# Patient Record
Sex: FEMALE | Race: WHITE | NOT HISPANIC OR LATINO | Employment: OTHER | URBAN - METROPOLITAN AREA
[De-identification: names, ages, dates, MRNs, and addresses within clinical notes are randomized per-mention and may not be internally consistent; named-entity substitution may affect disease eponyms.]

---

## 2017-01-11 ENCOUNTER — LAB (OUTPATIENT)
Dept: LAB | Facility: HOSPITAL | Age: 72
End: 2017-01-11

## 2017-01-11 DIAGNOSIS — N64.4 MASTODYNIA: Primary | ICD-10-CM

## 2017-01-11 LAB
ESTRADIOL SERPL HS-MCNC: 25 PG/ML
PROGEST SERPL-MCNC: 0.44 NG/ML

## 2017-01-11 PROCEDURE — 36415 COLL VENOUS BLD VENIPUNCTURE: CPT

## 2017-01-11 PROCEDURE — 84144 ASSAY OF PROGESTERONE: CPT | Performed by: OBSTETRICS & GYNECOLOGY

## 2017-01-11 PROCEDURE — 82670 ASSAY OF TOTAL ESTRADIOL: CPT | Performed by: OBSTETRICS & GYNECOLOGY

## 2017-01-12 ENCOUNTER — TRANSCRIBE ORDERS (OUTPATIENT)
Dept: ADMINISTRATIVE | Facility: HOSPITAL | Age: 72
End: 2017-01-12

## 2017-01-12 DIAGNOSIS — N63.0 MASS OF BREAST: Primary | ICD-10-CM

## 2017-01-24 ENCOUNTER — HOSPITAL ENCOUNTER (OUTPATIENT)
Dept: ULTRASOUND IMAGING | Facility: HOSPITAL | Age: 72
Discharge: HOME | End: 2017-01-24

## 2017-01-24 ENCOUNTER — HOSPITAL ENCOUNTER (OUTPATIENT)
Dept: MAMMOGRAPHY | Facility: HOSPITAL | Age: 72
Discharge: HOME | End: 2017-01-24
Attending: OBSTETRICS & GYNECOLOGY | Admitting: OBSTETRICS & GYNECOLOGY

## 2017-01-24 DIAGNOSIS — N63.0 MASS OF BREAST: ICD-10-CM

## 2017-01-24 PROCEDURE — G0279 TOMOSYNTHESIS, MAMMO: HCPCS | Performed by: RADIOLOGY

## 2017-01-24 PROCEDURE — G0204 DX MAMMO INCL CAD BI: HCPCS

## 2017-01-24 PROCEDURE — G0204 DX MAMMO INCL CAD BI: HCPCS | Performed by: RADIOLOGY

## 2017-01-24 PROCEDURE — 76642 ULTRASOUND BREAST LIMITED: CPT

## 2017-01-24 PROCEDURE — 76642 ULTRASOUND BREAST LIMITED: CPT | Performed by: RADIOLOGY

## 2017-01-24 PROCEDURE — G0279 TOMOSYNTHESIS, MAMMO: HCPCS

## 2017-04-04 ENCOUNTER — OFFICE VISIT (OUTPATIENT)
Dept: ENDOCRINOLOGY | Facility: CLINIC | Age: 72
End: 2017-04-04

## 2017-04-04 VITALS
DIASTOLIC BLOOD PRESSURE: 74 MMHG | HEIGHT: 63 IN | HEART RATE: 78 BPM | SYSTOLIC BLOOD PRESSURE: 148 MMHG | BODY MASS INDEX: 21.79 KG/M2 | OXYGEN SATURATION: 98 % | WEIGHT: 123 LBS

## 2017-04-04 DIAGNOSIS — E78.01 ESSENTIAL FAMILIAL HYPERCHOLESTEROLEMIA: ICD-10-CM

## 2017-04-04 DIAGNOSIS — K29.90 GASTRODUODENITIS WITHOUT BLEEDING: ICD-10-CM

## 2017-04-04 DIAGNOSIS — I10 BENIGN ESSENTIAL HYPERTENSION: Primary | ICD-10-CM

## 2017-04-04 DIAGNOSIS — G62.89 OTHER POLYNEUROPATHY: ICD-10-CM

## 2017-04-04 DIAGNOSIS — K21.00 GASTROESOPHAGEAL REFLUX DISEASE WITH ESOPHAGITIS: ICD-10-CM

## 2017-04-04 DIAGNOSIS — E55.9 VITAMIN D DEFICIENCY: ICD-10-CM

## 2017-04-04 DIAGNOSIS — R10.13 EPIGASTRIC PAIN: ICD-10-CM

## 2017-04-04 DIAGNOSIS — R61 NIGHT SWEATS: ICD-10-CM

## 2017-04-04 DIAGNOSIS — K22.0 ACHALASIA: ICD-10-CM

## 2017-04-04 PROBLEM — R53.83 FATIGUE: Status: ACTIVE | Noted: 2017-04-04

## 2017-04-04 PROBLEM — E03.9 HYPOTHYROIDISM: Status: ACTIVE | Noted: 2017-04-04

## 2017-04-04 PROBLEM — Z98.890 H/O COLONOSCOPY: Status: ACTIVE | Noted: 2017-04-04

## 2017-04-04 LAB
ALBUMIN SERPL-MCNC: 4.8 G/DL (ref 3.2–4.8)
ALBUMIN/GLOB SERPL: 1.7 G/DL (ref 1.5–2.5)
ALP SERPL-CCNC: 68 U/L (ref 25–100)
ALT SERPL W P-5'-P-CCNC: 10 U/L (ref 7–40)
ANION GAP SERPL CALCULATED.3IONS-SCNC: 11 MMOL/L (ref 3–11)
ARTICHOKE IGE QN: 109 MG/DL (ref 0–130)
AST SERPL-CCNC: 20 U/L (ref 0–33)
BASOPHILS # BLD AUTO: 0.02 10*3/MM3 (ref 0–0.2)
BASOPHILS NFR BLD AUTO: 0.2 % (ref 0–1)
BILIRUB SERPL-MCNC: 0.6 MG/DL (ref 0.3–1.2)
BUN BLD-MCNC: 10 MG/DL (ref 9–23)
BUN/CREAT SERPL: 12.5 (ref 7–25)
CALCIUM SPEC-SCNC: 10.5 MG/DL (ref 8.7–10.4)
CHLORIDE SERPL-SCNC: 103 MMOL/L (ref 99–109)
CHOLEST SERPL-MCNC: 206 MG/DL (ref 0–200)
CO2 SERPL-SCNC: 26 MMOL/L (ref 20–31)
CREAT BLD-MCNC: 0.8 MG/DL (ref 0.6–1.3)
DEPRECATED RDW RBC AUTO: 42.6 FL (ref 37–54)
EOSINOPHIL # BLD AUTO: 0.05 10*3/MM3 (ref 0.1–0.3)
EOSINOPHIL NFR BLD AUTO: 0.6 % (ref 0–3)
ERYTHROCYTE [DISTWIDTH] IN BLOOD BY AUTOMATED COUNT: 12.3 % (ref 11.3–14.5)
ERYTHROCYTE [SEDIMENTATION RATE] IN BLOOD: 12 MM/HR (ref 0–30)
FERRITIN SERPL-MCNC: 21 NG/ML (ref 10–291)
GFR SERPL CREATININE-BSD FRML MDRD: 70 ML/MIN/1.73
GLOBULIN UR ELPH-MCNC: 2.9 GM/DL
GLUCOSE BLD-MCNC: 151 MG/DL (ref 70–100)
HCT VFR BLD AUTO: 44.9 % (ref 34.5–44)
HDLC SERPL-MCNC: 85 MG/DL (ref 40–60)
HGB BLD-MCNC: 14.6 G/DL (ref 11.5–15.5)
IMM GRANULOCYTES # BLD: 0.01 10*3/MM3 (ref 0–0.03)
IMM GRANULOCYTES NFR BLD: 0.1 % (ref 0–0.6)
LIPASE SERPL-CCNC: 50 U/L (ref 6–51)
LYMPHOCYTES # BLD AUTO: 1.61 10*3/MM3 (ref 0.6–4.8)
LYMPHOCYTES NFR BLD AUTO: 18.4 % (ref 24–44)
MCH RBC QN AUTO: 30.7 PG (ref 27–31)
MCHC RBC AUTO-ENTMCNC: 32.5 G/DL (ref 32–36)
MCV RBC AUTO: 94.3 FL (ref 80–99)
MONOCYTES # BLD AUTO: 0.5 10*3/MM3 (ref 0–1)
MONOCYTES NFR BLD AUTO: 5.7 % (ref 0–12)
NEUTROPHILS # BLD AUTO: 6.54 10*3/MM3 (ref 1.5–8.3)
NEUTROPHILS NFR BLD AUTO: 75 % (ref 41–71)
PLATELET # BLD AUTO: 213 10*3/MM3 (ref 150–450)
PMV BLD AUTO: 10.3 FL (ref 6–12)
POTASSIUM BLD-SCNC: 4.1 MMOL/L (ref 3.5–5.5)
PROT SERPL-MCNC: 7.7 G/DL (ref 5.7–8.2)
RBC # BLD AUTO: 4.76 10*6/MM3 (ref 3.89–5.14)
SODIUM BLD-SCNC: 140 MMOL/L (ref 132–146)
TRIGL SERPL-MCNC: 109 MG/DL (ref 0–150)
TSH SERPL DL<=0.05 MIU/L-ACNC: 0.88 MIU/ML (ref 0.35–5.35)
WBC NRBC COR # BLD: 8.73 10*3/MM3 (ref 3.5–10.8)

## 2017-04-04 PROCEDURE — 80061 LIPID PANEL: CPT | Performed by: INTERNAL MEDICINE

## 2017-04-04 PROCEDURE — 82728 ASSAY OF FERRITIN: CPT | Performed by: INTERNAL MEDICINE

## 2017-04-04 PROCEDURE — 99214 OFFICE O/P EST MOD 30 MIN: CPT | Performed by: INTERNAL MEDICINE

## 2017-04-04 PROCEDURE — 80053 COMPREHEN METABOLIC PANEL: CPT | Performed by: INTERNAL MEDICINE

## 2017-04-04 PROCEDURE — 83690 ASSAY OF LIPASE: CPT | Performed by: INTERNAL MEDICINE

## 2017-04-04 PROCEDURE — 85025 COMPLETE CBC W/AUTO DIFF WBC: CPT | Performed by: INTERNAL MEDICINE

## 2017-04-04 PROCEDURE — 85652 RBC SED RATE AUTOMATED: CPT | Performed by: INTERNAL MEDICINE

## 2017-04-04 PROCEDURE — 84443 ASSAY THYROID STIM HORMONE: CPT | Performed by: INTERNAL MEDICINE

## 2017-04-04 NOTE — ASSESSMENT & PLAN NOTE
With h/o gerd despite ppi, night sweats  Epigastric abdominal pain   Check egd  Ct with oral contrast   F/u after these are complete

## 2017-04-04 NOTE — PROGRESS NOTES
Brittny Nicolas 71 y.o.  CC:Follow-up; Hypothyroidism; Hypertension; and Hyperlipidemia    [place]: Follow-up; Hypothyroidism; Hypertension; and Hyperlipidemia    bp is good   Cc is problems with insomnia and night sweats  No etiology determined  Does have GERD at times   Is getting up to empty bladder 3-4 times a night   She is reducing food and liquids after 7-8 pm  Had mamm Dr Whelan- was negative  Pain is epigastric - had abnormal barium study 2015 and was seeing GI Dr Anaya but she cannot recall if EGD at the time   Pain in pit of stomach  No abd scan or recent egd  Is taking ppi regularly but symptoms still severe at times    No Known Allergies    Current Outpatient Prescriptions:   •  amLODIPine (NORVASC) 2.5 MG tablet, Take 1 tablet by mouth Daily., Disp: 30 tablet, Rfl: 5  •  aspirin 81 MG tablet, Take  by mouth., Disp: , Rfl:   •  Calcium-Magnesium (CALCIUM MAGNESIUM 750) 300-300 MG tablet, Take 2 tablets by mouth Daily., Disp: , Rfl:   •  Cholecalciferol (VITAMIN D) 2000 UNITS capsule, Take 1 capsule by mouth., Disp: , Rfl:   •  levothyroxine (SYNTHROID, LEVOTHROID) 125 MCG tablet, Take 1 tablet by mouth Daily., Disp: 30 tablet, Rfl: 5  Patient Active Problem List    Diagnosis   • Achalasia [K22.0]   • Chest pain [R07.9]   • Benign essential hypertension [I10]   • Carotid artery stenosis [I65.29]   • Cataract [H26.9]   • Cough [R05]   • Acute cystitis [N30.00]   • Screening for malignant neoplasm [Z12.9]   • GERD (gastroesophageal reflux disease) [K21.9]   • Essential familial hypercholesterolemia [E78.01]   • Gastrointestinal bleeding [K92.2]   • Inguinal hernia [K40.90]   • Insomnia [G47.00]   • Meniere's disease [H81.09]   • Night sweats [R61]   • Osteoporosis [M81.0]   • Peripheral neuropathy [G62.9]   • Retinal artery occlusion [H34.9]   • Rheumatoid arthritis [M06.9]   • Stroke syndrome [I63.9]   • Suprapubic pain [R10.2]   • Transient ischemic attack [G45.9]   • Positive TB test [R76.11]   • Vitamin D  deficiency [E55.9]   • Weight loss [R63.4]     Review of Systems   Constitutional: Positive for fatigue. Negative for activity change, appetite change, chills, diaphoresis, fever and unexpected weight change.   HENT: Negative for congestion, dental problem, drooling, ear discharge, ear pain, facial swelling, hearing loss, mouth sores, nosebleeds, postnasal drip, rhinorrhea, sinus pressure, sneezing, sore throat, tinnitus, trouble swallowing and voice change.    Eyes: Negative for photophobia, pain, discharge, redness, itching and visual disturbance.   Respiratory: Negative for apnea, cough, choking, chest tightness, shortness of breath, wheezing and stridor.    Cardiovascular: Negative for chest pain, palpitations and leg swelling.   Gastrointestinal: Positive for abdominal pain. Negative for abdominal distention, anal bleeding, blood in stool, constipation, diarrhea, nausea, rectal pain and vomiting.        Gerd with gastritis    Endocrine: Negative for cold intolerance, heat intolerance, polydipsia, polyphagia and polyuria.        Night sweats    Genitourinary: Negative for decreased urine volume, difficulty urinating, dysuria, enuresis, flank pain, frequency, genital sores, hematuria and urgency.   Musculoskeletal: Positive for arthralgias. Negative for back pain, gait problem, joint swelling, myalgias, neck pain and neck stiffness.   Skin: Negative for color change, pallor, rash and wound.   Allergic/Immunologic: Negative for environmental allergies, food allergies and immunocompromised state.   Neurological: Negative for dizziness, tremors, seizures, syncope, facial asymmetry, speech difficulty, weakness, light-headedness, numbness and headaches.   Hematological: Negative for adenopathy. Does not bruise/bleed easily.   Psychiatric/Behavioral: Negative for agitation, behavioral problems, confusion, decreased concentration, dysphoric mood, hallucinations, self-injury, sleep disturbance and suicidal ideas. The  "patient is not nervous/anxious and is not hyperactive.      Social History     Social History   • Marital status:      Spouse name: N/A   • Number of children: N/A   • Years of education: N/A     Occupational History   • Not on file.     Social History Main Topics   • Smoking status: Former Smoker   • Smokeless tobacco: Not on file   • Alcohol use Not on file   • Drug use: Not on file   • Sexual activity: Not on file     Other Topics Concern   • Not on file     Social History Narrative     Family History   Problem Relation Age of Onset   • Cystic fibrosis Other    • Cervical cancer Mother    • Cancer Paternal Grandmother      /74  Pulse 98  Ht 63\" (160 cm)  Wt 123 lb (55.8 kg)  SpO2 (!) 78%  BMI 21.79 kg/m2  Physical Exam   Constitutional: She is oriented to person, place, and time. She appears well-developed and well-nourished.   HENT:   Head: Normocephalic and atraumatic.   Nose: Nose normal.   Mouth/Throat: Oropharynx is clear and moist.   Eyes: Conjunctivae, EOM and lids are normal. Pupils are equal, round, and reactive to light.   Neck: Trachea normal and normal range of motion. Neck supple. Carotid bruit is not present. No tracheal deviation present. No thyroid mass and no thyromegaly present.   Cardiovascular: Normal rate, regular rhythm, normal heart sounds and intact distal pulses.  Exam reveals no gallop and no friction rub.    No murmur heard.  Pulmonary/Chest: Effort normal and breath sounds normal. No respiratory distress. She has no wheezes.   Abdominal: Soft. Bowel sounds are normal. She exhibits no distension. There is tenderness (epigastric without mass or adenopathy ).   Musculoskeletal: Normal range of motion. She exhibits no edema or deformity.   Lymphadenopathy:     She has no cervical adenopathy.   Neurological: She is alert and oriented to person, place, and time. She has normal reflexes. She displays normal reflexes. No cranial nerve deficit.   Skin: Skin is warm and dry. " No rash noted. No cyanosis or erythema. Nails show no clubbing.   Psychiatric: She has a normal mood and affect. Her speech is normal and behavior is normal. Judgment and thought content normal. Cognition and memory are normal.   Nursing note and vitals reviewed.    Results for orders placed or performed in visit on 01/11/17   Estradiol   Result Value Ref Range    Estradiol 25.0 pg/mL   Progesterone   Result Value Ref Range    Progesterone 0.44 ng/mL     Problem List Items Addressed This Visit        Cardiovascular and Mediastinum    Benign essential hypertension - Primary     bp is high/ normal - she is testing at home and levels are normal / not over 145/85  Continue to test at home   Call or email if over goal at any time  Continue current medications          Relevant Orders    Comprehensive Metabolic Panel    CBC & Differential    CBC Auto Differential    Essential familial hypercholesterolemia     Check flp          Relevant Orders    TSH    Lipid Panel       Digestive    Achalasia     With h/o gerd despite ppi, night sweats  Epigastric abdominal pain   Check egd  Ct with oral contrast   F/u after these are complete         Relevant Orders    Ferritin    Sedimentation Rate    GERD (gastroesophageal reflux disease)     Abnormal study 2/15- repeat with egd          Relevant Orders    Ambulatory referral for Screening EGD    CT Abdomen Pelvis Without Contrast    Lipase    Vitamin D deficiency     Continue supplement and repeat yearly             Nervous and Auditory    Peripheral neuropathy     Without foot lesion   Recommended B12 supplement             Musculoskeletal and Integument    Night sweats     With epigastric abdominal pain - check ct abdomen            Relevant Orders    CT Abdomen Pelvis Without Contrast    Lipase      Other Visit Diagnoses     Epigastric pain        Relevant Orders    Lipase    Gastroduodenitis without bleeding         Relevant Orders    Ferritin        Return in about 6 months  (around 10/4/2017) for Recheck 30 min .    Casandra Sommer MA

## 2017-04-04 NOTE — ASSESSMENT & PLAN NOTE
bp is high/ normal - she is testing at home and levels are normal / not over 145/85  Continue to test at home   Call or email if over goal at any time  Continue current medications

## 2017-04-10 ENCOUNTER — HOSPITAL ENCOUNTER (OUTPATIENT)
Dept: CT IMAGING | Facility: HOSPITAL | Age: 72
Discharge: HOME | End: 2017-04-10
Attending: INTERNAL MEDICINE | Admitting: INTERNAL MEDICINE

## 2017-04-10 PROCEDURE — 74176 CT ABD & PELVIS W/O CONTRAST: CPT

## 2017-04-10 RX ADMIN — BARIUM SULFATE 450 ML: 21 SUSPENSION ORAL at 13:15

## 2017-04-11 RX ORDER — LEVOTHYROXINE SODIUM 0.12 MG/1
TABLET ORAL
Qty: 30 TABLET | Refills: 4 | Status: SHIPPED | OUTPATIENT
Start: 2017-04-11 | End: 2017-09-13 | Stop reason: SDUPTHER

## 2017-05-01 PROBLEM — K22.10 EROSIVE ESOPHAGITIS: Status: ACTIVE | Noted: 2017-05-01

## 2017-07-31 RX ORDER — AMLODIPINE BESYLATE 2.5 MG/1
2.5 TABLET ORAL DAILY
Qty: 30 TABLET | Refills: 5 | Status: SHIPPED | OUTPATIENT
Start: 2017-07-31 | End: 2017-08-01 | Stop reason: SDUPTHER

## 2017-07-31 NOTE — TELEPHONE ENCOUNTER
PATIENT NEEDS THIS REFILLED ASAP. SHE STATES HER PHARMACY SENT THE REQUEST OVER ON THE 20TH. I DO NOT SEE A REFILL REQUEST AT ALL IN HER CHART. LAST DATED WAS April.

## 2017-08-01 RX ORDER — AMLODIPINE BESYLATE 2.5 MG/1
2.5 TABLET ORAL DAILY
Qty: 30 TABLET | Refills: 5 | Status: SHIPPED | OUTPATIENT
Start: 2017-08-01 | End: 2018-02-09 | Stop reason: SDUPTHER

## 2017-09-14 RX ORDER — LEVOTHYROXINE SODIUM 0.12 MG/1
TABLET ORAL
Qty: 30 TABLET | Refills: 0 | Status: SHIPPED | OUTPATIENT
Start: 2017-09-14 | End: 2017-10-15 | Stop reason: SDUPTHER

## 2017-10-06 ENCOUNTER — OFFICE VISIT (OUTPATIENT)
Dept: ENDOCRINOLOGY | Facility: CLINIC | Age: 72
End: 2017-10-06

## 2017-10-06 VITALS
WEIGHT: 120 LBS | HEART RATE: 99 BPM | SYSTOLIC BLOOD PRESSURE: 138 MMHG | HEIGHT: 64 IN | DIASTOLIC BLOOD PRESSURE: 80 MMHG | BODY MASS INDEX: 20.49 KG/M2 | OXYGEN SATURATION: 98 %

## 2017-10-06 DIAGNOSIS — I10 BENIGN ESSENTIAL HYPERTENSION: Primary | ICD-10-CM

## 2017-10-06 DIAGNOSIS — K21.00 GASTROESOPHAGEAL REFLUX DISEASE WITH ESOPHAGITIS: ICD-10-CM

## 2017-10-06 DIAGNOSIS — M81.0 AGE-RELATED OSTEOPOROSIS WITHOUT CURRENT PATHOLOGICAL FRACTURE: ICD-10-CM

## 2017-10-06 DIAGNOSIS — E03.9 ACQUIRED HYPOTHYROIDISM: ICD-10-CM

## 2017-10-06 DIAGNOSIS — E55.9 VITAMIN D DEFICIENCY: ICD-10-CM

## 2017-10-06 DIAGNOSIS — M81.6 LOCALIZED OSTEOPOROSIS WITHOUT CURRENT PATHOLOGICAL FRACTURE: ICD-10-CM

## 2017-10-06 DIAGNOSIS — E78.01 ESSENTIAL FAMILIAL HYPERCHOLESTEROLEMIA: ICD-10-CM

## 2017-10-06 LAB
25(OH)D3 SERPL-MCNC: 31.9 NG/ML
ALBUMIN SERPL-MCNC: 4 G/DL (ref 3.2–4.8)
ALBUMIN/GLOB SERPL: 1.4 G/DL (ref 1.5–2.5)
ALP SERPL-CCNC: 94 U/L (ref 25–100)
ALT SERPL W P-5'-P-CCNC: 11 U/L (ref 7–40)
ANION GAP SERPL CALCULATED.3IONS-SCNC: 3 MMOL/L (ref 3–11)
ARTICHOKE IGE QN: 84 MG/DL (ref 0–130)
AST SERPL-CCNC: 17 U/L (ref 0–33)
BASOPHILS # BLD AUTO: 0.03 10*3/MM3 (ref 0–0.2)
BASOPHILS NFR BLD AUTO: 0.5 % (ref 0–1)
BILIRUB SERPL-MCNC: 0.6 MG/DL (ref 0.3–1.2)
BUN BLD-MCNC: 11 MG/DL (ref 9–23)
BUN/CREAT SERPL: 13.8 (ref 7–25)
CALCIUM SPEC-SCNC: 9.5 MG/DL (ref 8.7–10.4)
CHLORIDE SERPL-SCNC: 104 MMOL/L (ref 99–109)
CHOLEST SERPL-MCNC: 173 MG/DL (ref 0–200)
CO2 SERPL-SCNC: 33 MMOL/L (ref 20–31)
CREAT BLD-MCNC: 0.8 MG/DL (ref 0.6–1.3)
DEPRECATED RDW RBC AUTO: 43.3 FL (ref 37–54)
EOSINOPHIL # BLD AUTO: 0.12 10*3/MM3 (ref 0–0.3)
EOSINOPHIL NFR BLD AUTO: 2 % (ref 0–3)
ERYTHROCYTE [DISTWIDTH] IN BLOOD BY AUTOMATED COUNT: 12.5 % (ref 11.3–14.5)
GFR SERPL CREATININE-BSD FRML MDRD: 70 ML/MIN/1.73
GLOBULIN UR ELPH-MCNC: 2.8 GM/DL
GLUCOSE BLD-MCNC: 77 MG/DL (ref 70–100)
HCT VFR BLD AUTO: 39.9 % (ref 34.5–44)
HDLC SERPL-MCNC: 69 MG/DL (ref 40–60)
HGB BLD-MCNC: 12.7 G/DL (ref 11.5–15.5)
IMM GRANULOCYTES # BLD: 0.01 10*3/MM3 (ref 0–0.03)
IMM GRANULOCYTES NFR BLD: 0.2 % (ref 0–0.6)
LYMPHOCYTES # BLD AUTO: 1.23 10*3/MM3 (ref 0.6–4.8)
LYMPHOCYTES NFR BLD AUTO: 20 % (ref 24–44)
MCH RBC QN AUTO: 30.1 PG (ref 27–31)
MCHC RBC AUTO-ENTMCNC: 31.8 G/DL (ref 32–36)
MCV RBC AUTO: 94.5 FL (ref 80–99)
MONOCYTES # BLD AUTO: 0.47 10*3/MM3 (ref 0–1)
MONOCYTES NFR BLD AUTO: 7.7 % (ref 0–12)
NEUTROPHILS # BLD AUTO: 4.28 10*3/MM3 (ref 1.5–8.3)
NEUTROPHILS NFR BLD AUTO: 69.6 % (ref 41–71)
PLATELET # BLD AUTO: 229 10*3/MM3 (ref 150–450)
PMV BLD AUTO: 10.3 FL (ref 6–12)
POTASSIUM BLD-SCNC: 4 MMOL/L (ref 3.5–5.5)
PROT SERPL-MCNC: 6.8 G/DL (ref 5.7–8.2)
RBC # BLD AUTO: 4.22 10*6/MM3 (ref 3.89–5.14)
SODIUM BLD-SCNC: 140 MMOL/L (ref 132–146)
TRIGL SERPL-MCNC: 137 MG/DL (ref 0–150)
TSH SERPL DL<=0.05 MIU/L-ACNC: 1.02 MIU/ML (ref 0.35–5.35)
WBC NRBC COR # BLD: 6.14 10*3/MM3 (ref 3.5–10.8)

## 2017-10-06 PROCEDURE — 82306 VITAMIN D 25 HYDROXY: CPT | Performed by: INTERNAL MEDICINE

## 2017-10-06 PROCEDURE — G0008 ADMIN INFLUENZA VIRUS VAC: HCPCS | Performed by: INTERNAL MEDICINE

## 2017-10-06 PROCEDURE — 80061 LIPID PANEL: CPT | Performed by: INTERNAL MEDICINE

## 2017-10-06 PROCEDURE — 85025 COMPLETE CBC W/AUTO DIFF WBC: CPT | Performed by: INTERNAL MEDICINE

## 2017-10-06 PROCEDURE — 99214 OFFICE O/P EST MOD 30 MIN: CPT | Performed by: INTERNAL MEDICINE

## 2017-10-06 PROCEDURE — 80053 COMPREHEN METABOLIC PANEL: CPT | Performed by: INTERNAL MEDICINE

## 2017-10-06 PROCEDURE — 84443 ASSAY THYROID STIM HORMONE: CPT | Performed by: INTERNAL MEDICINE

## 2017-10-06 PROCEDURE — 90662 IIV NO PRSV INCREASED AG IM: CPT | Performed by: INTERNAL MEDICINE

## 2017-10-06 RX ORDER — PANTOPRAZOLE SODIUM 40 MG/1
TABLET, DELAYED RELEASE ORAL
COMMUNITY
Start: 2017-08-26 | End: 2018-10-22 | Stop reason: ALTCHOICE

## 2017-10-06 NOTE — ASSESSMENT & PLAN NOTE
On double dose protonix - still gerd  Worse at night - is supplementing with tums  Samples dexilant provided

## 2017-10-06 NOTE — PROGRESS NOTES
Brittny Nicolas 72 y.o.  CC:Follow-up; Hypothyroidism; Hypertension; Hyperlipidemia; Osteoporosis; and Heartburn (GERD)    [place]: Follow-up; Hypothyroidism; Hypertension; Hyperlipidemia; Osteoporosis; and Heartburn (GERD)    Doing well overall   bp is good   Energy is good overall  Is following a low fat diet  Is on protonix for gerd- working well   Known osteoporosis due for updated bone density  Does take vitamin D daily  Cc severe GERD - GI doubled protonix and she is still taking tums and other medications       No Known Allergies    Current Outpatient Prescriptions:   •  amLODIPine (NORVASC) 2.5 MG tablet, Take 1 tablet by mouth Daily., Disp: 30 tablet, Rfl: 5  •  aspirin 81 MG tablet, Take  by mouth., Disp: , Rfl:   •  Calcium-Magnesium (CALCIUM MAGNESIUM 750) 300-300 MG tablet, Take 2 tablets by mouth Daily., Disp: , Rfl:   •  Cholecalciferol (VITAMIN D) 2000 UNITS capsule, Take 1 capsule by mouth., Disp: , Rfl:   •  levothyroxine (SYNTHROID, LEVOTHROID) 125 MCG tablet, TAKE ONE TABLET BY MOUTH EVERY DAY, Disp: 30 tablet, Rfl: 0  •  pantoprazole (PROTONIX) 40 MG EC tablet, Take one tablet BID, Disp: , Rfl:   Patient Active Problem List    Diagnosis   • Erosive esophagitis [K22.10]     Overview Note:     EGD 5/17 - reflux precautions     • H/O colonoscopy [Z98.890]     Overview Note:     Description: 11/5/13 - Dr Cassandra Anaya     • Fatigue [R53.83]   • Hypothyroidism [E03.9]     Overview Note:     Impression: 03/08/2016 - check tfts  Impression: 09/08/2015 - update tfts  Impression: 03/09/2015 - check tfts  Impression: 12/23/2014 - check tsh  Impression: 12/05/2014 - check tsh  Impression: 09/11/2014 - update tft  Impression: 03/11/2014 - update tft;      • Achalasia [K22.0]     Overview Note:     Impression: 03/09/2015 - egd pending  Impression: 12/23/2014 - get CAT scan copy; Description: 3/15- esophagitis, schatzkis ring and hiatal hernia  s/p egd with dilation 3/11/15, EGD mild esophagitis     • Acute  upper respiratory infection [J06.9]     Overview Note:     Impression: 12/05/2014 - doxycycline and proair, check cxr;      • Atypical chest pain [R07.89]     Overview Note:     Description: atypical, assoc with low tsh  Saw Dr Ortega- stress test 4/13  Description: assoc with low TSH 6/12     • Benign essential hypertension [I10]     Overview Note:     Impression: 03/08/2016 - bp is good  Impression: 09/08/2015 - bp is good   continue to monitor  Impression: 03/09/2015 - bp is good  Impression: 12/23/2014 - bp is good  Impression: 12/05/2014 - bp is higher today- increase lisinopril 10 mg bid and check bp every day, report in 1 week  Impression: 09/11/2014 - bp is high- not sure if she took med last night  recheck better, does monitor at home and they are good overall  continue to monitor and we can titrate medication prn  Impression: 03/11/2014 - bp high here, repeat 158/82, home bp good.  continue to monitor and call if over 145/85;      • Carotid artery stenosis [I65.29]     Overview Note:     Impression: 01/08/2015 - s/p op cea- doing well  bp is settling down now- ok to continue current rx; Description: with occlusion of retinal artery- s/p carotide endarterectomy 1/15     • Cataract [H26.9]     Overview Note:     Description: 3/15     • Cough [R05]     Overview Note:     Impression: 03/09/2015 - raspy cough- waking her up at night  non productive- occurring since December  gradually gotten worse  discussed poss due to ace inhibitor- will change to arb.  Impression: 03/09/2015 - raspy cough- waking her up at night  non productive- occurring since December  gradually gotten worse;      • Acute cystitis [N30.00]   • Screening for malignant neoplasm [Z12.9]   • Gastroesophageal reflux disease [K21.9]     Overview Note:     Impression: 09/11/2014 - no help with protonix, prilosec, nexium and zantac  has taken and tolerated dexilant in past- samples provided and renewed; Description: esophagitis- Dr Anaya - 2014,  recc chronic ppi rx, EGD 3/15 - recc chronic ppi, diet , elevation HOB     • Essential familial hypercholesterolemia [E78.01]     Overview Note:     Impression: 03/08/2016 - check flp  Impression: 09/08/2015 - check flp  Impression: 03/09/2015 - check flp- target ldl less than 80  did not do chol last ov  Impression: 03/09/2015 - check flp- target ldl less than 80  Impression: 12/23/2014 - update flp  Impression: 03/11/2014 - update flp;      • Gastrointestinal bleeding [K92.2]     Overview Note:     Description: due to diverticulosis     • Inguinal hernia [K40.90]     Overview Note:     Impression: 03/08/2016 - s/p repair- healing well    left lower quadrant incision closed and no redness or drainage; Description: 3/16 - recurrent- AJ referred by Dr Whelan     • Insomnia [G47.00]     Overview Note:     Impression: 11/09/2015 - see above;      • Leg cramps [R25.2]     Overview Note:     Impression: 09/08/2015 - check ck, mag  trial co q 10- really lower calf and foot cramps;      • Meniere's disease [H81.09]   • Night sweats [R61]     Overview Note:     Impression: 11/09/2015 - with weight loss and low tsh   we reduced supplement last ov   check tfts today;      • Osteoporosis [M81.0]     Overview Note:     Impression: 12/23/2014 - check vitamin D levels  Impression: 03/11/2014 - check vitamin D levels; Description: dexa a long time ago     • Peripheral neuropathy [G62.9]     Overview Note:     Description: radiculopathy- ncv 2/13     • Retinal artery occlusion [H34.9]     Overview Note:     Impression: 01/08/2015 - with problems with peripheral vision  drove today- did well today;      • Rheumatoid arthritis [M06.9]     Overview Note:     Impression: 12/05/2014 - stable symptoms; Description: kaity 9/11     • Stroke syndrome [I63.9]     Overview Note:     Impression: 12/05/2014 - check flp; Description: retinal stroke 11/30/14- loss of vision right eye - Dr Denson      • Suprapubic pain [R10.2]     Overview Note:      Impression: 11/09/2015 - update ua;      • Transient ischemic attack [G45.9]     Overview Note:     Impression: 03/09/2015 - stable currently; Description: 3/15 ischemic neuropathy- eye     • Positive reaction to tuberculin skin test [R76.11]   • Vitamin D deficiency [E55.9]     Overview Note:     Impression: 03/08/2016 - update vitamin D levels  Impression: 09/08/2015 - update vitamin D levels;      • Weight loss [R63.4]     Overview Note:     Impression: 11/09/2015 - see above- also check labs;        Review of Systems   Constitutional: Negative for activity change, appetite change, chills, diaphoresis, fatigue, fever and unexpected weight change.   HENT: Negative for congestion, dental problem, drooling, ear discharge, ear pain, facial swelling, hearing loss, mouth sores, nosebleeds, postnasal drip, rhinorrhea, sinus pressure, sneezing, sore throat, tinnitus, trouble swallowing and voice change.    Eyes: Negative for photophobia, pain, discharge, redness, itching and visual disturbance.   Respiratory: Negative for apnea, cough, choking, chest tightness, shortness of breath, wheezing and stridor.    Cardiovascular: Negative for chest pain, palpitations and leg swelling.   Gastrointestinal: Negative for abdominal distention, abdominal pain, anal bleeding, blood in stool, constipation, diarrhea, nausea, rectal pain and vomiting.   Endocrine: Negative for cold intolerance, heat intolerance, polydipsia, polyphagia and polyuria.   Genitourinary: Negative for decreased urine volume, difficulty urinating, dysuria, enuresis, flank pain, frequency, genital sores, hematuria and urgency.   Musculoskeletal: Negative for arthralgias, back pain, gait problem, joint swelling, myalgias, neck pain and neck stiffness.   Skin: Negative for color change, pallor, rash and wound.   Allergic/Immunologic: Negative for environmental allergies, food allergies and immunocompromised state.   Neurological: Negative for dizziness, tremors,  "seizures, syncope, facial asymmetry, speech difficulty, weakness, light-headedness, numbness and headaches.   Hematological: Negative for adenopathy. Does not bruise/bleed easily.   Psychiatric/Behavioral: Negative for agitation, behavioral problems, confusion, decreased concentration, dysphoric mood, hallucinations, self-injury, sleep disturbance and suicidal ideas. The patient is not nervous/anxious and is not hyperactive.      Social History     Social History   • Marital status:      Spouse name: N/A   • Number of children: N/A   • Years of education: N/A     Occupational History   • Not on file.     Social History Main Topics   • Smoking status: Former Smoker   • Smokeless tobacco: Not on file   • Alcohol use Not on file   • Drug use: Not on file   • Sexual activity: Not on file     Other Topics Concern   • Not on file     Social History Narrative     Family History   Problem Relation Age of Onset   • Cystic fibrosis Other    • Cervical cancer Mother    • Cancer Paternal Grandmother      /80  Pulse 99  Ht 63.5\" (161.3 cm)  Wt 120 lb (54.4 kg)  SpO2 98%  BMI 20.92 kg/m2  Physical Exam   Constitutional: She is oriented to person, place, and time. She appears well-developed and well-nourished.   HENT:   Head: Normocephalic and atraumatic.   Nose: Nose normal.   Mouth/Throat: Oropharynx is clear and moist.   Eyes: Conjunctivae, EOM and lids are normal. Pupils are equal, round, and reactive to light.   Neck: Trachea normal and normal range of motion. Neck supple. Carotid bruit is not present. No tracheal deviation present. No thyroid mass and no thyromegaly present.   Cardiovascular: Normal rate, regular rhythm, normal heart sounds and intact distal pulses.  Exam reveals no gallop and no friction rub.    No murmur heard.  Pulmonary/Chest: Effort normal and breath sounds normal. No respiratory distress. She has no wheezes.   Musculoskeletal: Normal range of motion. She exhibits no edema or " deformity.   Lymphadenopathy:     She has no cervical adenopathy.   Neurological: She is alert and oriented to person, place, and time. She has normal reflexes. She displays normal reflexes. No cranial nerve deficit.   Skin: Skin is warm and dry. No rash noted. No cyanosis or erythema. Nails show no clubbing.   Psychiatric: She has a normal mood and affect. Her speech is normal and behavior is normal. Judgment and thought content normal. Cognition and memory are normal.   Nursing note and vitals reviewed.    Results for orders placed or performed in visit on 04/04/17   Comprehensive Metabolic Panel   Result Value Ref Range    Glucose 151 (H) 70 - 100 mg/dL    BUN 10 9 - 23 mg/dL    Creatinine 0.80 0.60 - 1.30 mg/dL    Sodium 140 132 - 146 mmol/L    Potassium 4.1 3.5 - 5.5 mmol/L    Chloride 103 99 - 109 mmol/L    CO2 26.0 20.0 - 31.0 mmol/L    Calcium 10.5 (H) 8.7 - 10.4 mg/dL    Total Protein 7.7 5.7 - 8.2 g/dL    Albumin 4.80 3.20 - 4.80 g/dL    ALT (SGPT) 10 7 - 40 U/L    AST (SGOT) 20 0 - 33 U/L    Alkaline Phosphatase 68 25 - 100 U/L    Total Bilirubin 0.6 0.3 - 1.2 mg/dL    eGFR Non African Amer 70 >60 mL/min/1.73    Globulin 2.9 gm/dL    A/G Ratio 1.7 1.5 - 2.5 g/dL    BUN/Creatinine Ratio 12.5 7.0 - 25.0    Anion Gap 11.0 3.0 - 11.0 mmol/L   TSH   Result Value Ref Range    TSH 0.876 0.350 - 5.350 mIU/mL   Lipid Panel   Result Value Ref Range    Total Cholesterol 206 (H) 0 - 200 mg/dL    Triglycerides 109 0 - 150 mg/dL    HDL Cholesterol 85 (H) 40 - 60 mg/dL    LDL Cholesterol  109 0 - 130 mg/dL   Ferritin   Result Value Ref Range    Ferritin 21.00 10.00 - 291.00 ng/mL   Sedimentation Rate   Result Value Ref Range    Sed Rate 12 0 - 30 mm/hr   Lipase   Result Value Ref Range    Lipase 50 6 - 51 U/L   CBC Auto Differential   Result Value Ref Range    WBC 8.73 3.50 - 10.80 10*3/mm3    RBC 4.76 3.89 - 5.14 10*6/mm3    Hemoglobin 14.6 11.5 - 15.5 g/dL    Hematocrit 44.9 (H) 34.5 - 44.0 %    MCV 94.3 80.0 - 99.0  fL    MCH 30.7 27.0 - 31.0 pg    MCHC 32.5 32.0 - 36.0 g/dL    RDW 12.3 11.3 - 14.5 %    RDW-SD 42.6 37.0 - 54.0 fl    MPV 10.3 6.0 - 12.0 fL    Platelets 213 150 - 450 10*3/mm3    Neutrophil % 75.0 (H) 41.0 - 71.0 %    Lymphocyte % 18.4 (L) 24.0 - 44.0 %    Monocyte % 5.7 0.0 - 12.0 %    Eosinophil % 0.6 0.0 - 3.0 %    Basophil % 0.2 0.0 - 1.0 %    Immature Grans % 0.1 0.0 - 0.6 %    Neutrophils, Absolute 6.54 1.50 - 8.30 10*3/mm3    Lymphocytes, Absolute 1.61 0.60 - 4.80 10*3/mm3    Monocytes, Absolute 0.50 0.00 - 1.00 10*3/mm3    Eosinophils, Absolute 0.05 (L) 0.10 - 0.30 10*3/mm3    Basophils, Absolute 0.02 0.00 - 0.20 10*3/mm3    Immature Grans, Absolute 0.01 0.00 - 0.03 10*3/mm3     Problem List Items Addressed This Visit        Cardiovascular and Mediastinum    Benign essential hypertension - Primary     bp is normal today          Relevant Orders    Comprehensive Metabolic Panel    CBC & Differential    CBC Auto Differential    Essential familial hypercholesterolemia     Check flp   On low fat diet          Relevant Orders    Lipid Panel       Digestive    Gastroesophageal reflux disease     On double dose protonix - still gerd  Worse at night - is supplementing with tums  Samples dexilant provided          Relevant Medications    pantoprazole (PROTONIX) 40 MG EC tablet    Vitamin D deficiency     Update vitamin D levels          Relevant Orders    Vitamin D 25 Hydroxy       Endocrine    Hypothyroidism     Check tfts          Relevant Orders    TSH       Musculoskeletal and Integument    Osteoporosis     Update bone density          Relevant Orders    DEXA Bone Density Axial        Return in about 6 months (around 4/6/2018) for Recheck 30 min .    Casandra Sommer MA  Signed Dalila Liz MD

## 2017-10-15 RX ORDER — LEVOTHYROXINE SODIUM 0.12 MG/1
125 TABLET ORAL DAILY
Qty: 30 TABLET | Refills: 3 | Status: SHIPPED | OUTPATIENT
Start: 2017-10-15 | End: 2018-02-14 | Stop reason: SDUPTHER

## 2017-10-25 ENCOUNTER — HOSPITAL ENCOUNTER (OUTPATIENT)
Dept: BONE DENSITY | Facility: HOSPITAL | Age: 72
Discharge: HOME | End: 2017-10-25
Attending: INTERNAL MEDICINE | Admitting: INTERNAL MEDICINE

## 2017-10-25 PROCEDURE — 77080 DXA BONE DENSITY AXIAL: CPT

## 2017-10-29 ENCOUNTER — TELEPHONE (OUTPATIENT)
Dept: INTERNAL MEDICINE | Facility: CLINIC | Age: 72
End: 2017-10-29

## 2017-10-29 RX ORDER — ZOSTER VACCINE LIVE 19400 [PFU]/.65ML
INJECTION, POWDER, LYOPHILIZED, FOR SUSPENSION SUBCUTANEOUS
COMMUNITY
Start: 2017-10-07 | End: 2018-10-09

## 2017-10-29 RX ORDER — IBANDRONATE SODIUM 150 MG/1
150 TABLET, FILM COATED ORAL
Qty: 1 TABLET | Refills: 11 | Status: SHIPPED | OUTPATIENT
Start: 2017-10-29 | End: 2017-11-02 | Stop reason: SDUPTHER

## 2017-11-02 ENCOUNTER — TELEPHONE (OUTPATIENT)
Dept: INTERNAL MEDICINE | Facility: CLINIC | Age: 72
End: 2017-11-02

## 2017-11-02 RX ORDER — IBANDRONATE SODIUM 150 MG/1
150 TABLET, FILM COATED ORAL
Qty: 1 TABLET | Refills: 11 | Status: SHIPPED | OUTPATIENT
Start: 2017-11-02 | End: 2017-12-11 | Stop reason: SDUPTHER

## 2017-11-02 NOTE — TELEPHONE ENCOUNTER
rx printed and mailed.  Thanks, [facility]      DR KIM,    THE RX FOR BONIVA WAS NOT ENCLOSED WITH RECENT LETTER FOR [facility].

## 2017-11-14 ENCOUNTER — TELEPHONE (OUTPATIENT)
Dept: INTERNAL MEDICINE | Facility: CLINIC | Age: 72
End: 2017-11-14

## 2017-11-14 NOTE — TELEPHONE ENCOUNTER
Received fax from [facility] requesting a PA for Boniva tablets, submitted PA via [email or web address], awaiting response.     PA for Boniva was denied stating that pt must try and fail Fosamax first, denial letter given to prescriber for further review.

## 2017-12-11 ENCOUNTER — TELEPHONE (OUTPATIENT)
Dept: ENDOCRINOLOGY | Facility: CLINIC | Age: 72
End: 2017-12-11

## 2017-12-11 RX ORDER — IBANDRONATE SODIUM 150 MG/1
150 TABLET, FILM COATED ORAL
Qty: 1 TABLET | Refills: 11
Start: 2017-12-11 | End: 2018-10-09

## 2017-12-11 NOTE — TELEPHONE ENCOUNTER
Called pt wo let her know that PA was approved and could have RX fill for Boniva. L:eft message on machine

## 2017-12-11 NOTE — TELEPHONE ENCOUNTER
PT WAS RETURNING A CALL. DOESN'T KNOW WHO CALLED OR WHY. READ THE NOTES LEFT BY [facility] IN MYCHART BUT THEY DID NOT MAKE SENSE TO PT

## 2018-02-09 RX ORDER — AMLODIPINE BESYLATE 2.5 MG/1
2.5 TABLET ORAL DAILY
Qty: 30 TABLET | Refills: 3 | Status: SHIPPED | OUTPATIENT
Start: 2018-02-09 | End: 2018-06-19 | Stop reason: SDUPTHER

## 2018-02-14 RX ORDER — LEVOTHYROXINE SODIUM 0.12 MG/1
TABLET ORAL
Qty: 30 TABLET | Refills: 1 | Status: SHIPPED | OUTPATIENT
Start: 2018-02-14 | End: 2018-04-11 | Stop reason: SDUPTHER

## 2018-04-09 ENCOUNTER — OFFICE VISIT (OUTPATIENT)
Dept: ENDOCRINOLOGY | Facility: CLINIC | Age: 73
End: 2018-04-09

## 2018-04-09 VITALS
HEART RATE: 81 BPM | DIASTOLIC BLOOD PRESSURE: 78 MMHG | SYSTOLIC BLOOD PRESSURE: 142 MMHG | BODY MASS INDEX: 20.32 KG/M2 | WEIGHT: 119 LBS | OXYGEN SATURATION: 98 % | HEIGHT: 64 IN

## 2018-04-09 DIAGNOSIS — E55.9 VITAMIN D DEFICIENCY: ICD-10-CM

## 2018-04-09 DIAGNOSIS — E78.01 ESSENTIAL FAMILIAL HYPERCHOLESTEROLEMIA: ICD-10-CM

## 2018-04-09 DIAGNOSIS — E03.9 ACQUIRED HYPOTHYROIDISM: Primary | ICD-10-CM

## 2018-04-09 DIAGNOSIS — I10 BENIGN ESSENTIAL HYPERTENSION: ICD-10-CM

## 2018-04-09 DIAGNOSIS — G62.89 OTHER POLYNEUROPATHY: ICD-10-CM

## 2018-04-09 LAB
25(OH)D3 SERPL-MCNC: 19.4 NG/ML
ALBUMIN SERPL-MCNC: 4.4 G/DL (ref 3.2–4.8)
ALBUMIN/GLOB SERPL: 1.8 G/DL (ref 1.5–2.5)
ALP SERPL-CCNC: 76 U/L (ref 25–100)
ALT SERPL W P-5'-P-CCNC: 15 U/L (ref 7–40)
ANION GAP SERPL CALCULATED.3IONS-SCNC: 5 MMOL/L (ref 3–11)
ARTICHOKE IGE QN: 93 MG/DL (ref 0–130)
AST SERPL-CCNC: 20 U/L (ref 0–33)
BILIRUB SERPL-MCNC: 0.4 MG/DL (ref 0.3–1.2)
BUN BLD-MCNC: 15 MG/DL (ref 9–23)
BUN/CREAT SERPL: 21.4 (ref 7–25)
CALCIUM SPEC-SCNC: 9.5 MG/DL (ref 8.7–10.4)
CHLORIDE SERPL-SCNC: 105 MMOL/L (ref 99–109)
CHOLEST SERPL-MCNC: 194 MG/DL (ref 0–200)
CO2 SERPL-SCNC: 29 MMOL/L (ref 20–31)
CREAT BLD-MCNC: 0.7 MG/DL (ref 0.6–1.3)
GFR SERPL CREATININE-BSD FRML MDRD: 82 ML/MIN/1.73
GLOBULIN UR ELPH-MCNC: 2.5 GM/DL
GLUCOSE BLD-MCNC: 70 MG/DL (ref 70–100)
HDLC SERPL-MCNC: 86 MG/DL (ref 40–60)
POTASSIUM BLD-SCNC: 4.5 MMOL/L (ref 3.5–5.5)
PROT SERPL-MCNC: 6.9 G/DL (ref 5.7–8.2)
SODIUM BLD-SCNC: 139 MMOL/L (ref 132–146)
TRIGL SERPL-MCNC: 68 MG/DL (ref 0–150)
TSH SERPL DL<=0.05 MIU/L-ACNC: 1.22 MIU/ML (ref 0.35–5.35)

## 2018-04-09 PROCEDURE — 82306 VITAMIN D 25 HYDROXY: CPT | Performed by: INTERNAL MEDICINE

## 2018-04-09 PROCEDURE — 80061 LIPID PANEL: CPT | Performed by: INTERNAL MEDICINE

## 2018-04-09 PROCEDURE — 84443 ASSAY THYROID STIM HORMONE: CPT | Performed by: INTERNAL MEDICINE

## 2018-04-09 PROCEDURE — 80053 COMPREHEN METABOLIC PANEL: CPT | Performed by: INTERNAL MEDICINE

## 2018-04-09 PROCEDURE — 99214 OFFICE O/P EST MOD 30 MIN: CPT | Performed by: INTERNAL MEDICINE

## 2018-04-09 RX ORDER — AMITRIPTYLINE HYDROCHLORIDE 25 MG/1
TABLET, FILM COATED ORAL
COMMUNITY
Start: 2018-03-12 | End: 2019-11-04

## 2018-04-09 NOTE — PROGRESS NOTES
Brittny Nicolas 72 y.o.  CC:Follow-up; Hypertension; Hypothyroidism; Hyperlipidemia; Osteoporosis; and Heartburn      [place]: Follow-up; Hypertension; Hypothyroidism; Hyperlipidemia; Osteoporosis; and Heartburn    bp is good   Is on low fat diet   Energy is good   Tolerating boniva well   Lost job without notice    No Known Allergies    Current Outpatient Prescriptions:   •  amitriptyline (ELAVIL) 25 MG tablet, Take one tablet Q HS, Disp: , Rfl:   •  amLODIPine (NORVASC) 2.5 MG tablet, Take 1 tablet by mouth Daily., Disp: 30 tablet, Rfl: 3  •  aspirin 81 MG tablet, Take  by mouth., Disp: , Rfl:   •  Calcium-Magnesium (CALCIUM MAGNESIUM 750) 300-300 MG tablet, Take 2 tablets by mouth Daily., Disp: , Rfl:   •  Cholecalciferol (VITAMIN D) 2000 UNITS capsule, Take 1 capsule by mouth., Disp: , Rfl:   •  ibandronate (BONIVA) 150 MG tablet, Take 1 tablet by mouth Every 30 (Thirty) Days., Disp: 1 tablet, Rfl: 11  •  levothyroxine (SYNTHROID, LEVOTHROID) 125 MCG tablet, TAKE ONE TABLET BY MOUTH EVERY DAY, Disp: 30 tablet, Rfl: 1  •  pantoprazole (PROTONIX) 40 MG EC tablet, Take one tablet BID, Disp: , Rfl:   •  ZOSTAVAX 42319 UNT/0.65ML reconstituted suspension, , Disp: , Rfl:   Patient Active Problem List    Diagnosis   • Erosive esophagitis [K22.10]     Overview Note:     EGD 5/17 - reflux precautions     • H/O colonoscopy [Z98.890]     Overview Note:     Description: 11/5/13 - Dr Cassandra Anaya     • Fatigue [R53.83]   • Hypothyroidism [E03.9]     Overview Note:     Impression: 03/08/2016 - check tfts  Impression: 09/08/2015 - update tfts  Impression: 03/09/2015 - check tfts  Impression: 12/23/2014 - check tsh  Impression: 12/05/2014 - check tsh  Impression: 09/11/2014 - update tft  Impression: 03/11/2014 - update tft;      • Achalasia [K22.0]     Overview Note:     Impression: 03/09/2015 - egd pending  Impression: 12/23/2014 - get CAT scan copy; Description: 3/15- esophagitis, schatzkis ring and hiatal hernia  s/p egd with  dilation 3/11/15, EGD mild esophagitis     • Acute upper respiratory infection [J06.9]     Overview Note:     Impression: 12/05/2014 - doxycycline and proair, check cxr;      • Atypical chest pain [R07.89]     Overview Note:     Description: atypical, assoc with low tsh  Saw Dr Ortega- stress test 4/13  Description: assoc with low TSH 6/12     • Benign essential hypertension [I10]     Overview Note:     Impression: 03/08/2016 - bp is good  Impression: 09/08/2015 - bp is good   continue to monitor  Impression: 03/09/2015 - bp is good  Impression: 12/23/2014 - bp is good  Impression: 12/05/2014 - bp is higher today- increase lisinopril 10 mg bid and check bp every day, report in 1 week  Impression: 09/11/2014 - bp is high- not sure if she took med last night  recheck better, does monitor at home and they are good overall  continue to monitor and we can titrate medication prn  Impression: 03/11/2014 - bp high here, repeat 158/82, home bp good.  continue to monitor and call if over 145/85;      • Carotid artery stenosis [I65.29]     Overview Note:     Impression: 01/08/2015 - s/p op cea- doing well  bp is settling down now- ok to continue current rx; Description: with occlusion of retinal artery- s/p carotide endarterectomy 1/15     • Cataract [H26.9]     Overview Note:     Description: 3/15     • Cough [R05]     Overview Note:     Impression: 03/09/2015 - raspy cough- waking her up at night  non productive- occurring since December  gradually gotten worse  discussed poss due to ace inhibitor- will change to arb.  Impression: 03/09/2015 - raspy cough- waking her up at night  non productive- occurring since December  gradually gotten worse;      • Acute cystitis [N30.00]   • Screening for malignant neoplasm [Z12.9]   • Gastroesophageal reflux disease [K21.9]     Overview Note:     Impression: 09/11/2014 - no help with protonix, prilosec, nexium and zantac  has taken and tolerated dexilant in past- samples provided and  renewed; Description: esophagitis- Dr Anaya - 2014, recc chronic ppi rx, EGD 3/15 - recc chronic ppi, diet , elevation HOB     • Essential familial hypercholesterolemia [E78.01]     Overview Note:     Impression: 03/08/2016 - check flp  Impression: 09/08/2015 - check flp  Impression: 03/09/2015 - check flp- target ldl less than 80  did not do chol last ov  Impression: 03/09/2015 - check flp- target ldl less than 80  Impression: 12/23/2014 - update flp  Impression: 03/11/2014 - update flp;      • Gastrointestinal bleeding [K92.2]     Overview Note:     Description: due to diverticulosis     • Inguinal hernia [K40.90]     Overview Note:     Impression: 03/08/2016 - s/p repair- healing well    left lower quadrant incision closed and no redness or drainage; Description: 3/16 - recurrent- AJ referred by Dr Whelan     • Insomnia [G47.00]     Overview Note:     Impression: 11/09/2015 - see above;      • Leg cramps [R25.2]     Overview Note:     Impression: 09/08/2015 - check ck, mag  trial co q 10- really lower calf and foot cramps;      • Meniere's disease [H81.09]   • Night sweats [R61]     Overview Note:     Impression: 11/09/2015 - with weight loss and low tsh   we reduced supplement last ov   check tfts today;      • Osteoporosis [M81.0]     Overview Note:     Impression: 12/23/2014 - check vitamin D levels  Impression: 03/11/2014 - check vitamin D levels; Description: dexa a long time ago     • Peripheral neuropathy [G62.9]     Overview Note:     Description: radiculopathy- ncv 2/13     • Retinal artery occlusion [H34.9]     Overview Note:     Impression: 01/08/2015 - with problems with peripheral vision  drove today- did well today;      • Rheumatoid arthritis [M06.9]     Overview Note:     Impression: 12/05/2014 - stable symptoms; Description: kaity 9/11     • Stroke syndrome [I63.9]     Overview Note:     Impression: 12/05/2014 - check flp; Description: retinal stroke 11/30/14- loss of vision right eye - Dr Denson       • Suprapubic pain [R10.2]     Overview Note:     Impression: 11/09/2015 - update ua;      • Transient ischemic attack [G45.9]     Overview Note:     Impression: 03/09/2015 - stable currently; Description: 3/15 ischemic neuropathy- eye     • Positive reaction to tuberculin skin test [R76.11]   • Vitamin D deficiency [E55.9]     Overview Note:     Impression: 03/08/2016 - update vitamin D levels  Impression: 09/08/2015 - update vitamin D levels;      • Weight loss [R63.4]     Overview Note:     Impression: 11/09/2015 - see above- also check labs;        Review of Systems   Constitutional: Negative for activity change, appetite change, chills, diaphoresis, fatigue, fever and unexpected weight change.   HENT: Negative for congestion, dental problem, drooling, ear discharge, ear pain, facial swelling, hearing loss, mouth sores, nosebleeds, postnasal drip, rhinorrhea, sinus pressure, sneezing, sore throat, tinnitus, trouble swallowing and voice change.    Eyes: Negative for photophobia, pain, discharge, redness, itching and visual disturbance.   Respiratory: Negative for apnea, cough, choking, chest tightness, shortness of breath, wheezing and stridor.    Cardiovascular: Negative for chest pain, palpitations and leg swelling.   Gastrointestinal: Negative for abdominal distention, abdominal pain, anal bleeding, blood in stool, constipation, diarrhea, nausea, rectal pain and vomiting.   Endocrine: Negative for cold intolerance, heat intolerance, polydipsia, polyphagia and polyuria.   Genitourinary: Negative for decreased urine volume, difficulty urinating, dysuria, enuresis, flank pain, frequency, genital sores, hematuria and urgency.   Musculoskeletal: Positive for arthralgias. Negative for back pain, gait problem, joint swelling, myalgias, neck pain and neck stiffness.        Bunion, callus and thickened left gt toenail   Skin: Negative for color change, pallor, rash and wound.   Allergic/Immunologic: Negative for  "environmental allergies, food allergies and immunocompromised state.   Neurological: Negative for dizziness, tremors, seizures, syncope, facial asymmetry, speech difficulty, weakness, light-headedness, numbness and headaches.   Hematological: Negative for adenopathy. Does not bruise/bleed easily.   Psychiatric/Behavioral: Negative for agitation, behavioral problems, confusion, decreased concentration, dysphoric mood, hallucinations, self-injury, sleep disturbance and suicidal ideas. The patient is not nervous/anxious and is not hyperactive.      Social History     Social History   • Marital status:      Spouse name: N/A   • Number of children: N/A   • Years of education: N/A     Occupational History   • Not on file.     Social History Main Topics   • Smoking status: Former Smoker   • Smokeless tobacco: Never Used   • Alcohol use Yes      Comment: rarely   • Drug use: No   • Sexual activity: Defer     Other Topics Concern   • Not on file     Social History Narrative   • No narrative on file     Family History   Problem Relation Age of Onset   • Cystic fibrosis Other    • Cervical cancer Mother    • Cancer Paternal Grandmother      /78   Pulse 81   Ht 161.3 cm (63.5\")   Wt 54 kg (119 lb)   SpO2 98%   BMI 20.75 kg/m²   Physical Exam   Constitutional: She is oriented to person, place, and time. She appears well-developed and well-nourished.   HENT:   Head: Normocephalic and atraumatic.   Nose: Nose normal.   Mouth/Throat: Oropharynx is clear and moist.   Eyes: Conjunctivae, EOM and lids are normal. Pupils are equal, round, and reactive to light.   Neck: Trachea normal and normal range of motion. Neck supple. Carotid bruit is not present. No tracheal deviation present. No thyroid mass and no thyromegaly present.   Cardiovascular: Normal rate, regular rhythm, normal heart sounds and intact distal pulses.  Exam reveals no gallop and no friction rub.    No murmur heard.  Pulmonary/Chest: Effort normal and " breath sounds normal. No respiratory distress. She has no wheezes.   Musculoskeletal: Normal range of motion. She exhibits no edema or deformity.   Lymphadenopathy:     She has no cervical adenopathy.   Neurological: She is alert and oriented to person, place, and time. She has normal reflexes. She displays normal reflexes. No cranial nerve deficit.   Skin: Skin is warm and dry. No rash noted. No cyanosis or erythema. Nails show no clubbing.   Thick nail left gt toe  Bilateral mid foot callus     Psychiatric: She has a normal mood and affect. Her speech is normal and behavior is normal. Judgment and thought content normal. Cognition and memory are normal.   Nursing note and vitals reviewed.    Results for orders placed or performed in visit on 10/06/17   Comprehensive Metabolic Panel   Result Value Ref Range    Glucose 77 70 - 100 mg/dL    BUN 11 9 - 23 mg/dL    Creatinine 0.80 0.60 - 1.30 mg/dL    Sodium 140 132 - 146 mmol/L    Potassium 4.0 3.5 - 5.5 mmol/L    Chloride 104 99 - 109 mmol/L    CO2 33.0 (H) 20.0 - 31.0 mmol/L    Calcium 9.5 8.7 - 10.4 mg/dL    Total Protein 6.8 5.7 - 8.2 g/dL    Albumin 4.00 3.20 - 4.80 g/dL    ALT (SGPT) 11 7 - 40 U/L    AST (SGOT) 17 0 - 33 U/L    Alkaline Phosphatase 94 25 - 100 U/L    Total Bilirubin 0.6 0.3 - 1.2 mg/dL    eGFR Non African Amer 70 >60 mL/min/1.73    Globulin 2.8 gm/dL    A/G Ratio 1.4 (L) 1.5 - 2.5 g/dL    BUN/Creatinine Ratio 13.8 7.0 - 25.0    Anion Gap 3.0 3.0 - 11.0 mmol/L   TSH   Result Value Ref Range    TSH 1.024 0.350 - 5.350 mIU/mL   Lipid Panel   Result Value Ref Range    Total Cholesterol 173 0 - 200 mg/dL    Triglycerides 137 0 - 150 mg/dL    HDL Cholesterol 69 (H) 40 - 60 mg/dL    LDL Cholesterol  84 0 - 130 mg/dL   Vitamin D 25 Hydroxy   Result Value Ref Range    25 Hydroxy, Vitamin D 31.9 ng/ml   CBC Auto Differential   Result Value Ref Range    WBC 6.14 3.50 - 10.80 10*3/mm3    RBC 4.22 3.89 - 5.14 10*6/mm3    Hemoglobin 12.7 11.5 - 15.5 g/dL     Hematocrit 39.9 34.5 - 44.0 %    MCV 94.5 80.0 - 99.0 fL    MCH 30.1 27.0 - 31.0 pg    MCHC 31.8 (L) 32.0 - 36.0 g/dL    RDW 12.5 11.3 - 14.5 %    RDW-SD 43.3 37.0 - 54.0 fl    MPV 10.3 6.0 - 12.0 fL    Platelets 229 150 - 450 10*3/mm3    Neutrophil % 69.6 41.0 - 71.0 %    Lymphocyte % 20.0 (L) 24.0 - 44.0 %    Monocyte % 7.7 0.0 - 12.0 %    Eosinophil % 2.0 0.0 - 3.0 %    Basophil % 0.5 0.0 - 1.0 %    Immature Grans % 0.2 0.0 - 0.6 %    Neutrophils, Absolute 4.28 1.50 - 8.30 10*3/mm3    Lymphocytes, Absolute 1.23 0.60 - 4.80 10*3/mm3    Monocytes, Absolute 0.47 0.00 - 1.00 10*3/mm3    Eosinophils, Absolute 0.12 0.00 - 0.30 10*3/mm3    Basophils, Absolute 0.03 0.00 - 0.20 10*3/mm3    Immature Grans, Absolute 0.01 0.00 - 0.03 10*3/mm3     Problem List Items Addressed This Visit        Cardiovascular and Mediastinum    Essential familial hypercholesterolemia     Check flp          Relevant Orders    Lipid Panel    Benign essential hypertension     bp is high- recommended she test at home and let us  Know if over 130/80            Digestive    Vitamin D deficiency     On supplement- update levels          Relevant Orders    Vitamin D 25 Hydroxy       Endocrine    Hypothyroidism - Primary     Check tfts          Relevant Orders    Comprehensive Metabolic Panel    TSH       Nervous and Auditory    Peripheral neuropathy     Doing well - no c/o   Discussed seeing podiatry   She will consider once she finds another job            Other Visit Diagnoses    None.       Return in about 6 months (around 10/9/2018) for Recheck 30 min .    Casandra Sommer MA  Signed Dalila Liz MD

## 2018-04-11 RX ORDER — LEVOTHYROXINE SODIUM 0.12 MG/1
TABLET ORAL
Qty: 30 TABLET | Refills: 1 | Status: SHIPPED | OUTPATIENT
Start: 2018-04-11 | End: 2018-06-19 | Stop reason: SDUPTHER

## 2018-06-19 RX ORDER — AMLODIPINE BESYLATE 2.5 MG/1
TABLET ORAL
Qty: 30 TABLET | Refills: 3 | Status: SHIPPED | OUTPATIENT
Start: 2018-06-19 | End: 2018-11-07 | Stop reason: SDUPTHER

## 2018-06-19 RX ORDER — LEVOTHYROXINE SODIUM 0.12 MG/1
TABLET ORAL
Qty: 30 TABLET | Refills: 1 | Status: SHIPPED | OUTPATIENT
Start: 2018-06-19 | End: 2018-09-06 | Stop reason: SDUPTHER

## 2018-09-06 RX ORDER — LEVOTHYROXINE SODIUM 0.12 MG/1
TABLET ORAL
Qty: 30 TABLET | Refills: 1 | Status: SHIPPED | OUTPATIENT
Start: 2018-09-06 | End: 2018-11-07 | Stop reason: SDUPTHER

## 2018-10-09 ENCOUNTER — OFFICE VISIT (OUTPATIENT)
Dept: ENDOCRINOLOGY | Facility: CLINIC | Age: 73
End: 2018-10-09

## 2018-10-09 VITALS
BODY MASS INDEX: 20.14 KG/M2 | WEIGHT: 118 LBS | DIASTOLIC BLOOD PRESSURE: 70 MMHG | HEIGHT: 64 IN | OXYGEN SATURATION: 100 % | HEART RATE: 70 BPM | SYSTOLIC BLOOD PRESSURE: 132 MMHG

## 2018-10-09 DIAGNOSIS — I10 BENIGN ESSENTIAL HYPERTENSION: Primary | ICD-10-CM

## 2018-10-09 DIAGNOSIS — E78.01 ESSENTIAL FAMILIAL HYPERCHOLESTEROLEMIA: ICD-10-CM

## 2018-10-09 DIAGNOSIS — E03.9 ACQUIRED HYPOTHYROIDISM: ICD-10-CM

## 2018-10-09 DIAGNOSIS — M81.6 LOCALIZED OSTEOPOROSIS WITHOUT CURRENT PATHOLOGICAL FRACTURE: ICD-10-CM

## 2018-10-09 DIAGNOSIS — E55.9 VITAMIN D DEFICIENCY: ICD-10-CM

## 2018-10-09 LAB
25(OH)D3 SERPL-MCNC: 34.1 NG/ML
ALBUMIN SERPL-MCNC: 4.56 G/DL (ref 3.2–4.8)
ALBUMIN/GLOB SERPL: 1.9 G/DL (ref 1.5–2.5)
ALP SERPL-CCNC: 72 U/L (ref 25–100)
ALT SERPL W P-5'-P-CCNC: 19 U/L (ref 7–40)
ANION GAP SERPL CALCULATED.3IONS-SCNC: 9 MMOL/L (ref 3–11)
ARTICHOKE IGE QN: 103 MG/DL (ref 0–130)
AST SERPL-CCNC: 20 U/L (ref 0–33)
BASOPHILS # BLD AUTO: 0.02 10*3/MM3 (ref 0–0.2)
BASOPHILS NFR BLD AUTO: 0.2 % (ref 0–1)
BILIRUB SERPL-MCNC: 0.7 MG/DL (ref 0.3–1.2)
BUN BLD-MCNC: 17 MG/DL (ref 9–23)
BUN/CREAT SERPL: 21.5 (ref 7–25)
CALCIUM SPEC-SCNC: 9.9 MG/DL (ref 8.7–10.4)
CHLORIDE SERPL-SCNC: 104 MMOL/L (ref 99–109)
CHOLEST SERPL-MCNC: 192 MG/DL (ref 0–200)
CO2 SERPL-SCNC: 28 MMOL/L (ref 20–31)
CREAT BLD-MCNC: 0.79 MG/DL (ref 0.6–1.3)
DEPRECATED RDW RBC AUTO: 42 FL (ref 37–54)
EOSINOPHIL # BLD AUTO: 0.08 10*3/MM3 (ref 0–0.3)
EOSINOPHIL NFR BLD AUTO: 0.8 % (ref 0–3)
ERYTHROCYTE [DISTWIDTH] IN BLOOD BY AUTOMATED COUNT: 12.3 % (ref 11.3–14.5)
GFR SERPL CREATININE-BSD FRML MDRD: 71 ML/MIN/1.73
GLOBULIN UR ELPH-MCNC: 2.3 GM/DL
GLUCOSE BLD-MCNC: 80 MG/DL (ref 70–100)
HCT VFR BLD AUTO: 41 % (ref 34.5–44)
HDLC SERPL-MCNC: 83 MG/DL (ref 40–60)
HGB BLD-MCNC: 12.9 G/DL (ref 11.5–15.5)
IMM GRANULOCYTES # BLD: 0.02 10*3/MM3 (ref 0–0.03)
IMM GRANULOCYTES NFR BLD: 0.2 % (ref 0–0.6)
LYMPHOCYTES # BLD AUTO: 1.68 10*3/MM3 (ref 0.6–4.8)
LYMPHOCYTES NFR BLD AUTO: 17.2 % (ref 24–44)
MCH RBC QN AUTO: 29.3 PG (ref 27–31)
MCHC RBC AUTO-ENTMCNC: 31.5 G/DL (ref 32–36)
MCV RBC AUTO: 93 FL (ref 80–99)
MONOCYTES # BLD AUTO: 0.67 10*3/MM3 (ref 0–1)
MONOCYTES NFR BLD AUTO: 6.9 % (ref 0–12)
NEUTROPHILS # BLD AUTO: 7.32 10*3/MM3 (ref 1.5–8.3)
NEUTROPHILS NFR BLD AUTO: 74.9 % (ref 41–71)
PLATELET # BLD AUTO: 248 10*3/MM3 (ref 150–450)
PMV BLD AUTO: 10.7 FL (ref 6–12)
POTASSIUM BLD-SCNC: 3.8 MMOL/L (ref 3.5–5.5)
PROT SERPL-MCNC: 6.9 G/DL (ref 5.7–8.2)
RBC # BLD AUTO: 4.41 10*6/MM3 (ref 3.89–5.14)
SODIUM BLD-SCNC: 141 MMOL/L (ref 132–146)
T4 FREE SERPL-MCNC: 1.19 NG/DL (ref 0.89–1.76)
TRIGL SERPL-MCNC: 107 MG/DL (ref 0–150)
TSH SERPL DL<=0.05 MIU/L-ACNC: 0.77 MIU/ML (ref 0.35–5.35)
WBC NRBC COR # BLD: 9.77 10*3/MM3 (ref 3.5–10.8)

## 2018-10-09 PROCEDURE — G0008 ADMIN INFLUENZA VIRUS VAC: HCPCS | Performed by: INTERNAL MEDICINE

## 2018-10-09 PROCEDURE — 99214 OFFICE O/P EST MOD 30 MIN: CPT | Performed by: INTERNAL MEDICINE

## 2018-10-09 PROCEDURE — 85025 COMPLETE CBC W/AUTO DIFF WBC: CPT | Performed by: INTERNAL MEDICINE

## 2018-10-09 PROCEDURE — 84439 ASSAY OF FREE THYROXINE: CPT | Performed by: INTERNAL MEDICINE

## 2018-10-09 PROCEDURE — 80061 LIPID PANEL: CPT | Performed by: INTERNAL MEDICINE

## 2018-10-09 PROCEDURE — 84443 ASSAY THYROID STIM HORMONE: CPT | Performed by: INTERNAL MEDICINE

## 2018-10-09 PROCEDURE — 82306 VITAMIN D 25 HYDROXY: CPT | Performed by: INTERNAL MEDICINE

## 2018-10-09 PROCEDURE — 90662 IIV NO PRSV INCREASED AG IM: CPT | Performed by: INTERNAL MEDICINE

## 2018-10-09 PROCEDURE — 80053 COMPREHEN METABOLIC PANEL: CPT | Performed by: INTERNAL MEDICINE

## 2018-10-09 NOTE — PROGRESS NOTES
Brittny Fidnecio 73 y.o.  CC:Acquired hypothyroidism (f/u ); Hyperlipidemia (f/u); Hypertension (f/u); Osteoporosis (f/u); and Heartburn (f/u )      [place]: Acquired hypothyroidism (f/u ); Hyperlipidemia (f/u); Hypertension (f/u); Osteoporosis (f/u); and Heartburn (f/u )  doing well overall  Energy is good  Is on low fat diet   bp is good with recheck   Is on boniva and calcium/D   A lot of gerd  Intolerant boniva  Discussed options with her - reclast vs prolia  She is concerned about duration of therapy and risk of ONJ and atypical fracture  We discussed small risk assoc with this  She would prefer therapy with prolia     No Known Allergies    Current Outpatient Prescriptions:   •  amitriptyline (ELAVIL) 25 MG tablet, Take one tablet Q HS, Disp: , Rfl:   •  amLODIPine (NORVASC) 2.5 MG tablet, TAKE ONE TABLET BY MOUTH EVERY DAY, Disp: 30 tablet, Rfl: 3  •  aspirin 81 MG tablet, Take  by mouth., Disp: , Rfl:   •  Calcium-Magnesium (CALCIUM MAGNESIUM 750) 300-300 MG tablet, Take 2 tablets by mouth Daily., Disp: , Rfl:   •  Cholecalciferol (VITAMIN D) 2000 UNITS capsule, Take 1 capsule by mouth., Disp: , Rfl:   •  ibandronate (BONIVA) 150 MG tablet, Take 1 tablet by mouth Every 30 (Thirty) Days., Disp: 1 tablet, Rfl: 11  •  levothyroxine (SYNTHROID, LEVOTHROID) 125 MCG tablet, TAKE ONE TABLET BY MOUTH EVERY DAY, Disp: 30 tablet, Rfl: 1  •  pantoprazole (PROTONIX) 40 MG EC tablet, Take one tablet BID, Disp: , Rfl:   Patient Active Problem List    Diagnosis   • Erosive esophagitis [K22.10]   • H/O colonoscopy [Z98.890]   • Fatigue [R53.83]   • Hypothyroidism [E03.9]   • Achalasia [K22.0]   • Acute upper respiratory infection [J06.9]   • Atypical chest pain [R07.89]   • Benign essential hypertension [I10]   • Carotid artery stenosis [I65.29]   • Cataract [H26.9]   • Cough [R05]   • Acute cystitis [N30.00]   • Screening for malignant neoplasm [Z12.9]   • Gastroesophageal reflux disease [K21.9]   • Essential familial  hypercholesterolemia [E78.01]   • Gastrointestinal bleeding [K92.2]   • Inguinal hernia [K40.90]   • Insomnia [G47.00]   • Leg cramps [R25.2]   • Meniere's disease [H81.09]   • Night sweats [R61]   • Osteoporosis [M81.0]   • Peripheral neuropathy [G62.9]   • Retinal artery occlusion [H34.9]   • Rheumatoid arthritis (CMS/HCC) [M06.9]   • Stroke syndrome [[id number]]   • Suprapubic pain [R10.2]   • Transient ischemic attack [G45.9]   • Positive reaction to tuberculin skin test [R76.11]   • Vitamin D deficiency [E55.9]   • Weight loss [R63.4]     Review of Systems   Constitutional: Negative for activity change, appetite change, chills, diaphoresis, fatigue, fever and unexpected weight change.   HENT: Negative for congestion, dental problem, drooling, ear discharge, ear pain, facial swelling, hearing loss, mouth sores, nosebleeds, postnasal drip, rhinorrhea, sinus pressure, sneezing, sore throat, tinnitus, trouble swallowing and voice change.    Eyes: Negative for photophobia, pain, discharge, redness, itching and visual disturbance.   Respiratory: Negative for apnea, cough, choking, chest tightness, shortness of breath, wheezing and stridor.    Cardiovascular: Negative for chest pain, palpitations and leg swelling.   Gastrointestinal: Negative for abdominal distention, abdominal pain, anal bleeding, blood in stool, constipation, diarrhea, nausea, rectal pain and vomiting.   Endocrine: Negative for cold intolerance, heat intolerance, polydipsia, polyphagia and polyuria.   Genitourinary: Negative for decreased urine volume, difficulty urinating, dysuria, enuresis, flank pain, frequency, genital sores, hematuria and urgency.   Musculoskeletal: Negative for arthralgias, back pain, gait problem, joint swelling, myalgias, neck pain and neck stiffness.   Skin: Negative for color change, pallor, rash and wound.   Allergic/Immunologic: Negative for environmental allergies, food allergies and immunocompromised state.  "  Neurological: Negative for dizziness, tremors, seizures, syncope, facial asymmetry, speech difficulty, weakness, light-headedness, numbness and headaches.   Hematological: Negative for adenopathy. Does not bruise/bleed easily.   Psychiatric/Behavioral: Negative for agitation, behavioral problems, confusion, decreased concentration, dysphoric mood, hallucinations, self-injury, sleep disturbance and suicidal ideas. The patient is not nervous/anxious and is not hyperactive.      Social History     Social History   • Marital status:      Spouse name: N/A   • Number of children: N/A   • Years of education: N/A     Occupational History   • Not on file.     Social History Main Topics   • Smoking status: Former Smoker   • Smokeless tobacco: Never Used   • Alcohol use Yes      Comment: rarely   • Drug use: No   • Sexual activity: Defer     Other Topics Concern   • Not on file     Social History Narrative   • No narrative on file     Family History   Problem Relation Age of Onset   • Cystic fibrosis Other    • Cervical cancer Mother    • Cancer Paternal Grandmother      /90   Pulse 70   Ht 161.3 cm (63.5\")   Wt 53.5 kg (118 lb)   SpO2 100%   BMI 20.57 kg/m²   Physical Exam   Constitutional: She is oriented to person, place, and time. She appears well-developed and well-nourished.   HENT:   Head: Normocephalic and atraumatic.   Nose: Nose normal.   Mouth/Throat: Oropharynx is clear and moist.   Eyes: Pupils are equal, round, and reactive to light. Conjunctivae, EOM and lids are normal.   Neck: Trachea normal and normal range of motion. Neck supple. Carotid bruit is not present. No tracheal deviation present. No thyroid mass and no thyromegaly present.   Cardiovascular: Normal rate, regular rhythm, normal heart sounds and intact distal pulses.  Exam reveals no gallop and no friction rub.    No murmur heard.  Pulmonary/Chest: Effort normal and breath sounds normal. No respiratory distress. She has no " wheezes.   Musculoskeletal: Normal range of motion. She exhibits no edema or deformity.   Dorsal kyphosis    Lymphadenopathy:     She has no cervical adenopathy.   Neurological: She is alert and oriented to person, place, and time. She has normal reflexes. She displays normal reflexes. No cranial nerve deficit.   Skin: Skin is warm and dry. No rash noted. No cyanosis or erythema. Nails show no clubbing.   Psychiatric: She has a normal mood and affect. Her speech is normal and behavior is normal. Judgment and thought content normal. Cognition and memory are normal.   Nursing note and vitals reviewed.    Results for orders placed or performed in visit on 04/09/18   Comprehensive Metabolic Panel   Result Value Ref Range    Glucose 70 70 - 100 mg/dL    BUN 15 9 - 23 mg/dL    Creatinine 0.70 0.60 - 1.30 mg/dL    Sodium 139 132 - 146 mmol/L    Potassium 4.5 3.5 - 5.5 mmol/L    Chloride 105 99 - 109 mmol/L    CO2 29.0 20.0 - 31.0 mmol/L    Calcium 9.5 8.7 - 10.4 mg/dL    Total Protein 6.9 5.7 - 8.2 g/dL    Albumin 4.40 3.20 - 4.80 g/dL    ALT (SGPT) 15 7 - 40 U/L    AST (SGOT) 20 0 - 33 U/L    Alkaline Phosphatase 76 25 - 100 U/L    Total Bilirubin 0.4 0.3 - 1.2 mg/dL    eGFR Non African Amer 82 >60 mL/min/1.73    Globulin 2.5 gm/dL    A/G Ratio 1.8 1.5 - 2.5 g/dL    BUN/Creatinine Ratio 21.4 7.0 - 25.0    Anion Gap 5.0 3.0 - 11.0 mmol/L   TSH   Result Value Ref Range    TSH 1.216 0.350 - 5.350 mIU/mL   Lipid Panel   Result Value Ref Range    Total Cholesterol 194 0 - 200 mg/dL    Triglycerides 68 0 - 150 mg/dL    HDL Cholesterol 86 (H) 40 - 60 mg/dL    LDL Cholesterol  93 0 - 130 mg/dL   Vitamin D 25 Hydroxy   Result Value Ref Range    25 Hydroxy, Vitamin D 19.4 ng/ml     Problem List Items Addressed This Visit        Cardiovascular and Mediastinum    Essential familial hypercholesterolemia     Check flp          Relevant Orders    Lipid Panel    Benign essential hypertension - Primary     High bp - normal with recheck    No bp elevation at home          Relevant Orders    Comprehensive Metabolic Panel    CBC Auto Differential       Digestive    Vitamin D deficiency     Update vitamin D levels          Relevant Orders    Vitamin D 25 Hydroxy       Endocrine    Hypothyroidism     Update tfts          Relevant Orders    TSH    T4, Free       Musculoskeletal and Integument    Osteoporosis     Check vitamin D level   Continue calcium and vitamin D supplement  rx sent for prolia and we will se if covered- intol boniva/oral bisphosphonates due to GI upset              Return in about 6 months (around 4/9/2019) for Recheck 30 min .    Kelsie Jones MA  Signed Dalila Liz MD

## 2018-10-09 NOTE — ASSESSMENT & PLAN NOTE
Check vitamin D level   Continue calcium and vitamin D supplement  rx sent for prolia and we will se if covered- intol boniva/oral bisphosphonates due to GI upset

## 2018-10-12 ENCOUNTER — TELEPHONE (OUTPATIENT)
Dept: INTERNAL MEDICINE | Facility: CLINIC | Age: 73
End: 2018-10-12

## 2018-10-12 NOTE — TELEPHONE ENCOUNTER
PT STATED THAT THE LAST TIME SHE SAW , SHE PRESCRIBED PROLIA; SHE PICKED IT UP THIS MORNING AT PHARMACY AND BEFORE SHE TAKES IT, SHE WANTS TO CONFIRM THAT MEDICATION IS FOR ESOPHAGEAL REFLUX; PT THINKS THAT  AND HERSELF MISUNDERSTOOD EACH OTHER AND SHE WANTS TO CONFIRM THAT THIS IS THE RIGHT MEDICATION; SHE HAS ESOPHAGEAL REFLUX AND IT KEEPS HER UP AT NIGHT; IF THIS IS NOT THE MEDICATION THAT SHE SHOULD BE TAKEN, WHAT MEDICATION DOES  RECOMMEND; PLEASE ADVISE PT [phone]

## 2018-10-15 NOTE — TELEPHONE ENCOUNTER
I lmom for pt to call back to discuss  Dr Liz advised pt she should stop boniva and start prolia due to the boniva causing the GERD to be so much worse so the prolia is for the osteoporosis and not GERD

## 2018-10-17 NOTE — TELEPHONE ENCOUNTER
PATIENT RETURNED CALL. PLEASE CALL BACK TO DISCUSS. SHE STATES THE GERD IS STILL CONTINUING TO KEEP HER UP THROUGHOUT THE NIGHT.     PATIENT SAYS IT IS OKAY TO LEAVE DETAILED MESSAGE IF SHE IS UNABLE TO ANSWER. THANKS.

## 2018-10-18 ENCOUNTER — TELEPHONE (OUTPATIENT)
Dept: INTERNAL MEDICINE | Facility: CLINIC | Age: 73
End: 2018-10-18

## 2018-10-18 NOTE — TELEPHONE ENCOUNTER
I lmom to advise the patient if she is still having a lot of symptoms with the GERD even after stopping the boniva she will need to be seen.  Advised her Dr Liz is out of the office for the next 10 days and she should call and schedule with an extender here in the office and to call back if any questions

## 2018-10-19 NOTE — TELEPHONE ENCOUNTER
I lmom to advise the patient to call back with any issues or problems she might be having otherwise I will assume she got the message that I left for her earlier in the week

## 2018-10-22 RX ORDER — DEXLANSOPRAZOLE 60 MG/1
60 CAPSULE, DELAYED RELEASE ORAL DAILY
Qty: 30 CAPSULE | Refills: 1 | Status: SHIPPED | OUTPATIENT
Start: 2018-10-22 | End: 2018-12-19 | Stop reason: SDUPTHER

## 2018-10-22 NOTE — TELEPHONE ENCOUNTER
Pt states the GERD symptoms are so bad it is keeping her awake at night.  She stopped the pantoprazole about 3 days ago because it was not helping at all  She was advised we can retry the dexilant and if it is not covered on insurance advised her to try OTC omeprazole or nexium  Pt voiced understanding  rx sent for dexilant

## 2018-11-07 RX ORDER — LEVOTHYROXINE SODIUM 0.12 MG/1
TABLET ORAL
Qty: 30 TABLET | Refills: 1 | Status: SHIPPED | OUTPATIENT
Start: 2018-11-07 | End: 2019-01-04 | Stop reason: SDUPTHER

## 2018-11-07 RX ORDER — AMLODIPINE BESYLATE 2.5 MG/1
TABLET ORAL
Qty: 30 TABLET | Refills: 3 | Status: SHIPPED | OUTPATIENT
Start: 2018-11-07 | End: 2019-01-04 | Stop reason: SDUPTHER

## 2018-11-12 ENCOUNTER — TELEPHONE (OUTPATIENT)
Dept: INTERNAL MEDICINE | Facility: CLINIC | Age: 73
End: 2018-11-12

## 2018-11-12 NOTE — TELEPHONE ENCOUNTER
Patient states she had a terrible night last night with severe acid reflux symptoms.  She states she is having pain from the mid abdomen into her back and has a bad headache and her arms and hands are tingling and some numbness.  She is taking Dexilant 60mg daily with no relief  She was offered OV here tonight however she declined stating she just got home from work and does not want to come back out tonight  Ov was then scheduled tomorrow with Brigitte Ward at 9:45  Pt voiced understanding

## 2018-11-12 NOTE — TELEPHONE ENCOUNTER
Patient called in regards to her gastroesophageal reflux messing up again and she was wanting to know if she could get in to see Dr Liz for this matter or been referred out to a gastro doctor ( other than dr waller office) pt would like to get a call back in regards to this matter at [phone]

## 2018-11-26 ENCOUNTER — TELEPHONE (OUTPATIENT)
Dept: INTERNAL MEDICINE | Facility: CLINIC | Age: 73
End: 2018-11-26

## 2018-11-26 DIAGNOSIS — K21.9 GASTROESOPHAGEAL REFLUX DISEASE, ESOPHAGITIS PRESENCE NOT SPECIFIED: Primary | ICD-10-CM

## 2018-11-26 NOTE — TELEPHONE ENCOUNTER
PATIENT CALLED REGARDING GERD SYMPTOMS, DR KIM 1ST AVAIL IS IN [place]ARCH.  SHE WANTS TO SEE A SPECIALISTS REGARDNG SYMPTOMS BUT DOESN'T WANT TO WAIT OR BE WAITLISTED HERE.  SHES REQUESTING A CALL -[phone]

## 2018-11-26 NOTE — TELEPHONE ENCOUNTER
Referral to GI specialist created.  I believe she just did not want to go back to Dr Goldstein because he advised her to lose weight.  Thanks, [facility]

## 2018-11-26 NOTE — TELEPHONE ENCOUNTER
I lmom to advise the pt the GI referral was created and to call back in one week if she has not be contacted or call sooner if questions or concerns

## 2018-12-19 RX ORDER — DEXLANSOPRAZOLE 60 MG/1
CAPSULE, DELAYED RELEASE ORAL
Qty: 30 CAPSULE | Refills: 1 | Status: SHIPPED | OUTPATIENT
Start: 2018-12-19 | End: 2019-02-25 | Stop reason: SDUPTHER

## 2019-01-04 RX ORDER — AMLODIPINE BESYLATE 2.5 MG/1
2.5 TABLET ORAL DAILY
Qty: 30 TABLET | Refills: 2 | Status: SHIPPED | OUTPATIENT
Start: 2019-01-04 | End: 2019-07-11 | Stop reason: SDUPTHER

## 2019-01-04 RX ORDER — LEVOTHYROXINE SODIUM 0.12 MG/1
125 TABLET ORAL DAILY
Qty: 30 TABLET | Refills: 2 | Status: SHIPPED | OUTPATIENT
Start: 2019-01-04 | End: 2019-11-04 | Stop reason: SDUPTHER

## 2019-01-15 ENCOUNTER — TRANSCRIBE ORDERS (OUTPATIENT)
Dept: ADMINISTRATIVE | Facility: HOSPITAL | Age: 74
End: 2019-01-15

## 2019-02-21 ENCOUNTER — OFFICE VISIT (OUTPATIENT)
Dept: ENDOCRINOLOGY | Facility: CLINIC | Age: 74
End: 2019-02-21

## 2019-02-21 ENCOUNTER — HOSPITAL ENCOUNTER (OUTPATIENT)
Dept: CT IMAGING | Facility: HOSPITAL | Age: 74
Discharge: HOME | End: 2019-02-21
Admitting: INTERNAL MEDICINE

## 2019-02-21 VITALS
HEART RATE: 78 BPM | OXYGEN SATURATION: 98 % | DIASTOLIC BLOOD PRESSURE: 80 MMHG | BODY MASS INDEX: 21.58 KG/M2 | SYSTOLIC BLOOD PRESSURE: 148 MMHG | WEIGHT: 121.8 LBS | HEIGHT: 63 IN

## 2019-02-21 DIAGNOSIS — E03.9 ACQUIRED HYPOTHYROIDISM: ICD-10-CM

## 2019-02-21 DIAGNOSIS — R10.32 LEFT LOWER QUADRANT PAIN: ICD-10-CM

## 2019-02-21 DIAGNOSIS — I10 BENIGN ESSENTIAL HYPERTENSION: Primary | ICD-10-CM

## 2019-02-21 DIAGNOSIS — M25.512 ACUTE PAIN OF LEFT SHOULDER: ICD-10-CM

## 2019-02-21 DIAGNOSIS — E55.9 VITAMIN D DEFICIENCY: ICD-10-CM

## 2019-02-21 DIAGNOSIS — E78.01 ESSENTIAL FAMILIAL HYPERCHOLESTEROLEMIA: ICD-10-CM

## 2019-02-21 LAB
ALBUMIN SERPL-MCNC: 4.56 G/DL (ref 3.2–4.8)
ALBUMIN/GLOB SERPL: 2 G/DL (ref 1.5–2.5)
ALP SERPL-CCNC: 83 U/L (ref 25–100)
ALT SERPL W P-5'-P-CCNC: 13 U/L (ref 7–40)
ANION GAP SERPL CALCULATED.3IONS-SCNC: 9 MMOL/L (ref 3–11)
ARTICHOKE IGE QN: 94 MG/DL (ref 0–130)
AST SERPL-CCNC: 20 U/L (ref 0–33)
BASOPHILS # BLD AUTO: 0.03 10*3/MM3 (ref 0–0.2)
BASOPHILS NFR BLD AUTO: 0.4 % (ref 0–1)
BILIRUB SERPL-MCNC: 0.6 MG/DL (ref 0.3–1.2)
BUN BLD-MCNC: 12 MG/DL (ref 9–23)
BUN/CREAT SERPL: 17.4 (ref 7–25)
CALCIUM SPEC-SCNC: 9.7 MG/DL (ref 8.7–10.4)
CHLORIDE SERPL-SCNC: 103 MMOL/L (ref 99–109)
CHOLEST SERPL-MCNC: 187 MG/DL (ref 0–200)
CO2 SERPL-SCNC: 27 MMOL/L (ref 20–31)
CREAT BLD-MCNC: 0.69 MG/DL (ref 0.6–1.3)
CRP SERPL-MCNC: 0.03 MG/DL (ref 0–1)
DEPRECATED RDW RBC AUTO: 43.2 FL (ref 37–54)
EOSINOPHIL # BLD AUTO: 0.05 10*3/MM3 (ref 0–0.3)
EOSINOPHIL NFR BLD AUTO: 0.6 % (ref 0–3)
ERYTHROCYTE [DISTWIDTH] IN BLOOD BY AUTOMATED COUNT: 13.1 % (ref 11.3–14.5)
ERYTHROCYTE [SEDIMENTATION RATE] IN BLOOD: 25 MM/HR (ref 0–30)
GFR SERPL CREATININE-BSD FRML MDRD: 83 ML/MIN/1.73
GLOBULIN UR ELPH-MCNC: 2.2 GM/DL
GLUCOSE BLD-MCNC: 83 MG/DL (ref 70–100)
HCT VFR BLD AUTO: 39.4 % (ref 34.5–44)
HDLC SERPL-MCNC: 74 MG/DL (ref 40–60)
HGB BLD-MCNC: 12.4 G/DL (ref 11.5–15.5)
IMM GRANULOCYTES # BLD AUTO: 0.02 10*3/MM3 (ref 0–0.05)
IMM GRANULOCYTES NFR BLD AUTO: 0.2 % (ref 0–0.6)
LYMPHOCYTES # BLD AUTO: 1.44 10*3/MM3 (ref 0.6–4.8)
LYMPHOCYTES NFR BLD AUTO: 17.8 % (ref 24–44)
MCH RBC QN AUTO: 28.4 PG (ref 27–31)
MCHC RBC AUTO-ENTMCNC: 31.5 G/DL (ref 32–36)
MCV RBC AUTO: 90.2 FL (ref 80–99)
MONOCYTES # BLD AUTO: 0.5 10*3/MM3 (ref 0–1)
MONOCYTES NFR BLD AUTO: 6.2 % (ref 0–12)
NEUTROPHILS # BLD AUTO: 6.04 10*3/MM3 (ref 1.5–8.3)
NEUTROPHILS NFR BLD AUTO: 74.8 % (ref 41–71)
PLATELET # BLD AUTO: 250 10*3/MM3 (ref 150–450)
PMV BLD AUTO: 10.3 FL (ref 6–12)
POTASSIUM BLD-SCNC: 3.7 MMOL/L (ref 3.5–5.5)
PROT SERPL-MCNC: 6.8 G/DL (ref 5.7–8.2)
RBC # BLD AUTO: 4.37 10*6/MM3 (ref 3.89–5.14)
SODIUM BLD-SCNC: 139 MMOL/L (ref 132–146)
TRIGL SERPL-MCNC: 86 MG/DL (ref 0–150)
TSH SERPL DL<=0.05 MIU/L-ACNC: 0.4 MIU/ML (ref 0.35–5.35)
WBC NRBC COR # BLD: 8.08 10*3/MM3 (ref 3.5–10.8)

## 2019-02-21 PROCEDURE — 84443 ASSAY THYROID STIM HORMONE: CPT | Performed by: INTERNAL MEDICINE

## 2019-02-21 PROCEDURE — 0 DIATRIZOATE MEGLUMINE & SODIUM PER 1 ML: Performed by: INTERNAL MEDICINE

## 2019-02-21 PROCEDURE — 86431 RHEUMATOID FACTOR QUANT: CPT | Performed by: INTERNAL MEDICINE

## 2019-02-21 PROCEDURE — 85025 COMPLETE CBC W/AUTO DIFF WBC: CPT | Performed by: INTERNAL MEDICINE

## 2019-02-21 PROCEDURE — 85652 RBC SED RATE AUTOMATED: CPT | Performed by: INTERNAL MEDICINE

## 2019-02-21 PROCEDURE — 80061 LIPID PANEL: CPT | Performed by: INTERNAL MEDICINE

## 2019-02-21 PROCEDURE — 80053 COMPREHEN METABOLIC PANEL: CPT | Performed by: INTERNAL MEDICINE

## 2019-02-21 PROCEDURE — 86430 RHEUMATOID FACTOR TEST QUAL: CPT | Performed by: INTERNAL MEDICINE

## 2019-02-21 PROCEDURE — 86140 C-REACTIVE PROTEIN: CPT | Performed by: INTERNAL MEDICINE

## 2019-02-21 PROCEDURE — 99214 OFFICE O/P EST MOD 30 MIN: CPT | Performed by: INTERNAL MEDICINE

## 2019-02-21 PROCEDURE — 74176 CT ABD & PELVIS W/O CONTRAST: CPT

## 2019-02-21 RX ADMIN — DIATRIZOATE MEGLUMINE AND DIATRIZOATE SODIUM 15 ML: 660; 100 LIQUID ORAL; RECTAL at 13:05

## 2019-02-21 NOTE — ASSESSMENT & PLAN NOTE
Check cbc - constant severe pain- some tenderness with palpation without rebound  Check CT abdomen today   Check inflammatory markers

## 2019-02-21 NOTE — PROGRESS NOTES
Brittny Nicolas 73 y.o.  [facility]:Follow-up; Hypothyroidism; Hyperlipidemia; Hypertension; Osteoporosis; and Heartburn      [place]: Follow-up; Hypothyroidism; Hyperlipidemia; Hypertension; Osteoporosis; and Heartburn    Having problems with left shoulder and arm - went to ER - xray negative (told her to use anti-inflammatory)  Discussed with her - still sore raising shoulder  Weak in shoulder   Also some left lower quadrant abdominal pain - 2-3 weeks - sharp and some constipation   Has acholic stools  Pain in abdomen constant - no exacerbating or alleviating factors  9/10 severity   Is on dexilant  Arm locked in place - precipitated er visit  Can raise to 90 degrees but no further   Has not tried any otc remedies besides nsaid    Allergies   Allergen Reactions   • Boniva [Ibandronic Acid] GI Intolerance     Refractory gerd and abdominal pain        Current Outpatient Medications:   •  amitriptyline (ELAVIL) 25 MG tablet, Take one tablet Q HS, Disp: , Rfl:   •  amLODIPine (NORVASC) 2.5 MG tablet, Take 1 tablet by mouth Daily., Disp: 30 tablet, Rfl: 2  •  aspirin 81 MG tablet, Take  by mouth., Disp: , Rfl:   •  Calcium-Magnesium (CALCIUM MAGNESIUM 750) 300-300 MG tablet, Take 2 tablets by mouth Daily., Disp: , Rfl:   •  Cholecalciferol (VITAMIN D) 2000 UNITS capsule, Take 1 capsule by mouth., Disp: , Rfl:   •  DEXILANT 60 MG capsule, TAKE ONE CAPSULE BY MOUTH EVERY DAY, Disp: 30 capsule, Rfl: 1  •  levothyroxine (SYNTHROID, LEVOTHROID) 125 MCG tablet, Take 1 tablet by mouth Daily., Disp: 30 tablet, Rfl: 2  Patient Active Problem List    Diagnosis   • Erosive esophagitis [K22.10]   • H/O colonoscopy [Z98.890]   • Fatigue [R53.83]   • Hypothyroidism [E03.9]   • Achalasia [K22.0]   • Acute upper respiratory infection [J06.9]   • Atypical chest pain [R07.89]   • Benign essential hypertension [I10]   • Carotid artery stenosis [I65.29]   • Cataract [H26.9]   • Cough [R05]   • Acute cystitis [N30.00]   • Screening for malignant  neoplasm [Z12.9]   • Gastroesophageal reflux disease [K21.9]   • Essential familial hypercholesterolemia [E78.01]   • Gastrointestinal bleeding [K92.2]   • Inguinal hernia [K40.90]   • Insomnia [G47.00]   • Leg cramps [R25.2]   • Meniere's disease [H81.09]   • Night sweats [R61]   • Osteoporosis [M81.0]   • Peripheral neuropathy [G62.9]   • Retinal artery occlusion [H34.9]   • Rheumatoid arthritis (CMS/HCC) [M06.9]   • Stroke syndrome [BOF7060]   • Suprapubic pain [R10.2]   • Transient ischemic attack [G45.9]   • Positive reaction to tuberculin skin test [R76.11]   • Vitamin D deficiency [E55.9]   • Weight loss [R63.4]     Review of Systems   Constitutional: Negative for activity change, appetite change, chills, diaphoresis, fatigue, fever and unexpected weight change.   HENT: Negative for congestion, dental problem, drooling, ear discharge, ear pain, facial swelling, hearing loss, mouth sores, nosebleeds, postnasal drip, rhinorrhea, sinus pressure, sneezing, sore throat, tinnitus, trouble swallowing and voice change.    Eyes: Negative for photophobia, pain, discharge, redness, itching and visual disturbance.   Respiratory: Negative for apnea, cough, choking, chest tightness, shortness of breath, wheezing and stridor.    Cardiovascular: Negative for chest pain, palpitations and leg swelling.   Gastrointestinal: Positive for abdominal pain. Negative for abdominal distention, anal bleeding, blood in stool, constipation, diarrhea, nausea, rectal pain and vomiting.        Acholic stools   Endocrine: Negative for cold intolerance, heat intolerance, polydipsia, polyphagia and polyuria.   Genitourinary: Negative for decreased urine volume, difficulty urinating, dysuria, enuresis, flank pain, frequency, genital sores, hematuria and urgency.   Musculoskeletal: Positive for arthralgias. Negative for back pain, gait problem, joint swelling, myalgias, neck pain and neck stiffness.        Left shoulder pain    Skin: Negative for  "color change, pallor, rash and wound.   Allergic/Immunologic: Negative for environmental allergies, food allergies and immunocompromised state.   Neurological: Negative for dizziness, tremors, seizures, syncope, facial asymmetry, speech difficulty, weakness, light-headedness, numbness and headaches.   Hematological: Negative for adenopathy. Does not bruise/bleed easily.   Psychiatric/Behavioral: Negative for agitation, behavioral problems, confusion, decreased concentration, dysphoric mood, hallucinations, self-injury, sleep disturbance and suicidal ideas. The patient is not nervous/anxious and is not hyperactive.      Social History     Socioeconomic History   • Marital status:      Spouse name: Not on file   • Number of children: Not on file   • Years of education: Not on file   • Highest education level: Not on file   Social Needs   • Financial resource strain: Not on file   • Food insecurity - worry: Not on file   • Food insecurity - inability: Not on file   • Transportation needs - medical: Not on file   • Transportation needs - non-medical: Not on file   Occupational History   • Not on file   Tobacco Use   • Smoking status: Former Smoker   • Smokeless tobacco: Never Used   Substance and Sexual Activity   • Alcohol use: Yes     Comment: rarely   • Drug use: No   • Sexual activity: Defer   Other Topics Concern   • Not on file   Social History Narrative   • Not on file     Family History   Problem Relation Age of Onset   • Cystic fibrosis Other    • Cervical cancer Mother    • Cancer Paternal Grandmother      /80   Pulse 78   Ht 160 cm (63\")   Wt 55.2 kg (121 lb 12.8 oz)   SpO2 98%   Breastfeeding? No   BMI 21.58 kg/m²   Physical Exam   Constitutional: She is oriented to person, place, and time. She appears well-developed and well-nourished.   HENT:   Head: Normocephalic and atraumatic.   Nose: Nose normal.   Mouth/Throat: Oropharynx is clear and moist.   Eyes: Conjunctivae, EOM and lids are " normal. Pupils are equal, round, and reactive to light.   Neck: Trachea normal and normal range of motion. Neck supple. Carotid bruit is not present. No tracheal deviation present. No thyroid mass and no thyromegaly present.   Cardiovascular: Normal rate, regular rhythm, normal heart sounds and intact distal pulses. Exam reveals no gallop and no friction rub.   No murmur heard.  Pulmonary/Chest: Effort normal and breath sounds normal. No respiratory distress. She has no wheezes.   Abdominal: Soft. Bowel sounds are normal. There is tenderness (left lower quadrant ).   Musculoskeletal: Normal range of motion. She exhibits no edema or deformity.   Left shoulder pain with decreased rom   Better passive rom    Lymphadenopathy:     She has no cervical adenopathy.   Neurological: She is alert and oriented to person, place, and time. She has normal reflexes. She displays normal reflexes. No cranial nerve deficit.   Skin: Skin is warm and dry. No rash noted. No cyanosis or erythema. Nails show no clubbing.   Psychiatric: She has a normal mood and affect. Her speech is normal and behavior is normal. Judgment and thought content normal. Cognition and memory are normal.   Nursing note and vitals reviewed.    Results for orders placed or performed in visit on 10/09/18   Comprehensive Metabolic Panel   Result Value Ref Range    Glucose 80 70 - 100 mg/dL    BUN 17 9 - 23 mg/dL    Creatinine 0.79 0.60 - 1.30 mg/dL    Sodium 141 132 - 146 mmol/L    Potassium 3.8 3.5 - 5.5 mmol/L    Chloride 104 99 - 109 mmol/L    CO2 28.0 20.0 - 31.0 mmol/L    Calcium 9.9 8.7 - 10.4 mg/dL    Total Protein 6.9 5.7 - 8.2 g/dL    Albumin 4.56 3.20 - 4.80 g/dL    ALT (SGPT) 19 7 - 40 U/L    AST (SGOT) 20 0 - 33 U/L    Alkaline Phosphatase 72 25 - 100 U/L    Total Bilirubin 0.7 0.3 - 1.2 mg/dL    eGFR Non African Amer 71 >60 mL/min/1.73    Globulin 2.3 gm/dL    A/G Ratio 1.9 1.5 - 2.5 g/dL    BUN/Creatinine Ratio 21.5 7.0 - 25.0    Anion Gap 9.0 3.0 -  11.0 mmol/L   CBC Auto Differential   Result Value Ref Range    WBC 9.77 3.50 - 10.80 10*3/mm3    RBC 4.41 3.89 - 5.14 10*6/mm3    Hemoglobin 12.9 11.5 - 15.5 g/dL    Hematocrit 41.0 34.5 - 44.0 %    MCV 93.0 80.0 - 99.0 fL    MCH 29.3 27.0 - 31.0 pg    MCHC 31.5 (L) 32.0 - 36.0 g/dL    RDW 12.3 11.3 - 14.5 %    RDW-SD 42.0 37.0 - 54.0 fl    MPV 10.7 6.0 - 12.0 fL    Platelets 248 150 - 450 10*3/mm3    Neutrophil % 74.9 (H) 41.0 - 71.0 %    Lymphocyte % 17.2 (L) 24.0 - 44.0 %    Monocyte % 6.9 0.0 - 12.0 %    Eosinophil % 0.8 0.0 - 3.0 %    Basophil % 0.2 0.0 - 1.0 %    Immature Grans % 0.2 0.0 - 0.6 %    Neutrophils, Absolute 7.32 1.50 - 8.30 10*3/mm3    Lymphocytes, Absolute 1.68 0.60 - 4.80 10*3/mm3    Monocytes, Absolute 0.67 0.00 - 1.00 10*3/mm3    Eosinophils, Absolute 0.08 0.00 - 0.30 10*3/mm3    Basophils, Absolute 0.02 0.00 - 0.20 10*3/mm3    Immature Grans, Absolute 0.02 0.00 - 0.03 10*3/mm3   Lipid Panel   Result Value Ref Range    Total Cholesterol 192 0 - 200 mg/dL    Triglycerides 107 0 - 150 mg/dL    HDL Cholesterol 83 (H) 40 - 60 mg/dL    LDL Cholesterol  103 0 - 130 mg/dL   TSH   Result Value Ref Range    TSH 0.772 0.350 - 5.350 mIU/mL   T4, Free   Result Value Ref Range    Free T4 1.19 0.89 - 1.76 ng/dL   Vitamin D 25 Hydroxy   Result Value Ref Range    25 Hydroxy, Vitamin D 34.1 ng/ml     Problem List Items Addressed This Visit        Cardiovascular and Mediastinum    Essential familial hypercholesterolemia     On low fat diet - doing well          Relevant Orders    Lipid Panel    TSH    Benign essential hypertension - Primary     bp high- recheck 142/78  Doing well overall  Continue to monitor          Relevant Orders    Comprehensive Metabolic Panel    CBC Auto Differential       Digestive    Vitamin D deficiency     Continue supplement             Endocrine    Hypothyroidism     Check tsh          Relevant Orders    TSH       Nervous and Auditory    Left lower quadrant pain     Check cbc -  constant severe pain- some tenderness with palpation without rebound  Check CT abdomen today   Check inflammatory markers          Relevant Orders    Sedimentation Rate    Rheumatoid Factor    C-reactive Protein    CT Abdomen Pelvis Without Contrast    Acute pain of left shoulder     With decreased active rom, to 90 degrees  Does have good passive ROM- check crp and rf, order for therapy provided  If no benefit then consider ortho referral           Return in about 6 months (around 8/21/2019) for Recheck 30 min .      Casandra Sommer MA  Signed Dalila Liz MD

## 2019-02-21 NOTE — ASSESSMENT & PLAN NOTE
With decreased active rom, to 90 degrees  Does have good passive ROM- check crp and rf, order for therapy provided  If no benefit then consider ortho referral

## 2019-02-22 LAB
RHEUMATOID FACT SERPL-ACNC: POSITIVE [IU]/ML
RHEUMATOID FACT TITR SER: NORMAL IU/ML

## 2019-02-26 RX ORDER — DEXLANSOPRAZOLE 60 MG/1
CAPSULE, DELAYED RELEASE ORAL
Qty: 30 CAPSULE | Refills: 1 | Status: SHIPPED | OUTPATIENT
Start: 2019-02-26 | End: 2019-11-04 | Stop reason: SDUPTHER

## 2019-03-12 ENCOUNTER — TELEPHONE (OUTPATIENT)
Dept: INTERNAL MEDICINE | Facility: CLINIC | Age: 74
End: 2019-03-12

## 2019-03-12 NOTE — TELEPHONE ENCOUNTER
MS ARMENDARIZ CALLED THIS MORNING REGARDING AN APPOINTMENT WITH PHYSICAL THERAPY THAT SHE NEEDS TO R/S. SHE STATES THAT THIS OFFICE SCHEDULED THE APPOINTMENT FOR HER BUT I DO NOT SEE THAT IN HER CHART. SHE STATES THAT SHE HAS A SHEET OF PAPER WITH DR KIM NAME ON IT AND DOESN'T KNOW WHERE THE PHYSICAL THERAPY APPOINTMENT IS TO BE.     CALL BACK [phone]

## 2019-03-12 NOTE — TELEPHONE ENCOUNTER
Patient was advised she needs to call the PT location of her choice to schedule and to take the written order with her to appointment  Pt voiced understanding

## 2019-04-02 ENCOUNTER — HOSPITAL ENCOUNTER (OUTPATIENT)
Dept: GENERAL RADIOLOGY | Facility: HOSPITAL | Age: 74
Discharge: HOME | End: 2019-04-02
Admitting: NURSE PRACTITIONER

## 2019-04-02 ENCOUNTER — TRANSCRIBE ORDERS (OUTPATIENT)
Dept: ADMINISTRATIVE | Facility: HOSPITAL | Age: 74
End: 2019-04-02

## 2019-04-02 DIAGNOSIS — M25.512 LEFT SHOULDER PAIN, UNSPECIFIED CHRONICITY: Primary | ICD-10-CM

## 2019-04-02 PROCEDURE — 73030 X-RAY EXAM OF SHOULDER: CPT

## 2019-04-15 ENCOUNTER — OFFICE VISIT (OUTPATIENT)
Dept: ORTHOPEDIC SURGERY | Facility: CLINIC | Age: 74
End: 2019-04-15

## 2019-04-15 VITALS — BODY MASS INDEX: 21.41 KG/M2 | HEART RATE: 77 BPM | HEIGHT: 63 IN | OXYGEN SATURATION: 98 % | WEIGHT: 120.81 LBS

## 2019-04-15 DIAGNOSIS — IMO0002 DISORDER OF ROTATOR CUFF SYNDROME OF LEFT SHOULDER AND ALLIED DISORDER: ICD-10-CM

## 2019-04-15 DIAGNOSIS — M25.512 LEFT SHOULDER PAIN, UNSPECIFIED CHRONICITY: Primary | ICD-10-CM

## 2019-04-15 PROCEDURE — 20610 DRAIN/INJ JOINT/BURSA W/O US: CPT | Performed by: PHYSICIAN ASSISTANT

## 2019-04-15 PROCEDURE — 99204 OFFICE O/P NEW MOD 45 MIN: CPT | Performed by: PHYSICIAN ASSISTANT

## 2019-04-15 RX ORDER — LEVOTHYROXINE SODIUM 175 UG/1
175 TABLET ORAL DAILY
Refills: 0 | COMMUNITY
Start: 2019-04-08 | End: 2019-11-04 | Stop reason: DRUGHIGH

## 2019-04-15 RX ORDER — NAPROXEN 500 MG/1
500 TABLET ORAL 2 TIMES DAILY
Refills: 2 | COMMUNITY
Start: 2019-04-02 | End: 2020-02-20

## 2019-04-15 RX ORDER — ATORVASTATIN CALCIUM 20 MG/1
20 TABLET, FILM COATED ORAL
Refills: 0 | COMMUNITY
Start: 2019-04-05 | End: 2019-11-04 | Stop reason: SDUPTHER

## 2019-04-15 RX ADMIN — LIDOCAINE HYDROCHLORIDE 4 ML: 10 INJECTION, SOLUTION INFILTRATION; PERINEURAL at 13:39

## 2019-04-15 RX ADMIN — TRIAMCINOLONE ACETONIDE 40 MG: 40 INJECTION, SUSPENSION INTRA-ARTICULAR; INTRAMUSCULAR at 13:39

## 2019-04-15 NOTE — PROGRESS NOTES
[facility] Note    Subjective     Chief Complaint   Patient presents with   • Left Shoulder - Pain        HPI      Brittny Nicolas is a 73 y.o. female.  Right-hand-dominant.  Patient presents orthopedic clinic for evaluation of her left shoulder.  She reports that on 24 January 2019 she woke up and was unable to move her left shoulder.  She denies any history of injury or trauma.  Since then she has been able to perform some movement but notes significant pain.  Patient states that pain is predominantly localized to the anterior and lateral aspect of the shoulder.  There is some mild tenderness noted over the ACJ.  At this time she endorses a pain scale of 9-10/10.  Severity the pain moderate to severe.  Quality pain aching, throbbing.  Associated symptoms swelling, stiffness and giving way.  Patient has difficulty with dressing, reaching, lifting and cannot perform any overhead activities secondary to pain in the shoulder.  It also affects her sleep.  She is currently using ibuprofen for pain control.  No reported radiculopathy or paresthesias.  Patient denies any fever, chills, night sweats or other constitutional symptoms.      Past Medical History:   Diagnosis Date   • Graves disease       Past Surgical History:   Procedure Laterality Date   • AUGMENTATION MAMMAPLASTY      1978   • BREAST AUGMENTATION     • COLONOSCOPY     • D&C CERVICAL BIOPSY      Dilation and Curettage of Cervical Stump   • HERNIA REPAIR     • THROMBOENDARTERECTOMY      carotid   • UPPER GASTROINTESTINAL ENDOSCOPY        Family History   Problem Relation Age of Onset   • Cystic fibrosis Other    • Cervical cancer Mother    • Cancer Paternal Grandmother      Social History     Socioeconomic History   • Marital status:      Spouse name: Not on file   • Number of children: Not on file   • Years of education: Not on file   • Highest education level: Not on file   Tobacco Use   • Smoking status: Former Smoker   •  Smokeless tobacco: Never Used   Substance and Sexual Activity   • Alcohol use: Yes     Comment: rarely   • Drug use: No   • Sexual activity: Defer      Current Outpatient Medications on File Prior to Visit   Medication Sig Dispense Refill   • amitriptyline (ELAVIL) 25 MG tablet Take one tablet Q HS     • amLODIPine (NORVASC) 2.5 MG tablet Take 1 tablet by mouth Daily. 30 tablet 2   • aspirin 81 MG tablet Take  by mouth.     • atorvastatin (LIPITOR) 20 MG tablet Take 20 mg by mouth every night at bedtime.  0   • Calcium-Magnesium (CALCIUM MAGNESIUM 750) 300-300 MG tablet Take 2 tablets by mouth Daily.     • Cholecalciferol (VITAMIN D) 2000 UNITS capsule Take 1 capsule by mouth.     • DEXILANT 60 MG capsule TAKE ONE CAPSULE BY MOUTH EVERY DAY 30 capsule 1   • levothyroxine (SYNTHROID, LEVOTHROID) 125 MCG tablet Take 1 tablet by mouth Daily. 30 tablet 2   • levothyroxine (SYNTHROID, LEVOTHROID) 175 MCG tablet Take 175 mcg by mouth Daily.  0   • naproxen (NAPROSYN) 500 MG tablet Take 500 mg by mouth 2 (Two) Times a Day.  2     No current facility-administered medications on file prior to visit.       Allergies   Allergen Reactions   • Boniva [Ibandronic Acid] GI Intolerance     Refractory gerd and abdominal pain         The following portions of the patient's history were reviewed and updated as appropriate: allergies, current medications, past family history, past medical history, past social history, past surgical history and problem list.    Review of Systems   Constitutional: Positive for activity change, appetite change, diaphoresis and fatigue.   HENT: Negative.    Eyes: Negative.    Respiratory: Negative.    Cardiovascular: Negative.    Gastrointestinal: Negative.    Endocrine: Negative.    Genitourinary: Negative.    Musculoskeletal: Positive for arthralgias (shoulder pain), back pain and joint swelling.   Skin: Negative.    Allergic/Immunologic: Negative.    Neurological: Negative.    Hematological: Negative.   "  Psychiatric/Behavioral: Negative.         Objective      Physical Exam  Pulse 77   Ht 160 cm (62.99\")   Wt 54.8 kg (120 lb 13 oz)   SpO2 98%   BMI 21.41 kg/m²     Body mass index is 21.41 kg/m².        GENERAL APPEARANCE: awake, alert & oriented x 3, in no acute distress and well developed, well nourished  PSYCH: normal mood and affect  LUNGS:  breathing nonlabored, no wheezing  EYES: sclera anicteric, pupils equal  CARDIOVASCULAR: palpable pulses dorsalis pedis, palpable posterior tibial bilaterally. Capillary refill less than 2 seconds  INTEGUMENTARY: skin intact, no clubbing, cyanosis  NEUROLOGIC:  Normal Sensation and reflexes             Ortho Exam  Musculoskeletal   Upper Extremity   Right Shoulder     Inspection and Palpation:     Tenderness -anterior, lateral shoulder as well as mild discomfort noted with palpation of the ACJ.    Crepitus - none    Sensation is normal    Examination reveals no ecchymosis.        Strength and Tone:    Supraspinatus - 4-/5    External Rotators- 4-/5    Infraspinatus - 4-/5    Subscapularis - 4/5    Deltoid - 5/5     Range of Motion   Left shoulder:    Internal Rotation: ROM - T12    External Rotation: AROM - 80 degrees    Elevation through flexion: AROM - 180 degrees    Right Shoulder:    Internal Rotation: ROM - side of the body    External Rotation: AROM - 45 degrees    Elevation through flexion: AROM - 110 degrees AROM, 140 degrees PROM     Instability   Right shoulder    Sulcus sign negative    Apprehension test negative    Jesica relocation test negative    Jerk test negative     Impingement   Right shoulder    Brennan-Kishan impingement test positive    Neer impingement test positive     Functional Testing   Right shoulder    AC crossover adduction test mild discomfort    Abdominal compression test mild discomfort    Lift-off sign negative      Imaging/Studies  Imaging Results (last 7 days)     ** No results found for the last 168 hours. **      Dr. De Jesus and I " reviewed plain film imaging performed on 4/2/2019.  No acute bony abnormality, fracture or dislocation.  There is evidence of subluxation of the humeral head inferior when compared to the glenoid.  See chart for official report.      Assessment/Plan        ICD-10-CM ICD-9-CM   1. Left shoulder pain, unspecified chronicity M25.512 719.41   2. Disorder of rotator cuff syndrome of left shoulder and allied disorder M75.102 726.19       Orders Placed This Encounter   Procedures   • Large Joint Arthrocentesis: L subacromial bursa   • Ambulatory Referral to Physical Therapy Evaluate and treat, Ortho        -Left shoulder pain with rotator cuff syndrome.  -Patient was offered and accepted a subacromial corticosteroid injection.  Injection was given today.  -Patient was referred to PT.  -Patient may continue with her over-the-counter anti-inflammatory as needed to help assist with inflammation and pain control.  -Discussed the importance of gentle range of motion to the shoulder to avoid stiffness.  -Follow back up in 4 weeks for repeat evaluation, sooner if issues arise or symptoms worsen/change.  Depending on how she responds to PT and corticosteroid injection, patient may require more advanced imaging to assess  cuff pathology.  Patient and daughter verbalized understanding and agreement.  -Questions and concerns answered.      After discussing the risks, benefits, indications of injection, the patient gave consent to proceed.  Her left shoulder subacromial space was confirmed as the correct site to be injected with a timeout.  It was then prepped using Hibiclens and injected with a mixture of 4 cc of 1% plain lidocaine and 1 cc of Kenalog (40 mg per mL) without any resistance through the posterior lateral approach, patient in seated position.  Area was cleaned, hemostasis was achieved and a Band-Aid was applied over the injection site.  The patient tolerated procedure well.  I instructed the patient on signs and symptoms  of infection.  They should report to the emergency department or return to clinic if any of these develop, for further evaluation and treatment.  Recommended modifying activity for the next 48 hours to include rest, ice, elevation and oral pain medication as needed.     Patient was examined by Dr. De Jesus and he agrees with the above assessment and plan.    Medical Decision Making  Management Options : over-the-counter medicine, prescription/IM medicine and physical/occupational therapy  Data/Risk: radiology tests       Landy Zayas PA-C  04/16/19  10:22 AM         EMR Dragon/Transcription disclaimer:  Much of this encounter note is an electronic transcription of spoken language to printed text. Electronic transcription of spoken language may permit erroneous, or at times, nonsensical words or phrases to be inadvertently transcribed. Although I have reviewed the note for such errors, some may still exist.

## 2019-04-15 NOTE — PROGRESS NOTES
Procedure   Large Joint Arthrocentesis: L subacromial bursa  Date/Time: 4/15/2019 1:39 PM  Consent given by: patient  Site marked: site marked  Timeout: Immediately prior to procedure a time out was called to verify the correct patient, procedure, equipment, support staff and site/side marked as required   Supporting Documentation  Indications: pain   Procedure Details  Location: shoulder - L subacromial bursa  Preparation: Patient was prepped and draped in the usual sterile fashion  Needle size: 25 G  Approach: posterior  Medications administered: 40 mg triamcinolone acetonide 40 MG/ML; 4 mL lidocaine 1 %  Patient tolerance: patient tolerated the procedure well with no immediate complications

## 2019-04-16 RX ORDER — LIDOCAINE HYDROCHLORIDE 10 MG/ML
4 INJECTION, SOLUTION INFILTRATION; PERINEURAL
Status: COMPLETED | OUTPATIENT
Start: 2019-04-15 | End: 2019-04-15

## 2019-04-16 RX ORDER — TRIAMCINOLONE ACETONIDE 40 MG/ML
40 INJECTION, SUSPENSION INTRA-ARTICULAR; INTRAMUSCULAR
Status: COMPLETED | OUTPATIENT
Start: 2019-04-15 | End: 2019-04-15

## 2019-04-17 ENCOUNTER — TELEPHONE (OUTPATIENT)
Dept: ORTHOPEDIC SURGERY | Facility: CLINIC | Age: 74
End: 2019-04-17

## 2019-04-17 NOTE — TELEPHONE ENCOUNTER
Tried to call patient and the phone was picked up and hung up.    Patient called back and I let her know that I was sending a message to Landy and will give her a call back when I hear from her.  Dalia

## 2019-04-17 NOTE — TELEPHONE ENCOUNTER
Patient calling because she said the CRD gave her to recommendations for physical therapist in [place] and she lost the paper where she wrote them down. I did not see them in her office note. Please advise. her number is [phone]

## 2019-04-18 NOTE — TELEPHONE ENCOUNTER
All I gave the patient was the printed referral to PT.  I don't know the name of the PT in [place]. If patient can get us the name and number, then we can fax referral to them.

## 2019-05-13 ENCOUNTER — TELEPHONE (OUTPATIENT)
Dept: ORTHOPEDIC SURGERY | Facility: CLINIC | Age: 74
End: 2019-05-13

## 2019-05-13 NOTE — TELEPHONE ENCOUNTER
CALLED PATIENT TO REMIND HER OF MISSED APPOINTMENT TODAY. LEFT VOICEMAIL FOR HER TO CALL BACK AND RESCHEDULE. SENT NO SHOW LETTER.

## 2019-07-12 RX ORDER — AMLODIPINE BESYLATE 2.5 MG/1
TABLET ORAL
Qty: 30 TABLET | Refills: 2 | Status: SHIPPED | OUTPATIENT
Start: 2019-07-12 | End: 2019-10-25 | Stop reason: SDUPTHER

## 2019-10-25 RX ORDER — AMLODIPINE BESYLATE 2.5 MG/1
2.5 TABLET ORAL DAILY
Qty: 30 TABLET | Refills: 1 | Status: SHIPPED | OUTPATIENT
Start: 2019-10-25 | End: 2019-11-04 | Stop reason: SDUPTHER

## 2019-11-04 ENCOUNTER — OFFICE VISIT (OUTPATIENT)
Dept: ENDOCRINOLOGY | Facility: CLINIC | Age: 74
End: 2019-11-04

## 2019-11-04 VITALS
SYSTOLIC BLOOD PRESSURE: 168 MMHG | OXYGEN SATURATION: 97 % | HEART RATE: 72 BPM | BODY MASS INDEX: 20.77 KG/M2 | DIASTOLIC BLOOD PRESSURE: 90 MMHG | WEIGHT: 117.2 LBS | HEIGHT: 63 IN

## 2019-11-04 DIAGNOSIS — E55.9 VITAMIN D DEFICIENCY: ICD-10-CM

## 2019-11-04 DIAGNOSIS — E03.9 ACQUIRED HYPOTHYROIDISM: ICD-10-CM

## 2019-11-04 DIAGNOSIS — I10 BENIGN ESSENTIAL HYPERTENSION: Primary | ICD-10-CM

## 2019-11-04 DIAGNOSIS — E78.01 ESSENTIAL FAMILIAL HYPERCHOLESTEROLEMIA: ICD-10-CM

## 2019-11-04 PROCEDURE — 82607 VITAMIN B-12: CPT | Performed by: INTERNAL MEDICINE

## 2019-11-04 PROCEDURE — 80053 COMPREHEN METABOLIC PANEL: CPT | Performed by: INTERNAL MEDICINE

## 2019-11-04 PROCEDURE — 99214 OFFICE O/P EST MOD 30 MIN: CPT | Performed by: INTERNAL MEDICINE

## 2019-11-04 PROCEDURE — 85025 COMPLETE CBC W/AUTO DIFF WBC: CPT | Performed by: INTERNAL MEDICINE

## 2019-11-04 PROCEDURE — 84443 ASSAY THYROID STIM HORMONE: CPT | Performed by: INTERNAL MEDICINE

## 2019-11-04 PROCEDURE — 84439 ASSAY OF FREE THYROXINE: CPT | Performed by: INTERNAL MEDICINE

## 2019-11-04 RX ORDER — AMLODIPINE BESYLATE 5 MG/1
5 TABLET ORAL DAILY
Qty: 30 TABLET | Refills: 11 | Status: SHIPPED | OUTPATIENT
Start: 2019-11-04 | End: 2020-11-11

## 2019-11-04 RX ORDER — ATORVASTATIN CALCIUM 20 MG/1
20 TABLET, FILM COATED ORAL
Qty: 30 TABLET | Refills: 5 | Status: SHIPPED | OUTPATIENT
Start: 2019-11-04 | End: 2020-08-05

## 2019-11-04 RX ORDER — AMLODIPINE BESYLATE 2.5 MG/1
2.5 TABLET ORAL DAILY
Qty: 30 TABLET | Refills: 5 | Status: SHIPPED | OUTPATIENT
Start: 2019-11-04 | End: 2019-11-04 | Stop reason: SDUPTHER

## 2019-11-04 RX ORDER — DEXLANSOPRAZOLE 60 MG/1
1 CAPSULE, DELAYED RELEASE ORAL DAILY
Qty: 30 CAPSULE | Refills: 5 | Status: SHIPPED | OUTPATIENT
Start: 2019-11-04 | End: 2020-05-14

## 2019-11-04 RX ORDER — LEVOTHYROXINE SODIUM 0.12 MG/1
125 TABLET ORAL DAILY
Qty: 30 TABLET | Refills: 2 | Status: SHIPPED | OUTPATIENT
Start: 2019-11-04 | End: 2019-12-23 | Stop reason: SDUPTHER

## 2019-11-04 NOTE — PROGRESS NOTES
Brittny Fidencio 74 y.o.  CC:Headache (with elevated blood pressure and confusion, went to [facility] on Sat and then was sent to  ED )      [place]q: Headache (with elevated blood pressure and confusion, went to [facility] on Sat and then was sent to  ED )    Has not been taking bp medication  She does not know why   She states she has been taking levothyroxine 125 mcg daily and has not missed any doses of this   bp is high today   She thinks she may be out of norvasc  She is with her daughter today who state she may also be having more problems with confusion   She is still working every day     Allergies   Allergen Reactions   • Boniva [Ibandronic Acid] GI Intolerance     Refractory gerd and abdominal pain        Current Outpatient Medications:   •  amitriptyline (ELAVIL) 25 MG tablet, Take one tablet Q HS, Disp: , Rfl:   •  amLODIPine (NORVASC) 2.5 MG tablet, Take 1 tablet by mouth Daily. Needs to call for appointment, Disp: 30 tablet, Rfl: 1  •  aspirin 81 MG tablet, Take  by mouth., Disp: , Rfl:   •  atorvastatin (LIPITOR) 20 MG tablet, Take 20 mg by mouth every night at bedtime., Disp: , Rfl: 0  •  Calcium-Magnesium (CALCIUM MAGNESIUM 750) 300-300 MG tablet, Take 2 tablets by mouth Daily., Disp: , Rfl:   •  Cholecalciferol (VITAMIN D) 2000 UNITS capsule, Take 1 capsule by mouth., Disp: , Rfl:   •  DEXILANT 60 MG capsule, TAKE ONE CAPSULE BY MOUTH EVERY DAY, Disp: 30 capsule, Rfl: 1  •  levothyroxine (SYNTHROID, LEVOTHROID) 125 MCG tablet, Take 1 tablet by mouth Daily., Disp: 30 tablet, Rfl: 2  •  levothyroxine (SYNTHROID, LEVOTHROID) 175 MCG tablet, Take 175 mcg by mouth Daily., Disp: , Rfl: 0  •  naproxen (NAPROSYN) 500 MG tablet, Take 500 mg by mouth 2 (Two) Times a Day., Disp: , Rfl: 2  Patient Active Problem List    Diagnosis   • Left lower quadrant pain [R10.32]   • Acute pain of left shoulder [M25.512]   • Erosive esophagitis [K22.10]   • H/O colonoscopy [Z98.890]   • Fatigue [R53.83]   •  Hypothyroidism [E03.9]   • Achalasia [K22.0]   • Acute upper respiratory infection [J06.9]   • Atypical chest pain [R07.89]   • Benign essential hypertension [I10]   • Carotid artery stenosis [I65.29]   • Cataract [H26.9]   • Cough [R05]   • Acute cystitis [N30.00]   • Screening for malignant neoplasm [Z12.9]   • Gastroesophageal reflux disease [K21.9]   • Essential familial hypercholesterolemia [E78.01]   • Gastrointestinal bleeding [K92.2]   • Inguinal hernia [K40.90]   • Insomnia [G47.00]   • Leg cramps [R25.2]   • Meniere's disease [H81.09]   • Night sweats [R61]   • Osteoporosis [M81.0]   • Peripheral neuropathy [G62.9]   • Retinal artery occlusion [H34.9]   • Rheumatoid arthritis (CMS/HCC) [M06.9]   • Stroke syndrome [[id number]]   • Suprapubic pain [R10.2]   • Transient ischemic attack [G45.9]   • Positive reaction to tuberculin skin test [R76.11]   • Vitamin D deficiency [E55.9]   • Weight loss [R63.4]     Review of Systems   Constitutional: Negative for activity change, appetite change, chills, diaphoresis, fatigue, fever and unexpected weight change.   HENT: Negative for congestion, dental problem, drooling, ear discharge, ear pain, facial swelling, hearing loss, mouth sores, nosebleeds, postnasal drip, rhinorrhea, sinus pressure, sneezing, sore throat, tinnitus, trouble swallowing and voice change.    Eyes: Negative for photophobia, pain, discharge, redness, itching and visual disturbance.   Respiratory: Negative for apnea, cough, choking, chest tightness, shortness of breath, wheezing and stridor.    Cardiovascular: Negative for chest pain, palpitations and leg swelling.   Gastrointestinal: Negative for abdominal distention, abdominal pain, anal bleeding, blood in stool, constipation, diarrhea, nausea, rectal pain and vomiting.   Endocrine: Negative for cold intolerance, heat intolerance, polydipsia, polyphagia and polyuria.   Genitourinary: Negative for decreased urine volume, difficulty urinating,  "dysuria, enuresis, flank pain, frequency, genital sores, hematuria and urgency.   Musculoskeletal: Negative for arthralgias, back pain, gait problem, joint swelling, myalgias, neck pain and neck stiffness.   Skin: Negative for color change, pallor, rash and wound.   Allergic/Immunologic: Negative for environmental allergies, food allergies and immunocompromised state.   Neurological: Negative for dizziness, tremors, seizures, syncope, facial asymmetry, speech difficulty, weakness, light-headedness, numbness and headaches.   Hematological: Negative for adenopathy. Does not bruise/bleed easily.   Psychiatric/Behavioral: Negative for agitation, behavioral problems, confusion, decreased concentration, dysphoric mood, hallucinations, self-injury, sleep disturbance and suicidal ideas. The patient is not nervous/anxious and is not hyperactive.      Social History     Socioeconomic History   • Marital status:      Spouse name: Not on file   • Number of children: Not on file   • Years of education: Not on file   • Highest education level: Not on file   Tobacco Use   • Smoking status: Former Smoker   • Smokeless tobacco: Never Used   Substance and Sexual Activity   • Alcohol use: Yes     Comment: rarely   • Drug use: No   • Sexual activity: Defer     Family History   Problem Relation Age of Onset   • Cystic fibrosis Other    • Cervical cancer Mother    • Cancer Paternal Grandmother      /90   Pulse 72   Ht 158.8 cm (62.5\")   Wt 53.2 kg (117 lb 3.2 oz)   SpO2 97%   BMI 21.09 kg/m²   Physical Exam   Constitutional: She is oriented to person, place, and time. She appears well-developed and well-nourished.   HENT:   Head: Normocephalic and atraumatic.   Nose: Nose normal.   Mouth/Throat: Oropharynx is clear and moist.   Eyes: Conjunctivae, EOM and lids are normal. Pupils are equal, round, and reactive to light.   Neck: Trachea normal and normal range of motion. Neck supple. Carotid bruit is not present. No " tracheal deviation present. No thyroid mass and no thyromegaly present.   Cardiovascular: Normal rate, regular rhythm, normal heart sounds and intact distal pulses. Exam reveals no gallop and no friction rub.   No murmur heard.  Pulmonary/Chest: Effort normal and breath sounds normal. No respiratory distress. She has no wheezes.   Musculoskeletal: Normal range of motion. She exhibits no edema or deformity.   Bilateral bunion   Overgrown nail left gt toe    Lymphadenopathy:     She has no cervical adenopathy.   Neurological: She is alert and oriented to person, place, and time. She has normal reflexes. She displays normal reflexes. No cranial nerve deficit.   Skin: Skin is warm and dry. No rash noted. No cyanosis or erythema. Nails show no clubbing.   Psychiatric: She has a normal mood and affect. Her speech is normal and behavior is normal. Judgment and thought content normal. Cognition and memory are normal.   Nursing note and vitals reviewed.    Results for orders placed or performed in visit on 02/21/19   Comprehensive Metabolic Panel   Result Value Ref Range    Glucose 83 70 - 100 mg/dL    BUN 12 9 - 23 mg/dL    Creatinine 0.69 0.60 - 1.30 mg/dL    Sodium 139 132 - 146 mmol/L    Potassium 3.7 3.5 - 5.5 mmol/L    Chloride 103 99 - 109 mmol/L    CO2 27.0 20.0 - 31.0 mmol/L    Calcium 9.7 8.7 - 10.4 mg/dL    Total Protein 6.8 5.7 - 8.2 g/dL    Albumin 4.56 3.20 - 4.80 g/dL    ALT (SGPT) 13 7 - 40 U/L    AST (SGOT) 20 0 - 33 U/L    Alkaline Phosphatase 83 25 - 100 U/L    Total Bilirubin 0.6 0.3 - 1.2 mg/dL    eGFR Non African Amer 83 >60 mL/min/1.73    Globulin 2.2 gm/dL    A/G Ratio 2.0 1.5 - 2.5 g/dL    BUN/Creatinine Ratio 17.4 7.0 - 25.0    Anion Gap 9.0 3.0 - 11.0 mmol/L   CBC Auto Differential   Result Value Ref Range    WBC 8.08 3.50 - 10.80 10*3/mm3    RBC 4.37 3.89 - 5.14 10*6/mm3    Hemoglobin 12.4 11.5 - 15.5 g/dL    Hematocrit 39.4 34.5 - 44.0 %    MCV 90.2 80.0 - 99.0 fL    MCH 28.4 27.0 - 31.0 pg     MCHC 31.5 (L) 32.0 - 36.0 g/dL    RDW 13.1 11.3 - 14.5 %    RDW-SD 43.2 37.0 - 54.0 fl    MPV 10.3 6.0 - 12.0 fL    Platelets 250 150 - 450 10*3/mm3    Neutrophil % 74.8 (H) 41.0 - 71.0 %    Lymphocyte % 17.8 (L) 24.0 - 44.0 %    Monocyte % 6.2 0.0 - 12.0 %    Eosinophil % 0.6 0.0 - 3.0 %    Basophil % 0.4 0.0 - 1.0 %    Immature Grans % 0.2 0.0 - 0.6 %    Neutrophils, Absolute 6.04 1.50 - 8.30 10*3/mm3    Lymphocytes, Absolute 1.44 0.60 - 4.80 10*3/mm3    Monocytes, Absolute 0.50 0.00 - 1.00 10*3/mm3    Eosinophils, Absolute 0.05 0.00 - 0.30 10*3/mm3    Basophils, Absolute 0.03 0.00 - 0.20 10*3/mm3    Immature Grans, Absolute 0.02 0.00 - 0.05 10*3/mm3   Sedimentation Rate   Result Value Ref Range    Sed Rate 25 0 - 30 mm/hr   Rheumatoid Factor   Result Value Ref Range    Rheumatoid Factor Qualitative Positive (A) Negative   C-reactive Protein   Result Value Ref Range    C-Reactive Protein 0.03 0.00 - 1.00 mg/dL   Lipid Panel   Result Value Ref Range    Total Cholesterol 187 0 - 200 mg/dL    Triglycerides 86 0 - 150 mg/dL    HDL Cholesterol 74 (H) 40 - 60 mg/dL    LDL Cholesterol  94 0 - 130 mg/dL   TSH   Result Value Ref Range    TSH 0.399 0.350 - 5.350 mIU/mL   Rheumatoid Factor (IU / ML) Titer   Result Value Ref Range    Rheumatoid Factor Semi-Quant 160 IU/mL IU/mL     Problem List Items Addressed This Visit        Cardiovascular and Mediastinum    Essential familial hypercholesterolemia     Is on lipitor 20 mg daily   Continue medication and monitoring          Relevant Medications    atorvastatin (LIPITOR) 20 MG tablet    Benign essential hypertension - Primary     bp is high- increase amlodipine to 5 mg daily and take every day   Daughter will help be sure she is getting her medication          Relevant Medications    amLODIPine (NORVASC) 5 MG tablet    Other Relevant Orders    Comprehensive Metabolic Panel    CBC Auto Differential       Digestive    Vitamin D deficiency     Is on supplement   Check b12 levels           Relevant Orders    Vitamin B12       Endocrine    Hypothyroidism     Check tfts   Continue monitoring and medication          Relevant Medications    levothyroxine (SYNTHROID, LEVOTHROID) 125 MCG tablet    Other Relevant Orders    T4, Free    TSH        Return in about 6 weeks (around 12/16/2019) for Recheck 30 min .    Casandra Sommer MA  Signed Dalila Liz MD

## 2019-11-04 NOTE — ASSESSMENT & PLAN NOTE
bp is high- increase amlodipine to 5 mg daily and take every day   Daughter will help be sure she is getting her medication

## 2019-11-05 LAB
ALBUMIN SERPL-MCNC: 4.3 G/DL (ref 3.5–5.2)
ALBUMIN/GLOB SERPL: 1.3 G/DL
ALP SERPL-CCNC: 69 U/L (ref 39–117)
ALT SERPL W P-5'-P-CCNC: 12 U/L (ref 1–33)
ANION GAP SERPL CALCULATED.3IONS-SCNC: 14.2 MMOL/L (ref 5–15)
AST SERPL-CCNC: 21 U/L (ref 1–32)
BASOPHILS # BLD AUTO: 0.05 10*3/MM3 (ref 0–0.2)
BASOPHILS NFR BLD AUTO: 0.7 % (ref 0–1.5)
BILIRUB SERPL-MCNC: 0.4 MG/DL (ref 0.2–1.2)
BUN BLD-MCNC: 14 MG/DL (ref 8–23)
BUN/CREAT SERPL: 17.3 (ref 7–25)
CALCIUM SPEC-SCNC: 9.8 MG/DL (ref 8.6–10.5)
CHLORIDE SERPL-SCNC: 99 MMOL/L (ref 98–107)
CO2 SERPL-SCNC: 24.8 MMOL/L (ref 22–29)
CREAT BLD-MCNC: 0.81 MG/DL (ref 0.57–1)
DEPRECATED RDW RBC AUTO: 47 FL (ref 37–54)
EOSINOPHIL # BLD AUTO: 0.07 10*3/MM3 (ref 0–0.4)
EOSINOPHIL NFR BLD AUTO: 1 % (ref 0.3–6.2)
ERYTHROCYTE [DISTWIDTH] IN BLOOD BY AUTOMATED COUNT: 14.9 % (ref 12.3–15.4)
GFR SERPL CREATININE-BSD FRML MDRD: 69 ML/MIN/1.73
GLOBULIN UR ELPH-MCNC: 3.4 GM/DL
GLUCOSE BLD-MCNC: 115 MG/DL (ref 65–99)
HCT VFR BLD AUTO: 38.1 % (ref 34–46.6)
HGB BLD-MCNC: 12.5 G/DL (ref 12–15.9)
IMM GRANULOCYTES # BLD AUTO: 0.03 10*3/MM3 (ref 0–0.05)
IMM GRANULOCYTES NFR BLD AUTO: 0.4 % (ref 0–0.5)
LYMPHOCYTES # BLD AUTO: 1.72 10*3/MM3 (ref 0.7–3.1)
LYMPHOCYTES NFR BLD AUTO: 24.4 % (ref 19.6–45.3)
MCH RBC QN AUTO: 28.7 PG (ref 26.6–33)
MCHC RBC AUTO-ENTMCNC: 32.8 G/DL (ref 31.5–35.7)
MCV RBC AUTO: 87.6 FL (ref 79–97)
MONOCYTES # BLD AUTO: 0.43 10*3/MM3 (ref 0.1–0.9)
MONOCYTES NFR BLD AUTO: 6.1 % (ref 5–12)
NEUTROPHILS # BLD AUTO: 4.76 10*3/MM3 (ref 1.7–7)
NEUTROPHILS NFR BLD AUTO: 67.4 % (ref 42.7–76)
NRBC BLD AUTO-RTO: 0 /100 WBC (ref 0–0.2)
PLATELET # BLD AUTO: 255 10*3/MM3 (ref 140–450)
PMV BLD AUTO: 9.3 FL (ref 6–12)
POTASSIUM BLD-SCNC: 4.1 MMOL/L (ref 3.5–5.2)
PROT SERPL-MCNC: 7.7 G/DL (ref 6–8.5)
RBC # BLD AUTO: 4.35 10*6/MM3 (ref 3.77–5.28)
SODIUM BLD-SCNC: 138 MMOL/L (ref 136–145)
T4 FREE SERPL-MCNC: 0.25 NG/DL (ref 0.93–1.7)
TSH SERPL DL<=0.05 MIU/L-ACNC: 110 UIU/ML (ref 0.27–4.2)
VIT B12 BLD-MCNC: 298 PG/ML (ref 211–946)
WBC NRBC COR # BLD: 7.06 10*3/MM3 (ref 3.4–10.8)

## 2019-11-06 ENCOUNTER — TELEPHONE (OUTPATIENT)
Dept: ENDOCRINOLOGY | Facility: CLINIC | Age: 74
End: 2019-11-06

## 2019-11-06 NOTE — TELEPHONE ENCOUNTER
PT called saying that she was told to call today for lab results. It is noted that Casadnra spoke with her last night regarding her results and I informed her that a latter had been mailed out to her regarding the results, I also read the letter to her over the phone. She made she had the medication and voiced understanding.

## 2019-12-16 ENCOUNTER — OFFICE VISIT (OUTPATIENT)
Dept: ENDOCRINOLOGY | Facility: CLINIC | Age: 74
End: 2019-12-16

## 2019-12-16 VITALS
HEIGHT: 64 IN | HEART RATE: 75 BPM | SYSTOLIC BLOOD PRESSURE: 124 MMHG | OXYGEN SATURATION: 100 % | WEIGHT: 112 LBS | BODY MASS INDEX: 19.12 KG/M2 | DIASTOLIC BLOOD PRESSURE: 76 MMHG

## 2019-12-16 DIAGNOSIS — I10 BENIGN ESSENTIAL HYPERTENSION: ICD-10-CM

## 2019-12-16 DIAGNOSIS — E03.9 ACQUIRED HYPOTHYROIDISM: Primary | ICD-10-CM

## 2019-12-16 LAB
ALBUMIN SERPL-MCNC: 4.6 G/DL (ref 3.5–5.2)
ALBUMIN/GLOB SERPL: 1.2 G/DL
ALP SERPL-CCNC: 62 U/L (ref 39–117)
ALT SERPL W P-5'-P-CCNC: 8 U/L (ref 1–33)
ANION GAP SERPL CALCULATED.3IONS-SCNC: 15 MMOL/L (ref 5–15)
AST SERPL-CCNC: 16 U/L (ref 1–32)
BILIRUB SERPL-MCNC: 0.5 MG/DL (ref 0.2–1.2)
BUN BLD-MCNC: 13 MG/DL (ref 8–23)
BUN/CREAT SERPL: 17.8 (ref 7–25)
CALCIUM SPEC-SCNC: 10.1 MG/DL (ref 8.6–10.5)
CHLORIDE SERPL-SCNC: 99 MMOL/L (ref 98–107)
CO2 SERPL-SCNC: 26 MMOL/L (ref 22–29)
CREAT BLD-MCNC: 0.73 MG/DL (ref 0.57–1)
GFR SERPL CREATININE-BSD FRML MDRD: 78 ML/MIN/1.73
GLOBULIN UR ELPH-MCNC: 3.7 GM/DL
GLUCOSE BLD-MCNC: 85 MG/DL (ref 65–99)
POTASSIUM BLD-SCNC: 4.6 MMOL/L (ref 3.5–5.2)
PROT SERPL-MCNC: 8.3 G/DL (ref 6–8.5)
SODIUM BLD-SCNC: 140 MMOL/L (ref 136–145)
T4 FREE SERPL-MCNC: 1.91 NG/DL (ref 0.93–1.7)
TSH SERPL DL<=0.05 MIU/L-ACNC: 0.25 UIU/ML (ref 0.27–4.2)

## 2019-12-16 PROCEDURE — 84443 ASSAY THYROID STIM HORMONE: CPT | Performed by: INTERNAL MEDICINE

## 2019-12-16 PROCEDURE — 84439 ASSAY OF FREE THYROXINE: CPT | Performed by: INTERNAL MEDICINE

## 2019-12-16 PROCEDURE — 80053 COMPREHEN METABOLIC PANEL: CPT | Performed by: INTERNAL MEDICINE

## 2019-12-16 PROCEDURE — 99213 OFFICE O/P EST LOW 20 MIN: CPT | Performed by: INTERNAL MEDICINE

## 2019-12-16 NOTE — PROGRESS NOTES
Brittny Fidencio 74 y.o.  CC:6 week FU      Nikolski: 6 week FU    Is getting ready to move to [place]   Will be living with grandson   bp is good  Is on vitamin D supplement  Is on statin   High TSH- now on synthroid 125 mcg daily     Allergies   Allergen Reactions   • Boniva [Ibandronic Acid] GI Intolerance     Refractory gerd and abdominal pain        Current Outpatient Medications:   •  amLODIPine (NORVASC) 5 MG tablet, Take 1 tablet by mouth Daily. Needs to call for appointment, Disp: 30 tablet, Rfl: 11  •  aspirin 81 MG tablet, Take  by mouth., Disp: , Rfl:   •  atorvastatin (LIPITOR) 20 MG tablet, Take 1 tablet by mouth every night at bedtime., Disp: 30 tablet, Rfl: 5  •  Calcium-Magnesium (CALCIUM MAGNESIUM 750) 300-300 MG tablet, Take 2 tablets by mouth Daily., Disp: , Rfl:   •  Cholecalciferol (VITAMIN D) 2000 UNITS capsule, Take 1 capsule by mouth., Disp: , Rfl:   •  dexlansoprazole (DEXILANT) 60 MG capsule, Take 1 capsule by mouth Daily., Disp: 30 capsule, Rfl: 5  •  levothyroxine (SYNTHROID, LEVOTHROID) 125 MCG tablet, Take 1 tablet by mouth Daily., Disp: 30 tablet, Rfl: 2  •  naproxen (NAPROSYN) 500 MG tablet, Take 500 mg by mouth 2 (Two) Times a Day., Disp: , Rfl: 2  Patient Active Problem List    Diagnosis   • Left lower quadrant pain [R10.32]   • Acute pain of left shoulder [M25.512]   • Erosive esophagitis [K22.10]   • H/O colonoscopy [Z98.890]   • Fatigue [R53.83]   • Hypothyroidism [E03.9]   • Achalasia [K22.0]   • Acute upper respiratory infection [J06.9]   • Atypical chest pain [R07.89]   • Benign essential hypertension [I10]   • Carotid artery stenosis [I65.29]   • Cataract [H26.9]   • Cough [R05]   • Acute cystitis [N30.00]   • Screening for malignant neoplasm [Z12.9]   • Gastroesophageal reflux disease [K21.9]   • Essential familial hypercholesterolemia [E78.01]   • Gastrointestinal bleeding [K92.2]   • Inguinal hernia [K40.90]   • Insomnia [G47.00]   • Leg cramps [R25.2]   • Meniere's disease  [H81.09]   • Night sweats [R61]   • Osteoporosis [M81.0]   • Peripheral neuropathy [G62.9]   • Retinal artery occlusion [H34.9]   • Rheumatoid arthritis (CMS/HCC) [M06.9]   • Stroke syndrome [[id number]]   • Suprapubic pain [R10.2]   • Transient ischemic attack [G45.9]   • Positive reaction to tuberculin skin test [R76.11]   • Vitamin D deficiency [E55.9]   • Weight loss [R63.4]     Review of Systems   Constitutional: Negative for activity change, appetite change, chills, diaphoresis, fatigue, fever and unexpected weight change.   HENT: Negative for congestion, dental problem, drooling, ear discharge, ear pain, facial swelling, hearing loss, mouth sores, nosebleeds, postnasal drip, rhinorrhea, sinus pressure, sneezing, sore throat, tinnitus, trouble swallowing and voice change.    Eyes: Negative for photophobia, pain, discharge, redness, itching and visual disturbance.   Respiratory: Negative for apnea, cough, choking, chest tightness, shortness of breath, wheezing and stridor.    Cardiovascular: Negative for chest pain, palpitations and leg swelling.   Gastrointestinal: Negative for abdominal distention, abdominal pain, anal bleeding, blood in stool, constipation, diarrhea, nausea, rectal pain and vomiting.   Endocrine: Negative for cold intolerance, heat intolerance, polydipsia, polyphagia and polyuria.   Genitourinary: Negative for decreased urine volume, difficulty urinating, dysuria, enuresis, flank pain, frequency, genital sores, hematuria and urgency.   Musculoskeletal: Negative for arthralgias, back pain, gait problem, joint swelling, myalgias, neck pain and neck stiffness.   Skin: Negative for color change, pallor, rash and wound.   Allergic/Immunologic: Negative for environmental allergies, food allergies and immunocompromised state.   Neurological: Negative for dizziness, tremors, seizures, syncope, facial asymmetry, speech difficulty, weakness, light-headedness, numbness and headaches.   Hematological:  "Negative for adenopathy. Does not bruise/bleed easily.   Psychiatric/Behavioral: Negative for agitation, behavioral problems, confusion, decreased concentration, dysphoric mood, hallucinations, self-injury, sleep disturbance and suicidal ideas. The patient is not nervous/anxious and is not hyperactive.      Social History     Socioeconomic History   • Marital status:      Spouse name: Not on file   • Number of children: Not on file   • Years of education: Not on file   • Highest education level: Not on file   Tobacco Use   • Smoking status: Former Smoker   • Smokeless tobacco: Never Used   Substance and Sexual Activity   • Alcohol use: Yes     Comment: rarely   • Drug use: No   • Sexual activity: Defer     Family History   Problem Relation Age of Onset   • Cystic fibrosis Other    • Cervical cancer Mother    • Cancer Paternal Grandmother      /76   Pulse 75   Ht 162.6 cm (64\")   Wt 50.8 kg (112 lb)   SpO2 100%   BMI 19.22 kg/m²   Physical Exam   Constitutional: She is oriented to person, place, and time. She appears well-developed and well-nourished.   HENT:   Head: Normocephalic and atraumatic.   Nose: Nose normal.   Mouth/Throat: Oropharynx is clear and moist.   Eyes: Pupils are equal, round, and reactive to light. Conjunctivae, EOM and lids are normal.   Neck: Trachea normal and normal range of motion. Neck supple. Carotid bruit is not present. No tracheal deviation present. No thyroid mass and no thyromegaly present.   Cardiovascular: Normal rate, regular rhythm, normal heart sounds and intact distal pulses. Exam reveals no gallop and no friction rub.   No murmur heard.  Pulmonary/Chest: Effort normal and breath sounds normal. No respiratory distress. She has no wheezes.   Musculoskeletal: Normal range of motion. She exhibits no edema or deformity.   Lymphadenopathy:     She has no cervical adenopathy.   Neurological: She is alert and oriented to person, place, and time. She has normal " reflexes. She displays normal reflexes. No cranial nerve deficit.   Skin: Skin is warm and dry. No rash noted. No cyanosis or erythema. Nails show no clubbing.   Psychiatric: She has a normal mood and affect. Her speech is normal and behavior is normal. Judgment and thought content normal. Cognition and memory are normal.   Nursing note and vitals reviewed.    Results for orders placed or performed in visit on 11/04/19   Comprehensive Metabolic Panel   Result Value Ref Range    Glucose 115 (H) 65 - 99 mg/dL    BUN 14 8 - 23 mg/dL    Creatinine 0.81 0.57 - 1.00 mg/dL    Sodium 138 136 - 145 mmol/L    Potassium 4.1 3.5 - 5.2 mmol/L    Chloride 99 98 - 107 mmol/L    CO2 24.8 22.0 - 29.0 mmol/L    Calcium 9.8 8.6 - 10.5 mg/dL    Total Protein 7.7 6.0 - 8.5 g/dL    Albumin 4.30 3.50 - 5.20 g/dL    ALT (SGPT) 12 1 - 33 U/L    AST (SGOT) 21 1 - 32 U/L    Alkaline Phosphatase 69 39 - 117 U/L    Total Bilirubin 0.4 0.2 - 1.2 mg/dL    eGFR Non African Amer 69 >60 mL/min/1.73    Globulin 3.4 gm/dL    A/G Ratio 1.3 g/dL    BUN/Creatinine Ratio 17.3 7.0 - 25.0    Anion Gap 14.2 5.0 - 15.0 mmol/L   CBC Auto Differential   Result Value Ref Range    WBC 7.06 3.40 - 10.80 10*3/mm3    RBC 4.35 3.77 - 5.28 10*6/mm3    Hemoglobin 12.5 12.0 - 15.9 g/dL    Hematocrit 38.1 34.0 - 46.6 %    MCV 87.6 79.0 - 97.0 fL    MCH 28.7 26.6 - 33.0 pg    MCHC 32.8 31.5 - 35.7 g/dL    RDW 14.9 12.3 - 15.4 %    RDW-SD 47.0 37.0 - 54.0 fl    MPV 9.3 6.0 - 12.0 fL    Platelets 255 140 - 450 10*3/mm3    Neutrophil % 67.4 42.7 - 76.0 %    Lymphocyte % 24.4 19.6 - 45.3 %    Monocyte % 6.1 5.0 - 12.0 %    Eosinophil % 1.0 0.3 - 6.2 %    Basophil % 0.7 0.0 - 1.5 %    Immature Grans % 0.4 0.0 - 0.5 %    Neutrophils, Absolute 4.76 1.70 - 7.00 10*3/mm3    Lymphocytes, Absolute 1.72 0.70 - 3.10 10*3/mm3    Monocytes, Absolute 0.43 0.10 - 0.90 10*3/mm3    Eosinophils, Absolute 0.07 0.00 - 0.40 10*3/mm3    Basophils, Absolute 0.05 0.00 - 0.20 10*3/mm3    Immature  Grans, Absolute 0.03 0.00 - 0.05 10*3/mm3    nRBC 0.0 0.0 - 0.2 /100 WBC   T4, Free   Result Value Ref Range    Free T4 0.25 (L) 0.93 - 1.70 ng/dL   TSH   Result Value Ref Range    .000 (H) 0.270 - 4.200 uIU/mL   Vitamin B12   Result Value Ref Range    Vitamin B-12 298 211 - 946 pg/mL     Problem List Items Addressed This Visit        Cardiovascular and Mediastinum    Benign essential hypertension     bp is good  Continue monitoring and medication             Endocrine    Hypothyroidism - Primary     Last tsh 110   Now taking medication   Check tfts          Relevant Orders    T4, Free    TSH    Comprehensive Metabolic Panel         Return in about 4 months (around 4/16/2020) for Recheck 30 min .    Casandra Sommer MA  Signed Dalila Liz MD

## 2019-12-23 ENCOUNTER — TELEPHONE (OUTPATIENT)
Dept: ENDOCRINOLOGY | Facility: CLINIC | Age: 74
End: 2019-12-23

## 2019-12-23 RX ORDER — LEVOTHYROXINE SODIUM 112 UG/1
112 TABLET ORAL DAILY
Qty: 90 TABLET | Refills: 1 | Status: SHIPPED | OUTPATIENT
Start: 2019-12-23 | End: 2020-06-29 | Stop reason: SDUPTHER

## 2020-01-16 ENCOUNTER — OFFICE VISIT (OUTPATIENT)
Dept: ENDOCRINOLOGY | Facility: CLINIC | Age: 75
End: 2020-01-16

## 2020-01-16 VITALS
WEIGHT: 111 LBS | DIASTOLIC BLOOD PRESSURE: 80 MMHG | HEIGHT: 63 IN | TEMPERATURE: 97.6 F | BODY MASS INDEX: 19.67 KG/M2 | SYSTOLIC BLOOD PRESSURE: 148 MMHG | HEART RATE: 64 BPM | OXYGEN SATURATION: 99 %

## 2020-01-16 DIAGNOSIS — J02.9 PHARYNGITIS, UNSPECIFIED ETIOLOGY: Primary | ICD-10-CM

## 2020-01-16 PROCEDURE — 99212 OFFICE O/P EST SF 10 MIN: CPT | Performed by: INTERNAL MEDICINE

## 2020-01-16 RX ORDER — AMOXICILLIN 500 MG/1
500 CAPSULE ORAL 3 TIMES DAILY
Qty: 21 CAPSULE | Refills: 0 | Status: SHIPPED | OUTPATIENT
Start: 2020-01-16 | End: 2020-02-20

## 2020-01-16 NOTE — PROGRESS NOTES
Brittny Nicolas 74 y.o.  CC:Sore Throat (woke up feeling bad this morning) and Headache      [place]: Sore Throat (woke up feeling bad this morning) and Headache    Started 2 days ago   bp is higher   Has not taken anything over the counter  Lost voice today   No runny nose, fever or eye c/o   Just sore throat and headache, tender ant neck lymph nodes     Allergies   Allergen Reactions   • Boniva [Ibandronic Acid] GI Intolerance     Refractory gerd and abdominal pain        Current Outpatient Medications:   •  amLODIPine (NORVASC) 5 MG tablet, Take 1 tablet by mouth Daily. Needs to call for appointment, Disp: 30 tablet, Rfl: 11  •  aspirin 81 MG tablet, Take  by mouth., Disp: , Rfl:   •  atorvastatin (LIPITOR) 20 MG tablet, Take 1 tablet by mouth every night at bedtime., Disp: 30 tablet, Rfl: 5  •  Calcium-Magnesium (CALCIUM MAGNESIUM 750) 300-300 MG tablet, Take 2 tablets by mouth Daily., Disp: , Rfl:   •  Cholecalciferol (VITAMIN D) 2000 UNITS capsule, Take 1 capsule by mouth., Disp: , Rfl:   •  dexlansoprazole (DEXILANT) 60 MG capsule, Take 1 capsule by mouth Daily., Disp: 30 capsule, Rfl: 5  •  levothyroxine (SYNTHROID, LEVOTHROID) 112 MCG tablet, Take 1 tablet by mouth Daily., Disp: 90 tablet, Rfl: 1  •  naproxen (NAPROSYN) 500 MG tablet, Take 500 mg by mouth 2 (Two) Times a Day., Disp: , Rfl: 2  Patient Active Problem List    Diagnosis   • Left lower quadrant pain [R10.32]   • Acute pain of left shoulder [M25.512]   • Erosive esophagitis [K22.10]   • H/O colonoscopy [Z98.890]   • Fatigue [R53.83]   • Hypothyroidism [E03.9]   • Achalasia [K22.0]   • Acute upper respiratory infection [J06.9]   • Atypical chest pain [R07.89]   • Benign essential hypertension [I10]   • Carotid artery stenosis [I65.29]   • Cataract [H26.9]   • Cough [R05]   • Acute cystitis [N30.00]   • Screening for malignant neoplasm [Z12.9]   • Gastroesophageal reflux disease [K21.9]   • Essential familial hypercholesterolemia [E78.01]   •  Gastrointestinal bleeding [K92.2]   • Inguinal hernia [K40.90]   • Insomnia [G47.00]   • Leg cramps [R25.2]   • Meniere's disease [H81.09]   • Night sweats [R61]   • Osteoporosis [M81.0]   • Peripheral neuropathy [G62.9]   • Retinal artery occlusion [H34.9]   • Rheumatoid arthritis (CMS/HCC) [M06.9]   • Stroke syndrome [OJS9953]   • Suprapubic pain [R10.2]   • Transient ischemic attack [G45.9]   • Positive reaction to tuberculin skin test [R76.11]   • Vitamin D deficiency [E55.9]   • Weight loss [R63.4]     Review of Systems   Constitutional: Negative for activity change, appetite change, chills, diaphoresis, fatigue, fever and unexpected weight change.   HENT: Positive for sore throat and voice change. Negative for congestion, dental problem, drooling, ear discharge, ear pain, facial swelling, hearing loss, mouth sores, nosebleeds, postnasal drip, rhinorrhea, sinus pressure, sneezing, tinnitus and trouble swallowing.    Eyes: Negative for photophobia, pain, discharge, redness, itching and visual disturbance.   Respiratory: Negative for apnea, cough, choking, chest tightness, shortness of breath, wheezing and stridor.    Cardiovascular: Negative for chest pain, palpitations and leg swelling.   Gastrointestinal: Negative for abdominal distention, abdominal pain, anal bleeding, blood in stool, constipation, diarrhea, nausea, rectal pain and vomiting.   Endocrine: Negative for cold intolerance, heat intolerance, polydipsia, polyphagia and polyuria.   Genitourinary: Negative for decreased urine volume, difficulty urinating, dysuria, enuresis, flank pain, frequency, genital sores, hematuria and urgency.   Musculoskeletal: Negative for arthralgias, back pain, gait problem, joint swelling, myalgias, neck pain and neck stiffness.   Skin: Negative for color change, pallor, rash and wound.   Allergic/Immunologic: Negative for environmental allergies, food allergies and immunocompromised state.   Neurological: Positive for  "headaches. Negative for dizziness, tremors, seizures, syncope, facial asymmetry, speech difficulty, weakness, light-headedness and numbness.   Hematological: Negative for adenopathy. Does not bruise/bleed easily.   Psychiatric/Behavioral: Negative for agitation, behavioral problems, confusion, decreased concentration, dysphoric mood, hallucinations, self-injury, sleep disturbance and suicidal ideas. The patient is not nervous/anxious and is not hyperactive.      Social History     Socioeconomic History   • Marital status:      Spouse name: Not on file   • Number of children: Not on file   • Years of education: Not on file   • Highest education level: Not on file   Tobacco Use   • Smoking status: Former Smoker   • Smokeless tobacco: Never Used   Substance and Sexual Activity   • Alcohol use: Yes     Comment: rarely   • Drug use: No   • Sexual activity: Defer     Family History   Problem Relation Age of Onset   • Cystic fibrosis Other    • Cervical cancer Mother    • Cancer Paternal Grandmother      /80   Pulse 64   Temp 97.6 °F (36.4 °C)   Ht 158.8 cm (62.5\")   Wt 50.3 kg (111 lb)   SpO2 99%   BMI 19.98 kg/m²   Physical Exam   Constitutional: She is oriented to person, place, and time. She appears well-developed and well-nourished.   HENT:   Head: Normocephalic and atraumatic.   Nose: Nose normal.   Mouth/Throat: Oropharyngeal exudate (with red post pharynx, tonsillar exudate) present.   Eyes: Pupils are equal, round, and reactive to light. Conjunctivae, EOM and lids are normal.   Neck: Trachea normal and normal range of motion. Neck supple. Carotid bruit is not present. No tracheal deviation present. No thyroid mass and no thyromegaly present.   Tender adenopathy neck    Cardiovascular: Normal rate, regular rhythm, normal heart sounds and intact distal pulses. Exam reveals no gallop and no friction rub.   No murmur heard.  Pulmonary/Chest: Effort normal and breath sounds normal. No respiratory " distress. She has no wheezes.   Musculoskeletal: Normal range of motion. She exhibits no edema or deformity.   Lymphadenopathy:     She has no cervical adenopathy.   Neurological: She is alert and oriented to person, place, and time. She has normal reflexes. She displays normal reflexes. No cranial nerve deficit.   Skin: Skin is warm and dry. No rash noted. No cyanosis or erythema. Nails show no clubbing.   Psychiatric: She has a normal mood and affect. Her speech is normal and behavior is normal. Judgment and thought content normal. Cognition and memory are normal.   Nursing note and vitals reviewed.    Results for orders placed or performed in visit on 12/16/19   T4, Free   Result Value Ref Range    Free T4 1.91 (H) 0.93 - 1.70 ng/dL   TSH   Result Value Ref Range    TSH 0.246 (L) 0.270 - 4.200 uIU/mL   Comprehensive Metabolic Panel   Result Value Ref Range    Glucose 85 65 - 99 mg/dL    BUN 13 8 - 23 mg/dL    Creatinine 0.73 0.57 - 1.00 mg/dL    Sodium 140 136 - 145 mmol/L    Potassium 4.6 3.5 - 5.2 mmol/L    Chloride 99 98 - 107 mmol/L    CO2 26.0 22.0 - 29.0 mmol/L    Calcium 10.1 8.6 - 10.5 mg/dL    Total Protein 8.3 6.0 - 8.5 g/dL    Albumin 4.60 3.50 - 5.20 g/dL    ALT (SGPT) 8 1 - 33 U/L    AST (SGOT) 16 1 - 32 U/L    Alkaline Phosphatase 62 39 - 117 U/L    Total Bilirubin 0.5 0.2 - 1.2 mg/dL    eGFR Non African Amer 78 >60 mL/min/1.73    Globulin 3.7 gm/dL    A/G Ratio 1.2 g/dL    BUN/Creatinine Ratio 17.8 7.0 - 25.0    Anion Gap 15.0 5.0 - 15.0 mmol/L     Problem List Items Addressed This Visit        Respiratory    Pharyngitis - Primary     Presumed strep - headache, tonsillar exudate and tender ant cervical adenopathy   rx amoxil sent to pharmacy   Call 24-48 hrs if no better              Return if symptoms worsen or fail to improve, for Recheck.    Casandra Sommer MA  Signed Dalila Liz MD

## 2020-01-17 PROBLEM — J02.9 PHARYNGITIS: Status: ACTIVE | Noted: 2020-01-17

## 2020-01-17 RX ORDER — LEVOTHYROXINE SODIUM 0.12 MG/1
TABLET ORAL
COMMUNITY
Start: 2019-12-30 | End: 2020-01-23

## 2020-01-17 NOTE — ASSESSMENT & PLAN NOTE
Presumed strep - headache, tonsillar exudate and tender ant cervical adenopathy   rx amoxil sent to pharmacy   Call 24-48 hrs if no better

## 2020-01-23 RX ORDER — LEVOTHYROXINE SODIUM 0.12 MG/1
TABLET ORAL
Qty: 90 TABLET | Refills: 1 | Status: SHIPPED | OUTPATIENT
Start: 2020-01-23 | End: 2020-02-20 | Stop reason: DRUGHIGH

## 2020-02-20 ENCOUNTER — OFFICE VISIT (OUTPATIENT)
Dept: ENDOCRINOLOGY | Facility: CLINIC | Age: 75
End: 2020-02-20

## 2020-02-20 VITALS
DIASTOLIC BLOOD PRESSURE: 74 MMHG | OXYGEN SATURATION: 98 % | WEIGHT: 104 LBS | BODY MASS INDEX: 18.43 KG/M2 | HEART RATE: 64 BPM | SYSTOLIC BLOOD PRESSURE: 128 MMHG | HEIGHT: 63 IN

## 2020-02-20 DIAGNOSIS — R41.3 MEMORY LOSS: ICD-10-CM

## 2020-02-20 DIAGNOSIS — I10 BENIGN ESSENTIAL HYPERTENSION: Primary | ICD-10-CM

## 2020-02-20 DIAGNOSIS — E55.9 VITAMIN D DEFICIENCY: ICD-10-CM

## 2020-02-20 DIAGNOSIS — E03.9 ACQUIRED HYPOTHYROIDISM: ICD-10-CM

## 2020-02-20 DIAGNOSIS — E78.01 ESSENTIAL FAMILIAL HYPERCHOLESTEROLEMIA: ICD-10-CM

## 2020-02-20 LAB
ALBUMIN SERPL-MCNC: 3.9 G/DL (ref 3.5–5.2)
ALBUMIN/GLOB SERPL: 1.3 G/DL
ALP SERPL-CCNC: 82 U/L (ref 39–117)
ALT SERPL W P-5'-P-CCNC: 16 U/L (ref 1–33)
ANION GAP SERPL CALCULATED.3IONS-SCNC: 10.4 MMOL/L (ref 5–15)
AST SERPL-CCNC: 21 U/L (ref 1–32)
BASOPHILS # BLD AUTO: 0.03 10*3/MM3 (ref 0–0.2)
BASOPHILS NFR BLD AUTO: 0.4 % (ref 0–1.5)
BILIRUB SERPL-MCNC: 0.5 MG/DL (ref 0.2–1.2)
BUN BLD-MCNC: 14 MG/DL (ref 8–23)
BUN/CREAT SERPL: 17.9 (ref 7–25)
CALCIUM SPEC-SCNC: 9.2 MG/DL (ref 8.6–10.5)
CHLORIDE SERPL-SCNC: 99 MMOL/L (ref 98–107)
CHOLEST SERPL-MCNC: 137 MG/DL (ref 0–200)
CO2 SERPL-SCNC: 23.6 MMOL/L (ref 22–29)
CREAT BLD-MCNC: 0.78 MG/DL (ref 0.57–1)
DEPRECATED RDW RBC AUTO: 37.2 FL (ref 37–54)
EOSINOPHIL # BLD AUTO: 0.03 10*3/MM3 (ref 0–0.4)
EOSINOPHIL NFR BLD AUTO: 0.4 % (ref 0.3–6.2)
ERYTHROCYTE [DISTWIDTH] IN BLOOD BY AUTOMATED COUNT: 11.9 % (ref 12.3–15.4)
GFR SERPL CREATININE-BSD FRML MDRD: 72 ML/MIN/1.73
GLOBULIN UR ELPH-MCNC: 3.1 GM/DL
GLUCOSE BLD-MCNC: 83 MG/DL (ref 65–99)
HCT VFR BLD AUTO: 35.2 % (ref 34–46.6)
HDLC SERPL-MCNC: 61 MG/DL (ref 40–60)
HGB BLD-MCNC: 11.7 G/DL (ref 12–15.9)
IMM GRANULOCYTES # BLD AUTO: 0.02 10*3/MM3 (ref 0–0.05)
IMM GRANULOCYTES NFR BLD AUTO: 0.3 % (ref 0–0.5)
LDLC SERPL CALC-MCNC: 63 MG/DL (ref 0–100)
LDLC/HDLC SERPL: 1.03 {RATIO}
LYMPHOCYTES # BLD AUTO: 0.85 10*3/MM3 (ref 0.7–3.1)
LYMPHOCYTES NFR BLD AUTO: 10.9 % (ref 19.6–45.3)
MCH RBC QN AUTO: 28.7 PG (ref 26.6–33)
MCHC RBC AUTO-ENTMCNC: 33.2 G/DL (ref 31.5–35.7)
MCV RBC AUTO: 86.3 FL (ref 79–97)
MONOCYTES # BLD AUTO: 0.86 10*3/MM3 (ref 0.1–0.9)
MONOCYTES NFR BLD AUTO: 11 % (ref 5–12)
NEUTROPHILS # BLD AUTO: 6.04 10*3/MM3 (ref 1.7–7)
NEUTROPHILS NFR BLD AUTO: 77 % (ref 42.7–76)
NRBC BLD AUTO-RTO: 0 /100 WBC (ref 0–0.2)
PLATELET # BLD AUTO: 208 10*3/MM3 (ref 140–450)
PMV BLD AUTO: 10.9 FL (ref 6–12)
POTASSIUM BLD-SCNC: 4.2 MMOL/L (ref 3.5–5.2)
PROT SERPL-MCNC: 7 G/DL (ref 6–8.5)
RBC # BLD AUTO: 4.08 10*6/MM3 (ref 3.77–5.28)
SODIUM BLD-SCNC: 133 MMOL/L (ref 136–145)
T4 FREE SERPL-MCNC: 1.48 NG/DL (ref 0.93–1.7)
TRIGL SERPL-MCNC: 65 MG/DL (ref 0–150)
TSH SERPL DL<=0.05 MIU/L-ACNC: 3.11 UIU/ML (ref 0.27–4.2)
VLDLC SERPL-MCNC: 13 MG/DL (ref 5–40)
WBC NRBC COR # BLD: 7.83 10*3/MM3 (ref 3.4–10.8)

## 2020-02-20 PROCEDURE — 84439 ASSAY OF FREE THYROXINE: CPT | Performed by: INTERNAL MEDICINE

## 2020-02-20 PROCEDURE — 99214 OFFICE O/P EST MOD 30 MIN: CPT | Performed by: INTERNAL MEDICINE

## 2020-02-20 PROCEDURE — 80061 LIPID PANEL: CPT | Performed by: INTERNAL MEDICINE

## 2020-02-20 PROCEDURE — 80053 COMPREHEN METABOLIC PANEL: CPT | Performed by: INTERNAL MEDICINE

## 2020-02-20 PROCEDURE — 85025 COMPLETE CBC W/AUTO DIFF WBC: CPT | Performed by: INTERNAL MEDICINE

## 2020-02-20 PROCEDURE — 84443 ASSAY THYROID STIM HORMONE: CPT | Performed by: INTERNAL MEDICINE

## 2020-02-20 NOTE — PROGRESS NOTES
"Brittny Nicolas 74 y.o.  CC:Memory Loss (showed up for [facility] 3 days early, does not remember many things and is not taking medications regularly per her daughter)      [place]: Memory Loss (showed up for [facility] 3 days early, does not remember many things and is not taking medications regularly per her daughter)    Initial tsh over 100  Repeat good but no return of memory   Daughter is staying at her house, people are checking her medications  bp is good   She is not eating- \"too hard to cook for one person\" - has had weight loss  Discussed supplements like ensure or boost    Allergies   Allergen Reactions   • Boniva [Ibandronic Acid] GI Intolerance     Refractory gerd and abdominal pain        Current Outpatient Medications:   •  amLODIPine (NORVASC) 5 MG tablet, Take 1 tablet by mouth Daily. Needs to call for appointment, Disp: 30 tablet, Rfl: 11  •  amoxicillin (AMOXIL) 500 MG capsule, Take 1 capsule by mouth 3 (Three) Times a Day., Disp: 21 capsule, Rfl: 0  •  aspirin 81 MG tablet, Take  by mouth., Disp: , Rfl:   •  atorvastatin (LIPITOR) 20 MG tablet, Take 1 tablet by mouth every night at bedtime., Disp: 30 tablet, Rfl: 5  •  Calcium-Magnesium (CALCIUM MAGNESIUM 750) 300-300 MG tablet, Take 2 tablets by mouth Daily., Disp: , Rfl:   •  Cholecalciferol (VITAMIN D) 2000 UNITS capsule, Take 1 capsule by mouth., Disp: , Rfl:   •  dexlansoprazole (DEXILANT) 60 MG capsule, Take 1 capsule by mouth Daily., Disp: 30 capsule, Rfl: 5  •  levothyroxine (SYNTHROID, LEVOTHROID) 112 MCG tablet, Take 1 tablet by mouth Daily., Disp: 90 tablet, Rfl: 1  •  levothyroxine (SYNTHROID, LEVOTHROID) 125 MCG tablet, TAKE 1 TABLET BY MOUTH EVERY DAY, Disp: 90 tablet, Rfl: 1  •  naproxen (NAPROSYN) 500 MG tablet, Take 500 mg by mouth 2 (Two) Times a Day., Disp: , Rfl: 2  Patient Active Problem List    Diagnosis   • Pharyngitis [J02.9]   • Left lower quadrant pain [R10.32]   • Acute pain of left shoulder [M25.512]   • Erosive esophagitis " [K22.10]   • H/O colonoscopy [Z98.890]   • Fatigue [R53.83]   • Hypothyroidism [E03.9]   • Achalasia [K22.0]   • Acute upper respiratory infection [J06.9]   • Atypical chest pain [R07.89]   • Benign essential hypertension [I10]   • Carotid artery stenosis [I65.29]   • Cataract [H26.9]   • Cough [R05]   • Acute cystitis [N30.00]   • Screening for malignant neoplasm [Z12.9]   • Gastroesophageal reflux disease [K21.9]   • Essential familial hypercholesterolemia [E78.01]   • Gastrointestinal bleeding [K92.2]   • Inguinal hernia [K40.90]   • Insomnia [G47.00]   • Leg cramps [R25.2]   • Meniere's disease [H81.09]   • Night sweats [R61]   • Osteoporosis [M81.0]   • Peripheral neuropathy [G62.9]   • Retinal artery occlusion [H34.9]   • Rheumatoid arthritis (CMS/HCC) [M06.9]   • Stroke syndrome [[id number]]   • Suprapubic pain [R10.2]   • Transient ischemic attack [G45.9]   • Positive reaction to tuberculin skin test [R76.11]   • Vitamin D deficiency [E55.9]   • Weight loss [R63.4]     Review of Systems   Constitutional: Positive for fatigue. Negative for activity change, appetite change, chills, diaphoresis, fever and unexpected weight change.   HENT: Negative for congestion, dental problem, drooling, ear discharge, ear pain, facial swelling, hearing loss, mouth sores, nosebleeds, postnasal drip, rhinorrhea, sinus pressure, sneezing, sore throat, tinnitus, trouble swallowing and voice change.    Eyes: Negative for photophobia, pain, discharge, redness, itching and visual disturbance.   Respiratory: Negative for apnea, cough, choking, chest tightness, shortness of breath, wheezing and stridor.    Cardiovascular: Negative for chest pain, palpitations and leg swelling.   Gastrointestinal: Negative for abdominal distention, abdominal pain, anal bleeding, blood in stool, constipation, diarrhea, nausea, rectal pain and vomiting.   Endocrine: Negative for cold intolerance, heat intolerance, polydipsia, polyphagia and polyuria.  "  Genitourinary: Negative for decreased urine volume, difficulty urinating, dysuria, enuresis, flank pain, frequency, genital sores, hematuria and urgency.   Musculoskeletal: Negative for arthralgias, back pain, gait problem, joint swelling, myalgias, neck pain and neck stiffness.   Skin: Negative for color change, pallor, rash and wound.   Allergic/Immunologic: Negative for environmental allergies, food allergies and immunocompromised state.   Neurological: Negative for dizziness, tremors, seizures, syncope, facial asymmetry, speech difficulty, weakness, light-headedness, numbness and headaches.   Hematological: Negative for adenopathy. Does not bruise/bleed easily.   Psychiatric/Behavioral: Positive for behavioral problems and confusion. Negative for agitation, decreased concentration, dysphoric mood, hallucinations, self-injury, sleep disturbance and suicidal ideas. The patient is not nervous/anxious and is not hyperactive.      Social History     Socioeconomic History   • Marital status:      Spouse name: Not on file   • Number of children: Not on file   • Years of education: Not on file   • Highest education level: Not on file   Tobacco Use   • Smoking status: Former Smoker   • Smokeless tobacco: Never Used   Substance and Sexual Activity   • Alcohol use: Yes     Comment: rarely   • Drug use: No   • Sexual activity: Defer     Family History   Problem Relation Age of Onset   • Cystic fibrosis Other    • Cervical cancer Mother    • Cancer Paternal Grandmother      /74   Pulse 64   Ht 158.8 cm (62.5\")   Wt 47.2 kg (104 lb)   SpO2 98%   BMI 18.72 kg/m²   Physical Exam   Constitutional: She is oriented to person, place, and time. She appears well-developed and well-nourished.   HENT:   Head: Normocephalic and atraumatic.   Nose: Nose normal.   Mouth/Throat: Oropharynx is clear and moist.   Eyes: Pupils are equal, round, and reactive to light. Conjunctivae, EOM and lids are normal.   Neck: Trachea " normal and normal range of motion. Neck supple. Carotid bruit is not present. No tracheal deviation present. No thyroid mass and no thyromegaly present.   Cardiovascular: Normal rate, regular rhythm, normal heart sounds and intact distal pulses. Exam reveals no gallop and no friction rub.   No murmur heard.  Pulmonary/Chest: Effort normal and breath sounds normal. No respiratory distress. She has no wheezes.   Musculoskeletal: Normal range of motion. She exhibits no edema or deformity.   Lymphadenopathy:     She has no cervical adenopathy.   Neurological: She is alert and oriented to person, place, and time. She has normal reflexes. She displays normal reflexes. No cranial nerve deficit.   Skin: Skin is warm and dry. No rash noted. No cyanosis or erythema. Nails show no clubbing.   Psychiatric: Her speech is normal. Cognition and memory are normal.   Nursing note and vitals reviewed.    Results for orders placed or performed in visit on 12/16/19   T4, Free   Result Value Ref Range    Free T4 1.91 (H) 0.93 - 1.70 ng/dL   TSH   Result Value Ref Range    TSH 0.246 (L) 0.270 - 4.200 uIU/mL   Comprehensive Metabolic Panel   Result Value Ref Range    Glucose 85 65 - 99 mg/dL    BUN 13 8 - 23 mg/dL    Creatinine 0.73 0.57 - 1.00 mg/dL    Sodium 140 136 - 145 mmol/L    Potassium 4.6 3.5 - 5.2 mmol/L    Chloride 99 98 - 107 mmol/L    CO2 26.0 22.0 - 29.0 mmol/L    Calcium 10.1 8.6 - 10.5 mg/dL    Total Protein 8.3 6.0 - 8.5 g/dL    Albumin 4.60 3.50 - 5.20 g/dL    ALT (SGPT) 8 1 - 33 U/L    AST (SGOT) 16 1 - 32 U/L    Alkaline Phosphatase 62 39 - 117 U/L    Total Bilirubin 0.5 0.2 - 1.2 mg/dL    eGFR Non African Amer 78 >60 mL/min/1.73    Globulin 3.7 gm/dL    A/G Ratio 1.2 g/dL    BUN/Creatinine Ratio 17.8 7.0 - 25.0    Anion Gap 15.0 5.0 - 15.0 mmol/L     Problem List Items Addressed This Visit        Cardiovascular and Mediastinum    Essential familial hypercholesterolemia     Update lipids- likely low due to weight loss,  decreased appetite         Relevant Orders    Lipid Panel (Completed)    Benign essential hypertension - Primary     bp is good   Continue monitoring and medication          Relevant Orders    Comprehensive Metabolic Panel (Completed)    CBC Auto Differential (Completed)       Digestive    Vitamin D deficiency     Continue vitamin d supplement             Endocrine    Hypothyroidism     Update tfts- states she is taking her medication as prescribed          Relevant Orders    TSH (Completed)    T4, Free (Completed)       Other    Memory loss     After being off thyroid supplement, tsh 100   Update tfts on medication          Relevant Orders    Ambulatory Referral to Neurology        Return in about 4 months (around 6/20/2020) for Recheck 30 min .    Casandra Sommer MA  Signed Dalila Liz MD

## 2020-03-04 ENCOUNTER — OFFICE VISIT (OUTPATIENT)
Dept: NEUROLOGY | Facility: CLINIC | Age: 75
End: 2020-03-04

## 2020-03-04 VITALS — OXYGEN SATURATION: 97 % | WEIGHT: 109 LBS | HEART RATE: 66 BPM | HEIGHT: 63 IN | BODY MASS INDEX: 19.31 KG/M2

## 2020-03-04 DIAGNOSIS — R41.3 MEMORY LOSS: Primary | ICD-10-CM

## 2020-03-04 PROCEDURE — 99205 OFFICE O/P NEW HI 60 MIN: CPT | Performed by: PSYCHIATRY & NEUROLOGY

## 2020-03-04 RX ORDER — DONEPEZIL HYDROCHLORIDE 5 MG/1
5 TABLET, FILM COATED ORAL DAILY
Qty: 30 TABLET | Refills: 11 | Status: SHIPPED | OUTPATIENT
Start: 2020-03-04 | End: 2020-06-03 | Stop reason: SDUPTHER

## 2020-03-04 RX ORDER — CETIRIZINE HYDROCHLORIDE 10 MG/1
10 TABLET ORAL DAILY
COMMUNITY
End: 2021-06-15

## 2020-03-04 NOTE — PROGRESS NOTES
"Subjective     CC: memory impairment    History of Present Illness   Brittny Nicolas is a 74 y.o. female who comes to clinic today for evaluation of memory impairment. Her family has noted symptoms since at least early 2019 marked initially by forgetfulness. This has gradually worsened  over time. Additional associated symptoms have included impairments in language  and executive function. There are no identified exacerbating or alleviating factors. She has moved in with her daughter in [place] as a result of her cognitive impairment.    Prior evaluation has included unremarkable screening bloodwork.    I have reviewed and confirmed the past family, social and medical history as accurate on 3/4/20.     Review of Systems   Constitutional: Negative.    Respiratory: Negative.    Cardiovascular: Negative.    Gastrointestinal: Negative.    Genitourinary: Negative.    All other systems reviewed and are negative.      Objective   General appearance today is normal.   Peripheral pulses were present and symmetric.   The ophthalmoscopic exam today is unremarkable. The discs and posterior elements are unremarkable.    Pulse 66   Ht 158.8 cm (62.5\")   Wt 49.4 kg (109 lb)   SpO2 97%   BMI 19.62 kg/m²     Physical Exam   Neurological: She has normal strength. She has a normal Finger-Nose-Finger Test. Gait normal.   Reflex Scores:       Tricep reflexes are 1+ on the right side and 1+ on the left side.       Bicep reflexes are 1+ on the right side and 1+ on the left side.       Brachioradialis reflexes are 1+ on the right side and 1+ on the left side.       Patellar reflexes are 1+ on the right side and 1+ on the left side.       Achilles reflexes are 1+ on the right side and 1+ on the left side.  Psychiatric: Her speech is normal.        Neurologic Exam     Mental Status   Oriented to person.   Disoriented to place.   Disoriented to time.   Registration: recalls 3 of 3 objects. Recall of objects at 5 minutes: 0/3. Follows 3 " step commands.   Attention: normal. Concentration: normal.   Speech: speech is normal   Level of consciousness: alert  Knowledge: poor.   Able to name object. Able to read. Able to repeat. Able to write. Normal comprehension.     Cranial Nerves   Cranial nerves II through XII intact.     Motor Exam   Muscle bulk: normal  Overall muscle tone: normal    Strength   Strength 5/5 throughout.     Sensory Exam   Light touch normal.     Gait, Coordination, and Reflexes     Gait  Gait: normal    Coordination   Finger to nose coordination: normal    Reflexes   Right brachioradialis: 1+  Left brachioradialis: 1+  Right biceps: 1+  Left biceps: 1+  Right triceps: 1+  Left triceps: 1+  Right patellar: 1+  Left patellar: 1+  Right achilles: 1+  Left achilles: 1+      MMSE=17      Assessment/Plan   Brittny was seen today for memory loss.    Diagnoses and all orders for this visit:    Memory loss  -     CT Head Without Contrast    Other orders  -     donepezil (ARICEPT) 5 MG tablet; Take 1 tablet by mouth Daily.          DISCUSSION/SUMMARY    Brittny Nicolas comes to clinic today for evaluation of memory impairment. Her history and examination, including bedside cognitive testing are most consistent with Alzheimer's Disease , which was discussed. For now it was elected to obtain a head CT . After discussing potential treatment options, it was elected to add  donepezil. She will then follow up in 3 months , or sooner if needed.     As part of this visit I reviewed prior lab results and obtained additional history from the family which is incorporated in the HPI. Please see above for additional details.

## 2020-03-11 ENCOUNTER — HOSPITAL ENCOUNTER (OUTPATIENT)
Dept: CT IMAGING | Facility: HOSPITAL | Age: 75
Discharge: HOME | End: 2020-03-11
Admitting: PSYCHIATRY & NEUROLOGY

## 2020-03-11 PROCEDURE — 70450 CT HEAD/BRAIN W/O DYE: CPT

## 2020-03-12 ENCOUNTER — TELEPHONE (OUTPATIENT)
Dept: NEUROLOGY | Facility: CLINIC | Age: 75
End: 2020-03-12

## 2020-03-13 ENCOUNTER — TELEPHONE (OUTPATIENT)
Dept: NEUROLOGY | Facility: CLINIC | Age: 75
End: 2020-03-13

## 2020-03-13 NOTE — TELEPHONE ENCOUNTER
Phone call with daughter, Adia Tatum, with whom patient lives. Adia's  also lives there and their daughter is also involved. Adia still works but only 5 min away from home and stops by to check in on her mother, especially for lunch. She wanted to know more about what it means to have AD, stages, understanding symptoms, safety, what care needs to expect. Daughter is going to look into social security disability as she was let go from her job on December 10 for memory reasons. Adia is medical power of  and patient has a living will. She has noticed significant short term memory issues, noticed 2 times recently that she left water boiling on stove, confusion with meds, limited awareness of her disease and often gets angry when daughter is helping her. Adia has taken over her meds, financial affairs. Has to remind her to eat and prepares her food most days. We discussed general AD progression, symptoms, and home safety. She understands safeguards they can try and put in place/modifications for when she's home alone, understanding the needs to look into additional supervision resources. She will start considering who she can call upon for additional supervision and I will send her lists of caregiver agencies as well as she understands she may qualify for medicaid and the home and community based waiver program for in home care. She agreed for me to email her information on Alzheimer[employer], information on the social security disability, medicaid waiver. We agreed to keep in touch and discuss specific issues they may be having. She was having headaches since starting Aricept but hasn't had them the last 2 days. I told her to let us know if these continue, she verbalized understanding.

## 2020-03-16 ENCOUNTER — TELEPHONE (OUTPATIENT)
Dept: ENDOCRINOLOGY | Facility: CLINIC | Age: 75
End: 2020-03-16

## 2020-03-16 NOTE — TELEPHONE ENCOUNTER
Tried calling Adia to get more info however there was no answer and the voice mail box has not been set up yet

## 2020-03-16 NOTE — TELEPHONE ENCOUNTER
PATIENT'S DAUGHTER STATES THAT SHE BELIEVES HER MOTHER HAS A SINUS INFECTION AT THIS TIME AND DOESN'T WANT TO BRING HER MOTHER OUT WITH THE CURRENT SITUATION AND WOULD LIKE TO KNOW IF DR KIM WOULD ORDER AN ANTIBIOTIC FOR HER. SHE ALSO WANTED TO LET DR KIM KNOW THAT AN ALZHEIMER'S DX WAS CONFIRMED BY DR PELAYO. [facility] IN [place]    CALL BACK [phone] [place]

## 2020-03-17 RX ORDER — CEFDINIR 300 MG/1
300 CAPSULE ORAL 2 TIMES DAILY
Qty: 20 CAPSULE | Refills: 0 | Status: SHIPPED | OUTPATIENT
Start: 2020-03-17 | End: 2020-06-29

## 2020-03-17 NOTE — TELEPHONE ENCOUNTER
Tried calling cell number again with no luck so Adia answered her home number  States Brittny has a lot of sinus pressure across her face and headache in that area with a lot of congestion.  She has been giving her sudafed and now trying zyrtec but not helping.  No fever or cough.  She really doesn't want to bring her out so she is asking for antibiotic to be sent to [facility]  She was advised [facility] out on Wednesday but to check with pharmacy Wednesday afternoon to see if rx was sent in

## 2020-03-18 NOTE — TELEPHONE ENCOUNTER
Informed Adia of Dr. Liz, recommendatiions and informed her rx was sent to pharmacy.  Adia voiced understanding.

## 2020-04-20 ENCOUNTER — TELEPHONE (OUTPATIENT)
Dept: ENDOCRINOLOGY | Facility: CLINIC | Age: 75
End: 2020-04-20

## 2020-04-20 NOTE — TELEPHONE ENCOUNTER
PATIENTS DAUGHTER IS CALLING NEEDING A LETTER ON OUR LETTER HEAD FROM DR. KIM STATING PATIENT IS UNABLE TO WORK DUE TO MEDICAL CONDITIONS. PATIENTS DAUGHTER WOULD LIKE THE LETTER TO BE MAILED OR E-MAIL TO PATIENT. PATIENTS NUMBER [id number]

## 2020-05-14 RX ORDER — DEXLANSOPRAZOLE 60 MG/1
CAPSULE, DELAYED RELEASE ORAL
Qty: 90 CAPSULE | Refills: 0 | Status: SHIPPED | OUTPATIENT
Start: 2020-05-14 | End: 2020-06-29 | Stop reason: SDUPTHER

## 2020-06-03 ENCOUNTER — TELEPHONE (OUTPATIENT)
Dept: NEUROLOGY | Facility: CLINIC | Age: 75
End: 2020-06-03

## 2020-06-03 RX ORDER — DONEPEZIL HYDROCHLORIDE 10 MG/1
10 TABLET, FILM COATED ORAL DAILY
Qty: 30 TABLET | Refills: 11 | Status: SHIPPED | OUTPATIENT
Start: 2020-06-03 | End: 2021-01-27

## 2020-06-03 NOTE — TELEPHONE ENCOUNTER
PT'S DAUGHTER CALLED IN AND ASKED IF WE ARE GOING TO INCREASE HER MEDICATION DONEPEZIL  OF HER 5MG  UP TO THE NEXT LEVEL AND IF SO SHE WOULD LIKE A REFILL OF THE NEXT HIGHER DOSE SENT TO [facility] IN [place]  WITH PHONE NUMBER [phone]  HER BEST CALL BACK NUMBER [phone]

## 2020-06-08 ENCOUNTER — OFFICE VISIT (OUTPATIENT)
Dept: NEUROLOGY | Facility: CLINIC | Age: 75
End: 2020-06-08

## 2020-06-08 VITALS
WEIGHT: 115 LBS | HEART RATE: 65 BPM | SYSTOLIC BLOOD PRESSURE: 158 MMHG | HEIGHT: 62 IN | DIASTOLIC BLOOD PRESSURE: 84 MMHG | BODY MASS INDEX: 21.16 KG/M2 | OXYGEN SATURATION: 97 %

## 2020-06-08 DIAGNOSIS — G30.1 LATE ONSET ALZHEIMER'S DISEASE WITHOUT BEHAVIORAL DISTURBANCE (HCC): Primary | ICD-10-CM

## 2020-06-08 DIAGNOSIS — F02.80 LATE ONSET ALZHEIMER'S DISEASE WITHOUT BEHAVIORAL DISTURBANCE (HCC): Primary | ICD-10-CM

## 2020-06-08 PROCEDURE — 99214 OFFICE O/P EST MOD 30 MIN: CPT | Performed by: PHYSICIAN ASSISTANT

## 2020-06-08 RX ORDER — CHOLECALCIFEROL (VITAMIN D3) 125 MCG
500 CAPSULE ORAL DAILY
COMMUNITY
End: 2021-06-15

## 2020-06-08 RX ORDER — MEMANTINE HYDROCHLORIDE 10 MG/1
10 TABLET ORAL 2 TIMES DAILY
Qty: 60 TABLET | Refills: 11 | Status: SHIPPED | OUTPATIENT
Start: 2020-06-08 | End: 2021-01-14

## 2020-06-08 NOTE — PROGRESS NOTES
"Subjective     Chief Complaint: memory impairment     History of Present Illness   Brittny Nicolas is a 75 y.o. female who returns to clinic today for evaluation of memory impairment. Her family has noted symptoms since at least early 2019 marked initially by forgetfulness. This has gradually worsened  over time. Additional associated symptoms have included impairments in language  and executive function. There are no identified exacerbating or alleviating factors. Her family manages her medications and finances. She has moved in with her daughter in [place] as a result of her cognitive impairment.      Prior evaluation has included an unremarkable head CT and screening blood work, which were unremarkable. She is currently taking donepezil 10mg daily.     Today: Since her last visit in 3/20, she feels essentially unchanged. Her family has noted some improvement in her cognition.       I have reviewed and confirmed the past family, social and medical history as accurate on 6/8/2020.     Review of Systems   Constitutional: Negative.    HENT: Negative.    Eyes: Negative.    Respiratory: Negative.    Cardiovascular: Negative.    Gastrointestinal: Negative.    Endocrine: Negative.    Genitourinary: Negative.    Musculoskeletal: Negative.    Skin: Negative.    Allergic/Immunologic: Negative.    Neurological:        Memory loss    Hematological: Negative.    Psychiatric/Behavioral: Negative.        Objective     /84   Pulse 65   Ht 157.5 cm (62\")   Wt 52.2 kg (115 lb)   SpO2 97%   BMI 21.03 kg/m²     General appearance today is normal.     Physical Exam   Neurological: She has normal strength. She has a normal Finger-Nose-Finger Test. Gait normal.   Psychiatric: Her speech is normal.        Neurologic Exam     Mental Status   Oriented to person.   Disoriented to place.   Disoriented to time. Oriented to season.   Registration: recalls 3 of 3 objects. Recall of objects at 5 minutes: 0/3 recall. Follows 3 step " commands.   Attention: normal.   Speech: speech is normal   Level of consciousness: alert  Able to name object. Able to read. Able to repeat. Able to write. Normal comprehension.     Cranial Nerves   Cranial nerves II through XII intact.     Motor Exam   Muscle bulk: normal  Overall muscle tone: normal    Strength   Strength 5/5 throughout.     Sensory Exam   Light touch normal.     Gait, Coordination, and Reflexes     Gait  Gait: normal    Coordination   Finger to nose coordination: normal    Tremor   Resting tremor: absent        Results  MMSE=20      Assessment/Plan   Brittny was seen today for memory loss and follow-up.    Diagnoses and all orders for this visit:    Late onset Alzheimer's disease without behavioral disturbance (CMS/HCC)  -     SLP Consult: Eval & Treat    Other orders  -     memantine (NAMENDA) 10 MG tablet; Take 1 tablet by mouth 2 (Two) Times a Day.          Discussion/Summary   Brittny Nicolas returns to clinic today for evaluation of Alzheimer's Disease . After discussing potential treatment options, it was elected to add  memantine and continue on donepezil unchanged. I have also made a referral to SLP for cognitive rehabilitation. She will then follow up in 6 months , or sooner if needed.   I spent 25 minutes face to face with the patient and family with 20 minutes spent on discussing diagnosis, evaluation, current status, treatment options and management as discussed above.       As part of this visit I obtained additional history from the family which is incorporated in the HPI.      Tressa Sepulveda PA-C

## 2020-06-29 ENCOUNTER — LAB (OUTPATIENT)
Dept: LAB | Facility: HOSPITAL | Age: 75
End: 2020-06-29

## 2020-06-29 ENCOUNTER — OFFICE VISIT (OUTPATIENT)
Dept: ENDOCRINOLOGY | Facility: CLINIC | Age: 75
End: 2020-06-29

## 2020-06-29 ENCOUNTER — HOSPITAL ENCOUNTER (OUTPATIENT)
Dept: GENERAL RADIOLOGY | Facility: HOSPITAL | Age: 75
Discharge: HOME | End: 2020-06-29
Admitting: INTERNAL MEDICINE

## 2020-06-29 VITALS
OXYGEN SATURATION: 99 % | WEIGHT: 116.2 LBS | SYSTOLIC BLOOD PRESSURE: 138 MMHG | HEART RATE: 63 BPM | DIASTOLIC BLOOD PRESSURE: 70 MMHG | BODY MASS INDEX: 21.25 KG/M2

## 2020-06-29 DIAGNOSIS — R07.81 RIB PAIN ON LEFT SIDE: ICD-10-CM

## 2020-06-29 DIAGNOSIS — E78.01 ESSENTIAL FAMILIAL HYPERCHOLESTEROLEMIA: ICD-10-CM

## 2020-06-29 DIAGNOSIS — E03.9 ACQUIRED HYPOTHYROIDISM: ICD-10-CM

## 2020-06-29 DIAGNOSIS — E55.9 VITAMIN D DEFICIENCY: ICD-10-CM

## 2020-06-29 DIAGNOSIS — I10 BENIGN ESSENTIAL HYPERTENSION: Primary | ICD-10-CM

## 2020-06-29 PROCEDURE — 80053 COMPREHEN METABOLIC PANEL: CPT | Performed by: INTERNAL MEDICINE

## 2020-06-29 PROCEDURE — 80061 LIPID PANEL: CPT | Performed by: INTERNAL MEDICINE

## 2020-06-29 PROCEDURE — 71046 X-RAY EXAM CHEST 2 VIEWS: CPT

## 2020-06-29 PROCEDURE — 99214 OFFICE O/P EST MOD 30 MIN: CPT | Performed by: INTERNAL MEDICINE

## 2020-06-29 PROCEDURE — 84443 ASSAY THYROID STIM HORMONE: CPT | Performed by: INTERNAL MEDICINE

## 2020-06-29 RX ORDER — DEXLANSOPRAZOLE 60 MG/1
1 CAPSULE, DELAYED RELEASE ORAL DAILY
Qty: 90 CAPSULE | Refills: 3 | Status: SHIPPED | OUTPATIENT
Start: 2020-06-29 | End: 2020-08-18

## 2020-06-29 RX ORDER — LEVOTHYROXINE SODIUM 112 UG/1
112 TABLET ORAL DAILY
Qty: 90 TABLET | Refills: 3 | Status: SHIPPED | OUTPATIENT
Start: 2020-06-29 | End: 2020-08-03

## 2020-06-29 NOTE — PROGRESS NOTES
Brittny Fidencio 75 y.o.  [facility]:Follow-up; Hypothyroidism; Hyperlipidemia; Hypertension; Osteoporosis; and Heartburn      [place]: Follow-up; Hypothyroidism; Hyperlipidemia; Hypertension; Osteoporosis; and Heartburn    bp is good  Is on low fat diet   Is on synthroid 112 mcg daily   Pain left ribs- ribs flair outward and are immobile but tender to touch   C/o constant rhinorrhea  Headache since increasing medication   Short fuse    Allergies   Allergen Reactions   • Boniva [Ibandronic Acid] GI Intolerance     Refractory gerd and abdominal pain        Current Outpatient Medications:   •  amLODIPine (NORVASC) 5 MG tablet, Take 1 tablet by mouth Daily. Needs to call for appointment, Disp: 30 tablet, Rfl: 11  •  aspirin 81 MG tablet, Take  by mouth., Disp: , Rfl:   •  atorvastatin (LIPITOR) 20 MG tablet, Take 1 tablet by mouth every night at bedtime., Disp: 30 tablet, Rfl: 5  •  Calcium-Magnesium (CALCIUM MAGNESIUM 750) 300-300 MG tablet, Take 2 tablets by mouth Daily., Disp: , Rfl:   •  cetirizine (zyrTEC) 10 MG tablet, Take 10 mg by mouth Daily., Disp: , Rfl:   •  Cholecalciferol (VITAMIN D) 2000 UNITS capsule, Take 1 capsule by mouth., Disp: , Rfl:   •  dexlansoprazole (Dexilant) 60 MG capsule, Take 1 capsule by mouth Daily., Disp: 90 capsule, Rfl: 3  •  donepezil (ARICEPT) 10 MG tablet, Take 1 tablet by mouth Daily., Disp: 30 tablet, Rfl: 11  •  levothyroxine (SYNTHROID, LEVOTHROID) 112 MCG tablet, Take 1 tablet by mouth Daily., Disp: 90 tablet, Rfl: 3  •  memantine (NAMENDA) 10 MG tablet, Take 1 tablet by mouth 2 (Two) Times a Day., Disp: 60 tablet, Rfl: 11  •  vitamin B-12 (CYANOCOBALAMIN) 500 MCG tablet, Take 500 mcg by mouth Daily., Disp: , Rfl:   Patient Active Problem List    Diagnosis   • Memory loss [R41.3]   • Pharyngitis [J02.9]   • Left lower quadrant pain [R10.32]   • Acute pain of left shoulder [M25.512]   • Erosive esophagitis [K22.10]   • H/O colonoscopy [Z98.890]   • Fatigue [R53.83]   • Hypothyroidism  [E03.9]   • Achalasia [K22.0]   • Acute upper respiratory infection [J06.9]   • Atypical chest pain [R07.89]   • Benign essential hypertension [I10]   • Carotid artery stenosis [I65.29]   • Cataract [H26.9]   • Cough [R05]   • Acute cystitis [N30.00]   • Screening for malignant neoplasm [Z12.9]   • Gastroesophageal reflux disease [K21.9]   • Essential familial hypercholesterolemia [E78.01]   • Gastrointestinal bleeding [K92.2]   • Inguinal hernia [K40.90]   • Insomnia [G47.00]   • Leg cramps [R25.2]   • Meniere's disease [H81.09]   • Night sweats [R61]   • Osteoporosis [M81.0]   • Peripheral neuropathy [G62.9]   • Retinal artery occlusion [H34.9]   • Rheumatoid arthritis (CMS/HCC) [M06.9]   • Stroke syndrome [IQW7446]   • Suprapubic pain [R10.2]   • Transient ischemic attack [G45.9]   • Positive reaction to tuberculin skin test [R76.11]   • Vitamin D deficiency [E55.9]   • Weight loss [R63.4]     Review of Systems   Constitutional: Positive for activity change and appetite change. Negative for chills, diaphoresis, fatigue, fever and unexpected weight change.   HENT: Negative for congestion, dental problem, drooling, ear discharge, ear pain, facial swelling, hearing loss, mouth sores, nosebleeds, postnasal drip, rhinorrhea, sinus pressure, sneezing, sore throat, tinnitus, trouble swallowing and voice change.    Eyes: Negative for photophobia, pain, discharge, redness, itching and visual disturbance.   Respiratory: Negative for apnea, cough, choking, chest tightness, shortness of breath, wheezing and stridor.    Cardiovascular: Negative for chest pain, palpitations and leg swelling.   Gastrointestinal: Positive for nausea. Negative for abdominal distention, abdominal pain, anal bleeding, blood in stool, constipation, diarrhea, rectal pain and vomiting.   Endocrine: Negative for cold intolerance, heat intolerance, polydipsia, polyphagia and polyuria.   Genitourinary: Negative for decreased urine volume, difficulty  urinating, dysuria, enuresis, flank pain, frequency, genital sores, hematuria and urgency.   Musculoskeletal: Positive for arthralgias. Negative for back pain, gait problem, joint swelling, myalgias, neck pain and neck stiffness.   Skin: Negative for color change, pallor, rash and wound.   Allergic/Immunologic: Negative for environmental allergies, food allergies and immunocompromised state.   Neurological: Positive for headaches. Negative for dizziness, tremors, seizures, syncope, facial asymmetry, speech difficulty, weakness, light-headedness and numbness.   Hematological: Negative for adenopathy. Does not bruise/bleed easily.   Psychiatric/Behavioral: Positive for confusion. Negative for agitation, behavioral problems, decreased concentration, dysphoric mood, hallucinations, self-injury, sleep disturbance and suicidal ideas. The patient is not nervous/anxious and is not hyperactive.      Social History     Socioeconomic History   • Marital status:      Spouse name: Not on file   • Number of children: Not on file   • Years of education: Not on file   • Highest education level: Not on file   Tobacco Use   • Smoking status: Former Smoker   • Smokeless tobacco: Never Used   Substance and Sexual Activity   • Alcohol use: Yes     Comment: rarely   • Drug use: No   • Sexual activity: Defer     Family History   Problem Relation Age of Onset   • Cystic fibrosis Other    • Cervical cancer Mother    • Cancer Paternal Grandmother      /70   Pulse 63   Wt 52.7 kg (116 lb 3.2 oz)   SpO2 99%   BMI 21.25 kg/m²   Physical Exam   Constitutional: She is oriented to person, place, and time. She appears well-developed and well-nourished.   HENT:   Head: Normocephalic and atraumatic.   Nose: Nose normal.   Mouth/Throat: Oropharynx is clear and moist.   Eyes: Pupils are equal, round, and reactive to light. Conjunctivae, EOM and lids are normal.   Neck: Trachea normal and normal range of motion. Neck supple. Carotid  bruit is not present. No tracheal deviation present. No thyroid mass and no thyromegaly present.   Cardiovascular: Normal rate, regular rhythm, normal heart sounds and intact distal pulses. Exam reveals no gallop and no friction rub.   No murmur heard.  Pulmonary/Chest: Effort normal and breath sounds normal. No respiratory distress. She has no wheezes.   Musculoskeletal: Normal range of motion. She exhibits no edema or deformity.   Lymphadenopathy:     She has no cervical adenopathy.   Neurological: She is alert and oriented to person, place, and time. She has normal reflexes. She displays normal reflexes. No cranial nerve deficit.   Skin: Skin is warm and dry. No rash noted. No cyanosis or erythema. Nails show no clubbing.   Psychiatric: She has a normal mood and affect. Her speech is normal and behavior is normal. Judgment and thought content normal. Cognition and memory are normal.   Nursing note and vitals reviewed.    Results for orders placed or performed in visit on 02/20/20   Comprehensive Metabolic Panel   Result Value Ref Range    Glucose 83 65 - 99 mg/dL    BUN 14 8 - 23 mg/dL    Creatinine 0.78 0.57 - 1.00 mg/dL    Sodium 133 (L) 136 - 145 mmol/L    Potassium 4.2 3.5 - 5.2 mmol/L    Chloride 99 98 - 107 mmol/L    CO2 23.6 22.0 - 29.0 mmol/L    Calcium 9.2 8.6 - 10.5 mg/dL    Total Protein 7.0 6.0 - 8.5 g/dL    Albumin 3.90 3.50 - 5.20 g/dL    ALT (SGPT) 16 1 - 33 U/L    AST (SGOT) 21 1 - 32 U/L    Alkaline Phosphatase 82 39 - 117 U/L    Total Bilirubin 0.5 0.2 - 1.2 mg/dL    eGFR Non African Amer 72 >60 mL/min/1.73    Globulin 3.1 gm/dL    A/G Ratio 1.3 g/dL    BUN/Creatinine Ratio 17.9 7.0 - 25.0    Anion Gap 10.4 5.0 - 15.0 mmol/L   CBC Auto Differential   Result Value Ref Range    WBC 7.83 3.40 - 10.80 10*3/mm3    RBC 4.08 3.77 - 5.28 10*6/mm3    Hemoglobin 11.7 (L) 12.0 - 15.9 g/dL    Hematocrit 35.2 34.0 - 46.6 %    MCV 86.3 79.0 - 97.0 fL    MCH 28.7 26.6 - 33.0 pg    MCHC 33.2 31.5 - 35.7 g/dL     RDW 11.9 (L) 12.3 - 15.4 %    RDW-SD 37.2 37.0 - 54.0 fl    MPV 10.9 6.0 - 12.0 fL    Platelets 208 140 - 450 10*3/mm3    Neutrophil % 77.0 (H) 42.7 - 76.0 %    Lymphocyte % 10.9 (L) 19.6 - 45.3 %    Monocyte % 11.0 5.0 - 12.0 %    Eosinophil % 0.4 0.3 - 6.2 %    Basophil % 0.4 0.0 - 1.5 %    Immature Grans % 0.3 0.0 - 0.5 %    Neutrophils, Absolute 6.04 1.70 - 7.00 10*3/mm3    Lymphocytes, Absolute 0.85 0.70 - 3.10 10*3/mm3    Monocytes, Absolute 0.86 0.10 - 0.90 10*3/mm3    Eosinophils, Absolute 0.03 0.00 - 0.40 10*3/mm3    Basophils, Absolute 0.03 0.00 - 0.20 10*3/mm3    Immature Grans, Absolute 0.02 0.00 - 0.05 10*3/mm3    nRBC 0.0 0.0 - 0.2 /100 WBC   Lipid Panel   Result Value Ref Range    Total Cholesterol 137 0 - 200 mg/dL    Triglycerides 65 0 - 150 mg/dL    HDL Cholesterol 61 (H) 40 - 60 mg/dL    LDL Cholesterol  63 0 - 100 mg/dL    VLDL Cholesterol 13 5 - 40 mg/dL    LDL/HDL Ratio 1.03    TSH   Result Value Ref Range    TSH 3.110 0.270 - 4.200 uIU/mL   T4, Free   Result Value Ref Range    Free T4 1.48 0.93 - 1.70 ng/dL     Problem List Items Addressed This Visit        Cardiovascular and Mediastinum    Essential familial hypercholesterolemia     Is on atorvastatin 20 mg daily   Continue current medication and monitoring          Benign essential hypertension - Primary     bp is good  Continue monitoring and medication          Relevant Orders    Comprehensive Metabolic Panel    Lipid Panel       Digestive    Vitamin D deficiency     Is on 2000 u of over the counter vitamin D daily   Check levels             Endocrine    Hypothyroidism     Is on levothyroxine 112 mcg daily   Check tfts          Relevant Medications    levothyroxine (SYNTHROID, LEVOTHROID) 112 MCG tablet    Other Relevant Orders    TSH       Nervous and Auditory    Rib pain on left side     With deformity / flailing outward of ribs on that side  Check xray          Relevant Orders    XR Chest PA & Lateral (Completed)        Return in about  6 months (around 12/29/2020).    Casandra Sommer MA  Signed Dalila Liz MD

## 2020-06-30 ENCOUNTER — TELEPHONE (OUTPATIENT)
Dept: ENDOCRINOLOGY | Facility: CLINIC | Age: 75
End: 2020-06-30

## 2020-06-30 DIAGNOSIS — R07.81 RIB PAIN ON LEFT SIDE: Primary | ICD-10-CM

## 2020-06-30 LAB
ALBUMIN SERPL-MCNC: 4 G/DL (ref 3.5–5.2)
ALBUMIN/GLOB SERPL: 1.2 G/DL
ALP SERPL-CCNC: 85 U/L (ref 39–117)
ALT SERPL W P-5'-P-CCNC: 9 U/L (ref 1–33)
ANION GAP SERPL CALCULATED.3IONS-SCNC: 12.9 MMOL/L (ref 5–15)
AST SERPL-CCNC: 16 U/L (ref 1–32)
BILIRUB SERPL-MCNC: 0.3 MG/DL (ref 0.2–1.2)
BUN SERPL-MCNC: 11 MG/DL (ref 8–23)
BUN/CREAT SERPL: 13.4 (ref 7–25)
CALCIUM SPEC-SCNC: 9.3 MG/DL (ref 8.6–10.5)
CHLORIDE SERPL-SCNC: 102 MMOL/L (ref 98–107)
CHOLEST SERPL-MCNC: 118 MG/DL (ref 0–200)
CO2 SERPL-SCNC: 24.1 MMOL/L (ref 22–29)
CREAT SERPL-MCNC: 0.82 MG/DL (ref 0.57–1)
GFR SERPL CREATININE-BSD FRML MDRD: 68 ML/MIN/1.73
GLOBULIN UR ELPH-MCNC: 3.4 GM/DL
GLUCOSE SERPL-MCNC: 93 MG/DL (ref 65–99)
HDLC SERPL-MCNC: 67 MG/DL (ref 40–60)
LDLC SERPL CALC-MCNC: 25 MG/DL (ref 0–100)
LDLC/HDLC SERPL: 0.38 {RATIO}
POTASSIUM SERPL-SCNC: 4.2 MMOL/L (ref 3.5–5.2)
PROT SERPL-MCNC: 7.4 G/DL (ref 6–8.5)
SODIUM SERPL-SCNC: 139 MMOL/L (ref 136–145)
TRIGL SERPL-MCNC: 129 MG/DL (ref 0–150)
TSH SERPL DL<=0.05 MIU/L-ACNC: 2.28 UIU/ML (ref 0.27–4.2)
VLDLC SERPL-MCNC: 25.8 MG/DL (ref 5–40)

## 2020-06-30 NOTE — TELEPHONE ENCOUNTER
Chest xray did not show any worrisome changes  If we want a better view of this area they recommend a CAT scan (will show bone better)  Let me know if she would like to go ahead and do more detailed imaging  Thanks, josephine

## 2020-08-03 RX ORDER — LEVOTHYROXINE SODIUM 112 UG/1
TABLET ORAL
Qty: 90 TABLET | Refills: 3 | Status: SHIPPED | OUTPATIENT
Start: 2020-08-03 | End: 2021-06-19

## 2020-08-05 RX ORDER — ATORVASTATIN CALCIUM 20 MG/1
TABLET, FILM COATED ORAL
Qty: 90 TABLET | Refills: 1 | Status: SHIPPED | OUTPATIENT
Start: 2020-08-05 | End: 2021-01-27

## 2020-08-11 RX ORDER — DEXLANSOPRAZOLE 60 MG/1
CAPSULE, DELAYED RELEASE ORAL
Qty: 90 CAPSULE | Refills: 0 | OUTPATIENT
Start: 2020-08-11

## 2020-08-18 RX ORDER — DEXLANSOPRAZOLE 60 MG/1
CAPSULE, DELAYED RELEASE ORAL
Qty: 90 CAPSULE | Refills: 1 | Status: SHIPPED | OUTPATIENT
Start: 2020-08-18 | End: 2021-02-17

## 2020-08-22 PROCEDURE — 99283 EMERGENCY DEPT VISIT LOW MDM: CPT

## 2020-08-22 PROCEDURE — 99284 EMERGENCY DEPT VISIT MOD MDM: CPT

## 2020-08-23 ENCOUNTER — HOSPITAL ENCOUNTER (OUTPATIENT)
Facility: HOSPITAL | Age: 75
Setting detail: OBSERVATION
Discharge: HOME | End: 2020-08-23
Attending: EMERGENCY MEDICINE | Admitting: INTERNAL MEDICINE

## 2020-08-23 ENCOUNTER — READMISSION MANAGEMENT (OUTPATIENT)
Dept: CALL CENTER | Facility: HOSPITAL | Age: 75
End: 2020-08-23

## 2020-08-23 ENCOUNTER — APPOINTMENT (OUTPATIENT)
Dept: GENERAL RADIOLOGY | Facility: HOSPITAL | Age: 75
End: 2020-08-23

## 2020-08-23 VITALS
SYSTOLIC BLOOD PRESSURE: 122 MMHG | WEIGHT: 114 LBS | TEMPERATURE: 97.3 F | HEIGHT: 62 IN | BODY MASS INDEX: 20.98 KG/M2 | RESPIRATION RATE: 18 BRPM | OXYGEN SATURATION: 95 % | DIASTOLIC BLOOD PRESSURE: 74 MMHG | HEART RATE: 85 BPM

## 2020-08-23 DIAGNOSIS — E87.1 HYPONATREMIA: ICD-10-CM

## 2020-08-23 DIAGNOSIS — Z86.73 HISTORY OF STROKE: ICD-10-CM

## 2020-08-23 DIAGNOSIS — R53.1 GENERALIZED WEAKNESS: Primary | ICD-10-CM

## 2020-08-23 DIAGNOSIS — R55 NEAR SYNCOPE: ICD-10-CM

## 2020-08-23 DIAGNOSIS — Z86.39 HISTORY OF HYPERLIPIDEMIA: ICD-10-CM

## 2020-08-23 DIAGNOSIS — Z86.59 HISTORY OF DEMENTIA: ICD-10-CM

## 2020-08-23 DIAGNOSIS — Z86.79 HISTORY OF HYPERTENSION: ICD-10-CM

## 2020-08-23 PROBLEM — F02.80 LATE ONSET ALZHEIMER'S DISEASE WITHOUT BEHAVIORAL DISTURBANCE: Status: ACTIVE | Noted: 2020-08-23

## 2020-08-23 PROBLEM — G30.1 LATE ONSET ALZHEIMER'S DISEASE WITHOUT BEHAVIORAL DISTURBANCE: Status: ACTIVE | Noted: 2020-08-23

## 2020-08-23 LAB
ALBUMIN SERPL-MCNC: 3.7 G/DL (ref 3.5–5.2)
ALBUMIN/GLOB SERPL: 1 G/DL
ALP SERPL-CCNC: 113 U/L (ref 39–117)
ALT SERPL W P-5'-P-CCNC: 9 U/L (ref 1–33)
ANION GAP SERPL CALCULATED.3IONS-SCNC: 10 MMOL/L (ref 5–15)
ANION GAP SERPL CALCULATED.3IONS-SCNC: 13 MMOL/L (ref 5–15)
ANION GAP SERPL CALCULATED.3IONS-SCNC: 8 MMOL/L (ref 5–15)
ANION GAP SERPL CALCULATED.3IONS-SCNC: 9 MMOL/L (ref 5–15)
AST SERPL-CCNC: 21 U/L (ref 1–32)
BASOPHILS # BLD AUTO: 0.02 10*3/MM3 (ref 0–0.2)
BASOPHILS NFR BLD AUTO: 0.1 % (ref 0–1.5)
BILIRUB SERPL-MCNC: 1 MG/DL (ref 0–1.2)
BILIRUB UR QL STRIP: NEGATIVE
BUN SERPL-MCNC: 10 MG/DL (ref 8–23)
BUN SERPL-MCNC: 11 MG/DL (ref 8–23)
BUN SERPL-MCNC: 14 MG/DL (ref 8–23)
BUN SERPL-MCNC: 8 MG/DL (ref 8–23)
BUN/CREAT SERPL: 12.1 (ref 7–25)
BUN/CREAT SERPL: 14.1 (ref 7–25)
BUN/CREAT SERPL: 15.1 (ref 7–25)
BUN/CREAT SERPL: 17.9 (ref 7–25)
CALCIUM SPEC-SCNC: 8.6 MG/DL (ref 8.6–10.5)
CALCIUM SPEC-SCNC: 8.6 MG/DL (ref 8.6–10.5)
CALCIUM SPEC-SCNC: 8.7 MG/DL (ref 8.6–10.5)
CALCIUM SPEC-SCNC: 9.1 MG/DL (ref 8.6–10.5)
CHLORIDE SERPL-SCNC: 100 MMOL/L (ref 98–107)
CHLORIDE SERPL-SCNC: 101 MMOL/L (ref 98–107)
CHLORIDE SERPL-SCNC: 102 MMOL/L (ref 98–107)
CHLORIDE SERPL-SCNC: 94 MMOL/L (ref 98–107)
CK SERPL-CCNC: 94 U/L (ref 20–180)
CLARITY UR: CLEAR
CO2 SERPL-SCNC: 17 MMOL/L (ref 22–29)
CO2 SERPL-SCNC: 21 MMOL/L (ref 22–29)
CO2 SERPL-SCNC: 23 MMOL/L (ref 22–29)
CO2 SERPL-SCNC: 24 MMOL/L (ref 22–29)
COLOR UR: YELLOW
CORTIS SERPL-MCNC: 20.54 MCG/DL
CREAT SERPL-MCNC: 0.66 MG/DL (ref 0.57–1)
CREAT SERPL-MCNC: 0.71 MG/DL (ref 0.57–1)
CREAT SERPL-MCNC: 0.73 MG/DL (ref 0.57–1)
CREAT SERPL-MCNC: 0.78 MG/DL (ref 0.57–1)
DEPRECATED RDW RBC AUTO: 43.4 FL (ref 37–54)
EOSINOPHIL # BLD AUTO: 0 10*3/MM3 (ref 0–0.4)
EOSINOPHIL NFR BLD AUTO: 0 % (ref 0.3–6.2)
ERYTHROCYTE [DISTWIDTH] IN BLOOD BY AUTOMATED COUNT: 13.5 % (ref 12.3–15.4)
GFR SERPL CREATININE-BSD FRML MDRD: 72 ML/MIN/1.73
GFR SERPL CREATININE-BSD FRML MDRD: 77 ML/MIN/1.73
GFR SERPL CREATININE-BSD FRML MDRD: 80 ML/MIN/1.73
GFR SERPL CREATININE-BSD FRML MDRD: 87 ML/MIN/1.73
GLOBULIN UR ELPH-MCNC: 3.7 GM/DL
GLUCOSE SERPL-MCNC: 101 MG/DL (ref 65–99)
GLUCOSE SERPL-MCNC: 103 MG/DL (ref 65–99)
GLUCOSE SERPL-MCNC: 108 MG/DL (ref 65–99)
GLUCOSE SERPL-MCNC: 133 MG/DL (ref 65–99)
GLUCOSE UR STRIP-MCNC: NEGATIVE MG/DL
HCT VFR BLD AUTO: 36.7 % (ref 34–46.6)
HGB BLD-MCNC: 11.3 G/DL (ref 12–15.9)
HGB UR QL STRIP.AUTO: NEGATIVE
HOLD SPECIMEN: NORMAL
HOLD SPECIMEN: NORMAL
IMM GRANULOCYTES # BLD AUTO: 0.06 10*3/MM3 (ref 0–0.05)
IMM GRANULOCYTES NFR BLD AUTO: 0.4 % (ref 0–0.5)
KETONES UR QL STRIP: NEGATIVE
LEUKOCYTE ESTERASE UR QL STRIP.AUTO: NEGATIVE
LYMPHOCYTES # BLD AUTO: 1.34 10*3/MM3 (ref 0.7–3.1)
LYMPHOCYTES NFR BLD AUTO: 9.1 % (ref 19.6–45.3)
MAGNESIUM SERPL-MCNC: 2.1 MG/DL (ref 1.6–2.4)
MCH RBC QN AUTO: 27.1 PG (ref 26.6–33)
MCHC RBC AUTO-ENTMCNC: 30.8 G/DL (ref 31.5–35.7)
MCV RBC AUTO: 88 FL (ref 79–97)
MONOCYTES # BLD AUTO: 1.39 10*3/MM3 (ref 0.1–0.9)
MONOCYTES NFR BLD AUTO: 9.5 % (ref 5–12)
NEUTROPHILS NFR BLD AUTO: 11.89 10*3/MM3 (ref 1.7–7)
NEUTROPHILS NFR BLD AUTO: 80.9 % (ref 42.7–76)
NITRITE UR QL STRIP: NEGATIVE
NRBC BLD AUTO-RTO: 0 /100 WBC (ref 0–0.2)
NT-PROBNP SERPL-MCNC: 284.2 PG/ML (ref 0–1800)
OSMOLALITY SERPL: 271 MOSM/KG (ref 275–295)
OSMOLALITY UR: 197 MOSM/KG (ref 300–1100)
PH UR STRIP.AUTO: 6.5 [PH] (ref 5–8)
PLATELET # BLD AUTO: 222 10*3/MM3 (ref 140–450)
PMV BLD AUTO: 9.8 FL (ref 6–12)
POTASSIUM SERPL-SCNC: 3.6 MMOL/L (ref 3.5–5.2)
POTASSIUM SERPL-SCNC: 3.6 MMOL/L (ref 3.5–5.2)
POTASSIUM SERPL-SCNC: 3.7 MMOL/L (ref 3.5–5.2)
POTASSIUM SERPL-SCNC: 4.2 MMOL/L (ref 3.5–5.2)
PROT SERPL-MCNC: 7.4 G/DL (ref 6–8.5)
PROT UR QL STRIP: NEGATIVE
RBC # BLD AUTO: 4.17 10*6/MM3 (ref 3.77–5.28)
SODIUM SERPL-SCNC: 124 MMOL/L (ref 136–145)
SODIUM SERPL-SCNC: 131 MMOL/L (ref 136–145)
SODIUM SERPL-SCNC: 133 MMOL/L (ref 136–145)
SODIUM SERPL-SCNC: 134 MMOL/L (ref 136–145)
SODIUM UR-SCNC: 26 MMOL/L
SP GR UR STRIP: <=1.005 (ref 1–1.03)
TROPONIN T SERPL-MCNC: <0.01 NG/ML (ref 0–0.03)
TROPONIN T SERPL-MCNC: <0.01 NG/ML (ref 0–0.03)
TSH SERPL DL<=0.05 MIU/L-ACNC: 2.16 UIU/ML (ref 0.27–4.2)
UROBILINOGEN UR QL STRIP: NORMAL
WBC # BLD AUTO: 14.7 10*3/MM3 (ref 3.4–10.8)
WHOLE BLOOD HOLD SPECIMEN: NORMAL
WHOLE BLOOD HOLD SPECIMEN: NORMAL

## 2020-08-23 PROCEDURE — 99220 PR INITIAL OBSERVATION CARE/DAY 70 MINUTES: CPT | Performed by: INTERNAL MEDICINE

## 2020-08-23 PROCEDURE — 83930 ASSAY OF BLOOD OSMOLALITY: CPT | Performed by: INTERNAL MEDICINE

## 2020-08-23 PROCEDURE — 80053 COMPREHEN METABOLIC PANEL: CPT | Performed by: PHYSICIAN ASSISTANT

## 2020-08-23 PROCEDURE — 83735 ASSAY OF MAGNESIUM: CPT | Performed by: INTERNAL MEDICINE

## 2020-08-23 PROCEDURE — G0378 HOSPITAL OBSERVATION PER HR: HCPCS

## 2020-08-23 PROCEDURE — 82550 ASSAY OF CK (CPK): CPT | Performed by: PHYSICIAN ASSISTANT

## 2020-08-23 PROCEDURE — 84443 ASSAY THYROID STIM HORMONE: CPT | Performed by: INTERNAL MEDICINE

## 2020-08-23 PROCEDURE — 83880 ASSAY OF NATRIURETIC PEPTIDE: CPT | Performed by: PHYSICIAN ASSISTANT

## 2020-08-23 PROCEDURE — 82533 TOTAL CORTISOL: CPT | Performed by: INTERNAL MEDICINE

## 2020-08-23 PROCEDURE — 84484 ASSAY OF TROPONIN QUANT: CPT | Performed by: PHYSICIAN ASSISTANT

## 2020-08-23 PROCEDURE — 96372 THER/PROPH/DIAG INJ SC/IM: CPT

## 2020-08-23 PROCEDURE — 84300 ASSAY OF URINE SODIUM: CPT | Performed by: INTERNAL MEDICINE

## 2020-08-23 PROCEDURE — 93005 ELECTROCARDIOGRAM TRACING: CPT | Performed by: PHYSICIAN ASSISTANT

## 2020-08-23 PROCEDURE — 71045 X-RAY EXAM CHEST 1 VIEW: CPT

## 2020-08-23 PROCEDURE — 81003 URINALYSIS AUTO W/O SCOPE: CPT | Performed by: PHYSICIAN ASSISTANT

## 2020-08-23 PROCEDURE — 85025 COMPLETE CBC W/AUTO DIFF WBC: CPT | Performed by: PHYSICIAN ASSISTANT

## 2020-08-23 PROCEDURE — 25810000003 SODIUM CHLORIDE 0.9 % WITH KCL 20 MEQ 20-0.9 MEQ/L-% SOLUTION: Performed by: INTERNAL MEDICINE

## 2020-08-23 PROCEDURE — 25010000002 HEPARIN (PORCINE) PER 1000 UNITS: Performed by: INTERNAL MEDICINE

## 2020-08-23 PROCEDURE — 83935 ASSAY OF URINE OSMOLALITY: CPT | Performed by: INTERNAL MEDICINE

## 2020-08-23 RX ORDER — PANTOPRAZOLE SODIUM 40 MG/1
40 TABLET, DELAYED RELEASE ORAL
Status: DISCONTINUED | OUTPATIENT
Start: 2020-08-23 | End: 2020-08-23 | Stop reason: HOSPADM

## 2020-08-23 RX ORDER — HEPARIN SODIUM 5000 [USP'U]/ML
5000 INJECTION, SOLUTION INTRAVENOUS; SUBCUTANEOUS EVERY 12 HOURS SCHEDULED
Status: DISCONTINUED | OUTPATIENT
Start: 2020-08-23 | End: 2020-08-23 | Stop reason: HOSPADM

## 2020-08-23 RX ORDER — LEVOTHYROXINE SODIUM 112 UG/1
112 TABLET ORAL
Status: DISCONTINUED | OUTPATIENT
Start: 2020-08-23 | End: 2020-08-23 | Stop reason: HOSPADM

## 2020-08-23 RX ORDER — FAMOTIDINE 20 MG/1
40 TABLET, FILM COATED ORAL DAILY
Status: DISCONTINUED | OUTPATIENT
Start: 2020-08-23 | End: 2020-08-23

## 2020-08-23 RX ORDER — SODIUM CHLORIDE 0.9 % (FLUSH) 0.9 %
10 SYRINGE (ML) INJECTION AS NEEDED
Status: DISCONTINUED | OUTPATIENT
Start: 2020-08-23 | End: 2020-08-23 | Stop reason: HOSPADM

## 2020-08-23 RX ORDER — SODIUM CHLORIDE 0.9 % (FLUSH) 0.9 %
10 SYRINGE (ML) INJECTION EVERY 12 HOURS SCHEDULED
Status: DISCONTINUED | OUTPATIENT
Start: 2020-08-23 | End: 2020-08-23 | Stop reason: HOSPADM

## 2020-08-23 RX ORDER — MEMANTINE HYDROCHLORIDE 10 MG/1
10 TABLET ORAL 2 TIMES DAILY
Status: DISCONTINUED | OUTPATIENT
Start: 2020-08-23 | End: 2020-08-23 | Stop reason: HOSPADM

## 2020-08-23 RX ORDER — AMLODIPINE BESYLATE 5 MG/1
5 TABLET ORAL DAILY
Status: DISCONTINUED | OUTPATIENT
Start: 2020-08-23 | End: 2020-08-23 | Stop reason: HOSPADM

## 2020-08-23 RX ORDER — ATORVASTATIN CALCIUM 40 MG/1
20 TABLET, FILM COATED ORAL DAILY
Status: DISCONTINUED | OUTPATIENT
Start: 2020-08-23 | End: 2020-08-23 | Stop reason: HOSPADM

## 2020-08-23 RX ORDER — ASPIRIN 325 MG
325 TABLET, DELAYED RELEASE (ENTERIC COATED) ORAL DAILY
Status: DISCONTINUED | OUTPATIENT
Start: 2020-08-23 | End: 2020-08-23 | Stop reason: HOSPADM

## 2020-08-23 RX ORDER — DONEPEZIL HYDROCHLORIDE 10 MG/1
10 TABLET, FILM COATED ORAL DAILY
Status: DISCONTINUED | OUTPATIENT
Start: 2020-08-23 | End: 2020-08-23 | Stop reason: HOSPADM

## 2020-08-23 RX ORDER — CETIRIZINE HYDROCHLORIDE 10 MG/1
5 TABLET ORAL DAILY
Status: DISCONTINUED | OUTPATIENT
Start: 2020-08-23 | End: 2020-08-23 | Stop reason: HOSPADM

## 2020-08-23 RX ORDER — DEXTROSE MONOHYDRATE 50 MG/ML
75 INJECTION, SOLUTION INTRAVENOUS CONTINUOUS
Status: DISCONTINUED | OUTPATIENT
Start: 2020-08-23 | End: 2020-08-23

## 2020-08-23 RX ORDER — SODIUM CHLORIDE AND POTASSIUM CHLORIDE 150; 900 MG/100ML; MG/100ML
100 INJECTION, SOLUTION INTRAVENOUS CONTINUOUS
Status: DISCONTINUED | OUTPATIENT
Start: 2020-08-23 | End: 2020-08-23

## 2020-08-23 RX ORDER — ACETAMINOPHEN 325 MG/1
650 TABLET ORAL EVERY 4 HOURS PRN
Status: DISCONTINUED | OUTPATIENT
Start: 2020-08-23 | End: 2020-08-23 | Stop reason: HOSPADM

## 2020-08-23 RX ORDER — LANOLIN ALCOHOL/MO/W.PET/CERES
500 CREAM (GRAM) TOPICAL DAILY
Status: DISCONTINUED | OUTPATIENT
Start: 2020-08-23 | End: 2020-08-23 | Stop reason: HOSPADM

## 2020-08-23 RX ADMIN — CYANOCOBALAMIN TAB 1000 MCG 500 MCG: 1000 TAB at 08:24

## 2020-08-23 RX ADMIN — AMLODIPINE BESYLATE 5 MG: 5 TABLET ORAL at 08:28

## 2020-08-23 RX ADMIN — FAMOTIDINE 40 MG: 20 TABLET, FILM COATED ORAL at 08:26

## 2020-08-23 RX ADMIN — CETIRIZINE HYDROCHLORIDE 5 MG: 10 TABLET, FILM COATED ORAL at 08:27

## 2020-08-23 RX ADMIN — PANTOPRAZOLE SODIUM 40 MG: 40 TABLET, DELAYED RELEASE ORAL at 12:11

## 2020-08-23 RX ADMIN — HEPARIN SODIUM 5000 UNITS: 5000 INJECTION INTRAVENOUS; SUBCUTANEOUS at 08:23

## 2020-08-23 RX ADMIN — SODIUM CHLORIDE, PRESERVATIVE FREE 10 ML: 5 INJECTION INTRAVENOUS at 08:30

## 2020-08-23 RX ADMIN — DONEPEZIL HYDROCHLORIDE 10 MG: 10 TABLET, FILM COATED ORAL at 08:27

## 2020-08-23 RX ADMIN — SODIUM CHLORIDE 1000 ML: 9 INJECTION, SOLUTION INTRAVENOUS at 01:25

## 2020-08-23 RX ADMIN — MEMANTINE 10 MG: 10 TABLET ORAL at 08:27

## 2020-08-23 RX ADMIN — DEXTROSE MONOHYDRATE 75 ML/HR: 50 INJECTION, SOLUTION INTRAVENOUS at 07:32

## 2020-08-23 RX ADMIN — ATORVASTATIN CALCIUM 20 MG: 40 TABLET, FILM COATED ORAL at 08:26

## 2020-08-23 RX ADMIN — POTASSIUM CHLORIDE AND SODIUM CHLORIDE 100 ML/HR: 900; 150 INJECTION, SOLUTION INTRAVENOUS at 04:05

## 2020-08-23 RX ADMIN — ASPIRIN 325 MG: 325 TABLET, COATED ORAL at 08:26

## 2020-08-23 RX ADMIN — LEVOTHYROXINE SODIUM 112 MCG: 112 TABLET ORAL at 06:52

## 2020-08-23 NOTE — ED PROVIDER NOTES
"Subjective   This is a 75-year-old female that presents to the ER with generalized weakness that started earlier today.  Patient has history of Alzheimer's dementia, so daughter is the primary historian.  Patient lives with her adult daughter who is at the bedside.  Patient is a grandson got  earlier today in [place], [place].  Daughter states that it was an outdoor wedding with festivities following.  The temperatures were hot and patient was wearing a polyester blazer.  They had to stand for quite a while and take family photographs.  Patient went quite some time without eating or drinking anything.  She became \"weak in the knees\" and had near syncopal episode and family sat her down.  She did not have any type of fall and there was no injury.  Patient reported some transient left-sided chest pain without radiation.  She also reports some left shoulder pain.  Daughter states that left shoulder pain is chronic in nature.  She denied any shortness of breath.  She had no episodes of nausea or vomiting.  They quickly got patient something to eat and drink.  They drove straight home to [place].  Patient continued to \"just not feel right\".  Blood pressure was mildly elevated at home when daughter checked it.  Patient denies any dysuria, urgency, or frequency.  She denies any bowel changes.  She denies any cough, congestion, URI symptoms, change in taste or smell, current chest pain, or shortness of breath.  She denies any known COVID exposure.  Daughter denies any recent medication changes.      History provided by:  Caregiver (Daughter)  History limited by:  Dementia  Weakness - Generalized   Duration:  12 hours  Timing:  Constant  Progression:  Unchanged  Chronicity:  New  Context comment:  Patient was outdoors at her grandsons wedding in [place], [place] earlier today.  She was standing for an extended period out in the heat.  She became generally weak and has just not felt well since.  She had " transient chest pain which resolved.  Relieved by:  Nothing  Worsened by:  Standing  Ineffective treatments:  Drinking fluids, lying down and rest  Associated symptoms: chest pain (Transient left-sided chest pain, completely resolved now)    Associated symptoms: no abdominal pain, no aphasia, no arthralgias, no cough, no diarrhea, no difficulty walking, no dizziness, no dysuria, no numbness in extremities, no falls, no fever, no foul-smelling urine, no frequency, no headaches, no loss of consciousness, no myalgias, no nausea, no near-syncope, no sensory-motor deficit, no shortness of breath, no stroke symptoms, no syncope (near syncopal episode), no urgency, no vision change and no vomiting        Review of Systems   Constitutional: Positive for fatigue. Negative for appetite change, chills, diaphoresis and fever.   HENT: Negative.         No change in taste or smell   Respiratory: Negative.  Negative for cough, chest tightness and shortness of breath.    Cardiovascular: Positive for chest pain (Transient left-sided chest pain, completely resolved now). Negative for palpitations, leg swelling, syncope (near syncopal episode) and near-syncope.   Gastrointestinal: Negative.  Negative for abdominal pain, diarrhea, nausea and vomiting.   Genitourinary: Negative.  Negative for dysuria, frequency and urgency.   Musculoskeletal: Negative for arthralgias, falls and myalgias.   Neurological: Positive for weakness (Generalized weakness). Negative for dizziness, loss of consciousness, syncope and headaches.        Episode of near syncope   All other systems reviewed and are negative.      Past Medical History:   Diagnosis Date   • Acid reflux disease    • Arthritis    • Carotid artery occlusion    • Depression    • Graves disease    • Hypertension    • Stomach problems    • Stroke (CMS/HCC)    • Thyroid disease        Allergies   Allergen Reactions   • Boniva [Ibandronic Acid] GI Intolerance     Refractory gerd and abdominal  pain        Past Surgical History:   Procedure Laterality Date   • AUGMENTATION MAMMAPLASTY      1978   • BREAST AUGMENTATION     • CAROTID ARTERY ANGIOPLASTY     • COLONOSCOPY     • D&C CERVICAL BIOPSY      Dilation and Curettage of Cervical Stump   • HERNIA REPAIR     • HERNIA REPAIR     • THROMBOENDARTERECTOMY      carotid   • UPPER GASTROINTESTINAL ENDOSCOPY         Family History   Problem Relation Age of Onset   • Cystic fibrosis Other    • Cervical cancer Mother    • Cancer Paternal Grandmother        Social History     Socioeconomic History   • Marital status:      Spouse name: Not on file   • Number of children: Not on file   • Years of education: Not on file   • Highest education level: Not on file   Tobacco Use   • Smoking status: Former Smoker   • Smokeless tobacco: Never Used   Substance and Sexual Activity   • Alcohol use: Yes     Comment: rarely   • Drug use: No   • Sexual activity: Defer           Objective   Physical Exam   Constitutional: She is oriented to person, place, and time. She appears well-developed and well-nourished. No distress.   HENT:   Head: Normocephalic and atraumatic.   Mouth/Throat: Mucous membranes are dry.   Eyes: Pupils are equal, round, and reactive to light. Conjunctivae and EOM are normal. No scleral icterus.   Neck: Normal range of motion. Neck supple.   Cardiovascular: Normal rate, regular rhythm, normal heart sounds, intact distal pulses and normal pulses.  No extrasystoles are present.   No murmur heard.  No pedal edema to lower extremities   Pulmonary/Chest: Effort normal and breath sounds normal. No accessory muscle usage. No tachypnea. No respiratory distress. She has no decreased breath sounds. She has no wheezes. She has no rhonchi.   Lungs are clear to auscultation bilaterally   Abdominal: Soft. Bowel sounds are normal. She exhibits no distension. There is no tenderness.   Abdomen soft and nontender.   Musculoskeletal: Normal range of motion. She exhibits  no edema.   Lymphadenopathy:     She has no cervical adenopathy.   Neurological: She is alert and oriented to person, place, and time. She has normal strength. No cranial nerve deficit or sensory deficit.   Generalized weakness with functional decline.  No focal deficits.  Smile is equal.  Tongue is midline.  Equal  strength 4 out of 5 and equal muscle strength to the lower extremities 4 out of 5.  History of Alzheimer's dementia.   Skin: Skin is warm and dry. She is not diaphoretic.   Psychiatric: She has a normal mood and affect. Her speech is normal and behavior is normal. Cognition and memory are impaired. She exhibits abnormal remote memory.   History of Alzheimer's dementia.  Pleasant and cooperative.   Nursing note and vitals reviewed.      Procedures           ED Course  ED Course as of Aug 23 0429   Sun Aug 23, 2020   0224 CBC revealed mild leukocytosis with white blood cell count 14.7.  Differential shows 80% neutrophils.  Chemistries showed sodium of 124.  Chloride 94.  Otherwise chemistries were normal.  Urinalysis shows no acute infectious process.  Creatinine kinase is 94.  Troponin is less than 0.010.  BNP is 284.  EKG shows normal sinus rhythm with occasional PAC.  No acute ST-T wave changes consistent with ischemia.  Chest x-ray shows no acute cardiopulmonary process.  Discussed the case in detail with Dr. Keenan, ER attending physician.  With generalized weakness and hyponatremia with near syncope, I will page the hospitalist to discuss admission.  We reviewed medications and patient does not take any meds that would induce hyponatremia.  She was given 1 L normal saline bolus.  We also will repeat 2-hour troponin.    [FC]   0429 Discussed admission with Dr. Galvan, hospitalist.  She is agreeable to admission on telemetry until IV fluids and recheck sodium in the morning.  She also will observe patient closely on telemetry due to transient episode of chest pain.  Vital signs are stable.    [FC]       ED Course User Index  [FC] Philly Felipe PA-C           Recent Results (from the past 24 hour(s))   Light Blue Top    Collection Time: 08/23/20 12:28 AM   Result Value Ref Range    Extra Tube hold for add-on    Green Top (Gel)    Collection Time: 08/23/20 12:28 AM   Result Value Ref Range    Extra Tube Hold for add-ons.    Lavender Top    Collection Time: 08/23/20 12:28 AM   Result Value Ref Range    Extra Tube hold for add-on    Gold Top - SST    Collection Time: 08/23/20 12:28 AM   Result Value Ref Range    Extra Tube Hold for add-ons.    Comprehensive Metabolic Panel    Collection Time: 08/23/20 12:28 AM   Result Value Ref Range    Glucose 108 (H) 65 - 99 mg/dL    BUN 14 8 - 23 mg/dL    Creatinine 0.78 0.57 - 1.00 mg/dL    Sodium 124 (L) 136 - 145 mmol/L    Potassium 4.2 3.5 - 5.2 mmol/L    Chloride 94 (L) 98 - 107 mmol/L    CO2 17.0 (L) 22.0 - 29.0 mmol/L    Calcium 9.1 8.6 - 10.5 mg/dL    Total Protein 7.4 6.0 - 8.5 g/dL    Albumin 3.70 3.50 - 5.20 g/dL    ALT (SGPT) 9 1 - 33 U/L    AST (SGOT) 21 1 - 32 U/L    Alkaline Phosphatase 113 39 - 117 U/L    Total Bilirubin 1.0 0.0 - 1.2 mg/dL    eGFR Non African Amer 72 >60 mL/min/1.73    Globulin 3.7 gm/dL    A/G Ratio 1.0 g/dL    BUN/Creatinine Ratio 17.9 7.0 - 25.0    Anion Gap 13.0 5.0 - 15.0 mmol/L   Troponin    Collection Time: 08/23/20 12:28 AM   Result Value Ref Range    Troponin T <0.010 0.000 - 0.030 ng/mL   BNP    Collection Time: 08/23/20 12:28 AM   Result Value Ref Range    proBNP 284.2 0.0-1,800.0 pg/mL   CK    Collection Time: 08/23/20 12:28 AM   Result Value Ref Range    Creatine Kinase 94 20 - 180 U/L   Osmolality, Serum    Collection Time: 08/23/20 12:28 AM   Result Value Ref Range    Osmolality 271 (L) 275 - 295 mOsm/kg   TSH    Collection Time: 08/23/20 12:28 AM   Result Value Ref Range    TSH 2.160 0.270 - 4.200 uIU/mL   Cortisol    Collection Time: 08/23/20 12:28 AM   Result Value Ref Range    Cortisol 20.54   mcg/dL   Magnesium    Collection  Time: 08/23/20 12:28 AM   Result Value Ref Range    Magnesium 2.1 1.6 - 2.4 mg/dL   Urinalysis With Microscopic If Indicated (No Culture) - Urine, Clean Catch    Collection Time: 08/23/20  1:29 AM   Result Value Ref Range    Color, UA Yellow Yellow, Straw    Appearance, UA Clear Clear    pH, UA 6.5 5.0 - 8.0    Specific Gravity, UA <=1.005 1.001 - 1.030    Glucose, UA Negative Negative    Ketones, UA Negative Negative    Bilirubin, UA Negative Negative    Blood, UA Negative Negative    Protein, UA Negative Negative    Leuk Esterase, UA Negative Negative    Nitrite, UA Negative Negative    Urobilinogen, UA 0.2 E.U./dL 0.2 - 1.0 E.U./dL   Sodium, Urine, Random - Urine, Clean Catch    Collection Time: 08/23/20  1:29 AM   Result Value Ref Range    Sodium, Urine 26 mmol/L   Osmolality, Urine - Urine, Clean Catch    Collection Time: 08/23/20  1:29 AM   Result Value Ref Range    Osmolality, Urine 197 (L) 300-1,100 mOsm/kg   CBC Auto Differential    Collection Time: 08/23/20  1:30 AM   Result Value Ref Range    WBC 14.70 (H) 3.40 - 10.80 10*3/mm3    RBC 4.17 3.77 - 5.28 10*6/mm3    Hemoglobin 11.3 (L) 12.0 - 15.9 g/dL    Hematocrit 36.7 34.0 - 46.6 %    MCV 88.0 79.0 - 97.0 fL    MCH 27.1 26.6 - 33.0 pg    MCHC 30.8 (L) 31.5 - 35.7 g/dL    RDW 13.5 12.3 - 15.4 %    RDW-SD 43.4 37.0 - 54.0 fl    MPV 9.8 6.0 - 12.0 fL    Platelets 222 140 - 450 10*3/mm3    Neutrophil % 80.9 (H) 42.7 - 76.0 %    Lymphocyte % 9.1 (L) 19.6 - 45.3 %    Monocyte % 9.5 5.0 - 12.0 %    Eosinophil % 0.0 (L) 0.3 - 6.2 %    Basophil % 0.1 0.0 - 1.5 %    Immature Grans % 0.4 0.0 - 0.5 %    Neutrophils, Absolute 11.89 (H) 1.70 - 7.00 10*3/mm3    Lymphocytes, Absolute 1.34 0.70 - 3.10 10*3/mm3    Monocytes, Absolute 1.39 (H) 0.10 - 0.90 10*3/mm3    Eosinophils, Absolute 0.00 0.00 - 0.40 10*3/mm3    Basophils, Absolute 0.02 0.00 - 0.20 10*3/mm3    Immature Grans, Absolute 0.06 (H) 0.00 - 0.05 10*3/mm3    nRBC 0.0 0.0 - 0.2 /100 WBC   Troponin    Collection  "Time: 08/23/20  3:03 AM   Result Value Ref Range    Troponin T <0.010 0.000 - 0.030 ng/mL   Basic Metabolic Panel    Collection Time: 08/23/20  3:03 AM   Result Value Ref Range    Glucose 101 (H) 65 - 99 mg/dL    BUN 11 8 - 23 mg/dL    Creatinine 0.73 0.57 - 1.00 mg/dL    Sodium 133 (L) 136 - 145 mmol/L    Potassium 3.6 3.5 - 5.2 mmol/L    Chloride 100 98 - 107 mmol/L    CO2 23.0 22.0 - 29.0 mmol/L    Calcium 8.6 8.6 - 10.5 mg/dL    eGFR Non African Amer 77 >60 mL/min/1.73    BUN/Creatinine Ratio 15.1 7.0 - 25.0    Anion Gap 10.0 5.0 - 15.0 mmol/L     Note: In addition to lab results from this visit, the labs listed above may include labs taken at another facility or during a different encounter within the last 24 hours. Please correlate lab times with ED admission and discharge times for further clarification of the services performed during this visit.    XR Chest 1 View   Final Result   No acute cardiopulmonary findings.      Signer Name: Mario Lopez MD    Signed: 8/23/2020 1:43 AM    Workstation Name: [email or web address]     [facility]        Vitals:    08/22/20 2317 08/22/20 2319 08/23/20 0357   BP:  144/91 146/77   BP Location:  Left arm Right arm   Patient Position:  Sitting Lying   Pulse:  76 70   Resp:  20 18   Temp: 98 °F (36.7 °C)  98.4 °F (36.9 °C)   TempSrc: Infrared  Oral   SpO2:  97%    Weight:  51.7 kg (114 lb)    Height:  157.5 cm (62\")      Medications   aspirin EC tablet 325 mg (has no administration in time range)   amLODIPine (NORVASC) tablet 5 mg (has no administration in time range)   cetirizine (zyrTEC) tablet 5 mg (has no administration in time range)   donepezil (ARICEPT) tablet 10 mg (has no administration in time range)   vitamin B-12 (CYANOCOBALAMIN) tablet 500 mcg (has no administration in time range)   memantine (NAMENDA) tablet 10 mg (has no administration in time range)   levothyroxine (SYNTHROID, LEVOTHROID) tablet 112 mcg (has no administration in time range) "   atorvastatin (LIPITOR) tablet 20 mg (has no administration in time range)   sodium chloride 0.9 % flush 10 mL (has no administration in time range)   sodium chloride 0.9 % flush 10 mL (has no administration in time range)   heparin (porcine) 5000 UNIT/ML injection 5,000 Units (has no administration in time range)   sodium chloride 0.9 % with KCl 20 mEq/L infusion (100 mL/hr Intravenous New Bag 8/23/20 0405)   acetaminophen (TYLENOL) tablet 650 mg (has no administration in time range)   famotidine (PEPCID) tablet 40 mg (has no administration in time range)   sodium chloride 0.9 % bolus 1,000 mL (0 mL Intravenous Stopped 8/23/20 0257)     ECG/EMG Results (last 24 hours)     Procedure Component Value Units Date/Time    ECG 12 Lead [[id number]] Collected:  08/23/20 0126     Updated:  08/23/20 0127        ECG 12 Lead         ECG 12 Lead                                        MDM    Final diagnoses:   Generalized weakness   Near syncope   Hyponatremia   History of dementia   History of stroke   History of hypertension   History of hyperlipidemia            Philly Felipe PA-C  08/23/20 0422

## 2020-08-23 NOTE — DISCHARGE SUMMARY
[facility] Medicine Services  DISCHARGE SUMMARY    Patient Name: Brittny Nicolas  : 1945  MRN: [id number]    Date of Admission: 2020 12:05 AM  Date of Discharge:  2020  Primary Care Physician: Dalila Liz MD    Consults     No orders found from 2020 to 2020.          Hospital Course     Presenting Problem:   Hyponatremia [E87.1]  Hyponatremia [E87.1]    Active Hospital Problems    Diagnosis  POA   • Hyponatremia [E87.1]  Yes   • Late onset Alzheimer's disease without behavioral disturbance (CMS/HCC) [G30.1, F02.80]  Yes   • Erosive esophagitis [K22.10]  Yes   • Hypothyroidism [E03.9]  Yes   • Carotid artery stenosis [I65.29]  Yes   • Atypical chest pain [R07.89]  Yes   • Osteoporosis [M81.0]  Yes      Resolved Hospital Problems   No resolved problems to display.          Hospital Course:  Brittny Nicolas is a 75 y.o. female w/ hx hypothyroidism, carotid stenosis (s/p previous cea), dementia who presented after episode of near syncope after being outside in hot sun for hours. Initial workup revealed sodium level 124. With minimal iv fluids sodium corrected to 133. Was given d5w and repeat bmp 131. Normal mentation. With likelihood that patient was dehydrated w/ acute hyponatremia, patient now stable to go home. Urinalysis negative. cxr negative. Exam benign. Feels well and back to [place]. Discussed w/ daughter who was appreciative of care received. Patient to d/c home w/ daughter today. Patient to follow up w/ pcp within the week and daughter voiced understanding      Discharge Follow Up Recommendations for outpatient labs/diagnostics:  Recommend bmp at pcp follow up    Day of Discharge     HPI:   Feels fine. No pain, no dyspnea. No dizziness, has ambulated. No nausea. No diarrhea. Eager to go home    Review of Systems  No fever, no cough    Vital Signs:   Temp:  [98 °F (36.7 °C)-98.4 °F (36.9 °C)] 98.1 °F (36.7 °C)  Heart Rate:  [67-76] 73  Resp:   [18-20] 18  BP: (117-146)/(67-91) 117/72     Physical Exam:  Constitutional:Alert, nontoxic, pleasant, follows commands appropriately, no distress, comfortable appearing in good spirits  Psych:Normal/appropriate affect  HEENT:[place], oroph clear  Neck: neck supple, full range of motion  Neuro: Face symmetric, speech clear, equal , moves all extremities  Cardiac: Rrr; No pretibial pitting edema  Resp: Ctab, normal effort  GI: abd soft, nontender  Skin: No extremity rash  Musculoskeletal/extremities: no cyanosis extremities; no significant ankle edema      Pertinent  and/or Most Recent Results     Results from last 7 days   Lab Units 08/23/20  0926 08/23/20  0443 08/23/20  0303 08/23/20  0130 08/23/20  0028   WBC 10*3/mm3  --   --   --  14.70*  --    HEMOGLOBIN g/dL  --   --   --  11.3*  --    HEMATOCRIT %  --   --   --  36.7  --    PLATELETS 10*3/mm3  --   --   --  222  --    SODIUM mmol/L 131* 134* 133*  --  124*   POTASSIUM mmol/L 3.6 3.7 3.6  --  4.2   CHLORIDE mmol/L 101 102 100  --  94*   CO2 mmol/L 21.0* 24.0 23.0  --  17.0*   BUN mg/dL 8 10 11  --  14   CREATININE mg/dL 0.66 0.71 0.73  --  0.78   GLUCOSE mg/dL 133* 103* 101*  --  108*   CALCIUM mg/dL 8.7 8.6 8.6  --  9.1     Results from last 7 days   Lab Units 08/23/20  0028   BILIRUBIN mg/dL 1.0   ALK PHOS U/L 113   ALT (SGPT) U/L 9   AST (SGOT) U/L 21           Invalid input(s): TG, LDLCALC, LDLREALC  Results from last 7 days   Lab Units 08/23/20  0303 08/23/20  0028   TSH uIU/mL  --  2.160   CORTISOL mcg/dL  --  20.54   PROBNP pg/mL  --  284.2   TROPONIN T ng/mL <0.010 <0.010       Brief Urine Lab Results  (Last result in the past 365 days)      Color   Clarity   Blood   Leuk Est   Nitrite   Protein   CREAT   Urine HCG        08/23/20 0129 Yellow Clear Negative Negative Negative Negative               Microbiology Results Abnormal     None          Imaging Results (All)     Procedure Component Value Units Date/Time    XR Chest 1 View [[id number]]  Collected:  08/23/20 0143     Updated:  08/23/20 0145    Narrative:       CR Chest 1 Vw    INDICATION:   Weakness and chest pain today.     COMPARISON:    6/29/2020    FINDINGS:  Single portable AP view(s) of the chest.  Cardiomegaly is stable. The lungs are clear. Pulmonary vascularity is normal. There is no pneumothorax. Patient is osteopenic. There are bilateral breast implants.      Impression:       No acute cardiopulmonary findings.    Signer Name: Mario Lopez MD   Signed: 8/23/2020 1:43 AM   Workstation Name: [email or web address]    [facility]                         Plan for Follow-up of Pending Labs/Results:     Discharge Details        Discharge Medications      Continue These Medications      Instructions Start Date   amLODIPine 5 MG tablet  Commonly known as:  NORVASC   5 mg, Oral, Daily, Needs to call for appointment      aspirin 81 MG tablet   Oral      atorvastatin 20 MG tablet  Commonly known as:  LIPITOR   TAKE 1 TABLET BY MOUTH EVERYDAY AT BEDTIME      Calcium Magnesium 750 300-300 MG tablet  Generic drug:  Calcium-Magnesium   2 tablets, Oral, Daily      cetirizine 10 MG tablet  Commonly known as:  zyrTEC   10 mg, Oral, Daily      Dexilant 60 MG capsule  Generic drug:  dexlansoprazole   TAKE 1 CAPSULE BY MOUTH EVERY DAY      donepezil 10 MG tablet  Commonly known as:  Aricept   10 mg, Oral, Daily      levothyroxine 112 MCG tablet  Commonly known as:  SYNTHROID, LEVOTHROID   TAKE 1 TABLET BY MOUTH EVERY DAY      memantine 10 MG tablet  Commonly known as:  NAMENDA   10 mg, Oral, 2 Times Daily      vitamin B-12 500 MCG tablet  Commonly known as:  CYANOCOBALAMIN   500 mcg, Oral, Daily      Vitamin D 50 MCG (2000 UT) capsule   1 capsule, Oral             Allergies   Allergen Reactions   • Boniva [Ibandronic Acid] GI Intolerance     Refractory gerd and abdominal pain          Discharge Disposition:  Home or Self Care    Diet:  Hospital:  Diet Order   Procedures   • Diet Regular        Activity:    CODE STATUS:    Code Status and Medical Interventions:   Ordered at: 08/23/20 0350     Code Status:    CPR     Medical Interventions (Level of Support Prior to Arrest):    Full       Future Appointments   Date Time Provider Department Center   12/29/2020  3:00 PM Dalila Liz MD [facility] None   1/15/2021  9:30 AM Jalil Betancourt MD [facility]       Additional Instructions for the Follow-ups that You Need to Schedule     Discharge Follow-up with PCP   As directed       Currently Documented PCP:    Dalila Liz MD    PCP Phone Number:    [phone]     Follow Up Details:  1 week                     Electronically signed by Constantin Ledezma MD, 08/23/20, 11:19 AM.      Time Spent on Discharge:  I spent  35 minutes on this discharge activity which included: face-to-face encounter with the patient, reviewing the data in the system, coordination of the care with the nursing staff as well as consultants, documentation, and entering orders.

## 2020-08-23 NOTE — H&P
[facility] Medicine Services  HISTORY AND PHYSICAL    Patient Name: Brittny Nicolas  : 1945  MRN: [id number]  Primary Care Physician: Dalila Liz MD    Subjective   Subjective     Chief Complaint:near syncope      HPI:  Brittny Nicolas is a 75 y.o. female with HO Graves' disease with hypothyroidism, hypertension, erosive esophagitis, stroke with carotid endarterectomy, and recent onset of dementia.  Patient's been in her usual state of confused health who today at her grandsons wedding in [place] had a period where she wilted.  She kind of slumped over as if she were going to pass out but her son-in-law caught her.  She did not have any head trauma or other trauma.  After that they decided to return to [place], she slept the whole way home.  They thought it was related to just her not eating enough and it was extremely hot.  Otherwise she has no complaints except for chronic back pain.        Review of Systems   Patient denies weight loss, headaches, changes in vision, fever, chills, sore throat, shortness of breath, cough, nausea or vomiting, diarrhea, change in urine output or habits, joint pain, rash, itching or bleeding.  She does complain of back pain and some abdominal discomfort.  Otherwise complete 10-system ROS is negative except as mentioned in the HPI.    Personal History     Past Medical History:   Diagnosis Date   • Acid reflux disease    • Arthritis    • Carotid artery occlusion    • Depression    • Graves disease    • Hypertension    • Stomach problems    • Stroke (CMS/HCC)    • Thyroid disease        Past Surgical History:   Procedure Laterality Date   • AUGMENTATION MAMMAPLASTY         • BREAST AUGMENTATION     • CAROTID ARTERY ANGIOPLASTY     • COLONOSCOPY     • D&C CERVICAL BIOPSY      Dilation and Curettage of Cervical Stump   • HERNIA REPAIR     • HERNIA REPAIR     • THROMBOENDARTERECTOMY      carotid   • UPPER GASTROINTESTINAL ENDOSCOPY          Family History: family history includes Cancer in her paternal grandmother; Cervical cancer in her mother; Cystic fibrosis in an other family member. Mother lived to be 70 from CHF, father  young in WWII    Social History:  reports that she has quit smoking. She has never used smokeless tobacco. She reports that she drinks alcohol. She reports that she does not use drugs.  She worked for the [employer] as a - house was foreclosed on bc she wasn't paying the bills.  She denies lives with her daughter son-in-law and granddaughter.  Medications:  Prior to Admission medications    Medication Sig Start Date End Date Taking? Authorizing Provider   amLODIPine (NORVASC) 5 MG tablet Take 1 tablet by mouth Daily. Needs to call for appointment 19   Dalila Liz MD   aspirin 81 MG tablet Take  by mouth. 14   Caren Sim MD   atorvastatin (LIPITOR) 20 MG tablet TAKE 1 TABLET BY MOUTH EVERYDAY AT BEDTIME 20   Dalila Liz MD   Calcium-Magnesium (CALCIUM MAGNESIUM 750) 300-300 MG tablet Take 2 tablets by mouth Daily. 3/8/16   Caren Sim MD   cetirizine (zyrTEC) 10 MG tablet Take 10 mg by mouth Daily.    Caren Sim MD   Cholecalciferol (VITAMIN D) 2000 UNITS capsule Take 1 capsule by mouth. 14   Caren Sim MD   DEXILANT 60 MG capsule TAKE 1 CAPSULE BY MOUTH EVERY DAY 20   Dalila Liz MD   donepezil (ARICEPT) 10 MG tablet Take 1 tablet by mouth Daily. 6/3/20 6/3/21  Jalil Betancourt MD   levothyroxine (SYNTHROID, LEVOTHROID) 112 MCG tablet TAKE 1 TABLET BY MOUTH EVERY DAY 8/3/20   Dalila Liz MD   memantine (NAMENDA) 10 MG tablet Take 1 tablet by mouth 2 (Two) Times a Day. 20  Tressa Sepulveda PA-C   vitamin B-12 (CYANOCOBALAMIN) 500 MCG tablet Take 500 mcg by mouth Daily.    Caren Sim MD       Allergies   Allergen Reactions   • Boniva [Ibandronic Acid] GI Intolerance     Refractory gerd  and abdominal pain        Objective   Objective     Vital Signs:   Temp:  [98 °F (36.7 °C)] 98 °F (36.7 °C)  Heart Rate:  [76] 76  Resp:  [20] 20  BP: (144)/(91) 144/91   Physical Exam   Patient is alert and talkative, she has a flat affect and answers questions very quickly and and accurately   neck is without mass or JVD, I do not appreciate any carotid bruits, cranial nerves are intact speech is clear  Heart is Reg wo murmur space appreciated  Lungs are clear wo wheeze or crackle, chest is very kyphotic  Abd is soft without HSM or mass, minimal epigastric tenderness  MAEW-able to ambulate to the wheelchair  Skin is without rash  Neurologic exam is nonfocal   Mood is appropriate    Results Reviewed:  I have personally reviewed current lab, radiology, and data and agree.    Results from last 7 days   Lab Units 08/23/20  0130   WBC 10*3/mm3 14.70*   HEMOGLOBIN g/dL 11.3*   HEMATOCRIT % 36.7   PLATELETS 10*3/mm3 222     Results from last 7 days   Lab Units 08/23/20  0028   SODIUM mmol/L 124*   POTASSIUM mmol/L 4.2   CHLORIDE mmol/L 94*   CO2 mmol/L 17.0*   BUN mg/dL 14   CREATININE mg/dL 0.78   GLUCOSE mg/dL 108*   CALCIUM mg/dL 9.1   ALT (SGPT) U/L 9   AST (SGOT) U/L 21       Imaging Results (Last 24 Hours)     Procedure Component Value Units Date/Time    XR Chest 1 View [[id number]] Collected:  08/23/20 0143     Updated:  08/23/20 0145    Narrative:       CR Chest 1 Vw    INDICATION:   Weakness and chest pain today.     COMPARISON:    6/29/2020    FINDINGS:  Single portable AP view(s) of the chest.  Cardiomegaly is stable. The lungs are clear. Pulmonary vascularity is normal. There is no pneumothorax. Patient is osteopenic. There are bilateral breast implants.      Impression:       No acute cardiopulmonary findings.    Signer Name: Mario Lopez MD   Signed: 8/23/2020 1:43 AM   Workstation Name: [facility]    [facility]      I have personally reviewed most recent lab results, radiology  images and interpretations and vascular studies and agree with findings, most notably: Sodium of 122, bicarb of 17, hemoglobin 11.      COVID-19 RISK SCREEN     Has the patient had close contact without PPE with a lab confirmed COVID-19 (+) person or a person under investigation (PUI) for COVID-19 infection?  -- No     Has the patient had respiratory symptoms, worsened/new cough and/or SOA, unexplained fever, or sudden loss of smell and/or taste in the past 7 days? --  No    Does the patient have baseline higher exposure risk such as working in healthcare field, currently residing in healthcare facility, or ongoing hemodialysis?  --  No         Assessment/Plan   Assessment / Plan     Active Hospital Problems    Diagnosis   • Hyponatremia   • Late onset Alzheimer's disease without behavioral disturbance (CMS/HCC)   • Erosive esophagitis     EGD 5/17 - reflux precautions     • Hypothyroidism     Impression: 03/08/2016 - check tfts  Impression: 09/08/2015 - update tfts  Impression: 03/09/2015 - check tfts  Impression: 12/23/2014 - check tsh  Impression: 12/05/2014 - check tsh  Impression: 09/11/2014 - update tft  Impression: 03/11/2014 - update tft;      • Carotid artery stenosis     Impression: 01/08/2015 - s/p op cea- doing well  bp is settling down now- ok to continue current rx; Description: with occlusion of retinal artery- s/p carotide endarterectomy 1/15     • Atypical chest pain     Description: atypical, assoc with low tsh  Saw Dr Ortega- stress test 4/13  Description: assoc with low TSH 6/12  [facility]- added elavil 2019 - repeat stress test      • Osteoporosis     Impression: 12/23/2014 - check vitamin D levels  Impression: 03/11/2014 - check vitamin D levels; Description: dexa a long time ago           Assessment & Plan:    74 yo with HO hypertension, hypothyroidism, osteoporosis, erosive esophagitis, carotid stenosis with CEA, and dementia who presents with an episode of near syncope.  Found to have  hyponatremia    Hyponatremia  -Most likely hypovolemic in nature.  Patient has had 1 L of normal saline in the emergency room will continue that at 100 an hour continue to check BMPs every 6 hours.  -Only medication I see that could be making this worse includes long-term PPI use.  Will check a mag and phosphorus as well.  She is on no SSRI or diuretics  -    Hypertension  -Hold medications for now    Hypothyroidism  -Check TSH, continue Synthroid    Dementia, possibly vascular in nature  -Continue chronic medications.  Try to minimize any factors that may irritate her.  Including any long-term stay in the hospital.      DVT prophylaxis:HEP SQ    CODE STATUS: Full code, discussed with her daughter at the bedside.    Admission Status: Obs status for now.      Electronically signed by Claudia Galvan MD 08/23/20 02:45

## 2020-08-23 NOTE — OUTREACH NOTE
Prep Survey      Responses   [facility] patient discharged from?  [place]   Is LACE score < 7 ?  No   Eligibility  [facility]   Date of Admission  08/23/20   Date of Discharge  08/23/20   Discharge Disposition  Home or Self Care   Discharge diagnosis  hyponatremia, dehydration, dementia   COVID-19 Test Status  Not tested   Does the patient have one of the following disease processes/diagnoses(primary or secondary)?  Other   Does the patient have Home health ordered?  No   Is there a DME ordered?  No   Prep survey completed?  Yes          Baylee Cooper RN

## 2020-08-24 ENCOUNTER — TRANSITIONAL CARE MANAGEMENT TELEPHONE ENCOUNTER (OUTPATIENT)
Dept: CALL CENTER | Facility: HOSPITAL | Age: 75
End: 2020-08-24

## 2020-08-24 NOTE — OUTREACH NOTE
Call Center TCM Note      Responses   [facility] patient discharged from?  [place]   COVID-19 Test Status  Not tested   Does the patient have one of the following disease processes/diagnoses(primary or secondary)?  Other   TCM attempt successful?  Yes   Call start time  0915   Call end time  0924   Discharge diagnosis  hyponatremia, dehydration, dementia   Person spoke with today (if not patient) and relationship  Adia Villareal-daughter   Meds reviewed with patient/caregiver?  Yes   Is the patient having any side effects they believe may be caused by any medication additions or changes?  No   Does the patient have all medications ordered at discharge?  N/A   Is the patient taking all medications as directed (includes completed medication regime)?  Yes   Comments regarding appointments      Does the patient have a primary care provider?   Yes   Does the patient have an appointment with their PCP within 7 days of discharge?  No   What is preventing the patient from scheduling follow up appointments within 7 days of discharge?  -- [Pt has had an appt with [facility], her daughter goes there, for about a month r/t needing surgery clearance for cataract surgery. That appt is on 8/27/20.]   Nursing Interventions  Verified appointment date/time/provider, Educated patient on importance of making appointment [Routed to PCP office]   Has the patient kept scheduled appointments due by today?  N/A   Pulse Ox monitoring  None   Psychosocial issues?  Yes   Psychosocial comments  Pt has dementia. She lives with her daughter Adia.   Did the patient receive a copy of their discharge instructions?  Yes   Nursing interventions  Reviewed instructions with patient   What is the patient's perception of their health status since discharge?  Improving [[place] reports patient ate dinner last night however she's been tired]   Is the patient/caregiver able to teach back signs and symptoms related to disease process  "for when to call PCP?  Yes   Is the patient/caregiver able to teach back signs and symptoms related to disease process for when to call 911?  Yes   Is the patient/caregiver able to teach back the hierarchy of who to call/visit for symptoms/problems? PCP, Specialist, Home health nurse, Urgent Care, ED, 911  Yes [Daughter reports, \"I think she would if she felt like she really needed to\"]   If the patient is a current smoker, are they able to teach back resources for cessation?  -- [Nonsmoker]   TCM call completed?  Yes          Ximena Rodriguez RN    8/24/2020, 09:29      "

## 2020-08-28 ENCOUNTER — TELEPHONE (OUTPATIENT)
Dept: ENDOCRINOLOGY | Facility: CLINIC | Age: 75
End: 2020-08-28

## 2020-08-28 NOTE — TELEPHONE ENCOUNTER
"Called and spoke with pt's daughter and this has been ongoing for 3 days. States she believes it is the upper part of her joint area. She was \"rolling it yesterday but is favoring it today and not using it.\" 1 year ago pt had this shoulder slip out of its socket and was told she could not have surgery, etc due to no muscle strength to keep it in place. States she was not able to do PT due to cost. Please advise. Thanks!  "

## 2020-08-28 NOTE — TELEPHONE ENCOUNTER
Has she had a transitional care visit?  It can be evaluated at that time if so  She will have to have an appt to have this evaluation  Thanks, josephine

## 2020-08-28 NOTE — TELEPHONE ENCOUNTER
PATIENT IS HAVING LOTS OF PAIN IN LEFT SHOULDER. THE PAIN IS NOT GOING AWAY. PATIENTS DAUGHTER (INDIO BARRETT) IS CALLING TO SEE WHAT DR. KIM SUGGEST FOR THE PAIN OR WHAT SHE CAN TAKE TO HELP EASE THE PAIN. PHONE NUMBER [phone]

## 2020-10-27 ENCOUNTER — HOSPITAL ENCOUNTER (OUTPATIENT)
Dept: SPEECH THERAPY | Facility: HOSPITAL | Age: 75
Setting detail: THERAPIES SERIES
Discharge: HOME | End: 2020-10-27

## 2020-10-27 DIAGNOSIS — G30.1 LATE ONSET ALZHEIMER'S DISEASE WITHOUT BEHAVIORAL DISTURBANCE (HCC): ICD-10-CM

## 2020-10-27 DIAGNOSIS — F02.80 LATE ONSET ALZHEIMER'S DISEASE WITHOUT BEHAVIORAL DISTURBANCE (HCC): ICD-10-CM

## 2020-10-27 PROCEDURE — 92523 SPEECH SOUND LANG COMPREHEN: CPT

## 2020-10-27 NOTE — THERAPY EVALUATION
Outpatient Speech Language Pathology   Adult Speech Language Cognitive Initial Evaluation  [facility]     Patient Name: Brittny Nicolas  : 1945  MRN: [id number]  Today's Date: 10/27/2020        Visit Date: 10/27/2020   Patient Active Problem List   Diagnosis   • Achalasia   • Acute upper respiratory infection   • Atypical chest pain   • Benign essential hypertension   • Carotid artery stenosis   • Cataract   • Cough   • Acute cystitis   • Screening for malignant neoplasm   • Gastroesophageal reflux disease   • Essential familial hypercholesterolemia   • Gastrointestinal bleeding   • Inguinal hernia   • Insomnia   • Leg cramps   • Meniere's disease   • Night sweats   • Osteoporosis   • Peripheral neuropathy   • Retinal artery occlusion   • Rheumatoid arthritis (CMS/HCC)   • Stroke syndrome   • Suprapubic pain   • Transient ischemic attack   • Positive reaction to tuberculin skin test   • Vitamin D deficiency   • Weight loss   • H/O colonoscopy   • Fatigue   • Hypothyroidism   • Erosive esophagitis   • Left lower quadrant pain   • Acute pain of left shoulder   • Pharyngitis   • Memory loss   • Rib pain on left side   • Hyponatremia   • Late onset Alzheimer's disease without behavioral disturbance (CMS/HCC)        Past Medical History:   Diagnosis Date   • Acid reflux disease    • Arthritis    • Carotid artery occlusion    • Depression    • Graves disease    • Hypertension    • Stomach problems    • Stroke (CMS/HCC)    • Thyroid disease         Past Surgical History:   Procedure Laterality Date   • AUGMENTATION MAMMAPLASTY         • BREAST AUGMENTATION     • CAROTID ARTERY ANGIOPLASTY     • COLONOSCOPY     • D&C CERVICAL BIOPSY      Dilation and Curettage of Cervical Stump   • HERNIA REPAIR     • HERNIA REPAIR     • THROMBOENDARTERECTOMY      carotid   • UPPER GASTROINTESTINAL ENDOSCOPY           Visit Dx:    ICD-10-CM ICD-9-CM   1. Late onset Alzheimer's disease without behavioral disturbance (CMS/HCC)   "G30.1 331.0    F02.80 294.10           SLP [facility] Evaluation - 10/27/20 0800        Communication Assessment/Intervention    Document Type  evaluation   -    Subjective Information  no complaints   -    Patient Observations  alert;cooperative;agree to therapy   -    Patient/Family/Caregiver Comments/Observations  Pt's daughter present for evaluation and both pt and daughter recognize that pt has memory deficits.   -    Care Plan Review  care plan/treatment goals reviewed   -    Care Plan Review, Other Participant(s)  daughter   -    Patient Effort  excellent   -HG    Symptoms Noted During/After Treatment  none   -HG       General Information    Patient Profile Reviewed  yes   -HG    Pertinent History Of Current Problem  Pt is a 75 year old female followed by Neurology for memory deficits. Her family has noted symptoms since at least early 2019 marked initially by forgetfulness. This has gradually worsened  over time. Additional associated symptoms have included impairments in language  and executive function. There are no identified exacerbating or alleviating factors. Her family manages her medications and finances. She has moved in with her daughter in [place] as a result of her cognitive impairment.    -    Precautions/Limitations, Vision  corrective lenses needed for reading;other (see comments)    Pt with hx of \"stroke in her eye\" and recent cataracts  -    Precautions/Limitations, Hearing  WFL;for purposes of eval   -    Patient Level of Education  Professor at    -    Prior Level of Function-Communication  WFL   -    Plans/Goals Discussed with  patient and family   -    Barriers to Rehab  none identified   -    Patient's Goals for Discharge  functional cognition   -    Family Goals for Discharge  functional cognition   -    Standardized Assessment Used  RBANS   -       Auditory Comprehension Assessment/Intervention    Auditory Comprehension (Communication)  mild impairment   " -HG    Auditory Comprehension Communication, Comment  Difficulty with digit symbol test as part of the RBANS- required conistent visual prompts   -HG       Reading Comprehension Assessment/Intervention    Reading Comprehension (Communication)  other (see comments)    reported per daughter  -HG    Reading Comprehension, Comment  Daughter states that she pt doesn't understand what she reads.    -HG       Expression Assessment/Intervention    Expression Assessment/Intervention  verbal expression   -HG       Verbal Expression Assessment/Intervention    Verbal Expression  WFL   -HG       Oral Motor Structure and Function    Oral Motor Structure and Function  WFL   -HG       Oral Musculature and Cranial Nerve Assessment    Oral Motor General Assessment  WFL   -HG       Motor Speech Assessment/Intervention    Motor Speech Function  WFL   -HG       Cursory Voice Assessment/Intervention    Quality and Resonance (Voice)  WFL   -HG       Cognitive Assessment Intervention- SLP    Cognitive Function (Cognition)  moderate impairment;severe impairment   -HG    Orientation Status (Cognition)  awareness of basic personal information;WFL   -HG    Memory (Cognitive)  immediate;delayed;unrelated;severe impairment   -HG    Attention (Cognitive)  sustained;alternating;moderate impairment;severe impairment   -HG    Thought Organization (Cognitive)  concrete divergent;moderate impairment   -HG       RBANS- Repeatable Battery for the Assessment of Neuropsychological Status    Immediate Memory Index Score  40   -HG    Immediate Memory Percentile  0 %   -HG    Immediate Memory Qualitative Description  extremely low   -HG    Visuospatial Index Score  66   -HG    Visuospatial Percentile  1 %   -HG    Visuospatial Qualitative Description  extremely low   -HG    Language Index Score  82   -HG    Language Percentile  12 %   -HG    Language Qualitative Description  low average   -HG    Attention Index Score  53   -HG    Attention Percentile  0 %    -HG    Attention Qualitative Description  extremely low   -HG    Delayed Memory Index Score  52   -HG    Delayed Memory Percentile  0 %   -HG    Delayed Memory Qualitative Description  extremely low   -HG    Total Index Score  293   -HG    Total Percentile  0 %   -HG    Total Qualitative Description  extremely low   -HG      User Key  (r) = Recorded By, (t) = Taken By, (c) = Cosigned By    Initials Name Provider Type    Lisa Marquis, MS CCC-SLP Speech and Language Pathologist                         OP SLP Education     Row Name 10/27/20 0800       Education    Barriers to Learning  No barriers identified  -HG    Education Provided  Patient demonstrated recommended strategies;Family/caregivers demonstrated recommended strategies;Patient requires further education on strategies, risks;Family/caregivers require further education on strategies, risks  -HG    Assessed  Learning needs;Learning motivation;Learning preferences;Learning readiness  -HG    Learning Motivation  Strong  -HG    Learning Method  Explanation;Demonstration;Teach back;Written materials  -HG    Teaching Response  Verbalized understanding;Demonstrated understanding;Reinforcement needed  -HG    Education Comments  Provided pt with a daily journal and gave examples for daily entries.   -HG      User Key  (r) = Recorded By, (t) = Taken By, (c) = Cosigned By    Initials Name Effective Dates    Lisa Marquis, MS [facility]-SLP 08/09/20 -           SLP OP Goals     Row Name 10/27/20 0800          Goal Type Needed    Goal Type Needed  Memory;Attention/Orientation;Auditory Comprehension;Other Adult Goals  -HG        Subjective Comments    Subjective Comments  Pt alert, cooperative, accompanied with daughter.  -HG        Auditory Comprehension Goals    Patient will comprehend spoken information in all settings  90%:;with cues  -HG     Status: Patient will comprehend spoken information in all settings  New  -HG     Patient will demonstrate comprehension  of spoken language by answering questions about a short paragraph  80%:;with cues  -HG     Status: Patient will demonstrate comprehension of spoken language by answering questions about a short paragraph  New  -HG        Memory Goals    Patient and family will implement compensatory strategies to maximize patient’s Memory function so patient can continue to participate in daily activities  90%:;with cues  -HG     Status: Patient and family will implement compensatory strategies to maximize patient’s Memory function so patient can continue to participate in daily activities  New  -HG     Patient will demonstrate improved ability to recall information by immediately recalling a series of words  80%:;related;after delay;with cues  -HG     Status: Patient will demonstrate improved ability to recall information by immediately recalling a series of words  New  -HG     Patient’s memory skills will be enhanced as reported by patient by utilizing internal memory strategies to recall up to 3 pieces of information after a 5- minute delay  80%:;with cues  -HG     Status: Patient’s memory skills will be enhanced as reported by patient by utilizing internal memory strategies to recall up to 3 pieces of information after a 5- minute delay  New  -HG     Patient’s memory skills will be enhanced as reported by patient by using external memory aides  80%:;with cues  -HG     Status: Patient’s memory skills will be enhanced as reported by patient by using external memory aides  New  -HG     Patient will demonstrate improved ability to recall information by stating activities patient has completed that day  80%:;with cues  -HG     Status: Patient will demonstrate improved ability to recall information by stating activities patient has completed that day  New  -HG        Attention/Orientation Goals    Patient will be able to interact safely in home environment  With Supervision  -HG     Status: Patient will be able to interact safely in  home environment  New  -HG     Patient will improve orientation skills by orienting to self, place, time, and situation  80%:;with cues  -HG     Status: Patient will improve orientation skills by orienting to self, place, time, and situation  New  -HG     Patient will improve attention skills by sustaining consistent behavioral response during continuous and repetitive activity (e.g., listening for target words, auditory/reading comprehension tasks)  with cues;5 minute task  -HG     Status: Patient will improve attention skills by sustaining consistent behavioral response during continuous and repetitive activity (e.g., listening for target words, auditory/reading comprehension tasks)  New  -HG     Patient will improve attention skills by focusing on environmental stimuli within left visuospatial field  80%:;with cues  -HG     Status: Patient will improve attention skills by focusing on environmental stimuli within left visuospatial field  New  -HG        Other Goals    Other Adult Goal- 1  Pt will complete thought organization tasks with 80% accuracy.  -HG     Status: Other Adult Goal- 1  New  -HG     Other Adult Goal- 2  Pt will complete reasoning and problem solving assessment with goals to follow as indicated.  -HG     Status: Other Adult Goal- 2  New  -HG     Other Adult Goal- 3  Pt will improve RBANS Total score to at least a 70 placing pt in the [place] range.   -HG        SLP Time Calculation    SLP Goal Re-Cert Due Date  11/26/20  -HG       User Key  (r) = Recorded By, (t) = Taken By, (c) = Cosigned By    Initials Name Provider Type    Lisa Marquis MS CCC-SLP Speech and Language Pathologist          OP SLP Assessment/Plan - 10/27/20 0800        SLP Assessment    Functional Problems  Speech Language- Adult/Cognition   -HG    Impact on Function: Adult Speech Language/Cognition  Difficulty following directions;Lack of insight or awareness of deficits, safety issues;Trouble learning or remembering  "new information;Poor attention to task;Requires supervision   -    Clinical Impression: Speech Language-Adult/Congnition  Severe:;Cognitive Communication Impairment   -HG    Functional Problems Comment  Pt's daughter reports \"hoarding items in the house\" and asks for repetition of information.   -HG    Clinical Impression Comments  RBANS Total Score of 51 places pt in the Extremely Low Range   -    Please refer to paper survey for additional self-reported information  Yes   -HG    Please refer to items scanned into chart for additional diagnostic informaiton and handouts as provided by clinician  Yes   -HG    SLP Diagnosis  Severe Cog/Comm deficits   -    Prognosis  Excellent (comment)   -HG    Patient/caregiver participated in establishment of treatment plan and goals  Yes   -HG    Patient would benefit from skilled therapy intervention  Yes   -HG       SLP Plan    Frequency  1-2x/week   -HG    Duration  12 weeks   -HG    Planned CPT's?  SLP SPEECH & LANGUAGE EVAL: 69636;SLP INDIVIDUAL SPEECH THERAPY: 17741   -    Expected Duration of Therapy Session (SLP Eval)  60   -HG    Plan Comments  Initiate Cog tx.    -      User Key  (r) = Recorded By, (t) = Taken By, (c) = Cosigned By    Initials Name Provider Type     Lisa Graves, MS CCC-SLP Speech and Language Pathologist                 Time Calculation:   SLP Start Time: 0800    Therapy Charges for Today     Code Description Service Date Service Provider Modifiers Qty    [id number]  ST EVAL SPEECH AND PROD W LANG  6 10/27/2020 Lisa Graves MS CCC-SLP GN 1                   Lisa Graves MS CCC-SLP  10/27/2020  "

## 2020-10-29 ENCOUNTER — HOSPITAL ENCOUNTER (OUTPATIENT)
Dept: SPEECH THERAPY | Facility: HOSPITAL | Age: 75
Setting detail: THERAPIES SERIES
Discharge: HOME | End: 2020-10-29

## 2020-10-29 DIAGNOSIS — G30.1 LATE ONSET ALZHEIMER'S DISEASE WITHOUT BEHAVIORAL DISTURBANCE (HCC): Primary | ICD-10-CM

## 2020-10-29 DIAGNOSIS — F02.80 LATE ONSET ALZHEIMER'S DISEASE WITHOUT BEHAVIORAL DISTURBANCE (HCC): Primary | ICD-10-CM

## 2020-10-29 PROCEDURE — 92507 TX SP LANG VOICE COMM INDIV: CPT

## 2020-10-29 NOTE — THERAPY TREATMENT NOTE
Outpatient Speech Language Pathology   Adult Speech Language Cognitive Treatment Note   [facility]     Patient Name: Brittny Nicolas  : 1945  MRN: [id number]  Today's Date: 10/29/2020         Visit Date: 10/29/2020   Patient Active Problem List   Diagnosis   • Achalasia   • Acute upper respiratory infection   • Atypical chest pain   • Benign essential hypertension   • Carotid artery stenosis   • Cataract   • Cough   • Acute cystitis   • Screening for malignant neoplasm   • Gastroesophageal reflux disease   • Essential familial hypercholesterolemia   • Gastrointestinal bleeding   • Inguinal hernia   • Insomnia   • Leg cramps   • Meniere's disease   • Night sweats   • Osteoporosis   • Peripheral neuropathy   • Retinal artery occlusion   • Rheumatoid arthritis (CMS/HCC)   • Stroke syndrome   • Suprapubic pain   • Transient ischemic attack   • Positive reaction to tuberculin skin test   • Vitamin D deficiency   • Weight loss   • H/O colonoscopy   • Fatigue   • Hypothyroidism   • Erosive esophagitis   • Left lower quadrant pain   • Acute pain of left shoulder   • Pharyngitis   • Memory loss   • Rib pain on left side   • Hyponatremia   • Late onset Alzheimer's disease without behavioral disturbance (CMS/[facility])          Visit Dx:    ICD-10-CM ICD-9-CM   1. Late onset Alzheimer's disease without behavioral disturbance (CMS/[facility])  G30.1 331.0    F02.80 294.10                         SLP OP Goals     Row Name 10/29/20 0800          Goal Type Needed    Goal Type Needed  Memory;Attention/Orientation;Auditory Comprehension;Other Adult Goals  -HG        Subjective Comments    Subjective Comments  Pt alert, cooperative, accompanied with daughter.   -HG        Auditory Comprehension Goals    Patient will comprehend spoken information in all settings  90%:;with cues  -HG     Status: Patient will comprehend spoken information in all settings  Progressing as expected  -HG     Comments: Patient will comprehend spoken information in  all settings  10/29/20: Pt's daughter states that she isn't sure her mom can read or understands the labels in her home for kitchen items.   -HG     Patient will demonstrate comprehension of spoken language by answering questions about a short paragraph  80%:;with cues  -HG     Status: Patient will demonstrate comprehension of spoken language by answering questions about a short paragraph  New  -HG        Memory Goals    Patient and family will implement compensatory strategies to maximize patient’s Memory function so patient can continue to participate in daily activities  90%:;with cues  -HG     Status: Patient and family will implement compensatory strategies to maximize patient’s Memory function so patient can continue to participate in daily activities  New  -HG     Patient will demonstrate improved ability to recall information by immediately recalling a series of words  80%:;related;after delay;with cues  -HG     Status: Patient will demonstrate improved ability to recall information by immediately recalling a series of words  New  -HG     Patient’s memory skills will be enhanced as reported by patient by utilizing internal memory strategies to recall up to 3 pieces of information after a 5- minute delay  80%:;with cues  -HG     Status: Patient’s memory skills will be enhanced as reported by patient by utilizing internal memory strategies to recall up to 3 pieces of information after a 5- minute delay  Progressing as expected  -HG     Comments: Patient’s memory skills will be enhanced as reported by patient by utilizing internal memory strategies to recall up to 3 pieces of information after a 5- minute delay  10/29/20: Delayed recall of 3 related pictures: pt was 2/3x 2, 3/3 x 3.   -HG     Patient’s memory skills will be enhanced as reported by patient by using external memory aides  80%:;with cues  -HG     Status: Patient’s memory skills will be enhanced as reported by patient by using external memory aides   Progressing as expected  -HG     Comments: Patient’s memory skills will be enhanced as reported by patient by using external memory aides  10/29/20: Pt had not written in her journal yet- provided visual cues.   -HG     Patient will demonstrate improved ability to recall information by stating activities patient has completed that day  80%:;with cues  -HG     Status: Patient will demonstrate improved ability to recall information by stating activities patient has completed that day  New  -HG        Attention/Orientation Goals    Patient will be able to interact safely in home environment  With Supervision  -HG     Status: Patient will be able to interact safely in home environment  New  -HG     Patient will improve orientation skills by orienting to self, place, time, and situation  80%:;with cues  -HG     Status: Patient will improve orientation skills by orienting to self, place, time, and situation  Progressing as expected  -HG     Comments: Patient will improve orientation skills by orienting to self, place, time, and situation  10/29/20: Provided calendar to assist with orientation.   -HG     Patient will improve attention skills by sustaining consistent behavioral response during continuous and repetitive activity (e.g., listening for target words, auditory/reading comprehension tasks)  with cues;5 minute task  -HG     Status: Patient will improve attention skills by sustaining consistent behavioral response during continuous and repetitive activity (e.g., listening for target words, auditory/reading comprehension tasks)  Progressing as expected  -HG     Comments: Patient will improve attention skills by sustaining consistent behavioral response during continuous and repetitive activity (e.g., listening for target words, auditory/reading comprehension tasks)  10/29/20: One Pile Card Game: pt was 80% accurate with min to mod cues.   -HG     Patient will improve attention skills by focusing on environmental  stimuli within left visuospatial field  80%:;with cues  -HG     Status: Patient will improve attention skills by focusing on environmental stimuli within left visuospatial field  New  -HG        Other Goals    Other Adult Goal- 1  Pt will complete thought organization tasks with 80% accuracy.  -HG     Status: Other Adult Goal- 1  Progressing as expected  -HG     Comments: Other Adult Goal- 1  10/29/20: divergent naming: pt was 70% accurate.   -HG     Other Adult Goal- 2  Pt will complete reasoning and problem solving assessment with goals to follow as indicated.  -HG     Status: Other Adult Goal- 2  New  -HG     Other Adult Goal- 3  Pt will improve RBANS Total score to at least a 70 placing pt in the [place] range.   -HG        SLP Time Calculation    SLP Goal Re-Cert Due Date  11/26/20  -HG       User Key  (r) = Recorded By, (t) = Taken By, (c) = Cosigned By    Initials Name Provider Type    Lisa Marquis MS CCC-SLP Speech and Language Pathologist          OP SLP Education     Row Name 10/29/20 0800       Education    Education Comments  Provided handout for internal and external memory strategies, cues for writing in her journal.   -HG      User Key  (r) = Recorded By, (t) = Taken By, (c) = Cosigned By    Initials Name Effective Dates    Lisa Marquis MS CCC-SLP 08/09/20 -           OP SLP Assessment/Plan - 10/29/20 0800        SLP Plan    Plan Comments  Cont with Cog tx.    -HG      User Key  (r) = Recorded By, (t) = Taken By, (c) = Cosigned By    Initials Name Provider Type    Lisa Marquis MS CCC-SLP Speech and Language Pathologist                 Time Calculation:   SLP Start Time: 0800    Therapy Charges for Today     Code Description Service Date Service Provider Modifiers Qty    [id number]  ST TREATMENT SPEECH 4 10/29/2020 Lisa Graves MS CCC-SLP GN 1                   Lisa Graves MS CCC-SLP  10/29/2020

## 2020-11-03 ENCOUNTER — HOSPITAL ENCOUNTER (OUTPATIENT)
Dept: SPEECH THERAPY | Facility: HOSPITAL | Age: 75
Setting detail: THERAPIES SERIES
Discharge: HOME | End: 2020-11-03

## 2020-11-03 DIAGNOSIS — F02.80 LATE ONSET ALZHEIMER'S DISEASE WITHOUT BEHAVIORAL DISTURBANCE (HCC): Primary | ICD-10-CM

## 2020-11-03 DIAGNOSIS — G30.1 LATE ONSET ALZHEIMER'S DISEASE WITHOUT BEHAVIORAL DISTURBANCE (HCC): Primary | ICD-10-CM

## 2020-11-03 PROCEDURE — 92507 TX SP LANG VOICE COMM INDIV: CPT

## 2020-11-03 NOTE — THERAPY TREATMENT NOTE
Outpatient Speech Language Pathology   Adult Speech Language Cognitive Treatment Note   [place]     Patient Name: Brittny Nicolas  : 1945  MRN: [id number]  Today's Date: 11/3/2020         Visit Date: 2020   Patient Active Problem List   Diagnosis   • Achalasia   • Acute upper respiratory infection   • Atypical chest pain   • Benign essential hypertension   • Carotid artery stenosis   • Cataract   • Cough   • Acute cystitis   • Screening for malignant neoplasm   • Gastroesophageal reflux disease   • Essential familial hypercholesterolemia   • Gastrointestinal bleeding   • Inguinal hernia   • Insomnia   • Leg cramps   • Meniere's disease   • Night sweats   • Osteoporosis   • Peripheral neuropathy   • Retinal artery occlusion   • Rheumatoid arthritis (CMS/HCC)   • Stroke syndrome   • Suprapubic pain   • Transient ischemic attack   • Positive reaction to tuberculin skin test   • Vitamin D deficiency   • Weight loss   • H/O colonoscopy   • Fatigue   • Hypothyroidism   • Erosive esophagitis   • Left lower quadrant pain   • Acute pain of left shoulder   • Pharyngitis   • Memory loss   • Rib pain on left side   • Hyponatremia   • Late onset Alzheimer's disease without behavioral disturbance (CMS/[facility])          Visit Dx:    ICD-10-CM ICD-9-CM   1. Late onset Alzheimer's disease without behavioral disturbance (CMS/[facility])  G30.1 331.0    F02.80 294.10                         SLP OP Goals     Row Name 20 0800          Goal Type Needed    Goal Type Needed  Memory;Attention/Orientation;Auditory Comprehension;Other Adult Goals  -HG        Subjective Comments    Subjective Comments  Pt alert, cooperative, accompanied with daughter.   -HG        Auditory Comprehension Goals    Patient will comprehend spoken information in all settings  90%:;with cues  -HG     Status: Patient will comprehend spoken information in all settings  Progressing as expected  -HG     Comments: Patient will comprehend spoken information in  all settings  10/29/20: Pt's daughter states that she isn't sure her mom can read or understands the labels in her home for kitchen items.   -HG     Patient will demonstrate comprehension of spoken language by answering questions about a short paragraph  80%:;with cues  -HG     Status: Patient will demonstrate comprehension of spoken language by answering questions about a short paragraph  New  -HG        Memory Goals    Patient and family will implement compensatory strategies to maximize patient’s Memory function so patient can continue to participate in daily activities  90%:;with cues  -HG     Status: Patient and family will implement compensatory strategies to maximize patient’s Memory function so patient can continue to participate in daily activities  New  -HG     Patient will demonstrate improved ability to recall information by immediately recalling a series of words  80%:;related;after delay;with cues  -HG     Status: Patient will demonstrate improved ability to recall information by immediately recalling a series of words  Progressing as expected  -HG     Comments: Patient will demonstrate improved ability to recall information by immediately recalling a series of words  11/3/20: Immediate recall on FROMAJE: pt was 2/3.   -HG     Patient’s memory skills will be enhanced as reported by patient by utilizing internal memory strategies to recall up to 3 pieces of information after a 5- minute delay  80%:;with cues  -HG     Status: Patient’s memory skills will be enhanced as reported by patient by utilizing internal memory strategies to recall up to 3 pieces of information after a 5- minute delay  Progressing as expected  -HG     Comments: Patient’s memory skills will be enhanced as reported by patient by utilizing internal memory strategies to recall up to 3 pieces of information after a 5- minute delay  11/3/20: Delayed recall of 4 related (familiar to pt) pt was 3/4 x 2, with an increased written cue, pt  was 4/4. 10/29/20: Delayed recall of 3 related pictures: pt was 2/3x 2, 3/3 x 3.   -HG     Patient’s memory skills will be enhanced as reported by patient by using external memory aides  80%:;with cues  -HG     Status: Patient’s memory skills will be enhanced as reported by patient by using external memory aides  Progressing as expected  -HG     Comments: Patient’s memory skills will be enhanced as reported by patient by using external memory aides  11/3/20: Reinforced use of daily journaling. 10/29/20: Pt had not written in her journal yet- provided visual cues.   -HG     Patient will demonstrate improved ability to recall information by stating activities patient has completed that day  80%:;with cues  -HG     Status: Patient will demonstrate improved ability to recall information by stating activities patient has completed that day  Progressing as expected  -HG     Comments: Patient will demonstrate improved ability to recall information by stating activities patient has completed that day  11/3/20: [place] did write but not much. With cues, was able to recall events from weekend.   -HG        Attention/Orientation Goals    Patient will be able to interact safely in home environment  With Supervision  -HG     Status: Patient will be able to interact safely in home environment  New  -HG     Patient will improve orientation skills by orienting to self, place, time, and situation  80%:;with cues  -HG     Status: Patient will improve orientation skills by orienting to self, place, time, and situation  Progressing as expected  -HG     Comments: Patient will improve orientation skills by orienting to self, place, time, and situation  10/29/20: Provided calendar to assist with orientation.   -HG     Patient will improve attention skills by sustaining consistent behavioral response during continuous and repetitive activity (e.g., listening for target words, auditory/reading  comprehension tasks)  with cues;5 minute task  -HG     Status: Patient will improve attention skills by sustaining consistent behavioral response during continuous and repetitive activity (e.g., listening for target words, auditory/reading comprehension tasks)  Progressing as expected  -HG     Comments: Patient will improve attention skills by sustaining consistent behavioral response during continuous and repetitive activity (e.g., listening for target words, auditory/reading comprehension tasks)  11/3/20: One Pile Card Game:  Pt was 85% accurate.  10/29/20: One Pile Card Game: pt was 80% accurate with min to mod cues.   -HG     Patient will improve attention skills by focusing on environmental stimuli within left visuospatial field  80%:;with cues  -HG     Status: Patient will improve attention skills by focusing on environmental stimuli within left visuospatial field  Progressing as expected  -HG     Comments: Patient will improve attention skills by focusing on environmental stimuli within left visuospatial field  11/3/20: Shapes and figures: 3 in a row and pt was 80% accurate with mod verbal and visual cues.   -HG        Other Goals    Other Adult Goal- 1  Pt will complete thought organization tasks with 80% accuracy.  -HG     Status: Other Adult Goal- 1  Progressing as expected  -HG     Comments: Other Adult Goal- 1  10/29/20: divergent naming: pt was 70% accurate.   -HG     Other Adult Goal- 2  Pt will complete reasoning and problem solving assessment with goals to follow as indicated.  -HG     Status: Other Adult Goal- 2  Progressing as expected  -HG     Comments: Other Adult Goal- 2  11/3/20: FROMAJE: Pt scored a 15 placing pt in the severe dementia or depression range.   -HG     Other Adult Goal- 3  Pt will improve RBANS Total score to at least a 70 placing pt in the [place] range.   -HG        SLP Time Calculation    SLP Goal Re-Cert Due Date  11/26/20  -HG       User Key  (r) = Recorded By, (t) =  Taken By, (c) = Cosigned By    Initials Name Provider Type    Lisa Marquis MS CCC-SLP Speech and Language Pathologist          OP SLP Education     Row Name 11/03/20 0800       Education    Education Comments  Hmwk for 5 related pictures.   -      User Key  (r) = Recorded By, (t) = Taken By, (c) = Cosigned By    Initials Name Effective Dates    Lisa Marquis MS CCC-SLP 08/09/20 -           OP SLP Assessment/Plan - 11/03/20 0800        SLP Plan    Plan Comments  Cont with Cog tx.    -      User Key  (r) = Recorded By, (t) = Taken By, (c) = Cosigned By    Initials Name Provider Type    Lisa Marquis MS CCC-SLP Speech and Language Pathologist                 Time Calculation:   SLP Start Time: 0800    Therapy Charges for Today     Code Description Service Date Service Provider Modifiers Qty    [id number]  ST TREATMENT SPEECH 4 11/3/2020 Lisa Graves MS CCC-SLP GN 1                   Lisa Graves MS CCC-SLP  11/3/2020

## 2020-11-05 ENCOUNTER — APPOINTMENT (OUTPATIENT)
Dept: SPEECH THERAPY | Facility: HOSPITAL | Age: 75
End: 2020-11-05

## 2020-11-10 ENCOUNTER — HOSPITAL ENCOUNTER (OUTPATIENT)
Dept: SPEECH THERAPY | Facility: HOSPITAL | Age: 75
Setting detail: THERAPIES SERIES
Discharge: HOME | End: 2020-11-10

## 2020-11-10 DIAGNOSIS — F02.80 LATE ONSET ALZHEIMER'S DISEASE WITHOUT BEHAVIORAL DISTURBANCE (HCC): Primary | ICD-10-CM

## 2020-11-10 DIAGNOSIS — G30.1 LATE ONSET ALZHEIMER'S DISEASE WITHOUT BEHAVIORAL DISTURBANCE (HCC): Primary | ICD-10-CM

## 2020-11-10 PROCEDURE — 92507 TX SP LANG VOICE COMM INDIV: CPT

## 2020-11-10 NOTE — THERAPY TREATMENT NOTE
Outpatient Speech Language Pathology   Adult Speech Language Cognitive Treatment Note   [place]     Patient Name: Brittny Nicolas  : 1945  MRN: [id number]  Today's Date: 11/10/2020         Visit Date: 11/10/2020   Patient Active Problem List   Diagnosis   • Achalasia   • Acute upper respiratory infection   • Atypical chest pain   • Benign essential hypertension   • Carotid artery stenosis   • Cataract   • Cough   • Acute cystitis   • Screening for malignant neoplasm   • Gastroesophageal reflux disease   • Essential familial hypercholesterolemia   • Gastrointestinal bleeding   • Inguinal hernia   • Insomnia   • Leg cramps   • Meniere's disease   • Night sweats   • Osteoporosis   • Peripheral neuropathy   • Retinal artery occlusion   • Rheumatoid arthritis (CMS/HCC)   • Stroke syndrome   • Suprapubic pain   • Transient ischemic attack   • Positive reaction to tuberculin skin test   • Vitamin D deficiency   • Weight loss   • H/O colonoscopy   • Fatigue   • Hypothyroidism   • Erosive esophagitis   • Left lower quadrant pain   • Acute pain of left shoulder   • Pharyngitis   • Memory loss   • Rib pain on left side   • Hyponatremia   • Late onset Alzheimer's disease without behavioral disturbance (CMS/[facility])          Visit Dx:    ICD-10-CM ICD-9-CM   1. Late onset Alzheimer's disease without behavioral disturbance (CMS/[facility])  G30.1 331.0    F02.80 294.10                         SLP OP Goals     Row Name 11/10/20 0800          Goal Type Needed    Goal Type Needed  Memory;Attention/Orientation;Auditory Comprehension;Other Adult Goals  -HG        Subjective Comments    Subjective Comments  Pt alert, cooperative, accompanied with daughter.   -HG        Auditory Comprehension Goals    Patient will comprehend spoken information in all settings  90%:;with cues  -HG     Status: Patient will comprehend spoken information in all settings  Progressing as expected  -HG     Comments: Patient will comprehend spoken information in  all settings  10/29/20: Pt's daughter states that she isn't sure her mom can read or understands the labels in her home for kitchen items.   -HG     Patient will demonstrate comprehension of spoken language by answering questions about a short paragraph  80%:;with cues  -HG     Status: Patient will demonstrate comprehension of spoken language by answering questions about a short paragraph  New  -HG        Memory Goals    Patient and family will implement compensatory strategies to maximize patient’s Memory function so patient can continue to participate in daily activities  90%:;with cues  -HG     Status: Patient and family will implement compensatory strategies to maximize patient’s Memory function so patient can continue to participate in daily activities  New  -HG     Patient will demonstrate improved ability to recall information by immediately recalling a series of words  80%:;related;after delay;with cues  -HG     Status: Patient will demonstrate improved ability to recall information by immediately recalling a series of words  Progressing as expected  -HG     Comments: Patient will demonstrate improved ability to recall information by immediately recalling a series of words  11/3/20: Immediate recall on FROMAJE: pt was 2/3.   -HG     Patient’s memory skills will be enhanced as reported by patient by utilizing internal memory strategies to recall up to 3 pieces of information after a 5- minute delay  80%:;with cues  -HG     Status: Patient’s memory skills will be enhanced as reported by patient by utilizing internal memory strategies to recall up to 3 pieces of information after a 5- minute delay  Progressing as expected  -HG     Comments: Patient’s memory skills will be enhanced as reported by patient by utilizing internal memory strategies to recall up to 3 pieces of information after a 5- minute delay  11/10/20: Delayed recall of 5 related words and pt was 3/5 with first initial presented. After review and  use of first initial, pt was 5/5.   11/3/20: Delayed recall of 4 related (familiar to pt) pt was 3/4 x 2, with an increased written cue, pt was 4/4. 10/29/20: Delayed recall of 3 related pictures: pt was 2/3x 2, 3/3 x 3.   -HG     Patient’s memory skills will be enhanced as reported by patient by using external memory aides  80%:;with cues  -HG     Status: Patient’s memory skills will be enhanced as reported by patient by using external memory aides  Progressing as expected  -HG     Comments: Patient’s memory skills will be enhanced as reported by patient by using external memory aides  11/10/20: Reinforced journaling and purpose and need for reference. 11/3/20: Reinforced use of daily journaling. 10/29/20: Pt had not written in her journal yet- provided visual cues.   -HG     Patient will demonstrate improved ability to recall information by stating activities patient has completed that day  80%:;with cues  -HG     Status: Patient will demonstrate improved ability to recall information by stating activities patient has completed that day  Progressing as expected  -HG     Comments: Patient will demonstrate improved ability to recall information by stating activities patient has completed that day  11/3/20: [place] did write but not much. With cues, was able to recall events from weekend.   -HG        Attention/Orientation Goals    Patient will be able to interact safely in home environment  With Supervision  -HG     Status: Patient will be able to interact safely in home environment  New  -HG     Patient will improve orientation skills by orienting to self, place, time, and situation  80%:;with cues  -HG     Status: Patient will improve orientation skills by orienting to self, place, time, and situation  Progressing as expected  -HG     Comments: Patient will improve orientation skills by orienting to self, place, time, and situation  11/10/20: Using calendar in office, pt was  able to ID current date.  10/29/20: Provided calendar to assist with orientation.   -HG     Patient will improve attention skills by sustaining consistent behavioral response during continuous and repetitive activity (e.g., listening for target words, auditory/reading comprehension tasks)  with cues;5 minute task  -HG     Status: Patient will improve attention skills by sustaining consistent behavioral response during continuous and repetitive activity (e.g., listening for target words, auditory/reading comprehension tasks)  Progressing as expected  -HG     Comments: Patient will improve attention skills by sustaining consistent behavioral response during continuous and repetitive activity (e.g., listening for target words, auditory/reading comprehension tasks)  11/3/20: One Pile Card Game:  Pt was 85% accurate.  10/29/20: One Pile Card Game: pt was 80% accurate with min to mod cues.   -HG     Patient will improve attention skills by focusing on environmental stimuli within left visuospatial field  80%:;with cues  -HG     Status: Patient will improve attention skills by focusing on environmental stimuli within left visuospatial field  Progressing as expected  -HG     Comments: Patient will improve attention skills by focusing on environmental stimuli within left visuospatial field  11/3/20: Shapes and figures: 3 in a row and pt was 80% accurate with mod verbal and visual cues.   -HG        Other Goals    Other Adult Goal- 1  Pt will complete thought organization tasks with 80% accuracy.  -HG     Status: Other Adult Goal- 1  Progressing as expected  -HG     Comments: Other Adult Goal- 1  11/10/20: Fill in word given first initial: pt was 100% accurate. 10/29/20: divergent naming: pt was 70% accurate.   -HG     Other Adult Goal- 2  Pt will complete reasoning and problem solving assessment with goals to follow as indicated.  -HG     Status: Other Adult Goal- 2  Progressing as expected  -HG     Comments: Other Adult  Goal- 2  11/10/20: Written Directions: pt was 90% accurate. 11/3/20: FROMAJE: Pt scored a 15 placing pt in the severe dementia or depression range.   -     Other Adult Goal- 3  Pt will improve RBANS Total score to at least a 70 placing pt in the [place] range.   -        SLP Time Calculation    SLP Goal Re-Cert Due Date  11/26/20  -       User Key  (r) = Recorded By, (t) = Taken By, (c) = Cosigned By    Initials Name Provider Type    Lisa Marquis MS CCC-SLP Speech and Language Pathologist          OP SLP Education     Row Name 11/10/20 0800       Education    Education Comments  Hmwk for 6 related words.   -      User Key  (r) = Recorded By, (t) = Taken By, (c) = Cosigned By    Initials Name Effective Dates    Lisa Marquis MS CCC-SLP 08/09/20 -           OP SLP Assessment/Plan - 11/10/20 0800        SLP Plan    Plan Comments  Cont with Cog tx.    -      User Key  (r) = Recorded By, (t) = Taken By, (c) = Cosigned By    Initials Name Provider Type    Lisa Marquis MS CCC-SLP Speech and Language Pathologist                 Time Calculation:   SLP Start Time: 0800    Therapy Charges for Today     Code Description Service Date Service Provider Modifiers Qty    [id number]  ST TREATMENT SPEECH 4 11/10/2020 Lisa Graves MS CCC-SLP GN 1                   Lisa Graves MS CCC-SLP  11/10/2020

## 2020-11-11 RX ORDER — AMLODIPINE BESYLATE 5 MG/1
TABLET ORAL
Qty: 90 TABLET | Refills: 3 | Status: SHIPPED | OUTPATIENT
Start: 2020-11-11 | End: 2021-11-19

## 2020-11-11 NOTE — TELEPHONE ENCOUNTER
Dr. Liz, refill request.  Thank you.      LOV:  06/29/20  Future visit:  12/17/20    Last refill:  11/04/19  Q.  30 tabs  R.  11  Sig:  Take 1 tablet by mouth Daily    [facility] pharmacy.

## 2020-11-12 ENCOUNTER — APPOINTMENT (OUTPATIENT)
Dept: SPEECH THERAPY | Facility: HOSPITAL | Age: 75
End: 2020-11-12

## 2020-11-17 ENCOUNTER — HOSPITAL ENCOUNTER (OUTPATIENT)
Dept: SPEECH THERAPY | Facility: HOSPITAL | Age: 75
Setting detail: THERAPIES SERIES
Discharge: HOME | End: 2020-11-17

## 2020-11-17 PROCEDURE — 92507 TX SP LANG VOICE COMM INDIV: CPT

## 2020-11-17 NOTE — THERAPY TREATMENT NOTE
Outpatient Speech Language Pathology   Adult Speech Language Cognitive Treatment Note   [facility]     Patient Name: Brittny Nicolas  : 1945  MRN: [id number]  Today's Date: 2020         Visit Date: 2020   Patient Active Problem List   Diagnosis   • Achalasia   • Acute upper respiratory infection   • Atypical chest pain   • Benign essential hypertension   • Carotid artery stenosis   • Cataract   • Cough   • Acute cystitis   • Screening for malignant neoplasm   • Gastroesophageal reflux disease   • Essential familial hypercholesterolemia   • Gastrointestinal bleeding   • Inguinal hernia   • Insomnia   • Leg cramps   • Meniere's disease   • Night sweats   • Osteoporosis   • Peripheral neuropathy   • Retinal artery occlusion   • Rheumatoid arthritis (CMS/HCC)   • Stroke syndrome   • Suprapubic pain   • Transient ischemic attack   • Positive reaction to tuberculin skin test   • Vitamin D deficiency   • Weight loss   • H/O colonoscopy   • Fatigue   • Hypothyroidism   • Erosive esophagitis   • Left lower quadrant pain   • Acute pain of left shoulder   • Pharyngitis   • Memory loss   • Rib pain on left side   • Hyponatremia   • Late onset Alzheimer's disease without behavioral disturbance (CMS/HCC)          Visit Dx:  No diagnosis found.                      SLP OP Goals     Row Name 20 0800          Goal Type Needed    Goal Type Needed  Memory;Attention/Orientation;Auditory Comprehension;Other Adult Goals  -HG        Subjective Comments    Subjective Comments  Pt alert, cooperative, accompanied with daughter. Pt agitated toward daughter but doesn't want to talk about it.   -HG        Auditory Comprehension Goals    Patient will comprehend spoken information in all settings  90%:;with cues  -HG     Status: Patient will comprehend spoken information in all settings  Progressing as expected  -HG     Comments: Patient will comprehend spoken information in all settings  10/29/20: Pt's daughter states  that she isn't sure her mom can read or understands the labels in her home for kitchen items.   -HG     Patient will demonstrate comprehension of spoken language by answering questions about a short paragraph  80%:;with cues  -HG     Status: Patient will demonstrate comprehension of spoken language by answering questions about a short paragraph  New  -HG        Memory Goals    Patient and family will implement compensatory strategies to maximize patient’s Memory function so patient can continue to participate in daily activities  90%:;with cues  -HG     Status: Patient and family will implement compensatory strategies to maximize patient’s Memory function so patient can continue to participate in daily activities  New  -HG     Patient will demonstrate improved ability to recall information by immediately recalling a series of words  80%:;related;after delay;with cues  -HG     Status: Patient will demonstrate improved ability to recall information by immediately recalling a series of words  Progressing as expected  -HG     Comments: Patient will demonstrate improved ability to recall information by immediately recalling a series of words  11/17/20: Mental Manipulation by attribute, pt was 90% accurate and by word placement, pt was 90% accurate. 11/3/20: Immediate recall on FROMAJE: pt was 2/3.   -HG     Patient’s memory skills will be enhanced as reported by patient by utilizing internal memory strategies to recall up to 3 pieces of information after a 5- minute delay  80%:;with cues  -HG     Status: Patient’s memory skills will be enhanced as reported by patient by utilizing internal memory strategies to recall up to 3 pieces of information after a 5- minute delay  Progressing as expected  -HG     Comments: Patient’s memory skills will be enhanced as reported by patient by utilizing internal memory strategies to recall up to 3 pieces of information after a 5- minute delay  11/17/20: Delayed recall of 6 related  words: pt was 4/6, 5/6 and 6/6 all with written cue. 11/10/20: Delayed recall of 5 related words and pt was 3/5 with first initial presented. After review and use of first initial, pt was 5/5.   11/3/20: Delayed recall of 4 related (familiar to pt) pt was 3/4 x 2, with an increased written cue, pt was 4/4. 10/29/20: Delayed recall of 3 related pictures: pt was 2/3x 2, 3/3 x 3.   -HG     Patient’s memory skills will be enhanced as reported by patient by using external memory aides  80%:;with cues  -HG     Status: Patient’s memory skills will be enhanced as reported by patient by using external memory aides  Progressing as expected  -HG     Comments: Patient’s memory skills will be enhanced as reported by patient by using external memory aides  11/17/20: Pt continues to journal. 11/10/20: Reinforced journaling and purpose and need for reference. 11/3/20: Reinforced use of daily journaling. 10/29/20: Pt had not written in her journal yet- provided visual cues.   -HG     Patient will demonstrate improved ability to recall information by stating activities patient has completed that day  80%:;with cues  -HG     Status: Patient will demonstrate improved ability to recall information by stating activities patient has completed that day  Progressing as expected  -HG     Comments: Patient will demonstrate improved ability to recall information by stating activities patient has completed that day  11/3/20: [place] did write but not much. With cues, was able to recall events from weekend.   -HG        Attention/Orientation Goals    Patient will be able to interact safely in home environment  With Supervision  -HG     Status: Patient will be able to interact safely in home environment  New  -HG     Patient will improve orientation skills by orienting to self, place, time, and situation  80%:;with cues  -HG     Status: Patient will improve orientation skills by orienting to self, place,  time, and situation  Progressing as expected  -HG     Comments: Patient will improve orientation skills by orienting to self, place, time, and situation  11/17/20: Pt used the calendar and pt was accurate with day of week and date.  11/10/20: Using calendar in office, pt was able to ID current date.  10/29/20: Provided calendar to assist with orientation.   -HG     Patient will improve attention skills by sustaining consistent behavioral response during continuous and repetitive activity (e.g., listening for target words, auditory/reading comprehension tasks)  with cues;5 minute task  -HG     Status: Patient will improve attention skills by sustaining consistent behavioral response during continuous and repetitive activity (e.g., listening for target words, auditory/reading comprehension tasks)  Progressing as expected  -HG     Comments: Patient will improve attention skills by sustaining consistent behavioral response during continuous and repetitive activity (e.g., listening for target words, auditory/reading comprehension tasks)  11/3/20: One Pile Card Game:  Pt was 85% accurate.  10/29/20: One Pile Card Game: pt was 80% accurate with min to mod cues.   -HG     Patient will improve attention skills by focusing on environmental stimuli within left visuospatial field  80%:;with cues  -HG     Status: Patient will improve attention skills by focusing on environmental stimuli within left visuospatial field  Progressing as expected  -HG     Comments: Patient will improve attention skills by focusing on environmental stimuli within left visuospatial field  11/3/20: Shapes and figures: 3 in a row and pt was 80% accurate with mod verbal and visual cues.   -HG        Other Goals    Other Adult Goal- 1  Pt will complete thought organization tasks with 80% accuracy.  -HG     Status: Other Adult Goal- 1  Progressing as expected  -HG     Comments: Other Adult Goal- 1  11/17/20: Category Matrix: pt was 80% accurate I'ly.  11/10/20: Fill in word given first initial: pt was 100% accurate. 10/29/20: divergent naming: pt was 70% accurate.   -HG     Other Adult Goal- 2  Pt will complete reasoning and problem solving assessment with goals to follow as indicated.  -HG     Status: Other Adult Goal- 2  Progressing as expected  -HG     Comments: Other Adult Goal- 2  11/10/20: Written Directions: pt was 90% accurate. 11/3/20: FROMAJE: Pt scored a 15 placing pt in the severe dementia or depression range.   -HG     Other Adult Goal- 3  Pt will improve RBANS Total score to at least a 70 placing pt in the [place] range.   -HG        SLP Time Calculation    SLP Goal Re-Cert Due Date  11/26/20  -HG       User Key  (r) = Recorded By, (t) = Taken By, (c) = Cosigned By    Initials Name Provider Type    Lisa Marquis MS CCC-SLP Speech and Language Pathologist          OP SLP Education     Row Name 11/17/20 0800       Education    Education Comments  Hmwk for cont'd 6 related words.   -HG      User Key  (r) = Recorded By, (t) = Taken By, (c) = Cosigned By    Initials Name Effective Dates    Lisa Marquis MS CCC-SLP 08/09/20 -           OP SLP Assessment/Plan - 11/17/20 0800        SLP Plan    Plan Comments  Cont with Cog tx.    -HG      User Key  (r) = Recorded By, (t) = Taken By, (c) = Cosigned By    Initials Name Provider Type    Lisa Marquis MS CCC-SLP Speech and Language Pathologist                 Time Calculation:   SLP Start Time: 0800    Therapy Charges for Today     Code Description Service Date Service Provider Modifiers Qty    79648160595  ST TREATMENT SPEECH 4 11/17/2020 Lisa Graves MS CCC-SLP GN 1                   Lisa Graves MS CCC-SLP  11/17/2020

## 2020-11-19 ENCOUNTER — APPOINTMENT (OUTPATIENT)
Dept: SPEECH THERAPY | Facility: HOSPITAL | Age: 75
End: 2020-11-19

## 2020-11-24 ENCOUNTER — HOSPITAL ENCOUNTER (OUTPATIENT)
Dept: SPEECH THERAPY | Facility: HOSPITAL | Age: 75
Setting detail: THERAPIES SERIES
Discharge: HOME | End: 2020-11-24

## 2020-11-24 DIAGNOSIS — G30.1 LATE ONSET ALZHEIMER'S DISEASE WITHOUT BEHAVIORAL DISTURBANCE (HCC): Primary | ICD-10-CM

## 2020-11-24 DIAGNOSIS — F02.80 LATE ONSET ALZHEIMER'S DISEASE WITHOUT BEHAVIORAL DISTURBANCE (HCC): Primary | ICD-10-CM

## 2020-11-24 PROCEDURE — 92507 TX SP LANG VOICE COMM INDIV: CPT

## 2020-11-24 NOTE — THERAPY PROGRESS REPORT/RE-CERT
Outpatient Speech Language Pathology   Adult Speech Language Cognitive Progress Note  [facility]     Patient Name: Brittny Nicolas  : 1945  MRN: [id number]  Today's Date: 2020         Visit Date: 2020   Patient Active Problem List   Diagnosis   • Achalasia   • Acute upper respiratory infection   • Atypical chest pain   • Benign essential hypertension   • Carotid artery stenosis   • Cataract   • Cough   • Acute cystitis   • Screening for malignant neoplasm   • Gastroesophageal reflux disease   • Essential familial hypercholesterolemia   • Gastrointestinal bleeding   • Inguinal hernia   • Insomnia   • Leg cramps   • Meniere's disease   • Night sweats   • Osteoporosis   • Peripheral neuropathy   • Retinal artery occlusion   • Rheumatoid arthritis (CMS/HCC)   • Stroke syndrome   • Suprapubic pain   • Transient ischemic attack   • Positive reaction to tuberculin skin test   • Vitamin D deficiency   • Weight loss   • H/O colonoscopy   • Fatigue   • Hypothyroidism   • Erosive esophagitis   • Left lower quadrant pain   • Acute pain of left shoulder   • Pharyngitis   • Memory loss   • Rib pain on left side   • Hyponatremia   • Late onset Alzheimer's disease without behavioral disturbance (CMS/[facility])          Visit Dx:    ICD-10-CM ICD-9-CM   1. Late onset Alzheimer's disease without behavioral disturbance (CMS/[facility])  G30.1 331.0    F02.80 294.10                         SLP OP Goals     Row Name 20 0800          Goal Type Needed    Goal Type Needed  Memory;Attention/Orientation;Auditory Comprehension;Other Adult Goals  -HG        Subjective Comments    Subjective Comments  Pt alert, cooperative, accompanied with daughter.   -HG        Auditory Comprehension Goals    Patient will comprehend spoken information in all settings  90%:;with cues  -HG     Status: Patient will comprehend spoken information in all settings  Progressing as expected  -HG     Comments: Patient will comprehend spoken information in  all settings  10/29/20: Pt's daughter states that she isn't sure her mom can read or understands the labels in her home for kitchen items.   -HG     Patient will demonstrate comprehension of spoken language by answering questions about a short paragraph  80%:;with cues  -HG     Status: Patient will demonstrate comprehension of spoken language by answering questions about a short paragraph  Progressing as expected  -HG     Comments: Patient will demonstrate comprehension of spoken language by answering questions about a short paragraph  11/24/20: 12-22 word paragraphs: 90% accurate.  22-36 word paragraphs: pt was 70% accurate.   -HG        Memory Goals    Patient and family will implement compensatory strategies to maximize patient’s Memory function so patient can continue to participate in daily activities  90%:;with cues  -HG     Status: Patient and family will implement compensatory strategies to maximize patient’s Memory function so patient can continue to participate in daily activities  New  -HG     Patient will demonstrate improved ability to recall information by immediately recalling a series of words  80%:;related;after delay;with cues  -HG     Status: Patient will demonstrate improved ability to recall information by immediately recalling a series of words  Progressing as expected  -HG     Comments: Patient will demonstrate improved ability to recall information by immediately recalling a series of words  11/24/20: Mental Manipulation for progression and pt was 90% accurate. 11/17/20: Mental Manipulation by attribute, pt was 90% accurate and by word placement, pt was 90% accurate. 11/3/20: Immediate recall on FROMAJE: pt was 2/3.  -HG     Patient’s memory skills will be enhanced as reported by patient by utilizing internal memory strategies to recall up to 3 pieces of information after a 5- minute delay  80%:;with cues  -HG     Status: Patient’s memory skills will be enhanced as reported by patient by  utilizing internal memory strategies to recall up to 3 pieces of information after a 5- minute delay  Progressing as expected  -HG     Comments: Patient’s memory skills will be enhanced as reported by patient by utilizing internal memory strategies to recall up to 3 pieces of information after a 5- minute delay  11/24/20: Delayed recall of 6 cont'd pictures from previous session: pt was 3/6 I'ly. 5/6 with min cues, 6/6 by the end of session. 11/17/20: Delayed recall of 6 related words: pt was 4/6, 5/6 and 6/6 all with written cue. 11/10/20: Delayed recall of 5 related words and pt was 3/5 with first initial presented. After review and use of first initial, pt was 5/5.   11/3/20: Delayed recall of 4 related (familiar to pt) pt was 3/4 x 2, with an increased written cue, pt was 4/4. 10/29/20: Delayed recall of 3 related pictures: pt was 2/3x 2, 3/3 x 3.    -HG     Patient’s memory skills will be enhanced as reported by patient by using external memory aides  80%:;with cues  -HG     Status: Patient’s memory skills will be enhanced as reported by patient by using external memory aides  Progressing as expected  -HG     Comments: Patient’s memory skills will be enhanced as reported by patient by using external memory aides  11/24/20: Pt had only journaled one time since last session- ideas for cues in order to prompt pt to write in her journal. 11/17/20: Pt continues to journal. 11/10/20: Reinforced journaling and purpose and need for reference. 11/3/20: Reinforced use of daily journaling. 10/29/20: Pt had not written in her journal yet- provided visual cues.   -HG     Patient will demonstrate improved ability to recall information by stating activities patient has completed that day  80%:;with cues  -HG     Status: Patient will demonstrate improved ability to recall information by stating activities patient has completed that day  Progressing as expected  -HG     Comments: Patient will demonstrate improved ability to  recall information by stating activities patient has completed that day  11/3/20: [place] but did write but not much. With cues, was able to recall events from weekend.   -HG        Attention/Orientation Goals    Patient will be able to interact safely in home environment  With Supervision  -HG     Status: Patient will be able to interact safely in home environment  New  -HG     Patient will improve orientation skills by orienting to self, place, time, and situation  80%:;with cues  -HG     Status: Patient will improve orientation skills by orienting to self, place, time, and situation  Progressing as expected  -HG     Comments: Patient will improve orientation skills by orienting to self, place, time, and situation  11/24/20: Pt aware of date and day of week. Encouraged use of journal in order to keep up with orientation. 11/17/20: Pt used the calendar and pt was accurate with day of week and date.  11/10/20: Using calendar in office, pt was able to ID current date.  10/29/20: Provided calendar to assist with orientation.   -HG     Patient will improve attention skills by sustaining consistent behavioral response during continuous and repetitive activity (e.g., listening for target words, auditory/reading comprehension tasks)  with cues;5 minute task  -HG     Status: Patient will improve attention skills by sustaining consistent behavioral response during continuous and repetitive activity (e.g., listening for target words, auditory/reading comprehension tasks)  Progressing as expected  -HG     Comments: Patient will improve attention skills by sustaining consistent behavioral response during continuous and repetitive activity (e.g., listening for target words, auditory/reading comprehension tasks)  11/3/20: One Pile Card Game:  Pt was 85% accurate.  10/29/20: One Pile Card Game: pt was 80% accurate with min to mod cues.   -HG     Patient will improve attention skills by focusing  on environmental stimuli within left visuospatial field  80%:;with cues  -HG     Status: Patient will improve attention skills by focusing on environmental stimuli within left visuospatial field  Progressing as expected  -HG     Comments: Patient will improve attention skills by focusing on environmental stimuli within left visuospatial field  11/3/20: Shapes and figures: 3 in a row and pt was 80% accurate with mod verbal and visual cues.   -HG        Other Goals    Other Adult Goal- 1  Pt will complete thought organization tasks with 80% accuracy.  -HG     Status: Other Adult Goal- 1  Progressing as expected  -HG     Comments: Other Adult Goal- 1  11/24/20: Divergent naming: pt was 75-90% accurate . 11/17/20: Category Matrix: pt was 80% accurate I'ly. 11/10/20: Fill in word given first initial: pt was 100% accurate. 10/29/20: divergent naming: pt was 70% accurate. 90  -HG     Other Adult Goal- 2  Pt will complete reasoning and problem solving assessment with goals to follow as indicated.  -HG     Status: Other Adult Goal- 2  Progressing as expected  -HG     Comments: Other Adult Goal- 2  11/10/20: Written Directions: pt was 90% accurate. 11/3/20: FROMAJE: Pt scored a 15 placing pt in the severe dementia or depression range.   -HG     Other Adult Goal- 3  Pt will improve RBANS Total score to at least a 70 placing pt in the [place] range.   -        SLP Time Calculation    SLP Goal Re-Cert Due Date  12/24/20  -HG       User Key  (r) = Recorded By, (t) = Taken By, (c) = Cosigned By    Initials Name Provider Type    Lisa Marquis MS CCC-SLP Speech and Language Pathologist          OP SLP Education     Row Name 11/24/20 0800       Education    Education Comments  Hmwk for 7 related pictures/words.   -HG      User Key  (r) = Recorded By, (t) = Taken By, (c) = Cosigned By    Initials Name Effective Dates    Lisa Marquis MS CCC-SLP 08/09/20 -           OP SLP Assessment/Plan - 11/24/20 0800        SLP  Assessment    Functional Problems  Speech Language- Adult/Cognition   -    Clinical Impression: Speech Language-Adult/Congnition  Severe:;Cognitive Communication Impairment   -    Clinical Impression Comments  RBANS not re-adminsistered this date.    -    SLP Diagnosis  Severe cog comm impairment   -       SLP Plan    Plan Comments  Cont with Cog tx.    -      User Key  (r) = Recorded By, (t) = Taken By, (c) = Cosigned By    Initials Name Provider Type     Lisa Graves MS CCC-SLP Speech and Language Pathologist                 Time Calculation:   SLP Start Time: 0800    Therapy Charges for Today     Code Description Service Date Service Provider Modifiers Qty    [id number]  ST TREATMENT SPEECH 4 11/24/2020 Lias Graves MS CCC-SLP GN 1                   Lisa Graves MS CCC-SLP  11/24/2020

## 2020-12-01 ENCOUNTER — APPOINTMENT (OUTPATIENT)
Dept: SPEECH THERAPY | Facility: HOSPITAL | Age: 75
End: 2020-12-01

## 2020-12-08 ENCOUNTER — HOSPITAL ENCOUNTER (OUTPATIENT)
Dept: SPEECH THERAPY | Facility: HOSPITAL | Age: 75
Setting detail: THERAPIES SERIES
Discharge: HOME | End: 2020-12-08

## 2020-12-08 DIAGNOSIS — F02.80 LATE ONSET ALZHEIMER'S DISEASE WITHOUT BEHAVIORAL DISTURBANCE (HCC): Primary | ICD-10-CM

## 2020-12-08 DIAGNOSIS — G30.1 LATE ONSET ALZHEIMER'S DISEASE WITHOUT BEHAVIORAL DISTURBANCE (HCC): Primary | ICD-10-CM

## 2020-12-08 PROCEDURE — 92507 TX SP LANG VOICE COMM INDIV: CPT

## 2020-12-08 NOTE — THERAPY TREATMENT NOTE
Outpatient Speech Language Pathology   Adult Speech Language Cognitive Treatment Note   [place]     Patient Name: Brittny Nicolas  : 1945  MRN: [id number]  Today's Date: 2020         Visit Date: 2020   Patient Active Problem List   Diagnosis   • Achalasia   • Acute upper respiratory infection   • Atypical chest pain   • Benign essential hypertension   • Carotid artery stenosis   • Cataract   • Cough   • Acute cystitis   • Screening for malignant neoplasm   • Gastroesophageal reflux disease   • Essential familial hypercholesterolemia   • Gastrointestinal bleeding   • Inguinal hernia   • Insomnia   • Leg cramps   • Meniere's disease   • Night sweats   • Osteoporosis   • Peripheral neuropathy   • Retinal artery occlusion   • Rheumatoid arthritis (CMS/HCC)   • Stroke syndrome   • Suprapubic pain   • Transient ischemic attack   • Positive reaction to tuberculin skin test   • Vitamin D deficiency   • Weight loss   • H/O colonoscopy   • Fatigue   • Hypothyroidism   • Erosive esophagitis   • Left lower quadrant pain   • Acute pain of left shoulder   • Pharyngitis   • Memory loss   • Rib pain on left side   • Hyponatremia   • Late onset Alzheimer's disease without behavioral disturbance (CMS/[facility])          Visit Dx:    ICD-10-CM ICD-9-CM   1. Late onset Alzheimer's disease without behavioral disturbance (CMS/[facility])  G30.1 331.0    F02.80 294.10                         SLP OP Goals     Row Name 20 0800          Goal Type Needed    Goal Type Needed  Memory;Attention/Orientation;Auditory Comprehension;Other Adult Goals  -HG        Subjective Comments    Subjective Comments  Pt alert, cooperative, accompanied with daughter.   -HG        Auditory Comprehension Goals    Patient will comprehend spoken information in all settings  90%:;with cues  -HG     Status: Patient will comprehend spoken information in all settings  Progressing as expected  -HG     Comments: Patient will comprehend spoken information in  all settings  10/29/20: Pt's daughter states that she isn't sure her mom can read or understands the labels in her home for kitchen items.   -HG     Patient will demonstrate comprehension of spoken language by answering questions about a short paragraph  80%:;with cues  -HG     Status: Patient will demonstrate comprehension of spoken language by answering questions about a short paragraph  Progressing as expected  -HG     Comments: Patient will demonstrate comprehension of spoken language by answering questions about a short paragraph  11/24/20: 12-22 word paragraphs: 90% accurate.  22-36 word paragraphs: pt was 70% accurate.   -HG        Memory Goals    Patient and family will implement compensatory strategies to maximize patient’s Memory function so patient can continue to participate in daily activities  90%:;with cues  -HG     Status: Patient and family will implement compensatory strategies to maximize patient’s Memory function so patient can continue to participate in daily activities  New  -HG     Patient will demonstrate improved ability to recall information by immediately recalling a series of words  80%:;related;after delay;with cues  -HG     Status: Patient will demonstrate improved ability to recall information by immediately recalling a series of words  Progressing as expected  -HG     Comments: Patient will demonstrate improved ability to recall information by immediately recalling a series of words  11/24/20: Mental Manipulation for progression and pt was 90% accurate. 11/17/20: Mental Manipulation by attribute, pt was 90% accurate and by word placement, pt was 90% accurate. 11/3/20: Immediate recall on FROMAJE: pt was 2/3.  -HG     Patient’s memory skills will be enhanced as reported by patient by utilizing internal memory strategies to recall up to 3 pieces of information after a 5- minute delay  80%:;with cues  -HG     Status: Patient’s memory skills will be enhanced as reported by patient by  utilizing internal memory strategies to recall up to 3 pieces of information after a 5- minute delay  Progressing as expected  -HG     Comments: Patient’s memory skills will be enhanced as reported by patient by utilizing internal memory strategies to recall up to 3 pieces of information after a 5- minute delay  12/8/20: Delayed recall of 2 new related pictures, pt was 1/2 to 2/2. 11/24/20: Delayed recall of 6 cont'd pictures from previous session: pt was 3/6 I'ly. 5/6 with min cues, 6/6 by the end of session. 11/17/20: Delayed recall of 6 related words: pt was 4/6, 5/6 and 6/6 all with written cue. 11/10/20: Delayed recall of 5 related words and pt was 3/5 with first initial presented. After review and use of first initial, pt was 5/5.   11/3/20: Delayed recall of 4 related (familiar to pt) pt was 3/4 x 2, with an increased written cue, pt was 4/4. 10/29/20: Delayed recall of 3 related pictures: pt was 2/3x 2, 3/3 x 3.    -HG     Patient’s memory skills will be enhanced as reported by patient by using external memory aides  80%:;with cues  -HG     Status: Patient’s memory skills will be enhanced as reported by patient by using external memory aides  Progressing as expected  -HG     Comments: Patient’s memory skills will be enhanced as reported by patient by using external memory aides  12/8/20: Pt has not been writing in her journal. Started entry for today.   11/24/20: Pt had only journaled one time since last session- ideas for cues in order to prompt pt to write in her journal. 11/17/20: Pt continues to journal. 11/10/20: Reinforced journaling and purpose and need for reference. 11/3/20: Reinforced use of daily journaling. 10/29/20: Pt had not written in her journal yet- provided visual cues.   -HG     Patient will demonstrate improved ability to recall information by stating activities patient has completed that day  80%:;with cues  -HG     Status: Patient will demonstrate improved ability to recall information  by stating activities patient has completed that day  Progressing as expected  -HG     Comments: Patient will demonstrate improved ability to recall information by stating activities patient has completed that day  11/3/20: [place] but not much. With cues, was able to recall events from weekend.   -HG        Attention/Orientation Goals    Patient will be able to interact safely in home environment  With Supervision  -HG     Status: Patient will be able to interact safely in home environment  New  -HG     Patient will improve orientation skills by orienting to self, place, time, and situation  80%:;with cues  -HG     Status: Patient will improve orientation skills by orienting to self, place, time, and situation  Progressing as expected  -HG     Comments: Patient will improve orientation skills by orienting to self, place, time, and situation  11/24/20: Pt aware of date and day of week. Encouraged use of journal in order to keep up with orientation. 11/17/20: Pt used the calendar and pt was accurate with day of week and date.  11/10/20: Using calendar in office, pt was able to ID current date.  10/29/20: Provided calendar to assist with orientation.   -HG     Patient will improve attention skills by sustaining consistent behavioral response during continuous and repetitive activity (e.g., listening for target words, auditory/reading comprehension tasks)  with cues;5 minute task  -HG     Status: Patient will improve attention skills by sustaining consistent behavioral response during continuous and repetitive activity (e.g., listening for target words, auditory/reading comprehension tasks)  Progressing as expected  -HG     Comments: Patient will improve attention skills by sustaining consistent behavioral response during continuous and repetitive activity (e.g., listening for target words, auditory/reading comprehension tasks)  11/3/20: One Pile Card Game:  Pt was 85%  accurate.  10/29/20: One Pile Card Game: pt was 80% accurate with min to mod cues.   -HG     Patient will improve attention skills by focusing on environmental stimuli within left visuospatial field  80%:;with cues  -HG     Status: Patient will improve attention skills by focusing on environmental stimuli within left visuospatial field  Progressing as expected  -HG     Comments: Patient will improve attention skills by focusing on environmental stimuli within left visuospatial field  11/3/20: Shapes and figures: 3 in a row and pt was 80% accurate with mod verbal and visual cues.   -HG        Other Goals    Other Adult Goal- 1  Pt will complete thought organization tasks with 80% accuracy.  -HG     Status: Other Adult Goal- 1  Progressing as expected  -HG     Comments: Other Adult Goal- 1  12/8/20: Category matrix: pt was 90% accurate.  Crossword puzzle and pt was 85% accurate. 11/24/20: Divergent naming: pt was 75-90% accurate . 11/17/20: Category Matrix: pt was 80% accurate I'ly. 11/10/20: Fill in word given first initial: pt was 100% accurate. 10/29/20: divergent naming: pt was 70% accurate. 90  -HG     Other Adult Goal- 2  Pt will complete reasoning and problem solving assessment with goals to follow as indicated.  -HG     Status: Other Adult Goal- 2  Progressing as expected  -HG     Comments: Other Adult Goal- 2  12/8/20: Six step sequencing: pt was 90% accurate. 11/10/20: Written Directions: pt was 90% accurate. 11/3/20: FROMAJE: Pt scored a 15 placing pt in the severe dementia or depression range.   -HG     Other Adult Goal- 3  Pt will improve RBANS Total score to at least a 70 placing pt in the [place] range.   -HG        SLP Time Calculation    SLP Goal Re-Cert Due Date  12/24/20  -HG       User Key  (r) = Recorded By, (t) = Taken By, (c) = Cosigned By    Initials Name Provider Type    Lisa Marquis MS CCC-SLP Speech and Language Pathologist          OP SLP Education     Row Name 12/08/20 0800        Education    Education Comments  Hmwk for 2 new related pictures and provided HEP materials.   -      User Key  (r) = Recorded By, (t) = Taken By, (c) = Cosigned By    Initials Name Effective Dates    Lisa Marquis MS CCC-SLP 08/09/20 -           OP SLP Assessment/Plan - 12/08/20 0800        SLP Plan    Plan Comments  Cont with Cog tx.    -      User Key  (r) = Recorded By, (t) = Taken By, (c) = Cosigned By    Initials Name Provider Type    Lisa Marquis MS CCC-SLP Speech and Language Pathologist                 Time Calculation:   SLP Start Time: 0800    Therapy Charges for Today     Code Description Service Date Service Provider Modifiers Qty    [id number] HC ST TREATMENT SPEECH 4 12/8/2020 Lisa Graves MS CCC-SLP GN 1                   Lisa Graves MS CCC-DESMOND  12/8/2020

## 2020-12-15 ENCOUNTER — APPOINTMENT (OUTPATIENT)
Dept: SPEECH THERAPY | Facility: HOSPITAL | Age: 75
End: 2020-12-15

## 2020-12-17 ENCOUNTER — LAB (OUTPATIENT)
Dept: LAB | Facility: HOSPITAL | Age: 75
End: 2020-12-17

## 2020-12-17 ENCOUNTER — OFFICE VISIT (OUTPATIENT)
Dept: ENDOCRINOLOGY | Facility: CLINIC | Age: 75
End: 2020-12-17

## 2020-12-17 VITALS
WEIGHT: 123.4 LBS | HEART RATE: 68 BPM | HEIGHT: 63 IN | BODY MASS INDEX: 21.86 KG/M2 | DIASTOLIC BLOOD PRESSURE: 70 MMHG | SYSTOLIC BLOOD PRESSURE: 142 MMHG | OXYGEN SATURATION: 96 %

## 2020-12-17 DIAGNOSIS — E55.9 VITAMIN D DEFICIENCY: ICD-10-CM

## 2020-12-17 DIAGNOSIS — M25.512 ACUTE PAIN OF LEFT SHOULDER: ICD-10-CM

## 2020-12-17 DIAGNOSIS — E78.01 ESSENTIAL FAMILIAL HYPERCHOLESTEROLEMIA: Primary | ICD-10-CM

## 2020-12-17 DIAGNOSIS — I10 BENIGN ESSENTIAL HYPERTENSION: ICD-10-CM

## 2020-12-17 PROCEDURE — 99214 OFFICE O/P EST MOD 30 MIN: CPT | Performed by: INTERNAL MEDICINE

## 2020-12-17 PROCEDURE — 80061 LIPID PANEL: CPT | Performed by: INTERNAL MEDICINE

## 2020-12-17 PROCEDURE — 80053 COMPREHEN METABOLIC PANEL: CPT | Performed by: INTERNAL MEDICINE

## 2020-12-17 PROCEDURE — 84443 ASSAY THYROID STIM HORMONE: CPT | Performed by: INTERNAL MEDICINE

## 2020-12-17 PROCEDURE — 82306 VITAMIN D 25 HYDROXY: CPT | Performed by: INTERNAL MEDICINE

## 2020-12-17 PROCEDURE — G0008 ADMIN INFLUENZA VIRUS VAC: HCPCS | Performed by: INTERNAL MEDICINE

## 2020-12-17 PROCEDURE — 90694 VACC AIIV4 NO PRSRV 0.5ML IM: CPT | Performed by: INTERNAL MEDICINE

## 2020-12-17 NOTE — PROGRESS NOTES
Brittny Fidencio 75 y.o.  CC:Follow-up, Hypothyroidism, Hyperlipidemia, Hypertension, Osteoporosis, and Heartburn      [place]: Follow-up, Hypothyroidism, Hyperlipidemia, Hypertension, Osteoporosis, and Heartburn    bp is slightly higher  Energy is good on synthroid 112 mcg daily   Is on low fat diet and lipitor 20 mg daily   Is on aricept  dexilant working well for heartburn  Is going to therapy with OT - for memory   Is frustrated - would like more to do   Left hand swelling and pain - comes and goes   Also some left shoulder pain   Left shoulder and left chest pain - was seen in ER   Recheck bp 134/68    Allergies   Allergen Reactions   • Boniva [Ibandronic Acid] GI Intolerance     Refractory gerd and abdominal pain        Current Outpatient Medications:   •  amLODIPine (NORVASC) 5 MG tablet, TAKE 1 TABLET BY MOUTH EVERY DAY, Disp: 90 tablet, Rfl: 3  •  aspirin 81 MG tablet, Take  by mouth., Disp: , Rfl:   •  atorvastatin (LIPITOR) 20 MG tablet, TAKE 1 TABLET BY MOUTH EVERYDAY AT BEDTIME, Disp: 90 tablet, Rfl: 1  •  Calcium-Magnesium (CALCIUM MAGNESIUM 750) 300-300 MG tablet, Take 2 tablets by mouth Daily., Disp: , Rfl:   •  cetirizine (zyrTEC) 10 MG tablet, Take 10 mg by mouth Daily., Disp: , Rfl:   •  Cholecalciferol (VITAMIN D) 2000 UNITS capsule, Take 1 capsule by mouth., Disp: , Rfl:   •  DEXILANT 60 MG capsule, TAKE 1 CAPSULE BY MOUTH EVERY DAY, Disp: 90 capsule, Rfl: 1  •  donepezil (ARICEPT) 10 MG tablet, Take 1 tablet by mouth Daily., Disp: 30 tablet, Rfl: 11  •  levothyroxine (SYNTHROID, LEVOTHROID) 112 MCG tablet, TAKE 1 TABLET BY MOUTH EVERY DAY, Disp: 90 tablet, Rfl: 3  •  memantine (NAMENDA) 10 MG tablet, Take 1 tablet by mouth 2 (Two) Times a Day., Disp: 60 tablet, Rfl: 11  •  vitamin B-12 (CYANOCOBALAMIN) 500 MCG tablet, Take 500 mcg by mouth Daily., Disp: , Rfl:   Patient Active Problem List    Diagnosis   • Hyponatremia [E87.1]   • Late onset Alzheimer's disease without behavioral disturbance  (CMS/[facility]) [G30.1, F02.80]   • Rib pain on left side [R07.81]   • Memory loss [R41.3]   • Pharyngitis [J02.9]   • Left lower quadrant pain [R10.32]   • Acute pain of left shoulder [M25.512]   • Erosive esophagitis [K22.10]   • H/O colonoscopy [Z98.890]   • Fatigue [R53.83]   • Hypothyroidism [E03.9]   • Achalasia [K22.0]   • Acute upper respiratory infection [J06.9]   • Atypical chest pain [R07.89]   • Benign essential hypertension [I10]   • Carotid artery stenosis [I65.29]   • Cataract [H26.9]   • Cough [R05]   • Acute cystitis [N30.00]   • Screening for malignant neoplasm [Z12.9]   • Gastroesophageal reflux disease [K21.9]   • Essential familial hypercholesterolemia [E78.01]   • Gastrointestinal bleeding [K92.2]   • Inguinal hernia [K40.90]   • Insomnia [G47.00]   • Leg cramps [R25.2]   • Meniere's disease [H81.09]   • Night sweats [R61]   • Osteoporosis [M81.0]   • Peripheral neuropathy [G62.9]   • Retinal artery occlusion [H34.9]   • Rheumatoid arthritis (CMS/[facility]) [M06.9]   • Stroke syndrome [[id number]]   • Suprapubic pain [R10.2]   • Transient ischemic attack [G45.9]   • Positive reaction to tuberculin skin test [R76.11]   • Vitamin D deficiency [E55.9]   • Weight loss [R63.4]     Review of Systems   Constitutional: Positive for activity change, appetite change and unexpected weight change. Negative for chills, diaphoresis, fatigue and fever.   HENT: Negative for congestion, dental problem, drooling, ear discharge, ear pain, facial swelling, hearing loss, mouth sores, nosebleeds, postnasal drip, rhinorrhea, sinus pressure, sneezing, sore throat, tinnitus, trouble swallowing and voice change.    Eyes: Negative for photophobia, pain, discharge, redness, itching and visual disturbance.   Respiratory: Negative for apnea, cough, choking, chest tightness, shortness of breath, wheezing and stridor.    Cardiovascular: Positive for chest pain. Negative for palpitations and leg swelling.   Gastrointestinal: Negative for  "abdominal distention, abdominal pain, anal bleeding, blood in stool, constipation, diarrhea, nausea, rectal pain and vomiting.   Endocrine: Negative for cold intolerance, heat intolerance, polydipsia, polyphagia and polyuria.   Genitourinary: Negative for decreased urine volume, difficulty urinating, dysuria, enuresis, flank pain, frequency, genital sores, hematuria and urgency.   Musculoskeletal: Positive for arthralgias and gait problem. Negative for back pain, joint swelling, myalgias, neck pain and neck stiffness.   Skin: Negative for color change, pallor, rash and wound.   Allergic/Immunologic: Negative for environmental allergies, food allergies and immunocompromised state.   Neurological: Negative for dizziness, tremors, seizures, syncope, facial asymmetry, speech difficulty, weakness, light-headedness, numbness and headaches.   Hematological: Negative for adenopathy. Does not bruise/bleed easily.   Psychiatric/Behavioral: Positive for confusion and decreased concentration. Negative for agitation, behavioral problems, dysphoric mood, hallucinations, self-injury, sleep disturbance and suicidal ideas. The patient is not nervous/anxious and is not hyperactive.      Social History     Socioeconomic History   • Marital status:      Spouse name: Not on file   • Number of children: Not on file   • Years of education: Not on file   • Highest education level: Not on file   Tobacco Use   • Smoking status: Former Smoker   • Smokeless tobacco: Never Used   Substance and Sexual Activity   • Alcohol use: Yes     Comment: rarely   • Drug use: No   • Sexual activity: Defer     Family History   Problem Relation Age of Onset   • Cystic fibrosis Other    • Cervical cancer Mother    • Cancer Paternal Grandmother      /70   Pulse 68   Ht 160 cm (63\")   Wt 56 kg (123 lb 6.4 oz)   SpO2 96%   BMI 21.86 kg/m²   Physical Exam  Vitals signs and nursing note reviewed.   Constitutional:       Appearance: Normal " appearance. She is well-developed.   HENT:      Head: Normocephalic and atraumatic.   Eyes:      General: Lids are normal.      Extraocular Movements: Extraocular movements intact.      Conjunctiva/sclera: Conjunctivae normal.      Pupils: Pupils are equal, round, and reactive to light.   Neck:      Musculoskeletal: Normal range of motion and neck supple.      Thyroid: No thyroid mass or thyromegaly.      Vascular: No carotid bruit.      Trachea: Trachea normal. No tracheal deviation.   Cardiovascular:      Rate and Rhythm: Normal rate and regular rhythm.      Heart sounds: Normal heart sounds. No murmur. No friction rub. No gallop.    Pulmonary:      Effort: Pulmonary effort is normal. No respiratory distress.      Breath sounds: Normal breath sounds. No wheezing.   Musculoskeletal: Normal range of motion.         General: No deformity.   Lymphadenopathy:      Cervical: No cervical adenopathy.   Skin:     General: Skin is warm and dry.      Findings: No erythema or rash.      Nails: There is no clubbing.     Neurological:      General: No focal deficit present.      Mental Status: She is alert. Mental status is at baseline.      Cranial Nerves: No cranial nerve deficit.      Deep Tendon Reflexes: Reflexes are normal and symmetric. Reflexes normal.   Psychiatric:         Speech: Speech normal.         Behavior: Behavior normal.         Thought Content: Thought content normal.         Judgment: Judgment normal.       Results for orders placed or performed during the hospital encounter of 08/23/20   Comprehensive Metabolic Panel    Specimen: Blood   Result Value Ref Range    Glucose 108 (H) 65 - 99 mg/dL    BUN 14 8 - 23 mg/dL    Creatinine 0.78 0.57 - 1.00 mg/dL    Sodium 124 (L) 136 - 145 mmol/L    Potassium 4.2 3.5 - 5.2 mmol/L    Chloride 94 (L) 98 - 107 mmol/L    CO2 17.0 (L) 22.0 - 29.0 mmol/L    Calcium 9.1 8.6 - 10.5 mg/dL    Total Protein 7.4 6.0 - 8.5 g/dL    Albumin 3.70 3.50 - 5.20 g/dL    ALT (SGPT) 9 1 -  33 U/L    AST (SGOT) 21 1 - 32 U/L    Alkaline Phosphatase 113 39 - 117 U/L    Total Bilirubin 1.0 0.0 - 1.2 mg/dL    eGFR Non African Amer 72 >60 mL/min/1.73    Globulin 3.7 gm/dL    A/G Ratio 1.0 g/dL    BUN/Creatinine Ratio 17.9 7.0 - 25.0    Anion Gap 13.0 5.0 - 15.0 mmol/L   Urinalysis With Microscopic If Indicated (No Culture) - Urine, Clean Catch    Specimen: Urine, Clean Catch   Result Value Ref Range    Color, UA Yellow Yellow, Straw    Appearance, UA Clear Clear    pH, UA 6.5 5.0 - 8.0    Specific Gravity, UA <=1.005 1.001 - 1.030    Glucose, UA Negative Negative    Ketones, UA Negative Negative    Bilirubin, UA Negative Negative    Blood, UA Negative Negative    Protein, UA Negative Negative    Leuk Esterase, UA Negative Negative    Nitrite, UA Negative Negative    Urobilinogen, UA 0.2 E.U./dL 0.2 - 1.0 E.U./dL   Troponin    Specimen: Blood   Result Value Ref Range    Troponin T <0.010 0.000 - 0.030 ng/mL   BNP    Specimen: Blood   Result Value Ref Range    proBNP 284.2 0.0-1,800.0 pg/mL   CK    Specimen: Blood   Result Value Ref Range    Creatine Kinase 94 20 - 180 U/L   CBC Auto Differential    Specimen: Blood   Result Value Ref Range    WBC 14.70 (H) 3.40 - 10.80 10*3/mm3    RBC 4.17 3.77 - 5.28 10*6/mm3    Hemoglobin 11.3 (L) 12.0 - 15.9 g/dL    Hematocrit 36.7 34.0 - 46.6 %    MCV 88.0 79.0 - 97.0 fL    MCH 27.1 26.6 - 33.0 pg    MCHC 30.8 (L) 31.5 - 35.7 g/dL    RDW 13.5 12.3 - 15.4 %    RDW-SD 43.4 37.0 - 54.0 fl    MPV 9.8 6.0 - 12.0 fL    Platelets 222 140 - 450 10*3/mm3    Neutrophil % 80.9 (H) 42.7 - 76.0 %    Lymphocyte % 9.1 (L) 19.6 - 45.3 %    Monocyte % 9.5 5.0 - 12.0 %    Eosinophil % 0.0 (L) 0.3 - 6.2 %    Basophil % 0.1 0.0 - 1.5 %    Immature Grans % 0.4 0.0 - 0.5 %    Neutrophils, Absolute 11.89 (H) 1.70 - 7.00 10*3/mm3    Lymphocytes, Absolute 1.34 0.70 - 3.10 10*3/mm3    Monocytes, Absolute 1.39 (H) 0.10 - 0.90 10*3/mm3    Eosinophils, Absolute 0.00 0.00 - 0.40 10*3/mm3     Basophils, Absolute 0.02 0.00 - 0.20 10*3/mm3    Immature Grans, Absolute 0.06 (H) 0.00 - 0.05 10*3/mm3    nRBC 0.0 0.0 - 0.2 /100 WBC   Troponin    Specimen: Blood   Result Value Ref Range    Troponin T <0.010 0.000 - 0.030 ng/mL   Osmolality, Serum    Specimen: Blood   Result Value Ref Range    Osmolality 271 (L) 275 - 295 mOsm/kg   Sodium, Urine, Random - Urine, Clean Catch    Specimen: Urine, Clean Catch   Result Value Ref Range    Sodium, Urine 26 mmol/L   Osmolality, Urine - Urine, Clean Catch    Specimen: Urine, Clean Catch   Result Value Ref Range    Osmolality, Urine 197 (L) 300-1,100 mOsm/kg   Basic Metabolic Panel    Specimen: Blood   Result Value Ref Range    Glucose 103 (H) 65 - 99 mg/dL    BUN 10 8 - 23 mg/dL    Creatinine 0.71 0.57 - 1.00 mg/dL    Sodium 134 (L) 136 - 145 mmol/L    Potassium 3.7 3.5 - 5.2 mmol/L    Chloride 102 98 - 107 mmol/L    CO2 24.0 22.0 - 29.0 mmol/L    Calcium 8.6 8.6 - 10.5 mg/dL    eGFR Non African Amer 80 >60 mL/min/1.73    BUN/Creatinine Ratio 14.1 7.0 - 25.0    Anion Gap 8.0 5.0 - 15.0 mmol/L   Basic Metabolic Panel    Specimen: Blood   Result Value Ref Range    Glucose 101 (H) 65 - 99 mg/dL    BUN 11 8 - 23 mg/dL    Creatinine 0.73 0.57 - 1.00 mg/dL    Sodium 133 (L) 136 - 145 mmol/L    Potassium 3.6 3.5 - 5.2 mmol/L    Chloride 100 98 - 107 mmol/L    CO2 23.0 22.0 - 29.0 mmol/L    Calcium 8.6 8.6 - 10.5 mg/dL    eGFR Non African Amer 77 >60 mL/min/1.73    BUN/Creatinine Ratio 15.1 7.0 - 25.0    Anion Gap 10.0 5.0 - 15.0 mmol/L   TSH    Specimen: Blood   Result Value Ref Range    TSH 2.160 0.270 - 4.200 uIU/mL   Cortisol    Specimen: Blood   Result Value Ref Range    Cortisol 20.54   mcg/dL   Magnesium    Specimen: Blood   Result Value Ref Range    Magnesium 2.1 1.6 - 2.4 mg/dL   Basic Metabolic Panel    Specimen: Blood   Result Value Ref Range    Glucose 133 (H) 65 - 99 mg/dL    BUN 8 8 - 23 mg/dL    Creatinine 0.66 0.57 - 1.00 mg/dL    Sodium 131 (L) 136 - 145 mmol/L     Potassium 3.6 3.5 - 5.2 mmol/L    Chloride 101 98 - 107 mmol/L    CO2 21.0 (L) 22.0 - 29.0 mmol/L    Calcium 8.7 8.6 - 10.5 mg/dL    eGFR Non African Amer 87 >60 mL/min/1.73    BUN/Creatinine Ratio 12.1 7.0 - 25.0    Anion Gap 9.0 5.0 - 15.0 mmol/L   Light Blue Top   Result Value Ref Range    Extra Tube hold for add-on    Green Top (Gel)   Result Value Ref Range    Extra Tube Hold for add-ons.    Lavender Top   Result Value Ref Range    Extra Tube hold for add-on    Gold Top - SST   Result Value Ref Range    Extra Tube Hold for add-ons.      Problems Addressed this Visit        Cardiovascular and Mediastinum    Essential familial hypercholesterolemia - Primary     Check flp   Continue atorvastatin 20 mg daily          Relevant Orders    Comprehensive Metabolic Panel    Lipid Panel    TSH    Benign essential hypertension     bp is high/ normal   Labile with occ low level at home   Continue current medication and monitoring             Digestive    Vitamin D deficiency     Is on vitamin D supplement   Check levels          Relevant Orders    Vitamin D 25 Hydroxy       Nervous and Auditory    Acute pain of left shoulder     And left chest pain - seen in ER without cardiac cause   Felt to be due to arthritis - discussed nsaid or tylenol use with food 2-3 days a week for pain            Diagnoses       Codes Comments    Essential familial hypercholesterolemia    -  Primary ICD-10-CM: E78.01  ICD-9-CM: 272.0     Vitamin D deficiency     ICD-10-CM: E55.9  ICD-9-CM: 268.9     Benign essential hypertension     ICD-10-CM: I10  ICD-9-CM: 401.1     Acute pain of left shoulder     ICD-10-CM: M25.512  ICD-9-CM: 719.41         Return in about 6 months (around 6/17/2021) for Recheck.    Casandra Sommer MA  Signed Dalila Liz MD

## 2020-12-18 LAB
25(OH)D3 SERPL-MCNC: 48.2 NG/ML (ref 30–100)
ALBUMIN SERPL-MCNC: 4.3 G/DL (ref 3.5–5.2)
ALBUMIN/GLOB SERPL: 1.2 G/DL
ALP SERPL-CCNC: 117 U/L (ref 39–117)
ALT SERPL W P-5'-P-CCNC: 12 U/L (ref 1–33)
ANION GAP SERPL CALCULATED.3IONS-SCNC: 8.7 MMOL/L (ref 5–15)
AST SERPL-CCNC: 17 U/L (ref 1–32)
BILIRUB SERPL-MCNC: 0.5 MG/DL (ref 0–1.2)
BUN SERPL-MCNC: 10 MG/DL (ref 8–23)
BUN/CREAT SERPL: 12.2 (ref 7–25)
CALCIUM SPEC-SCNC: 9.7 MG/DL (ref 8.6–10.5)
CHLORIDE SERPL-SCNC: 98 MMOL/L (ref 98–107)
CHOLEST SERPL-MCNC: 131 MG/DL (ref 0–200)
CO2 SERPL-SCNC: 27.3 MMOL/L (ref 22–29)
CREAT SERPL-MCNC: 0.82 MG/DL (ref 0.57–1)
GFR SERPL CREATININE-BSD FRML MDRD: 68 ML/MIN/1.73
GLOBULIN UR ELPH-MCNC: 3.7 GM/DL
GLUCOSE SERPL-MCNC: 100 MG/DL (ref 65–99)
HDLC SERPL-MCNC: 86 MG/DL (ref 40–60)
LDLC SERPL CALC-MCNC: 29 MG/DL (ref 0–100)
LDLC/HDLC SERPL: 0.32 {RATIO}
POTASSIUM SERPL-SCNC: 4.2 MMOL/L (ref 3.5–5.2)
PROT SERPL-MCNC: 8 G/DL (ref 6–8.5)
SODIUM SERPL-SCNC: 134 MMOL/L (ref 136–145)
TRIGL SERPL-MCNC: 88 MG/DL (ref 0–150)
TSH SERPL DL<=0.05 MIU/L-ACNC: 4.91 UIU/ML (ref 0.27–4.2)
VLDLC SERPL-MCNC: 16 MG/DL (ref 5–40)

## 2020-12-18 NOTE — ASSESSMENT & PLAN NOTE
And left chest pain - seen in ER without cardiac cause   Felt to be due to arthritis - discussed nsaid or tylenol use with food 2-3 days a week for pain

## 2020-12-18 NOTE — ASSESSMENT & PLAN NOTE
bp is high/ normal   Labile with occ low level at home   Continue current medication and monitoring

## 2021-01-14 RX ORDER — MEMANTINE HYDROCHLORIDE 10 MG/1
TABLET ORAL
Qty: 180 TABLET | Refills: 0 | Status: SHIPPED | OUTPATIENT
Start: 2021-01-14 | End: 2021-01-27

## 2021-01-15 ENCOUNTER — HOSPITAL ENCOUNTER (OUTPATIENT)
Dept: SPEECH THERAPY | Facility: HOSPITAL | Age: 76
Setting detail: THERAPIES SERIES
Discharge: HOME | End: 2021-01-15

## 2021-01-15 DIAGNOSIS — F02.80 LATE ONSET ALZHEIMER'S DISEASE WITHOUT BEHAVIORAL DISTURBANCE (HCC): Primary | ICD-10-CM

## 2021-01-15 DIAGNOSIS — G30.1 LATE ONSET ALZHEIMER'S DISEASE WITHOUT BEHAVIORAL DISTURBANCE (HCC): Primary | ICD-10-CM

## 2021-01-15 PROCEDURE — 92507 TX SP LANG VOICE COMM INDIV: CPT

## 2021-01-15 NOTE — THERAPY PROGRESS REPORT/RE-CERT
Outpatient Speech Language Pathology   Adult Speech Language Cognitive Progress Note  [facility]     Patient Name: Brittny Nicolas  : 1945  MRN: [id number]  Today's Date: 1/15/2021         Visit Date: 01/15/2021   Patient Active Problem List   Diagnosis   • Achalasia   • Acute upper respiratory infection   • Atypical chest pain   • Benign essential hypertension   • Carotid artery stenosis   • Cataract   • Cough   • Acute cystitis   • Screening for malignant neoplasm   • Gastroesophageal reflux disease   • Essential familial hypercholesterolemia   • Gastrointestinal bleeding   • Inguinal hernia   • Insomnia   • Leg cramps   • Meniere's disease   • Night sweats   • Osteoporosis   • Peripheral neuropathy   • Retinal artery occlusion   • Rheumatoid arthritis (CMS/HCC)   • Stroke syndrome   • Suprapubic pain   • Transient ischemic attack   • Positive reaction to tuberculin skin test   • Vitamin D deficiency   • Weight loss   • H/O colonoscopy   • Fatigue   • Hypothyroidism   • Erosive esophagitis   • Left lower quadrant pain   • Acute pain of left shoulder   • Pharyngitis   • Memory loss   • Rib pain on left side   • Hyponatremia   • Late onset Alzheimer's disease without behavioral disturbance (CMS/[facility])          Visit Dx:    ICD-10-CM ICD-9-CM   1. Late onset Alzheimer's disease without behavioral disturbance (CMS/HCC)  G30.1 331.0    F02.80 294.10                         SLP OP Goals     Row Name 01/15/21 1100          Goal Type Needed    Goal Type Needed  Memory;Attention/Orientation;Auditory Comprehension;Other Adult Goals  -HG        Subjective Comments    Subjective Comments  Pt alert, cooperative, accompanied with daughter.   -HG        Auditory Comprehension Goals    Patient will comprehend spoken information in all settings  90%:;with cues  -HG     Status: Patient will comprehend spoken information in all settings  Progressing as expected  -HG     Comments: Patient will comprehend spoken information in  all settings  10/29/20: Pt's daughter states that she isn't sure her mom can read or understands the labels in her home for kitchen items.   -HG     Patient will demonstrate comprehension of spoken language by answering questions about a short paragraph  80%:;with cues  -HG     Status: Patient will demonstrate comprehension of spoken language by answering questions about a short paragraph  Progressing as expected  -HG     Comments: Patient will demonstrate comprehension of spoken language by answering questions about a short paragraph  11/24/20: 12-22 word paragraphs: 90% accurate.  22-36 word paragraphs: pt was 70% accurate.   -HG        Memory Goals    Patient and family will implement compensatory strategies to maximize patient’s Memory function so patient can continue to participate in daily activities  90%:;with cues  -HG     Status: Patient and family will implement compensatory strategies to maximize patient’s Memory function so patient can continue to participate in daily activities  New  -HG     Patient will demonstrate improved ability to recall information by immediately recalling a series of words  80%:;related;after delay;with cues  -HG     Status: Patient will demonstrate improved ability to recall information by immediately recalling a series of words  Progressing as expected  -HG     Comments: Patient will demonstrate improved ability to recall information by immediately recalling a series of words  1/15/21: SLUMS: Immediate recall of paragraph and pt was 1/8. 11/24/20: Mental Manipulation for progression and pt was 90% accurate. 11/17/20: Mental Manipulation by attribute, pt was 90% accurate and by word placement, pt was 90% accurate. 11/3/20: Immediate recall on FROMAJE: pt was 2/3.  -HG     Patient’s memory skills will be enhanced as reported by patient by utilizing internal memory strategies to recall up to 3 pieces of information after a 5- minute delay  80%:;with cues  -HG     Status: Patient’s  memory skills will be enhanced as reported by patient by utilizing internal memory strategies to recall up to 3 pieces of information after a 5- minute delay  Progressing as expected  -HG     Comments: Patient’s memory skills will be enhanced as reported by patient by utilizing internal memory strategies to recall up to 3 pieces of information after a 5- minute delay  1/15/21: SLUMS Delayed recall of 5 un-related words and pt was 0/5 I'ly and with cues, pt was 4/5.  12/8/20: Delayed recall of 2 new related pictures, pt was 1/2 to 2/2. 11/24/20: Delayed recall of 6 cont'd pictures from previous session: pt was 3/6 I'ly. 5/6 with min cues, 6/6 by the end of session. 11/17/20: Delayed recall of 6 related words: pt was 4/6, 5/6 and 6/6 all with written cue. 11/10/20: Delayed recall of 5 related words and pt was 3/5 with first initial presented. After review and use of first initial, pt was 5/5.   11/3/20: Delayed recall of 4 related (familiar to pt) pt was 3/4 x 2, with an increased written cue, pt was 4/4. 10/29/20: Delayed recall of 3 related pictures: pt was 2/3x 2, 3/3 x 3.    -HG     Patient’s memory skills will be enhanced as reported by patient by using external memory aides  80%:;with cues  -HG     Status: Patient’s memory skills will be enhanced as reported by patient by using external memory aides  Progressing as expected  -HG     Comments: Patient’s memory skills will be enhanced as reported by patient by using external memory aides  1/15/21: Re-educated pt and daughter on importance of journaling. 12/8/20: Pt has not been writing in her journal. Started entry for today.   11/24/20: Pt had only journaled one time since last session- ideas for cues in order to prompt pt to write in her journal. 11/17/20: Pt continues to journal. 11/10/20: Reinforced journaling and purpose and need for reference. 11/3/20: Reinforced use of daily journaling. 10/29/20: Pt had not written in her journal yet- provided visual cues.    -HG     Patient will demonstrate improved ability to recall information by stating activities patient has completed that day  80%:;with cues  -HG     Status: Patient will demonstrate improved ability to recall information by stating activities patient has completed that day  Progressing as expected  -HG     Comments: Patient will demonstrate improved ability to recall information by stating activities patient has completed that day  1/15/21: Pt does not recall having a nightmare per her daughter's report and with a cue, could recall what she had for breakfast: 11/3/20: [place] did write but not much. With cues, was able to recall events from weekend.   -HG        Attention/Orientation Goals    Patient will be able to interact safely in home environment  With Supervision  -HG     Status: Patient will be able to interact safely in home environment  Progressing as expected  -HG     Comments: Patient will be able to interact safely in home environment  1/15/21: SLUMS Digit Span and pt was 2/2.   -HG     Patient will improve orientation skills by orienting to self, place, time, and situation  80%:;with cues  -HG     Status: Patient will improve orientation skills by orienting to self, place, time, and situation  Progressing as expected  -HG     Comments: Patient will improve orientation skills by orienting to self, place, time, and situation  11/24/20: Pt aware of date and day of week. Encouraged use of journal in order to keep up with orientation. 11/17/20: Pt used the calendar and pt was accurate with day of week and date.  11/10/20: Using calendar in office, pt was able to ID current date.  10/29/20: Provided calendar to assist with orientation.   -HG     Patient will improve attention skills by sustaining consistent behavioral response during continuous and repetitive activity (e.g., listening for target words, auditory/reading comprehension tasks)  with cues;5 minute task  -HG      Status: Patient will improve attention skills by sustaining consistent behavioral response during continuous and repetitive activity (e.g., listening for target words, auditory/reading comprehension tasks)  Progressing as expected  -HG     Comments: Patient will improve attention skills by sustaining consistent behavioral response during continuous and repetitive activity (e.g., listening for target words, auditory/reading comprehension tasks)  1/15/21: One Pile Card Game: pt was 80% accurate. 11/3/20: One Pile Card Game:  Pt was 85% accurate.  10/29/20: One Pile Card Game: pt was 80% accurate with min to mod cues.   -HG     Patient will improve attention skills by focusing on environmental stimuli within left visuospatial field  80%:;with cues  -HG     Status: Patient will improve attention skills by focusing on environmental stimuli within left visuospatial field  Progressing as expected  -HG     Comments: Patient will improve attention skills by focusing on environmental stimuli within left visuospatial field  11/3/20: Shapes and figures: 3 in a row and pt was 80% accurate with mod verbal and visual cues.   -HG        Other Goals    Other Adult Goal- 1  Pt will complete thought organization tasks with 80% accuracy.  -HG     Status: Other Adult Goal- 1  Progressing as expected  -HG     Comments: Other Adult Goal- 1  1/15/21: SLUMS naming: pt was 8/15. 12/8/20: Category matrix: pt was 90% accurate.  Crossword puzzle and pt was 85% accurate. 11/24/20: Divergent naming: pt was 75-90% accurate . 11/17/20: Category Matrix: pt was 80% accurate I'ly. 11/10/20: Fill in word given first initial: pt was 100% accurate. 10/29/20: divergent naming: pt was 70% accurate. 90  -HG     Other Adult Goal- 2  Pt will complete reasoning and problem solving assessment with goals to follow as indicated.  -HG     Status: Other Adult Goal- 2  Progressing as expected  -HG     Comments: Other Adult Goal- 2  12/8/20: Six step sequencing:  pt was 90% accurate. 11/10/20: Written Directions: pt was 90% accurate. 11/3/20: FROMAJE: Pt scored a 15 placing pt in the severe dementia or depression range.   -     Other Adult Goal- 3  Pt will improve RBANS Total score to at least a 70 placing pt in the [place] range.   -        SLP Time Calculation    SLP Goal Re-Cert Due Date  02/14/21  -       User Key  (r) = Recorded By, (t) = Taken By, (c) = Cosigned By    Initials Name Provider Type    Lisa Marquis MS MUNDO CCC-SLP Speech and Language Pathologist          OP SLP Education     Row Name 01/15/21 1100       Education    Education Comments  Hmwk and education for journaling, thought organization and immediate recall.   -      User Key  (r) = Recorded By, (t) = Taken By, (c) = Cosigned By    Initials Name Effective Dates    Lisa Marquis MS ELISA FLOWER-SLP 08/09/20 -           OP SLP Assessment/Plan - 01/15/21 1100        SLP Assessment    Functional Problems  Speech Language- Adult/Cognition   -    Impact on Function: Adult Speech Language/Cognition  Difficulty communicating in a medical emergency;Restrictions in personal and social life;Difficulty sequencing thoughts to express complex messages;Unable to understand written/spoken language;Difficulty following directions;Difficulty sequencing or problem solving to complete ADLs;Unrealistic view of abilities/deficits;Lack of insight or awareness of deficits, safety issues;Difficulty participating in avocational activities;Decreased recall of personal information and medical history;Trouble learning or remembering new information;Poor attention to task;Poor judgment;Requires supervision   -    Clinical Impression: Speech Language-Adult/Congnition  Severe:;Cognitive Communication Impairment   -    Functional Problems Comment  Pt hoards or loses items in her home and family struggles to reason or find the lost items.   -    Clinical Impression Comments  SLUMS Total score of 9/20 places pt in  the Dementia range.   -HG    Please refer to paper survey for additional self-reported information  Yes   -HG    Please refer to items scanned into chart for additional diagnostic informaiton and handouts as provided by clinician  Yes   -HG    SLP Diagnosis  Severe cognitive/communication impairment   -HG    Prognosis  Excellent (comment)   -HG    Patient/caregiver participated in establishment of treatment plan and goals  Yes   -HG    Patient would benefit from skilled therapy intervention  Yes   -HG       SLP Plan    Frequency  1-2x/week   -HG    Duration  12 weeks   -HG    Planned CPT's?  SLP INDIVIDUAL SPEECH THERAPY: 94647   -    Expected Duration of Therapy Session (SLP Eval)  60   -HG    Plan Comments  Cont with Cog tx.   -HG      User Key  (r) = Recorded By, (t) = Taken By, (c) = Cosigned By    Initials Name Provider Type     Lisa Graves MS CCC-SLP Speech and Language Pathologist                 Time Calculation:   SLP Start Time: 1100    Therapy Charges for Today     Code Description Service Date Service Provider Modifiers Qty    [id number]  ST TREATMENT SPEECH 4 1/15/2021 Lisa Graves MS CCC-SLP GN 1                   Lisa Graves MS CCC-SLP  1/15/2021

## 2021-01-19 ENCOUNTER — APPOINTMENT (OUTPATIENT)
Dept: SPEECH THERAPY | Facility: HOSPITAL | Age: 76
End: 2021-01-19

## 2021-01-26 ENCOUNTER — HOSPITAL ENCOUNTER (OUTPATIENT)
Dept: SPEECH THERAPY | Facility: HOSPITAL | Age: 76
Setting detail: THERAPIES SERIES
Discharge: HOME | End: 2021-01-26

## 2021-01-26 DIAGNOSIS — F02.80 LATE ONSET ALZHEIMER'S DISEASE WITHOUT BEHAVIORAL DISTURBANCE (HCC): Primary | ICD-10-CM

## 2021-01-26 DIAGNOSIS — G30.1 LATE ONSET ALZHEIMER'S DISEASE WITHOUT BEHAVIORAL DISTURBANCE (HCC): Primary | ICD-10-CM

## 2021-01-26 PROCEDURE — 92507 TX SP LANG VOICE COMM INDIV: CPT

## 2021-01-26 NOTE — THERAPY TREATMENT NOTE
Outpatient Speech Language Pathology   Adult Speech Language Cognitive Treatment Note   [place]     Patient Name: Brittny Nicolas  : 1945  MRN: [id number]  Today's Date: 2021         Visit Date: 2021   Patient Active Problem List   Diagnosis   • Achalasia   • Acute upper respiratory infection   • Atypical chest pain   • Benign essential hypertension   • Carotid artery stenosis   • Cataract   • Cough   • Acute cystitis   • Screening for malignant neoplasm   • Gastroesophageal reflux disease   • Essential familial hypercholesterolemia   • Gastrointestinal bleeding   • Inguinal hernia   • Insomnia   • Leg cramps   • Meniere's disease   • Night sweats   • Osteoporosis   • Peripheral neuropathy   • Retinal artery occlusion   • Rheumatoid arthritis (CMS/HCC)   • Stroke syndrome   • Suprapubic pain   • Transient ischemic attack   • Positive reaction to tuberculin skin test   • Vitamin D deficiency   • Weight loss   • H/O colonoscopy   • Fatigue   • Hypothyroidism   • Erosive esophagitis   • Left lower quadrant pain   • Acute pain of left shoulder   • Pharyngitis   • Memory loss   • Rib pain on left side   • Hyponatremia   • Late onset Alzheimer's disease without behavioral disturbance (CMS/[facility])          Visit Dx:    ICD-10-CM ICD-9-CM   1. Late onset Alzheimer's disease without behavioral disturbance (CMS/HCC)  G30.1 331.0    F02.80 294.10                         SLP OP Goals     Row Name 21 1400          Goal Type Needed    Goal Type Needed  Memory;Attention/Orientation;Auditory Comprehension;Other Adult Goals  -HG        Subjective Comments    Subjective Comments  Pt alert, cooperative, accompanied with daughter and returned with journal.   -HG        Auditory Comprehension Goals    Patient will comprehend spoken information in all settings  90%:;with cues  -HG     Status: Patient will comprehend spoken information in all settings  Progressing as expected  -HG     Comments: Patient will  comprehend spoken information in all settings  10/29/20: Pt's daughter states that she isn't sure her mom can read or understands the labels in her home for kitchen items.   -HG     Patient will demonstrate comprehension of spoken language by answering questions about a short paragraph  80%:;with cues  -HG     Status: Patient will demonstrate comprehension of spoken language by answering questions about a short paragraph  Progressing as expected  -HG     Comments: Patient will demonstrate comprehension of spoken language by answering questions about a short paragraph  11/24/20: 12-22 word paragraphs: 90% accurate.  22-36 word paragraphs: pt was 70% accurate.   -HG        Memory Goals    Patient and family will implement compensatory strategies to maximize patient’s Memory function so patient can continue to participate in daily activities  90%:;with cues  -HG     Status: Patient and family will implement compensatory strategies to maximize patient’s Memory function so patient can continue to participate in daily activities  New  -HG     Patient will demonstrate improved ability to recall information by immediately recalling a series of words  80%:;related;after delay;with cues  -HG     Status: Patient will demonstrate improved ability to recall information by immediately recalling a series of words  Progressing as expected  -HG     Comments: Patient will demonstrate improved ability to recall information by immediately recalling a series of words  1/26/21: Immediate recall of 3 words in a row and pt was 90% accurate. 1/15/21: SLUMS: Immediate recall of paragraph and pt was 1/8. 11/24/20: Mental Manipulation for progression and pt was 90% accurate. 11/17/20: Mental Manipulation by attribute, pt was 90% accurate and by word placement, pt was 90% accurate. 11/3/20: Immediate recall on FROMAJE: pt was 2/3.  -HG     Patient’s memory skills will be enhanced as reported by patient by utilizing internal memory strategies to  recall up to 3 pieces of information after a 5- minute delay  80%:;with cues  -HG     Status: Patient’s memory skills will be enhanced as reported by patient by utilizing internal memory strategies to recall up to 3 pieces of information after a 5- minute delay  Progressing as expected  -HG     Comments: Patient’s memory skills will be enhanced as reported by patient by utilizing internal memory strategies to recall up to 3 pieces of information after a 5- minute delay  1/26/21: Delayed recall of 2 related pictures: pt was 1/15/21: SLUMS Delayed recall of 5 un-related words and pt was 0/5 I'ly and with cues, pt was 4/5.  12/8/20: Delayed recall of 2 new related pictures, pt was 1/2 to 2/2. 11/24/20: Delayed recall of 6 cont'd pictures from previous session: pt was 3/6 I'ly. 5/6 with min cues, 6/6 by the end of session. 11/17/20: Delayed recall of 6 related words: pt was 4/6, 5/6 and 6/6 all with written cue. 11/10/20: Delayed recall of 5 related words and pt was 3/5 with first initial presented. After review and use of first initial, pt was 5/5.   11/3/20: Delayed recall of 4 related (familiar to pt) pt was 3/4 x 2, with an increased written cue, pt was 4/4. 10/29/20: Delayed recall of 3 related pictures: pt was 2/3x 2, 3/3 x 3.    -HG     Patient’s memory skills will be enhanced as reported by patient by using external memory aides  80%:;with cues  -HG     Status: Patient’s memory skills will be enhanced as reported by patient by using external memory aides  Progressing as expected  -HG     Comments: Patient’s memory skills will be enhanced as reported by patient by using external memory aides  1/26/21: Pt brought in journal but had not written in it so SLP had pt start an entry for today. 1/15/21: Re-educated pt and daughter on importance of journaling. 12/8/20: Pt has not been writing in her journal. Started entry for today.   11/24/20: Pt had only journaled one time since last session- ideas for cues in order to  prompt pt to write in her journal. 11/17/20: Pt continues to journal. 11/10/20: Reinforced journaling and purpose and need for reference. 11/3/20: Reinforced use of daily journaling. 10/29/20: Pt had not written in her journal yet- provided visual cues.   -HG     Patient will demonstrate improved ability to recall information by stating activities patient has completed that day  80%:;with cues  -HG     Status: Patient will demonstrate improved ability to recall information by stating activities patient has completed that day  Progressing as expected  -HG     Comments: Patient will demonstrate improved ability to recall information by stating activities patient has completed that day  1/26/21: Recall of meals this date with accurate recall of breakfast and no recall of lunch- required cue from daughter. 1/15/21: Pt does not recall having a nightmare per her daughter's report and with a cue, could recall what she had for breakfast: 11/3/20: [place] did write but not much. With cues, was able to recall events from weekend.   -HG        Attention/Orientation Goals    Patient will be able to interact safely in home environment  With Supervision  -HG     Status: Patient will be able to interact safely in home environment  Progressing as expected  -HG     Comments: Patient will be able to interact safely in home environment  1/15/21: SLUMS [employer]an and pt was 2/2.   -HG     Patient will improve orientation skills by orienting to self, place, time, and situation  80%:;with cues  -HG     Status: Patient will improve orientation skills by orienting to self, place, time, and situation  Progressing as expected  -HG     Comments: Patient will improve orientation skills by orienting to self, place, time, and situation  1/26/21: Pt has a calendar at home to use but was not oriented to date when starting her journal sheet. 11/24/20: Pt aware of date and day of week. Encouraged use of journal  in order to keep up with orientation. 11/17/20: Pt used the calendar and pt was accurate with day of week and date.  11/10/20: Using calendar in office, pt was able to ID current date.  10/29/20: Provided calendar to assist with orientation.   -HG     Patient will improve attention skills by sustaining consistent behavioral response during continuous and repetitive activity (e.g., listening for target words, auditory/reading comprehension tasks)  with cues;5 minute task  -HG     Status: Patient will improve attention skills by sustaining consistent behavioral response during continuous and repetitive activity (e.g., listening for target words, auditory/reading comprehension tasks)  Progressing as expected  -HG     Comments: Patient will improve attention skills by sustaining consistent behavioral response during continuous and repetitive activity (e.g., listening for target words, auditory/reading comprehension tasks)  1/26/21: Word placement by occurrence and pt was 90% accurate, reversal and pt was 50% accurate. 1/15/21: One Pile Card Game: pt was 80% accurate. 11/3/20: One Pile Card Game:  Pt was 85% accurate.  10/29/20: One Pile Card Game: pt was 80% accurate with min to mod cues.   -HG     Patient will improve attention skills by focusing on environmental stimuli within left visuospatial field  80%:;with cues  -HG     Status: Patient will improve attention skills by focusing on environmental stimuli within left visuospatial field  Progressing as expected  -HG     Comments: Patient will improve attention skills by focusing on environmental stimuli within left visuospatial field  1/26/21: Copying boxed information: pt was 75% accurate.   11/3/20: Shapes and figures: 3 in a row and pt was 80% accurate with mod verbal and visual cues.   -HG        Other Goals    Other Adult Goal- 1  Pt will complete thought organization tasks with 80% accuracy.  -HG     Status: Other Adult Goal- 1  Progressing as expected  -HG      Comments: Other Adult Goal- 1  1/26/21: 1/15/21: SLUMS naming: pt was 8/15. 12/8/20: Category matrix: pt was 90% accurate.  Crossword puzzle and pt was 85% accurate. 11/24/20: Divergent naming: pt was 75-90% accurate . 11/17/20: Category Matrix: pt was 80% accurate I'ly. 11/10/20: Fill in word given first initial: pt was 100% accurate. 10/29/20: divergent naming: pt was 70% accurate. 90  -HG     Other Adult Goal- 2  Pt will complete reasoning and problem solving assessment with goals to follow as indicated.  -HG     Status: Other Adult Goal- 2  Progressing as expected  -HG     Comments: Other Adult Goal- 2  1/26/21: Connecting the dots alphabetically and pt required consistent cues. 12/8/20: Six step sequencing: pt was 90% accurate. 11/10/20: Written Directions: pt was 90% accurate. 11/3/20: FROMAJE: Pt scored a 15 placing pt in the severe dementia or depression range.   -HG     Other Adult Goal- 3  Pt will improve RBANS Total score to at least a 70 placing pt in the [place] range.   -HG        SLP Time Calculation    SLP Goal Re-Cert Due Date  02/14/21  -HG       User Key  (r) = Recorded By, (t) = Taken By, (c) = Cosigned By    Initials Name Provider Type    SRIRAM Star Lisaher MUNDO MS CCC-SLP Speech and Language Pathologist          OP SLP Education     Row Name 01/26/21 1400       Education    Education Comments  Hmwk for 3 animals and instructed to journal daily for recall and orientation purposes.   -HG      User Key  (r) = Recorded By, (t) = Taken By, (c) = Cosigned By    Initials Name Effective Dates    Lisa Marquis MS CCC-SLP 08/09/20 -           OP SLP Assessment/Plan - 01/26/21 1400        SLP Plan    Plan Comments  Cont with Cog tx.    -HG      User Key  (r) = Recorded By, (t) = Taken By, (c) = Cosigned By    Initials Name Provider Type    SRIRAM WittisLisa MS CCC-SLP Speech and Language Pathologist                 Time Calculation:   SLP Start Time: 1400    Therapy Charges for Today     Code  Description Service Date Service Provider Modifiers Qty    [id number]  ST TREATMENT SPEECH 4 1/26/2021 Lisa Graves, MS CCC-SLP GN 1                   Lisa Graves, MS CCC-SLP  1/26/2021

## 2021-01-27 ENCOUNTER — OFFICE VISIT (OUTPATIENT)
Dept: NEUROLOGY | Facility: CLINIC | Age: 76
End: 2021-01-27

## 2021-01-27 VITALS
OXYGEN SATURATION: 97 % | WEIGHT: 120 LBS | TEMPERATURE: 97.5 F | HEIGHT: 63 IN | SYSTOLIC BLOOD PRESSURE: 130 MMHG | BODY MASS INDEX: 21.26 KG/M2 | HEART RATE: 70 BPM | DIASTOLIC BLOOD PRESSURE: 70 MMHG

## 2021-01-27 DIAGNOSIS — F02.80 LATE ONSET ALZHEIMER'S DISEASE WITHOUT BEHAVIORAL DISTURBANCE (HCC): Primary | ICD-10-CM

## 2021-01-27 DIAGNOSIS — G30.1 LATE ONSET ALZHEIMER'S DISEASE WITHOUT BEHAVIORAL DISTURBANCE (HCC): Primary | ICD-10-CM

## 2021-01-27 PROCEDURE — 99213 OFFICE O/P EST LOW 20 MIN: CPT | Performed by: NURSE PRACTITIONER

## 2021-01-27 RX ORDER — DONEPEZIL HYDROCHLORIDE 10 MG/1
10 TABLET, FILM COATED ORAL NIGHTLY
Qty: 90 TABLET | Refills: 3 | Status: SHIPPED | OUTPATIENT
Start: 2021-01-27 | End: 2021-07-21

## 2021-01-27 RX ORDER — ATORVASTATIN CALCIUM 20 MG/1
TABLET, FILM COATED ORAL
Qty: 90 TABLET | Refills: 1 | Status: SHIPPED | OUTPATIENT
Start: 2021-01-27 | End: 2021-08-26

## 2021-01-27 RX ORDER — MEMANTINE HYDROCHLORIDE 10 MG/1
10 TABLET ORAL 2 TIMES DAILY
Qty: 180 TABLET | Refills: 3 | Status: SHIPPED | OUTPATIENT
Start: 2021-01-27 | End: 2021-07-21

## 2021-01-27 NOTE — PROGRESS NOTES
Subjective:     Patient ID: Brittny Nicolas is a 75 y.o. female.    CC:   Chief Complaint   Patient presents with   • lateonset alzheimer's disease without behavioral disturbance       HPI:   History of Present Illness   Brittny Nicolas is a 75 y.o. female who returns to clinic today for 6 month follow up on Alzheimer's Disease. Her family has noted symptoms since at least early 2019 marked initially by forgetfulness. This has gradually worsened over time. Additional associated symptoms have included impairments in language  and executive function. There are no identified exacerbating or alleviating factors. Her family manages her medications and finances. She has moved in with her daughter in [place] as a result of her cognitive impairment. She is currently on donepezil 10mg QHS and last visit memantine 10mg BID was added. Some depression situational due to COVID-19 pandemic. Her daughter has seen some improvement with less repetitiveness. She is going to speech therapy for cognitive therapy. Previously followed by Dr. Betancourt in our memory clinic. Likes to walk w/ better weather.  Daughter feels she is doing overall about the same.  She does continue to kaycee some things at times and had 1 night terror over the past 6 months.  No agitation.  Does not drive.     Prior evaluation has included an unremarkable head CT and screening blood work, which were unremarkable. Hospitalized in August 2020 for hyponatremia at [facility].    The following portions of the patient's history were reviewed and updated as appropriate: allergies, current medications, past family history, past medical history, past social history, past surgical history and problem list.    Past Medical History:   Diagnosis Date   • Acid reflux disease    • Arthritis    • Carotid artery occlusion    • Depression    • Graves disease    • Hypertension    • Stomach problems    • Stroke (CMS/[facility])    • Thyroid disease        Past Surgical History:   Procedure  Laterality Date   • AUGMENTATION MAMMAPLASTY      1978   • BREAST AUGMENTATION     • CAROTID ARTERY ANGIOPLASTY     • COLONOSCOPY     • D&C CERVICAL BIOPSY      Dilation and Curettage of Cervical Stump   • HERNIA REPAIR     • HERNIA REPAIR     • THROMBOENDARTERECTOMY      carotid   • UPPER GASTROINTESTINAL ENDOSCOPY         Social History     Socioeconomic History   • Marital status:      Spouse name: Not on file   • Number of children: Not on file   • Years of education: Not on file   • Highest education level: Not on file   Tobacco Use   • Smoking status: Former Smoker   • Smokeless tobacco: Never Used   Substance and Sexual Activity   • Alcohol use: Yes     Comment: rarely   • Drug use: No   • Sexual activity: Not Currently       Family History   Problem Relation Age of Onset   • Cystic fibrosis Other    • Cervical cancer Mother    • Cancer Paternal Grandmother         Review of Systems   Constitutional: Negative for chills, fatigue, fever and unexpected weight change.   HENT: Negative for ear pain, hearing loss, nosebleeds, rhinorrhea and sore throat.    Eyes: Negative for photophobia, pain, discharge, itching and visual disturbance.   Respiratory: Negative for cough, chest tightness, shortness of breath and wheezing.    Cardiovascular: Negative for chest pain, palpitations and leg swelling.   Gastrointestinal: Negative for abdominal pain, blood in stool, constipation, diarrhea, nausea and vomiting.   Genitourinary: Negative for dysuria, frequency, hematuria and urgency.   Musculoskeletal: Negative for arthralgias, back pain, gait problem, joint swelling, myalgias, neck pain and neck stiffness.   Skin: Negative for rash and wound.   Allergic/Immunologic: Negative for environmental allergies and food allergies.   Neurological: Negative for dizziness, tremors, seizures, syncope, speech difficulty, weakness, light-headedness, numbness and headaches.   Hematological: Negative for adenopathy. Does not  "bruise/bleed easily.   Psychiatric/Behavioral: Negative for agitation, confusion, decreased concentration, hallucinations, sleep disturbance and suicidal ideas. The patient is not nervous/anxious.    All other systems reviewed and are negative.       Objective:  /70   Pulse 70   Temp 97.5 °F (36.4 °C)   Ht 160 cm (63\")   Wt 54.4 kg (120 lb)   SpO2 97%   BMI 21.26 kg/m²     Neurologic Exam     Mental Status   Oriented to person.   Oriented to place.   Disoriented to time.   Speech: speech is normal     Cranial Nerves   Cranial nerves II through XII intact.     Motor Exam   Muscle bulk: normal  Overall muscle tone: normal    Strength   Strength 5/5 throughout.     Gait, Coordination, and Reflexes     Gait  Gait: normal    Coordination   Finger to nose coordination: normal    Tremor   Resting tremor: absent  Intention tremor: absent  Action tremor: absent    Reflexes   Right brachioradialis: 2+  Left brachioradialis: 2+  Right biceps: 2+  Left biceps: 2+  Right : 2+  Left : 2+      Physical Exam  Neurological:      Coordination: Finger-Nose-Finger Test normal.      Gait: Gait is intact.      Deep Tendon Reflexes: Strength normal.      Reflex Scores:       Bicep reflexes are 2+ on the right side and 2+ on the left side.       Brachioradialis reflexes are 2+ on the right side and 2+ on the left side.  Psychiatric:         Mood and Affect: Mood is depressed.         Speech: Speech normal.         Behavior: Behavior is slowed.         Thought Content: Thought content normal.         Cognition and Memory: She exhibits impaired recent memory.         Judgment: Judgment normal.       MMSE 17/30 1/27/21, MMSE 20 6/2020    Assessment/Plan:       Diagnoses and all orders for this visit:    1. Late onset Alzheimer's disease without behavioral disturbance (CMS/HCC) (Primary)  -     donepezil (ARICEPT) 10 MG tablet; Take 1 tablet by mouth Every Night.  Dispense: 90 tablet; Refill: 3  -     memantine (NAMENDA) 10 " MG tablet; Take 1 tablet by mouth 2 (Two) Times a Day.  Dispense: 180 tablet; Refill: 3           She will continue current medications.  Recommend they continue to keep her cognitively challenged as well as social interaction via FaceTime or telephone calls frequently during COVID-19 pandemic.  Recommend COVID-19 vaccine when available.  No behavioral disturbance at this time.  We will follow-up in 6 months or sooner if needed.  Continue cognitive therapy with speech-language pathology.  Daughter would also like information on assisted living and cost and we will notify her  Edwige Estrada to reach out to her.  Reviewed medications, potential side effects and signs and symptoms to report. Discussed risk versus benefits of treatment plan with patient and/or family-including medications, labs and radiology that may be ordered. Addressed questions and concerns during visit. Patient and/or family verbalized understanding and agree with plan.    AS THE PROVIDER, I PERSONALLY WORE PPE DURING ENTIRE FACE TO FACE ENCOUNTER IN CLINIC WITH THE PATIENT. PATIENT ALSO WORE PPE DURING ENTIRE FACE TO FACE ENCOUNTER EXCEPT FOR A MAX OF 30 SECONDS DURING NEUROLOGICAL EVALUATION OF CRANIAL NERVES AND THEN MASK WAS PLACED BACK OVER PATIENT FACE FOR REMAINDER OF VISIT. I WASHED MY HANDS BEFORE AND AFTER VISIT.    During this visit the following were done:  Labs Reviewed [x]    Labs Ordered []    Radiology Reports Reviewed []    Radiology Ordered []    PCP Records Reviewed [x]    Referring Provider Records Reviewed []    ER Records Reviewed [x]    Hospital Records Reviewed []    History Obtained From Family [x]    Radiology Images Reviewed []    Other Reviewed []    Records Requested []      Munira Bloom, CARLOS  1/27/2021

## 2021-01-28 ENCOUNTER — TELEPHONE (OUTPATIENT)
Dept: NEUROLOGY | Facility: CLINIC | Age: 76
End: 2021-01-28

## 2021-01-28 DIAGNOSIS — R13.19 OTHER DYSPHAGIA: Primary | ICD-10-CM

## 2021-01-28 NOTE — TELEPHONE ENCOUNTER
I placed order for swallow evaluation. This should be scheduled outpatient at [facility]. Thanks for the updates. -CARLOS Jon.

## 2021-01-28 NOTE — TELEPHONE ENCOUNTER
Phone call with daughter, Brittny, to address any questions about senior living options which she discussed in clinic yesterday. Patient is living with daughter and this is going well for now. She requires very little oversight, just reminders as of now. We discussed the many signs for when it's time to move, she would like for me to email her a [facility] [facility] that has senior living options.  She is not ready to have Ms. Nicolas move yet largely due to covid but would like to know what's available when appropriate. She forgot to mention in clinic the change in swallowing she's observed with her mom. Said she's having a hard time swallowing pills, notices a large gulp and then uses hand on her throat to stroke it down. Said this can happen with other foods so she has to be mindful of what she eats. I will mention to Munira to set up a swallowing evaluation, outpatient is ok.

## 2021-02-02 ENCOUNTER — APPOINTMENT (OUTPATIENT)
Dept: SPEECH THERAPY | Facility: HOSPITAL | Age: 76
End: 2021-02-02

## 2021-02-09 ENCOUNTER — HOSPITAL ENCOUNTER (OUTPATIENT)
Dept: SPEECH THERAPY | Facility: HOSPITAL | Age: 76
Setting detail: THERAPIES SERIES
Discharge: HOME | End: 2021-02-09

## 2021-02-09 DIAGNOSIS — G30.1 LATE ONSET ALZHEIMER'S DISEASE WITHOUT BEHAVIORAL DISTURBANCE (HCC): Primary | ICD-10-CM

## 2021-02-09 DIAGNOSIS — F02.80 LATE ONSET ALZHEIMER'S DISEASE WITHOUT BEHAVIORAL DISTURBANCE (HCC): Primary | ICD-10-CM

## 2021-02-09 PROCEDURE — 92507 TX SP LANG VOICE COMM INDIV: CPT

## 2021-02-09 NOTE — THERAPY TREATMENT NOTE
Outpatient Speech Language Pathology   Adult Speech Language Cognitive Treatment Note   [facility]     Patient Name: Brittny Nicolas  : 1945  MRN: [id number]  Today's Date: 2021         Visit Date: 2021   Patient Active Problem List   Diagnosis   • Achalasia   • Acute upper respiratory infection   • Atypical chest pain   • Benign essential hypertension   • Carotid artery stenosis   • Cataract   • Cough   • Acute cystitis   • Screening for malignant neoplasm   • Gastroesophageal reflux disease   • Essential familial hypercholesterolemia   • Gastrointestinal bleeding   • Inguinal hernia   • Insomnia   • Leg cramps   • Meniere's disease   • Night sweats   • Osteoporosis   • Peripheral neuropathy   • Retinal artery occlusion   • Rheumatoid arthritis (CMS/HCC)   • Stroke syndrome   • Suprapubic pain   • Transient ischemic attack   • Positive reaction to tuberculin skin test   • Vitamin D deficiency   • Weight loss   • H/O colonoscopy   • Fatigue   • Hypothyroidism   • Erosive esophagitis   • Left lower quadrant pain   • Acute pain of left shoulder   • Pharyngitis   • Memory loss   • Rib pain on left side   • Hyponatremia   • Late onset Alzheimer's disease without behavioral disturbance (CMS/[facility])          Visit Dx:    ICD-10-CM ICD-9-CM   1. Late onset Alzheimer's disease without behavioral disturbance (CMS/HCC)  G30.1 331.0    F02.80 294.10                         SLP OP Goals     Row Name 21 1500 21 1400       Goal Type Needed    Goal Type Needed  Memory;Attention/Orientation;Auditory Comprehension;Other Adult Goals  -HG  --  -HG       Subjective Comments    Subjective Comments  Pt alert, cooperative, accompanied with daughter.   -HG  Pt alert, cooperative, accompanied with daughter.   -HG       Auditory Comprehension Goals    Patient will comprehend spoken information in all settings  90%:;with cues  -HG  --  -HG    Status: Patient will comprehend spoken information in all settings   Progressing as expected  -HG  --  -HG    Comments: Patient will comprehend spoken information in all settings  10/29/20: Pt's daughter states that she isn't sure her mom can read or understands the labels in her home for kitchen items.   -HG  --  -HG    Patient will demonstrate comprehension of spoken language by answering questions about a short paragraph  80%:;with cues  -HG  --  -HG    Status: Patient will demonstrate comprehension of spoken language by answering questions about a short paragraph  Progressing as expected  -HG  --  -HG    Comments: Patient will demonstrate comprehension of spoken language by answering questions about a short paragraph  11/24/20: 12-22 word paragraphs: 90% accurate.  22-36 word paragraphs: pt was 70% accurate.   -HG  --  -HG       Memory Goals    Patient and family will implement compensatory strategies to maximize patient’s Memory function so patient can continue to participate in daily activities  90%:;with cues  -HG  --  -HG    Status: Patient and family will implement compensatory strategies to maximize patient’s Memory function so patient can continue to participate in daily activities  New  -HG  --  -HG    Patient will demonstrate improved ability to recall information by immediately recalling a series of words  80%:;related;after delay;with cues  -HG  --  -HG    Status: Patient will demonstrate improved ability to recall information by immediately recalling a series of words  Progressing as expected  -HG  --  -HG    Comments: Patient will demonstrate improved ability to recall information by immediately recalling a series of words  1/26/21: Immediate recall of 3 words in a row and pt was 90% accurate. 1/15/21: SLUMS: Immediate recall of paragraph and pt was 1/8. 11/24/20: Mental Manipulation for progression and pt was 90% accurate. 11/17/20: Mental Manipulation by attribute, pt was 90% accurate and by word placement, pt was 90% accurate. 11/3/20: Immediate recall on FROMAJE:  pt was 2/3.  -HG  --  -HG    Patient’s memory skills will be enhanced as reported by patient by utilizing internal memory strategies to recall up to 3 pieces of information after a 5- minute delay  80%:;with cues  -HG  --  -HG    Status: Patient’s memory skills will be enhanced as reported by patient by utilizing internal memory strategies to recall up to 3 pieces of information after a 5- minute delay  Progressing as expected  -HG  --  -HG    Comments: Patient’s memory skills will be enhanced as reported by patient by utilizing internal memory strategies to recall up to 3 pieces of information after a 5- minute delay  2/9/21: Delayed recall of 3 animal from 2 weeks ago: pt was 0/3 w/o cue. After repetition during session, pt was 3/3 x 3.  1/26/21: Delayed recall of 2 related pictures: pt was 1/15/21: SLUMS Delayed recall of 5 un-related words and pt was 0/5 I'ly and with cues, pt was 4/5.  12/8/20: Delayed recall of 2 new related pictures, pt was 1/2 to 2/2. 11/24/20: Delayed recall of 6 cont'd pictures from previous session: pt was 3/6 I'ly. 5/6 with min cues, 6/6 by the end of session. 11/17/20: Delayed recall of 6 related words: pt was 4/6, 5/6 and 6/6 all with written cue. 11/10/20: Delayed recall of 5 related words and pt was 3/5 with first initial presented. After review and use of first initial, pt was 5/5.   11/3/20: Delayed recall of 4 related (familiar to pt) pt was 3/4 x 2, with an increased written cue, pt was 4/4. 10/29/20: Delayed recall of 3 related pictures: pt was 2/3x 2, 3/3 x 3.    -HG  --  -HG    Patient’s memory skills will be enhanced as reported by patient by using external memory aides  80%:;with cues  -HG  --  -HG    Status: Patient’s memory skills will be enhanced as reported by patient by using external memory aides  Progressing as expected  -HG  --  -HG    Comments: Patient’s memory skills will be enhanced as reported by patient by using external memory aides  2/9/21: Pt returned with  journal with entries since a few days ago. 1/26/21: Pt brought in journal but had not written in it so SLP had pt start an entry for today. 1/15/21: Re-educated pt and daughter on importance of journaling. 12/8/20: Pt has not been writing in her journal. Started entry for today.   11/24/20: Pt had only journaled one time since last session- ideas for cues in order to prompt pt to write in her journal. 11/17/20: Pt continues to journal. 11/10/20: Reinforced journaling and purpose and need for reference. 11/3/20: Reinforced use of daily journaling. 10/29/20: Pt had not written in her journal yet- provided visual cues.   -HG  --  -HG    Patient will demonstrate improved ability to recall information by stating activities patient has completed that day  80%:;with cues  -HG  --  -HG    Status: Patient will demonstrate improved ability to recall information by stating activities patient has completed that day  Progressing as expected  -HG  --  -HG    Comments: Patient will demonstrate improved ability to recall information by stating activities patient has completed that day  2/9/21: Pt recalled foods that she had not eaten, daughter had to clarify for accuracy for journal. 1/26/21: Recall of meals this date with accurate recall of breakfast and no recall of lunch- required cue from daughter. 1/15/21: Pt does not recall having a nightmare per her daughter's report and with a cue, could recall what she had for breakfast: 11/3/20: [place] but did write but not much. With cues, was able to recall events from weekend.   -HG  --  -HG       Attention/Orientation Goals    Patient will be able to interact safely in home environment  With Supervision  -HG  --  -HG    Status: Patient will be able to interact safely in home environment  Progressing as expected  -HG  --  -HG    Comments: Patient will be able to interact safely in home environment  1/15/21: SLUMS [employer] Span and pt was 2/2.   -HG  --   -HG    Patient will improve orientation skills by orienting to self, place, time, and situation  80%:;with cues  -HG  --  -HG    Status: Patient will improve orientation skills by orienting to self, place, time, and situation  Progressing as expected  -HG  --  -HG    Comments: Patient will improve orientation skills by orienting to self, place, time, and situation  2/9/21: Pt used calendar in session for orientation purposes.  1/26/21: Pt has a calendar at home to use but was not oriented to date when starting her journal sheet. 11/24/20: Pt aware of date and day of week. Encouraged use of journal in order to keep up with orientation. 11/17/20: Pt used the calendar and pt was accurate with day of week and date.  11/10/20: Using calendar in office, pt was able to ID current date.  10/29/20: Provided calendar to assist with orientation.   -HG  --  -HG    Patient will improve attention skills by sustaining consistent behavioral response during continuous and repetitive activity (e.g., listening for target words, auditory/reading comprehension tasks)  with cues;5 minute task  -HG  --  -HG    Status: Patient will improve attention skills by sustaining consistent behavioral response during continuous and repetitive activity (e.g., listening for target words, auditory/reading comprehension tasks)  Progressing as expected  -HG  --  -HG    Comments: Patient will improve attention skills by sustaining consistent behavioral response during continuous and repetitive activity (e.g., listening for target words, auditory/reading comprehension tasks)  2/9/21: The game of TRASH for consistency of following directions to a new game, pt was 70% accurate.  1/26/21: Word placement by occurrence and pt was 90% accurate, reversal and pt was 50% accurate. 1/15/21: One Pile Card Game: pt was 80% accurate. 11/3/20: One Pile Card Game:  Pt was 85% accurate.  10/29/20: One Pile Card Game: pt was 80% accurate with min to mod cues.   -HG  --   -HG    Patient will improve attention skills by focusing on environmental stimuli within left visuospatial field  80%:;with cues  -HG  --  -HG    Status: Patient will improve attention skills by focusing on environmental stimuli within left visuospatial field  Progressing as expected  -HG  --  -HG    Comments: Patient will improve attention skills by focusing on environmental stimuli within left visuospatial field  1/26/21: Copying boxed information: pt was 75% accurate.   11/3/20: Shapes and figures: 3 in a row and pt was 80% accurate with mod verbal and visual cues.   -HG  --  -HG       Other Goals    Other Adult Goal- 1  Pt will complete thought organization tasks with 80% accuracy.  -HG  --  -HG    Status: Other Adult Goal- 1  Progressing as expected  -HG  --  -HG    Comments: Other Adult Goal- 1  2/9/21: Following written directions: pt was 80% accurate.  1/15/21: SLUMS naming: pt was 8/15. 12/8/20: Category matrix: pt was 90% accurate.  Crossword puzzle and pt was 85% accurate. 11/24/20: Divergent naming: pt was 75-90% accurate . 11/17/20: Category Matrix: pt was 80% accurate I'ly. 11/10/20: Fill in word given first initial: pt was 100% accurate. 10/29/20: divergent naming: pt was 70% accurate. 90  -HG  --  -HG    Other Adult Goal- 2  Pt will complete reasoning and problem solving assessment with goals to follow as indicated.  -HG  --  -HG    Status: Other Adult Goal- 2  Progressing as expected  -HG  --  -HG    Comments: Other Adult Goal- 2  1/26/21: Connecting the dots alphabetically and pt required consistent cues. 12/8/20: Six step sequencing: pt was 90% accurate. 11/10/20: Written Directions: pt was 90% accurate. 11/3/20: FROMAJE: Pt scored a 15 placing pt in the severe dementia or depression range.   -HG  --  -HG    Other Adult Goal- 3  Pt will improve RBANS Total score to at least a 70 placing pt in the [place] range.   -HG  --  -HG       SLP Time Calculation    SLP Goal Re-Cert Due Date  02/14/21   -HG  --  -HG      User Key  (r) = Recorded By, (t) = Taken By, (c) = Cosigned By    Initials Name Provider Type    Lisa Marquis MS CCC-SLP Speech and Language Pathologist          OP SLP Education     Row Name 02/09/21 1500       Education    Education Comments  Hmwk for 4 related animals and clock work for reasoning and thought organization.   -HG      User Key  (r) = Recorded By, (t) = Taken By, (c) = Cosigned By    Initials Name Effective Dates    Lisa Marquis MS CCC-SLP 08/09/20 -           OP SLP Assessment/Plan - 02/09/21 1500        SLP Plan    Plan Comments  Cont with Cog tx.    -HG      User Key  (r) = Recorded By, (t) = Taken By, (c) = Cosigned By    Initials Name Provider Type    Lisa Marquis MS CCC-DESMOND Speech and Language Pathologist                 Time Calculation:   SLP Start Time: 1500    Therapy Charges for Today     Code Description Service Date Service Provider Modifiers Qty    83571134212 HC ST TREATMENT SPEECH 4 2/9/2021 Lisa Graves MS CCC-SLP GN 1                   MS STEFANI PetersonSLP  2/9/2021

## 2021-02-15 ENCOUNTER — APPOINTMENT (OUTPATIENT)
Dept: PREADMISSION TESTING | Facility: HOSPITAL | Age: 76
End: 2021-02-15

## 2021-02-15 PROCEDURE — U0004 COV-19 TEST NON-CDC HGH THRU: HCPCS

## 2021-02-15 PROCEDURE — C9803 HOPD COVID-19 SPEC COLLECT: HCPCS

## 2021-02-16 ENCOUNTER — APPOINTMENT (OUTPATIENT)
Dept: SPEECH THERAPY | Facility: HOSPITAL | Age: 76
End: 2021-02-16

## 2021-02-16 LAB — SARS-COV-2 RNA RESP QL NAA+PROBE: NOT DETECTED

## 2021-02-17 ENCOUNTER — HOSPITAL ENCOUNTER (OUTPATIENT)
Dept: GENERAL RADIOLOGY | Facility: HOSPITAL | Age: 76
Discharge: HOME | End: 2021-02-17
Admitting: NURSE PRACTITIONER

## 2021-02-17 DIAGNOSIS — R13.13 PHARYNGEAL DYSPHAGIA: ICD-10-CM

## 2021-02-17 PROCEDURE — 74230 X-RAY XM SWLNG FUNCJ C+: CPT

## 2021-02-17 PROCEDURE — A9270 NON-COVERED ITEM OR SERVICE: HCPCS | Performed by: NURSE PRACTITIONER

## 2021-02-17 PROCEDURE — 63710000001 BARIUM SULFATE 40 % PASTE: Performed by: NURSE PRACTITIONER

## 2021-02-17 PROCEDURE — 63710000001 BARIUM SULFATE 40 % RECONSTITUTED SUSPENSION: Performed by: NURSE PRACTITIONER

## 2021-02-17 PROCEDURE — 92611 MOTION FLUOROSCOPY/SWALLOW: CPT

## 2021-02-17 RX ORDER — DEXLANSOPRAZOLE 60 MG/1
1 CAPSULE, DELAYED RELEASE ORAL DAILY
Qty: 90 CAPSULE | Refills: 1 | Status: SHIPPED | OUTPATIENT
Start: 2021-02-17 | End: 2021-08-19

## 2021-02-17 RX ADMIN — BARIUM SULFATE 100 ML: 0.81 POWDER, FOR SUSPENSION ORAL at 13:21

## 2021-02-17 RX ADMIN — BARIUM SULFATE 20 ML: 400 PASTE ORAL at 13:21

## 2021-02-17 NOTE — MBS/VFSS/FEES
Outpatient Speech Language Pathology   Adult Swallow Initial Evaluation   [facility]   Modified Barium Swallow Study (MBS)     Patient Name: Brittny Nicolas  : 1945  MRN: [id number]  Today's Date: 2021         Visit Date: 2021   Patient Active Problem List   Diagnosis   • Achalasia   • Acute upper respiratory infection   • Atypical chest pain   • Benign essential hypertension   • Carotid artery stenosis   • Cataract   • Cough   • Acute cystitis   • Screening for malignant neoplasm   • Gastroesophageal reflux disease   • Essential familial hypercholesterolemia   • Gastrointestinal bleeding   • Inguinal hernia   • Insomnia   • Leg cramps   • Meniere's disease   • Night sweats   • Osteoporosis   • Peripheral neuropathy   • Retinal artery occlusion   • Rheumatoid arthritis (CMS/HCC)   • Stroke syndrome   • Suprapubic pain   • Transient ischemic attack   • Positive reaction to tuberculin skin test   • Vitamin D deficiency   • Weight loss   • H/O colonoscopy   • Fatigue   • Hypothyroidism   • Erosive esophagitis   • Left lower quadrant pain   • Acute pain of left shoulder   • Pharyngitis   • Memory loss   • Rib pain on left side   • Hyponatremia   • Late onset Alzheimer's disease without behavioral disturbance (CMS/HCC)        Past Medical History:   Diagnosis Date   • Acid reflux disease    • Arthritis    • Carotid artery occlusion    • Depression    • Graves disease    • Hypertension    • Stomach problems    • Stroke (CMS/HCC)    • Thyroid disease         Past Surgical History:   Procedure Laterality Date   • AUGMENTATION MAMMAPLASTY         • BREAST AUGMENTATION     • CAROTID ARTERY ANGIOPLASTY     • COLONOSCOPY     • D&C CERVICAL BIOPSY      Dilation and Curettage of Cervical Stump   • HERNIA REPAIR     • HERNIA REPAIR     • THROMBOENDARTERECTOMY      carotid   • UPPER GASTROINTESTINAL ENDOSCOPY           Visit Dx:     ICD-10-CM ICD-9-CM   1. Pharyngeal dysphagia  R13.13 787.23           [facility]  Adult Swallow Evaluation     Row Name 02/17/21 1310       Rehab Evaluation    Document Type  evaluation  -RD    Subjective Information  no complaints  -RD    Patient Observations  alert;cooperative;agree to therapy  -RD    Patient/Family/Caregiver Comments/Observations  Daughter present  -RD    Patient Effort  good  -RD       General Information    Patient Profile Reviewed  yes  -RD    Pertinent History Of Current Problem  Pt presents for OP MBS 2' daughter reports of pt w/ swallowing difficulty. Dtr reports pt w/ frequent belching and globus sensation (pt pointed to the level of the UES). PMH significant for thyroid dz, stomach problems, Graves dz, CAD, depression, GERD, dementia. Pt has been seeing OP SLP for cognitive therapy.   -RD    Current Method of Nutrition  regular textures;thin liquids  -RD    Precautions/Limitations, Vision  WFL with corrective lenses;for purposes of eval  -RD    Precautions/Limitations, Hearing  WFL;for purposes of eval  -RD    Prior Level of Function-Communication  cognitive-linguistic impairment;other (see comments) dementia per chart  -RD    Prior Level of Function-Swallowing  no diet consistency restrictions;esophageal concerns  -RD    Plans/Goals Discussed with  patient;family;agreed upon  -RD    Barriers to Rehab  none identified  -RD    Patient's Goals for Discharge  patient did not state  -RD    Family Goals for Discharge  patient able to eat/drink without coughing/choking;patient able to return to regular diet  -RD       Pain    Additional Documentation  Pain Scale: Numbers Pre/Post-Treatment (Group)  -RD       Pain Scale: Numbers Pre/Post-Treatment    Pretreatment Pain Rating  0/10 - no pain  -RD    Posttreatment Pain Rating  0/10 - no pain  -RD       MBS/VFSS    Utensils Used  spoon;cup;straw  -RD    Consistencies Trialed  thin liquids;pudding thick;regular textures  -RD       MBS/VFSS Interpretation    Oral Phase, Comment  Mild oral dysphagia. Adequate mastication of regular  solid. Lingual pumping noted w/ pudding/solids 2' reduced lingual control. Mild oral residue w/ larger bolus size. Cleared w/ spontaneous secondary swallow.   -RD       Initiation of Pharyngeal Swallow    Initiation of Pharyngeal Swallow  bolus in pyriform sinuses  -RD    Attempted Compensatory Maneuvers  bolus size;bolus presentation style;additional subsequent swallow  -RD    Response to Attempted Compensatory Maneuvers  prevented aspiration;reduced residue  -RD    Pharyngeal Phase, Comment  Mild pharyngeal dysphagia. Penetration before/during the swallow w/ thin liquids 2' delayed initiation and reduced vestibular closure. Penetration cleared upon completion of the swallow w/ small sips of thins, but did not fully clear w/ larger consecutive drinks of thins, posing aspiration risk. No immediate aspiration observed w/ any consistency during exam. Mild diffuse pharyngeal residue w/ all consistencies (> w/ larger bolus size) 2' reduced pharyngeal stripping wave and reduced sustained excursion. Pt able to reduce reside w/ cued additional swallow. Education provided on diet recs and compensations. Pt agreeable. Pt would also benefit from general pharyngeal strengthening w/ outpatient SLP. MD to order OP SLP for dysphagia if in agreement. Pt reported difficulty w/ pills, so barium capsule given. This appeared to clear through UES w/o difficulty. If further concerns pt may benefit from meds crushed in pudding/applesauce as tolerated or whole one at a time.   -RD       Esophageal Phase    Esophageal Phase  other (see comments)  -RD    Esophageal Phase, Comment  Pt noted w/ dilated appearing esophagus at the level just below the upper esophageal sphincter. Esophageal scan completed and concerns for possible stricture or achalasia noted. Pt would benefit from referral to GI and further workup/imaging as indicated. Educated pt on reflux precautions and recs for GI referral.   -RD       Clinical Impression    SLP Swallowing  Diagnosis  mild;pharyngeal dysphagia;other (see comments) suspect further esophageal dysphagia  -RD    Functional Impact  risk of aspiration/pneumonia  -RD    Rehab Potential/Prognosis, Swallowing  good, to achieve stated therapy goals  -RD    Swallow Criteria for Skilled Therapeutic Interventions Met  demonstrates skilled criteria  -RD       Recommendations    Therapy Frequency (Swallow)  evaluation only  -RD    SLP Diet Recommendation  regular textures;thin liquids  -RD    Recommended Diagnostics  other (see comments) further esophageal workup per MD discretion  -RD    Recommended Precautions and Strategies  upright posture during/after eating;small bites of food and sips of liquid;multiple swallows per bite of food;multiple swallows per sip of liquid;general aspiration precautions;reflux precautions  -RD    Oral Care Recommendations  Oral Care BID/PRN  -RD    SLP Rec. for Method of Medication Administration  meds whole;meds crushed;with pudding or applesauce;as tolerated  -RD    Monitor for Signs of Aspiration  yes;notify SLP if any concerns  -RD    Anticipated Discharge Disposition (SLP)  home with OP services;other (see comments) MD to order if in agreement  -RD    Demonstrates Need for Referral to Another Service  gastroenterology;other (see comments) Pt needs GI referral for further esophageal workup per MD   -RD      User Key  (r) = Recorded By, (t) = Taken By, (c) = Cosigned By    Initials Name Provider Type    RD Izzy Viera MS CCC-SLP Speech and Language Pathologist                        OP SLP Education     Row Name 02/17/21 5006       Education    Barriers to Learning  No barriers identified  -RD    Assessed  Learning needs;Learning motivation;Learning preferences;Learning readiness  -RD    Learning Motivation  Strong  -RD    Learning Method  Explanation;Teach back  -RD    Teaching Response  Verbalized understanding  -RD    Education Comments  daughter present  -RD      User Key  (r) = Recorded  By, (t) = Taken By, (c) = Cosigned By    Initials Name Effective Dates    RD Izzy Viera MS CCC-SLP 04/03/18 -                             Time Calculation:   SLP Start Time: 1310    Therapy Charges for Today     Code Description Service Date Service Provider Modifiers Qty    53543042529 HC ST MOTION FLUORO EVAL SWALLOW 5 2/17/2021 Izzy Viera MS CCC-SLP GN 1            Patient was not wearing a face mask and did not exhibit coughing during this therapy encounter.  Procedure performed was aerosolizing, involved close contact (within 6 feet for at least 15 minutes or longer), and did not involve contact with infectious secretions or specimens.  Therapist used appropriate personal protective equipment including gloves, standard procedure mask and eye protection.  Appropriate PPE was worn during the entire therapy session.  Hand hygiene was completed before and after therapy session.         Izzy Viera MS CCC-SLP  2/17/2021

## 2021-02-18 ENCOUNTER — TELEPHONE (OUTPATIENT)
Dept: NEUROLOGY | Facility: CLINIC | Age: 76
End: 2021-02-18

## 2021-02-18 ENCOUNTER — APPOINTMENT (OUTPATIENT)
Dept: GENERAL RADIOLOGY | Facility: HOSPITAL | Age: 76
End: 2021-02-18

## 2021-02-18 DIAGNOSIS — K21.00 GASTROESOPHAGEAL REFLUX DISEASE WITH ESOPHAGITIS WITHOUT HEMORRHAGE: ICD-10-CM

## 2021-02-18 DIAGNOSIS — R13.12 OROPHARYNGEAL DYSPHAGIA: Primary | ICD-10-CM

## 2021-02-18 NOTE — TELEPHONE ENCOUNTER
Please notify the patient's family that I received the speech therapy evaluation notes.  The speech therapist recommended that we add speech therapy orders for difficulty swallowing and I will add this to the speech therapy orders.  The speech therapist also recommended that we refer the patient to a gastroenterologist for additional evaluation of reflux and other swallowing concerns.  I will place that referral if patients family is agreeable with this evaluation. Let me know so I can place GI referral. Thanks, CARLOS Jon

## 2021-02-19 NOTE — TELEPHONE ENCOUNTER
Placed GI referral. First available provider with [facility] is fine. I chose a provider but can be first available. Thanks.

## 2021-02-19 NOTE — TELEPHONE ENCOUNTER
Pt's daughter is aware of the additional speech therapy being added and would like to at least speak with someone in gastroenterology, so please place the referral?

## 2021-02-23 ENCOUNTER — HOSPITAL ENCOUNTER (OUTPATIENT)
Dept: SPEECH THERAPY | Facility: HOSPITAL | Age: 76
Setting detail: THERAPIES SERIES
Discharge: HOME | End: 2021-02-23

## 2021-02-23 DIAGNOSIS — F02.80 LATE ONSET ALZHEIMER'S DISEASE WITHOUT BEHAVIORAL DISTURBANCE (HCC): Primary | ICD-10-CM

## 2021-02-23 DIAGNOSIS — G30.1 LATE ONSET ALZHEIMER'S DISEASE WITHOUT BEHAVIORAL DISTURBANCE (HCC): Primary | ICD-10-CM

## 2021-02-23 PROCEDURE — 92610 EVALUATE SWALLOWING FUNCTION: CPT

## 2021-02-23 PROCEDURE — 92507 TX SP LANG VOICE COMM INDIV: CPT

## 2021-02-23 NOTE — THERAPY RE-EVALUATION
Outpatient Speech Language Pathology   Adult Speech Language Cognitive Progress Note  [facility]     Patient Name: Brittny Nicolas  : 1945  MRN: [id number]  Today's Date: 2021         Visit Date: 2021   Patient Active Problem List   Diagnosis   • Achalasia   • Acute upper respiratory infection   • Atypical chest pain   • Benign essential hypertension   • Carotid artery stenosis   • Cataract   • Cough   • Acute cystitis   • Screening for malignant neoplasm   • Gastroesophageal reflux disease   • Essential familial hypercholesterolemia   • Gastrointestinal bleeding   • Inguinal hernia   • Insomnia   • Leg cramps   • Meniere's disease   • Night sweats   • Osteoporosis   • Peripheral neuropathy   • Retinal artery occlusion   • Rheumatoid arthritis (CMS/HCC)   • Stroke syndrome   • Suprapubic pain   • Transient ischemic attack   • Positive reaction to tuberculin skin test   • Vitamin D deficiency   • Weight loss   • H/O colonoscopy   • Fatigue   • Hypothyroidism   • Erosive esophagitis   • Left lower quadrant pain   • Acute pain of left shoulder   • Pharyngitis   • Memory loss   • Rib pain on left side   • Hyponatremia   • Late onset Alzheimer's disease without behavioral disturbance (CMS/[facility])          Visit Dx:    ICD-10-CM ICD-9-CM   1. Late onset Alzheimer's disease without behavioral disturbance (CMS/HCC)  G30.1 331.0    F02.80 294.10         SLP Adult Swallow Evaluation     Row Name 21 1500       Rehab Evaluation    Document Type  evaluation  -    Subjective Information  no complaints  -    Patient Observations  alert;cooperative;agree to therapy  -    Patient/Family/Caregiver Comments/Observations  Pt's daughter present and has noted difficulty at home which preempted daughter to report to Neurology and therefore MBS was ordered at completed.  -    Care Plan Review  evaluation/treatment results reviewed  -    Care Plan Review, Other Participant(s)  daughter  -    Patient Effort   "excellent  -HG    Symptoms Noted During/After Treatment  none  -HG       General Information    Patient Profile Reviewed  yes  -HG    Pertinent History Of Current Problem  Please see initial assessment. Pt does take reflux medications and notes that food and pills get stuck \"right here\" as pt points to the base of her neck.  -HG    Current Method of Nutrition  regular textures;thin liquids  -HG    Precautions/Limitations, Vision  corrective lenses needed for reading  -HG    Precautions/Limitations, Hearing  WFL;for purposes of eval  -HG    Prior Level of Function-Communication  cognitive-linguistic impairment  -HG    Prior Level of Function-Swallowing  no diet consistency restrictions  -HG    Plans/Goals Discussed with  patient and family  -HG    Barriers to Rehab  none identified  -HG       Oral Motor Structure and Function    Dentition Assessment  upper dentures/partial in place;other (see comments) lower teeth on bottom are pt's.  -HG    Secretion Management  WNL/WFL  -HG    Mucosal Quality  moist, healthy  -HG    Gag Response  WFL  -HG    Volitional Swallow  WFL  -HG    Volitional Cough  WFL  -HG       Oral Musculature and Cranial Nerve Assessment    Oral Motor General Assessment  WFL  -HG       General Eating/Swallowing Observations    Respiratory Support Currently in Use  room air  -HG    Eating/Swallowing Skills  self-fed  -HG    Positioning During Eating  upright 90 degree;upright in chair  -HG    Utensils Used  spoon;cup;straw  -HG    Consistencies Trialed  regular textures;soft textures;pureed;thin liquids  -HG       Respiratory    Respiratory Status  WFL;room air  -HG       Clinical Swallow Eval    Oral Prep Phase  WFL  -HG    Oral Transit  WFL  -HG    Oral Residue  WFL  -HG    Pharyngeal Phase  no overt signs/symptoms of pharyngeal impairment  -HG    Esophageal Phase  suspected esophageal impairment  -HG    Clinical Swallow Evaluation Summary  CSE completed this date. Pt tolerated all consistences with " no s/s of aspiration: (WVQ is present at baseline) water via cup, straw, applesauce, mixed fruit and myriam cracker. MBS completed on 2/17/21 recommending regular diet and thin liquids. Mild oropharyngeal dysphagia is present.  -HG       Esophageal Phase Concerns    Esophageal Phase Concerns  sensation of material sticking  -HG    Sensation of Material Sticking  mechanical soft;regular consistencies  -HG      User Key  (r) = Recorded By, (t) = Taken By, (c) = Cosigned By    Initials Name Provider Type     Lisa Graves MS CCC-SLP Speech and Language Pathologist                      SLP OP Goals     Row Name 02/23/21 1500          Goal Type Needed    Goal Type Needed  Memory;Attention/Orientation;Auditory Comprehension;Other Adult Goals;Dysphagia  -HG        Subjective Comments    Subjective Comments  Pt alert, cooperative, accompanied with daughter.   -HG        Auditory Comprehension Goals    Patient will comprehend spoken information in all settings  90%:;with cues  -HG     Status: Patient will comprehend spoken information in all settings  Progressing as expected  -HG     Comments: Patient will comprehend spoken information in all settings  10/29/20: Pt's daughter states that she isn't sure her mom can read or understands the labels in her home for kitchen items.   -HG     Patient will demonstrate comprehension of spoken language by answering questions about a short paragraph  80%:;with cues  -HG     Status: Patient will demonstrate comprehension of spoken language by answering questions about a short paragraph  Progressing as expected  -HG     Comments: Patient will demonstrate comprehension of spoken language by answering questions about a short paragraph  2/23/21:  12-22 word paragraphs: 90% accurate.  22-36 word paragraphs: pt was 70% accurate.   -HG        Memory Goals    Patient and family will implement compensatory strategies to maximize patient’s Memory function so patient can continue to  participate in daily activities  90%:;with cues  -HG     Status: Patient and family will implement compensatory strategies to maximize patient’s Memory function so patient can continue to participate in daily activities  New  -HG     Patient will demonstrate improved ability to recall information by immediately recalling a series of words  80%:;related;after delay;with cues  -HG     Status: Patient will demonstrate improved ability to recall information by immediately recalling a series of words  Progressing as expected  -HG     Comments: Patient will demonstrate improved ability to recall information by immediately recalling a series of words  1/26/21: Immediate recall of 3 words in a row and pt was 90% accurate. 1/15/21: SLUMS: Immediate recall of paragraph and pt was 1/8. 11/24/20: Mental Manipulation for progression and pt was 90% accurate. 11/17/20: Mental Manipulation by attribute, pt was 90% accurate and by word placement, pt was 90% accurate. 11/3/20: Immediate recall on FROMAJE: pt was 2/3.  -HG     Patient’s memory skills will be enhanced as reported by patient by utilizing internal memory strategies to recall up to 3 pieces of information after a 5- minute delay  80%:;with cues  -HG     Status: Patient’s memory skills will be enhanced as reported by patient by utilizing internal memory strategies to recall up to 3 pieces of information after a 5- minute delay  Progressing as expected  -HG     Comments: Patient’s memory skills will be enhanced as reported by patient by utilizing internal memory strategies to recall up to 3 pieces of information after a 5- minute delay  2/9/21: Delayed recall of 3 animal from 2 weeks ago: pt was 0/3 w/o cue. After repetition during session, pt was 3/3 x 3.  1/26/21: Delayed recall of 2 related pictures: pt was 1/15/21: SLUMS Delayed recall of 5 un-related words and pt was 0/5 I'ly and with cues, pt was 4/5.  12/8/20: Delayed recall of 2 new related pictures, pt was 1/2 to  2/2. 11/24/20: Delayed recall of 6 cont'd pictures from previous session: pt was 3/6 I'ly. 5/6 with min cues, 6/6 by the end of session. 11/17/20: Delayed recall of 6 related words: pt was 4/6, 5/6 and 6/6 all with written cue. 11/10/20: Delayed recall of 5 related words and pt was 3/5 with first initial presented. After review and use of first initial, pt was 5/5.   11/3/20: Delayed recall of 4 related (familiar to pt) pt was 3/4 x 2, with an increased written cue, pt was 4/4. 10/29/20: Delayed recall of 3 related pictures: pt was 2/3x 2, 3/3 x 3.    -HG     Patient’s memory skills will be enhanced as reported by patient by using external memory aides  80%:;with cues  -HG     Status: Patient’s memory skills will be enhanced as reported by patient by using external memory aides  Progressing as expected  -HG     Comments: Patient’s memory skills will be enhanced as reported by patient by using external memory aides  2/9/21: Pt returned with journal with entries since a few days ago. 1/26/21: Pt brought in journal but had not written in it so SLP had pt start an entry for today. 1/15/21: Re-educated pt and daughter on importance of journaling. 12/8/20: Pt has not been writing in her journal. Started entry for today.   11/24/20: Pt had only journaled one time since last session- ideas for cues in order to prompt pt to write in her journal. 11/17/20: Pt continues to journal. 11/10/20: Reinforced journaling and purpose and need for reference. 11/3/20: Reinforced use of daily journaling. 10/29/20: Pt had not written in her journal yet- provided visual cues.   -HG     Patient will demonstrate improved ability to recall information by stating activities patient has completed that day  80%:;with cues  -HG     Status: Patient will demonstrate improved ability to recall information by stating activities patient has completed that day  Progressing as expected  -HG     Comments: Patient will demonstrate improved ability to  recall information by stating activities patient has completed that day  2/9/21: Pt recalled foods that she had not eaten, daughter had to clarify for accuracy for journal. 1/26/21: Recall of meals this date with accurate recall of breakfast and no recall of lunch- required cue from daughter. 1/15/21: Pt does not recall having a nightmare per her daughter's report and with a cue, could recall what she had for breakfast: 11/3/20: [place] did write but not much. With cues, was able to recall events from weekend.   -HG        Attention/Orientation Goals    Patient will be able to interact safely in home environment  With Supervision  -HG     Status: Patient will be able to interact safely in home environment  Progressing as expected  -HG     Comments: Patient will be able to interact safely in home environment  1/15/21: SLUMS Dig[employer] Span and pt was 2/2.   -HG     Patient will improve orientation skills by orienting to self, place, time, and situation  80%:;with cues  -HG     Status: Patient will improve orientation skills by orienting to self, place, time, and situation  Progressing as expected  -HG     Comments: Patient will improve orientation skills by orienting to self, place, time, and situation  2/9/21: Pt used calendar in session for orientation purposes.  1/26/21: Pt has a calendar at home to use but was not oriented to date when starting her journal sheet. 11/24/20: Pt aware of date and day of week. Encouraged use of journal in order to keep up with orientation. 11/17/20: Pt used the calendar and pt was accurate with day of week and date.  11/10/20: Using calendar in office, pt was able to ID current date.  10/29/20: Provided calendar to assist with orientation.   -HG     Patient will improve attention skills by sustaining consistent behavioral response during continuous and repetitive activity (e.g., listening for target words, auditory/reading comprehension tasks)  with  cues;5 minute task  -HG     Status: Patient will improve attention skills by sustaining consistent behavioral response during continuous and repetitive activity (e.g., listening for target words, auditory/reading comprehension tasks)  Progressing as expected  -HG     Comments: Patient will improve attention skills by sustaining consistent behavioral response during continuous and repetitive activity (e.g., listening for target words, auditory/reading comprehension tasks)  2/9/21: The game of TRASH for consistency of following directions to a new game, pt was 70% accurate.  1/26/21: Word placement by occurrence and pt was 90% accurate, reversal and pt was 50% accurate. 1/15/21: One Pile Card Game: pt was 80% accurate. 11/3/20: One Pile Card Game:  Pt was 85% accurate.  10/29/20: One Pile Card Game: pt was 80% accurate with min to mod cues.   -HG     Patient will improve attention skills by focusing on environmental stimuli within left visuospatial field  80%:;with cues  -HG     Status: Patient will improve attention skills by focusing on environmental stimuli within left visuospatial field  Progressing as expected  -HG     Comments: Patient will improve attention skills by focusing on environmental stimuli within left visuospatial field  1/26/21: Copying boxed information: pt was 75% accurate.   11/3/20: Shapes and figures: 3 in a row and pt was 80% accurate with mod verbal and visual cues.   -HG        Dysphagia Goals    Patient will safely consume the recommended diet without complications such as aspiration pneumonia  regular diet and thin liquids  -HG     Status: Patient will safely consume the recommended diet without complications such as aspiration pneumonia  New  -HG     Patient will improve stage transition duration of swallow/improve timing to reduce food falling into the airway by decreasing swallow delay after neurosensory  stimulation  three second prep;suck-swallow;super-supraglottic swallow;with cues   -HG     Status: Patient will improve stage transition duration of swallow/improve timing to reduce food falling into the airway by decreasing swallow delay after neurosensory  stimulation  New  -HG     Patient will increase laryngeal elevation to reduce residue that might fall into airway by completing  super-supraglottic swallow;falsetto/pitch glide;with cues  -HG     Status: Patient will increase laryngeal elevation to reduce residue that might fall into airway by completing  New  -HG     Patient will increase closure of larynx to keep food from falling into the airway by completing  super-supraglottic swallow;push-pull with breath hold;with cues  -HG     Status: Patient will increase closure of larynx to keep food from falling into the airway by completing  New  -HG     Patient will increase strength of tongue base and posterior pharyngeal walls to reduce residue that might fall into airway by completing  effotful swallow;Bernarda (tongue hold);with cues  -HG     Status: Patient will increase strength of tongue base and posterior pharyngeal walls to reduce residue that might fall into airway by completing  New  -HG        Other Goals    Other Adult Goal- 1  Pt will complete thought organization tasks with 80% accuracy.  -HG     Status: Other Adult Goal- 1  Progressing as expected  -HG     Comments: Other Adult Goal- 1  2/23/21: 2/9/21: Following written directions: pt was 80% accurate.  1/15/21: SLUMS naming: pt was 8/15. 12/8/20: Category matrix: pt was 90% accurate.  Crossword puzzle and pt was 85% accurate. 11/24/20: Divergent naming: pt was 75-90% accurate . 11/17/20: Category Matrix: pt was 80% accurate I'ly. 11/10/20: Fill in word given first initial: pt was 100% accurate. 10/29/20: divergent naming: pt was 70% accurate. 90  -HG     Other Adult Goal- 2  Pt will complete reasoning and problem solving assessment with goals to follow as indicated.  -HG     Status: Other Adult Goal- 2  Progressing as expected   -     Comments: Other Adult Goal- 2  1/26/21: Connecting the dots alphabetically and pt required consistent cues. 12/8/20: Six step sequencing: pt was 90% accurate. 11/10/20: Written Directions: pt was 90% accurate. 11/3/20: FROMAJE: Pt scored a 15 placing pt in the severe dementia or depression range.   -     Other Adult Goal- 3  Pt will improve RBANS Total score to at least a 70 placing pt in the [place] range.   -        SLP Time Calculation    SLP Goal Re-Cert Due Date  03/25/21  -       User Key  (r) = Recorded By, (t) = Taken By, (c) = Cosigned By    Initials Name Provider Type     Lisa Graves MS CCC-SLP Speech and Language Pathologist          OP SLP Education     Row Name 02/23/21 1500       Education    Barriers to Learning  No barriers identified  -    Education Provided  Described results of evaluation;Patient expressed understanding of evaluation;Family/caregivers expressed understanding of evaluation;Patient participated in establishing goals and treatment plan;Family/caregivers participated in establishing goals and treatment plan;Patient demonstrated recommended strategies;Family/caregivers demonstrated recommended strategies;Patient requires further education on strategies, risks;Family/caregivers require further education on strategies, risks  -    Assessed  Learning needs;Learning motivation;Learning preferences;Learning readiness  -    Learning Motivation  Strong  -    Learning Method  Explanation;Demonstration;Teach back;Written materials  -    Teaching Response  Verbalized understanding;Demonstrated understanding;Reinforcement needed  -    Education Comments  Education for swallowing exercises with pt and daughter. Allowed opportunities for teach back. Reviewed MBS findings with pt and plan of care for swallowing tx. Hmwk for journaling for cog goals.   -      User Key  (r) = Recorded By, (t) = Taken By, (c) = Cosigned By    Initials Name Effective Dates      Lisa Graves MS CCC-SLP 08/09/20 -           OP SLP Assessment/Plan - 02/23/21 1500        SLP Assessment    Functional Problems  Swallowing;Speech Language- Adult/Cognition   -HG    Impact on Function: Adult Speech Language/Cognition  Difficulty communicating in a medical emergency;Restrictions in personal and social life;Difficulty sequencing thoughts to express complex messages;Difficulty following directions;Lack of insight or awareness of deficits, safety issues;Unable to complete specified job requirements;Decreased recall of personal information and medical history;Trouble learning or remembering new information;Poor attention to task;Requires supervision   -HG    Clinical Impression: Speech Language-Adult/Congnition  Severe:;Cognitive Communication Impairment   -HG    Impact on Function: Swallowing  Risk of aspiration;Risk of pneumonia   -HG    Clinical Impression: Swallowing  Mild:;oropharyngeal phase dysphagia   -HG    Functional Problems Comment  Pt not consistent with journaling and use of strategies at home. Swallowing: pt with s/s at home as to what prompted daughter to have pt evaluated.   -HG    Clinical Impression Comments  SLUMS Total score of 8/30 is in the Dementia range. MBS on 2/17/21 revealed Mild oropharyngeal dysphagia. MASA score of 198/200. Pt exhibited no overt s/s of aspiration during session however WVQ present intermittently t/o Clinical Swallow Evaluation this date.    -HG    Please refer to paper survey for additional self-reported information  Yes   -HG    Please refer to items scanned into chart for additional diagnostic informaiton and handouts as provided by clinician  Yes   -HG    SLP Diagnosis  Severe Cognitive communication impairment and Mild oropharyngeal dysphagia   -HG    Prognosis  Excellent (comment)   -HG    Patient/caregiver participated in establishment of treatment plan and goals  Yes   -HG    Patient would benefit from skilled therapy intervention  Yes   -HG        SLP Plan    Frequency  1-2x/week   -HG    Duration  8 weeks   -HG    Planned CPT's?  SLP INDIVIDUAL SPEECH THERAPY: 28294;SLP SWALLOW THERAPY: 19570   -HG    Expected Duration of Therapy Session (SLP Eval)  60   -HG    Plan Comments  Cont with Cog tx and Initiate Dysphagia tx.    -HG      User Key  (r) = Recorded By, (t) = Taken By, (c) = Cosigned By    Initials Name Provider Type     Lisa Graves, MS CCC-SLP Speech and Language Pathologist                 Time Calculation:   SLP Start Time: 1500    Therapy Charges for Today     Code Description Service Date Service Provider Modifiers Qty    70439286581 HC ST EVAL ORAL PHARYNG SWALLOW 3 2021 Lisa Graves, MS CCC-SLP GN 1    [id number] HC ST TREATMENT SPEECH 3 2021 Lisa Graves, MS CCC-SLP GN 1                   Lisa Graves MS CCC-SLP  2021 and Outpatient Speech Language Pathology   Adult Swallow Initial Evaluation   [facility]     Patient Name: Brittny Nicolas  : 1945  MRN: [id number]  Today's Date: 2021         Visit Date: 2021   Patient Active Problem List   Diagnosis   • Achalasia   • Acute upper respiratory infection   • Atypical chest pain   • Benign essential hypertension   • Carotid artery stenosis   • Cataract   • Cough   • Acute cystitis   • Screening for malignant neoplasm   • Gastroesophageal reflux disease   • Essential familial hypercholesterolemia   • Gastrointestinal bleeding   • Inguinal hernia   • Insomnia   • Leg cramps   • Meniere's disease   • Night sweats   • Osteoporosis   • Peripheral neuropathy   • Retinal artery occlusion   • Rheumatoid arthritis (CMS/HCC)   • Stroke syndrome   • Suprapubic pain   • Transient ischemic attack   • Positive reaction to tuberculin skin test   • Vitamin D deficiency   • Weight loss   • H/O colonoscopy   • Fatigue   • Hypothyroidism   • Erosive esophagitis   • Left lower quadrant pain   • Acute pain of left shoulder   • Pharyngitis   • Memory loss   •  "Rib pain on left side   • Hyponatremia   • Late onset Alzheimer's disease without behavioral disturbance (CMS/HCC)        Past Medical History:   Diagnosis Date   • Acid reflux disease    • Arthritis    • Carotid artery occlusion    • Depression    • Graves disease    • Hypertension    • Stomach problems    • Stroke (CMS/HCC)    • Thyroid disease         Past Surgical History:   Procedure Laterality Date   • AUGMENTATION MAMMAPLASTY      1978   • BREAST AUGMENTATION     • CAROTID ARTERY ANGIOPLASTY     • COLONOSCOPY     • D&C CERVICAL BIOPSY      Dilation and Curettage of Cervical Stump   • HERNIA REPAIR     • HERNIA REPAIR     • THROMBOENDARTERECTOMY      carotid   • UPPER GASTROINTESTINAL ENDOSCOPY           Visit Dx:     ICD-10-CM ICD-9-CM   1. Late onset Alzheimer's disease without behavioral disturbance (CMS/HCC)  G30.1 331.0    F02.80 294.10           SLP Adult Swallow Evaluation     Row Name 02/23/21 1500       Rehab Evaluation    Document Type  evaluation  -HG    Subjective Information  no complaints  -HG    Patient Observations  alert;cooperative;agree to therapy  -HG    Patient/Family/Caregiver Comments/Observations  Pt's daughter present and has noted difficulty at home which preempted daughter to report to Neurology and therefore MBS was ordered at completed.  -    Care Plan Review  evaluation/treatment results reviewed  -HG    Care Plan Review, Other Participant(s)  daughter  -HG    Patient Effort  excellent  -HG    Symptoms Noted During/After Treatment  none  -HG       General Information    Patient Profile Reviewed  yes  -HG    Pertinent History Of Current Problem  Please see initial assessment. Pt does take reflux medications and notes that food and pills get stuck \"right here\" as pt points to the base of her neck.  -HG    Current Method of Nutrition  regular textures;thin liquids  -HG    Precautions/Limitations, Vision  corrective lenses needed for reading  -HG    Precautions/Limitations, Hearing  " WFL;for purposes of eval  -HG    Prior Level of Function-Communication  cognitive-linguistic impairment  -HG    Prior Level of Function-Swallowing  no diet consistency restrictions  -HG    Plans/Goals Discussed with  patient and family  -    Barriers to Rehab  none identified  -HG       Oral Motor Structure and Function    Dentition Assessment  upper dentures/partial in place;other (see comments) lower teeth on bottom are pt's.  -HG    Secretion Management  WNL/WFL  -HG    Mucosal Quality  moist, healthy  -HG    Gag Response  WFL  -HG    Volitional Swallow  WFL  -HG    Volitional Cough  WFL  -HG       Oral Musculature and Cranial Nerve Assessment    Oral Motor General Assessment  WFL  -HG       General Eating/Swallowing Observations    Respiratory Support Currently in Use  room air  -HG    Eating/Swallowing Skills  self-fed  -HG    Positioning During Eating  upright 90 degree;upright in chair  -HG    Utensils Used  spoon;cup;straw  -HG    Consistencies Trialed  regular textures;soft textures;pureed;thin liquids  -HG       Respiratory    Respiratory Status  WFL;room air  -HG       Clinical Swallow Eval    Oral Prep Phase  WFL  -HG    Oral Transit  WFL  -HG    Oral Residue  WFL  -HG    Pharyngeal Phase  no overt signs/symptoms of pharyngeal impairment  -HG    Esophageal Phase  suspected esophageal impairment  -HG    Clinical Swallow Evaluation Summary  CSE completed this date. Pt tolerated all consistences with no s/s of aspiration: (WVQ is present at baseline) water via cup, straw, applesauce, mixed fruit and myriam cracker. MBS completed on 2/17/21 recommending regular diet and thin liquids. Mild oropharyngeal dysphagia is present.  -HG       Esophageal Phase Concerns    Esophageal Phase Concerns  sensation of material sticking  -HG    Sensation of Material Sticking  mechanical soft;regular consistencies  -HG      User Key  (r) = Recorded By, (t) = Taken By, (c) = Cosigned By    Initials Name Provider Type      Lisa Graves MS CCC-SLP Speech and Language Pathologist                        OP SLP Education     Row Name 02/23/21 1500       Education    Barriers to Learning  No barriers identified  -HG    Education Provided  Described results of evaluation;Patient expressed understanding of evaluation;Family/caregivers expressed understanding of evaluation;Patient participated in establishing goals and treatment plan;Family/caregivers participated in establishing goals and treatment plan;Patient demonstrated recommended strategies;Family/caregivers demonstrated recommended strategies;Patient requires further education on strategies, risks;Family/caregivers require further education on strategies, risks  -    Assessed  Learning needs;Learning motivation;Learning preferences;Learning readiness  -    Learning Motivation  Strong  -    Learning Method  Explanation;Demonstration;Teach back;Written materials  -    Teaching Response  Verbalized understanding;Demonstrated understanding;Reinforcement needed  -HG    Education Comments  Education for swallowing exercises with pt and daughter. Allowed opportunities for teach back. Reviewed MBS findings with pt and plan of care for swallowing tx. Hmwk for journaling for cog goals.   -      User Key  (r) = Recorded By, (t) = Taken By, (c) = Cosigned By    Initials Name Effective Dates    HG Lisa Graves MS [facility]-SLP 08/09/20 -           SLP OP Goals     Row Name 02/23/21 1500          Goal Type Needed    Goal Type Needed  Memory;Attention/Orientation;Auditory Comprehension;Other Adult Goals;Dysphagia  -HG        Subjective Comments    Subjective Comments  Pt alert, cooperative, accompanied with daughter.   -HG        Auditory Comprehension Goals    Patient will comprehend spoken information in all settings  90%:;with cues  -HG     Status: Patient will comprehend spoken information in all settings  Progressing as expected  -HG     Comments: Patient will comprehend spoken  information in all settings  10/29/20: Pt's daughter states that she isn't sure her mom can read or understands the labels in her home for kitchen items.   -HG     Patient will demonstrate comprehension of spoken language by answering questions about a short paragraph  80%:;with cues  -HG     Status: Patient will demonstrate comprehension of spoken language by answering questions about a short paragraph  Progressing as expected  -HG     Comments: Patient will demonstrate comprehension of spoken language by answering questions about a short paragraph  2/23/21:  12-22 word paragraphs: 90% accurate.  22-36 word paragraphs: pt was 70% accurate.   -HG        Memory Goals    Patient and family will implement compensatory strategies to maximize patient’s Memory function so patient can continue to participate in daily activities  90%:;with cues  -HG     Status: Patient and family will implement compensatory strategies to maximize patient’s Memory function so patient can continue to participate in daily activities  New  -HG     Patient will demonstrate improved ability to recall information by immediately recalling a series of words  80%:;related;after delay;with cues  -HG     Status: Patient will demonstrate improved ability to recall information by immediately recalling a series of words  Progressing as expected  -HG     Comments: Patient will demonstrate improved ability to recall information by immediately recalling a series of words  1/26/21: Immediate recall of 3 words in a row and pt was 90% accurate. 1/15/21: SLUMS: Immediate recall of paragraph and pt was 1/8. 11/24/20: Mental Manipulation for progression and pt was 90% accurate. 11/17/20: Mental Manipulation by attribute, pt was 90% accurate and by word placement, pt was 90% accurate. 11/3/20: Immediate recall on FROMAJE: pt was 2/3.  -HG     Patient’s memory skills will be enhanced as reported by patient by utilizing internal memory strategies to recall up to 3  pieces of information after a 5- minute delay  80%:;with cues  -HG     Status: Patient’s memory skills will be enhanced as reported by patient by utilizing internal memory strategies to recall up to 3 pieces of information after a 5- minute delay  Progressing as expected  -HG     Comments: Patient’s memory skills will be enhanced as reported by patient by utilizing internal memory strategies to recall up to 3 pieces of information after a 5- minute delay  2/9/21: Delayed recall of 3 animal from 2 weeks ago: pt was 0/3 w/o cue. After repetition during session, pt was 3/3 x 3.  1/26/21: Delayed recall of 2 related pictures: pt was 1/15/21: SLUMS Delayed recall of 5 un-related words and pt was 0/5 I'ly and with cues, pt was 4/5.  12/8/20: Delayed recall of 2 new related pictures, pt was 1/2 to 2/2. 11/24/20: Delayed recall of 6 cont'd pictures from previous session: pt was 3/6 I'ly. 5/6 with min cues, 6/6 by the end of session. 11/17/20: Delayed recall of 6 related words: pt was 4/6, 5/6 and 6/6 all with written cue. 11/10/20: Delayed recall of 5 related words and pt was 3/5 with first initial presented. After review and use of first initial, pt was 5/5.   11/3/20: Delayed recall of 4 related (familiar to pt) pt was 3/4 x 2, with an increased written cue, pt was 4/4. 10/29/20: Delayed recall of 3 related pictures: pt was 2/3x 2, 3/3 x 3.    -HG     Patient’s memory skills will be enhanced as reported by patient by using external memory aides  80%:;with cues  -HG     Status: Patient’s memory skills will be enhanced as reported by patient by using external memory aides  Progressing as expected  -HG     Comments: Patient’s memory skills will be enhanced as reported by patient by using external memory aides  2/9/21: Pt returned with journal with entries since a few days ago. 1/26/21: Pt brought in journal but had not written in it so SLP had pt start an entry for today. 1/15/21: Re-educated pt and daughter on importance of  journaling. 12/8/20: Pt has not been writing in her journal. Started entry for today.   11/24/20: Pt had only journaled one time since last session- ideas for cues in order to prompt pt to write in her journal. 11/17/20: Pt continues to journal. 11/10/20: Reinforced journaling and purpose and need for reference. 11/3/20: Reinforced use of daily journaling. 10/29/20: Pt had not written in her journal yet- provided visual cues.   -HG     Patient will demonstrate improved ability to recall information by stating activities patient has completed that day  80%:;with cues  -HG     Status: Patient will demonstrate improved ability to recall information by stating activities patient has completed that day  Progressing as expected  -HG     Comments: Patient will demonstrate improved ability to recall information by stating activities patient has completed that day  2/9/21: Pt recalled foods that she had not eaten, daughter had to clarify for accuracy for journal. 1/26/21: Recall of meals this date with accurate recall of breakfast and no recall of lunch- required cue from daughter. 1/15/21: Pt does not recall having a nightmare per her daughter's report and with a cue, could recall what she had for breakfast: 11/3/20: [place] did write but not much. With cues, was able to recall events from weekend.   -HG        Attention/Orientation Goals    Patient will be able to interact safely in home environment  With Supervision  -HG     Status: Patient will be able to interact safely in home environment  Progressing as expected  -HG     Comments: Patient will be able to interact safely in home environment  1/15/21: SLUMS [employer]an and pt was 2/2.   -HG     Patient will improve orientation skills by orienting to self, place, time, and situation  80%:;with cues  -HG     Status: Patient will improve orientation skills by orienting to self, place, time, and situation  Progressing as expected  -HG      Comments: Patient will improve orientation skills by orienting to self, place, time, and situation  2/9/21: Pt used calendar in session for orientation purposes.  1/26/21: Pt has a calendar at home to use but was not oriented to date when starting her journal sheet. 11/24/20: Pt aware of date and day of week. Encouraged use of journal in order to keep up with orientation. 11/17/20: Pt used the calendar and pt was accurate with day of week and date.  11/10/20: Using calendar in office, pt was able to ID current date.  10/29/20: Provided calendar to assist with orientation.   -HG     Patient will improve attention skills by sustaining consistent behavioral response during continuous and repetitive activity (e.g., listening for target words, auditory/reading comprehension tasks)  with cues;5 minute task  -HG     Status: Patient will improve attention skills by sustaining consistent behavioral response during continuous and repetitive activity (e.g., listening for target words, auditory/reading comprehension tasks)  Progressing as expected  -HG     Comments: Patient will improve attention skills by sustaining consistent behavioral response during continuous and repetitive activity (e.g., listening for target words, auditory/reading comprehension tasks)  2/9/21: The game of "[employer]SH for consistency of following directions to a new game, pt was 70% accurate.  1/26/21: Word placement by occurrence and pt was 90% accurate, reversal and pt was 50% accurate. 1/15/21: One Pile Card Game: pt was 80% accurate. 11/3/20: One Pile Card Game:  Pt was 85% accurate.  10/29/20: One Pile Card Game: pt was 80% accurate with min to mod cues.   -HG     Patient will improve attention skills by focusing on environmental stimuli within left visuospatial field  80%:;with cues  -HG     Status: Patient will improve attention skills by focusing on environmental stimuli within left visuospatial field  Progressing as expected  -HG     Comments:  Patient will improve attention skills by focusing on environmental stimuli within left visuospatial field  1/26/21: Copying boxed information: pt was 75% accurate.   11/3/20: Shapes and figures: 3 in a row and pt was 80% accurate with mod verbal and visual cues.   -HG        Dysphagia Goals    Patient will safely consume the recommended diet without complications such as aspiration pneumonia  regular diet and thin liquids  -HG     Status: Patient will safely consume the recommended diet without complications such as aspiration pneumonia  New  -HG     Patient will improve stage transition duration of swallow/improve timing to reduce food falling into the airway by decreasing swallow delay after neurosensory  stimulation  three second prep;suck-swallow;super-supraglottic swallow;with cues  -HG     Status: Patient will improve stage transition duration of swallow/improve timing to reduce food falling into the airway by decreasing swallow delay after neurosensory  stimulation  New  -HG     Patient will increase laryngeal elevation to reduce residue that might fall into airway by completing  super-supraglottic swallow;falsetto/pitch glide;with cues  -HG     Status: Patient will increase laryngeal elevation to reduce residue that might fall into airway by completing  New  -HG     Patient will increase closure of larynx to keep food from falling into the airway by completing  super-supraglottic swallow;push-pull with breath hold;with cues  -HG     Status: Patient will increase closure of larynx to keep food from falling into the airway by completing  New  -HG     Patient will increase strength of tongue base and posterior pharyngeal walls to reduce residue that might fall into airway by completing  effotful swallow;Bernarda (tongue hold);with cues  -HG     Status: Patient will increase strength of tongue base and posterior pharyngeal walls to reduce residue that might fall into airway by completing  New  -HG        Other  Goals    Other Adult Goal- 1  Pt will complete thought organization tasks with 80% accuracy.  -HG     Status: Other Adult Goal- 1  Progressing as expected  -HG     Comments: Other Adult Goal- 1  2/23/21: 2/9/21: Following written directions: pt was 80% accurate.  1/15/21: SLUMS naming: pt was 8/15. 12/8/20: Category matrix: pt was 90% accurate.  Crossword puzzle and pt was 85% accurate. 11/24/20: Divergent naming: pt was 75-90% accurate . 11/17/20: Category Matrix: pt was 80% accurate I'ly. 11/10/20: Fill in word given first initial: pt was 100% accurate. 10/29/20: divergent naming: pt was 70% accurate. 90  -HG     Other Adult Goal- 2  Pt will complete reasoning and problem solving assessment with goals to follow as indicated.  -HG     Status: Other Adult Goal- 2  Progressing as expected  -HG     Comments: Other Adult Goal- 2  1/26/21: Connecting the dots alphabetically and pt required consistent cues. 12/8/20: Six step sequencing: pt was 90% accurate. 11/10/20: Written Directions: pt was 90% accurate. 11/3/20: FROMAJE: Pt scored a 15 placing pt in the severe dementia or depression range.   -HG     Other Adult Goal- 3  Pt will improve RBANS Total score to at least a 70 placing pt in the [place] range.   -HG        SLP Time Calculation    SLP Goal Re-Cert Due Date  03/25/21  -HG       User Key  (r) = Recorded By, (t) = Taken By, (c) = Cosigned By    Initials Name Provider Type     Lisa Graves MS CCC-SLP Speech and Language Pathologist          OP SLP Assessment/Plan - 02/23/21 1500        SLP Assessment    Functional Problems  Swallowing;Speech Language- Adult/Cognition   -    Impact on Function: Adult Speech Language/Cognition  Difficulty communicating in a medical emergency;Restrictions in personal and social life;Difficulty sequencing thoughts to express complex messages;Difficulty following directions;Lack of insight or awareness of deficits, safety issues;Unable to complete specified job  requirements;Decreased recall of personal information and medical history;Trouble learning or remembering new information;Poor attention to task;Requires supervision   -HG    Clinical Impression: Speech Language-Adult/Congnition  Severe:;Cognitive Communication Impairment   -HG    Impact on Function: Swallowing  Risk of aspiration;Risk of pneumonia   -HG    Clinical Impression: Swallowing  Mild:;oropharyngeal phase dysphagia   -HG    Functional Problems Comment  Pt not consistent with journaling and use of strategies at home. Swallowing: pt with s/s at home as to what prompted daughter to have pt evaluated.   -HG    Clinical Impression Comments  SLUMS Total score of 8/30 is in the Dementia range. MBS on 2/17/21 revealed Mild oropharyngeal dysphagia. MASA score of 198/200. Pt exhibited no overt s/s of aspiration during session however WVQ present intermittently t/o Clinical Swallow Evaluation this date.    -HG    Please refer to paper survey for additional self-reported information  Yes   -HG    Please refer to items scanned into chart for additional diagnostic informaiton and handouts as provided by clinician  Yes   -HG    SLP Diagnosis  Severe Cognitive communication impairment and Mild oropharyngeal dysphagia   -HG    Prognosis  Excellent (comment)   -HG    Patient/caregiver participated in establishment of treatment plan and goals  Yes   -HG    Patient would benefit from skilled therapy intervention  Yes   -HG       SLP Plan    Frequency  1-2x/week   -HG    Duration  8 weeks   -HG    Planned CPT's?  SLP INDIVIDUAL SPEECH THERAPY: 04229;SLP SWALLOW THERAPY: 51154   -HG    Expected Duration of Therapy Session (SLP Eval)  60   -HG    Plan Comments  Cont with Cog tx and Initiate Dysphagia tx.    -HG      User Key  (r) = Recorded By, (t) = Taken By, (c) = Cosigned By    Initials Name Provider Type     Lisa Graves MS CCC-SLP Speech and Language Pathologist                    Time Calculation:   SLP Start Time:  1500    Therapy Charges for Today     Code Description Service Date Service Provider Modifiers Qty    [id number]  ST EVAL ORAL PHARYNG SWALLOW 3 2/23/2021 Lisa Graves, MS CCC-SLP GN 1    [id number]  ST TREATMENT SPEECH 3 2/23/2021 Lisa Graves, MS CCC-SLP GN 1                   Lisa Graves MS ELISA-SLP  2/23/2021

## 2021-02-27 ENCOUNTER — TELEPHONE (OUTPATIENT)
Dept: ENDOCRINOLOGY | Facility: CLINIC | Age: 76
End: 2021-02-27

## 2021-02-27 DIAGNOSIS — K22.2 STRICTURE ESOPHAGUS: Primary | ICD-10-CM

## 2021-03-02 ENCOUNTER — HOSPITAL ENCOUNTER (OUTPATIENT)
Dept: SPEECH THERAPY | Facility: HOSPITAL | Age: 76
Setting detail: THERAPIES SERIES
Discharge: HOME | End: 2021-03-02

## 2021-03-02 DIAGNOSIS — G30.1 LATE ONSET ALZHEIMER'S DISEASE WITHOUT BEHAVIORAL DISTURBANCE (HCC): Primary | ICD-10-CM

## 2021-03-02 DIAGNOSIS — F02.80 LATE ONSET ALZHEIMER'S DISEASE WITHOUT BEHAVIORAL DISTURBANCE (HCC): Primary | ICD-10-CM

## 2021-03-02 PROCEDURE — 92507 TX SP LANG VOICE COMM INDIV: CPT

## 2021-03-02 NOTE — THERAPY TREATMENT NOTE
Outpatient Speech Language Pathology   Adult Speech Language Cognitive Treatment Note  [facility]     Patient Name: Brittny Nicolas  : 1945  MRN: [id number]  Today's Date: 3/2/2021         Visit Date: 2021   Patient Active Problem List   Diagnosis   • Achalasia   • Acute upper respiratory infection   • Atypical chest pain   • Benign essential hypertension   • Carotid artery stenosis   • Cataract   • Cough   • Acute cystitis   • Screening for malignant neoplasm   • Gastroesophageal reflux disease   • Essential familial hypercholesterolemia   • Gastrointestinal bleeding   • Inguinal hernia   • Insomnia   • Leg cramps   • Meniere's disease   • Night sweats   • Osteoporosis   • Peripheral neuropathy   • Retinal artery occlusion   • Rheumatoid arthritis (CMS/HCC)   • Stroke syndrome   • Suprapubic pain   • Transient ischemic attack   • Positive reaction to tuberculin skin test   • Vitamin D deficiency   • Weight loss   • H/O colonoscopy   • Fatigue   • Hypothyroidism   • Erosive esophagitis   • Left lower quadrant pain   • Acute pain of left shoulder   • Pharyngitis   • Memory loss   • Rib pain on left side   • Hyponatremia   • Late onset Alzheimer's disease without behavioral disturbance (CMS/[facility])          Visit Dx:    ICD-10-CM ICD-9-CM   1. Late onset Alzheimer's disease without behavioral disturbance (CMS/HCC)  G30.1 331.0    F02.80 294.10                         SLP OP Goals     Row Name 21 1500          Goal Type Needed    Goal Type Needed  Memory;Attention/Orientation;Auditory Comprehension;Other Adult Goals  -HG        Subjective Comments    Subjective Comments  Pt alert, cooperative, accompanied with daughter.   -HG        Auditory Comprehension Goals    Patient will comprehend spoken information in all settings  90%:;with cues  -HG     Status: Patient will comprehend spoken information in all settings  Progressing as expected  -HG     Comments: Patient will comprehend spoken information in  all settings  3/2/21: WH questions and pt was 90% accurate. 10/29/20: Pt's daughter states that she isn't sure her mom can read or understands the labels in her home for kitchen items.   -HG     Patient will demonstrate comprehension of spoken language by answering questions about a short paragraph  80%:;with cues  -HG     Status: Patient will demonstrate comprehension of spoken language by answering questions about a short paragraph  Progressing as expected  -HG     Comments: Patient will demonstrate comprehension of spoken language by answering questions about a short paragraph  2/23/21:  12-22 word paragraphs: 90% accurate.  22-36 word paragraphs: pt was 70% accurate.   -HG        Memory Goals    Patient and family will implement compensatory strategies to maximize patient’s Memory function so patient can continue to participate in daily activities  90%:;with cues  -HG     Status: Patient and family will implement compensatory strategies to maximize patient’s Memory function so patient can continue to participate in daily activities  New  -HG     Patient will demonstrate improved ability to recall information by immediately recalling a series of words  80%:;related;after delay;with cues  -HG     Status: Patient will demonstrate improved ability to recall information by immediately recalling a series of words  Progressing as expected  -HG     Comments: Patient will demonstrate improved ability to recall information by immediately recalling a series of words  3/2/21: Pt recalled all of her swallowing exercises and demonstrated them for recall purposes.  SLUMS Immediate recall of paragraph, pt was 0/8.  1/26/21: Immediate recall of 3 words in a row and pt was 90% accurate. 1/15/21: SLUMS: Immediate recall of paragraph and pt was 1/8. 11/24/20: Mental Manipulation for progression and pt was 90% accurate. 11/17/20: Mental Manipulation by attribute, pt was 90% accurate and by word placement, pt was 90% accurate.  11/3/20: Immediate recall on CELESTINE: pt was 2/3.  -HG     Patient’s memory skills will be enhanced as reported by patient by utilizing internal memory strategies to recall up to 3 pieces of information after a 5- minute delay  80%:;with cues  -HG     Status: Patient’s memory skills will be enhanced as reported by patient by utilizing internal memory strategies to recall up to 3 pieces of information after a 5- minute delay  Progressing as expected  -HG     Comments: Patient’s memory skills will be enhanced as reported by patient by utilizing internal memory strategies to recall up to 3 pieces of information after a 5- minute delay  3/2/21: Delayed recall of 4 related animals and pt was 4/4, SLUMS delayed was 0/5.  2/9/21: Delayed recall of 3 animal from 2 weeks ago: pt was 0/3 w/o cue. After repetition during session, pt was 3/3 x 3.  1/26/21: Delayed recall of 2 related pictures: pt was 1/15/21: SLUMS Delayed recall of 5 un-related words and pt was 0/5 I'ly and with cues, pt was 4/5.  12/8/20: Delayed recall of 2 new related pictures, pt was 1/2 to 2/2. 11/24/20: Delayed recall of 6 cont'd pictures from previous session: pt was 3/6 I'ly. 5/6 with min cues, 6/6 by the end of session. 11/17/20: Delayed recall of 6 related words: pt was 4/6, 5/6 and 6/6 all with written cue. 11/10/20: Delayed recall of 5 related words and pt was 3/5 with first initial presented. After review and use of first initial, pt was 5/5.   11/3/20: Delayed recall of 4 related (familiar to pt) pt was 3/4 x 2, with an increased written cue, pt was 4/4. 10/29/20: Delayed recall of 3 related pictures: pt was 2/3x 2, 3/3 x 3.    -HG     Patient’s memory skills will be enhanced as reported by patient by using external memory aides  80%:;with cues  -HG     Status: Patient’s memory skills will be enhanced as reported by patient by using external memory aides  Progressing as expected  -HG     Comments: Patient’s memory skills will be enhanced as  reported by patient by using external memory aides  3/2/21: Pt had not written in journal since last tx session. 2/9/21: Pt returned with journal with entries since a few days ago. 1/26/21: Pt brought in journal but had not written in it so SLP had pt start an entry for today. 1/15/21: Re-educated pt and daughter on importance of journaling. 12/8/20: Pt has not been writing in her journal. Started entry for today.   11/24/20: Pt had only journaled one time since last session- ideas for cues in order to prompt pt to write in her journal. 11/17/20: Pt continues to journal. 11/10/20: Reinforced journaling and purpose and need for reference. 11/3/20: Reinforced use of daily journaling. 10/29/20: Pt had not written in her journal yet- provided visual cues.   -HG     Patient will demonstrate improved ability to recall information by stating activities patient has completed that day  80%:;with cues  -HG     Status: Patient will demonstrate improved ability to recall information by stating activities patient has completed that day  Progressing as expected  -HG     Comments: Patient will demonstrate improved ability to recall information by stating activities patient has completed that day  3/21:  Pt did recall completing swallowing exercises and returned with handout with markings representing how often she completed them. 2/9/21: Pt recalled foods that she had not eaten, daughter had to clarify for accuracy for journal. 1/26/21: Recall of meals this date with accurate recall of breakfast and no recall of lunch- required cue from daughter. 1/15/21: Pt does not recall having a nightmare per her daughter's report and with a cue, could recall what she had for breakfast: 11/3/20: [place] did write but not much. With cues, was able to recall events from weekend.   -HG        Attention/Orientation Goals    Patient will be able to interact safely in home environment  With Supervision  -HG      Status: Patient will be able to interact safely in home environment  Progressing as expected  -HG     Comments: Patient will be able to interact safely in home environment  3/2/21: Pt aware of what to do if home alone and smells spoke or needs help since there is no land line phone at home. 1/15/21: SLUMS Dig[employer] Span and pt was 2/2.   -HG     Patient will improve orientation skills by orienting to self, place, time, and situation  80%:;with cues  -HG     Status: Patient will improve orientation skills by orienting to self, place, time, and situation  Progressing as expected  -HG     Comments: Patient will improve orientation skills by orienting to self, place, time, and situation  2/9/21: Pt used calendar in session for orientation purposes.  1/26/21: Pt has a calendar at home to use but was not oriented to date when starting her journal sheet. 11/24/20: Pt aware of date and day of week. Encouraged use of journal in order to keep up with orientation. 11/17/20: Pt used the calendar and pt was accurate with day of week and date.  11/10/20: Using calendar in office, pt was able to ID current date.  10/29/20: Provided calendar to assist with orientation.   -HG     Patient will improve attention skills by sustaining consistent behavioral response during continuous and repetitive activity (e.g., listening for target words, auditory/reading comprehension tasks)  with cues;5 minute task  -HG     Status: Patient will improve attention skills by sustaining consistent behavioral response during continuous and repetitive activity (e.g., listening for target words, auditory/reading comprehension tasks)  Progressing as expected  -HG     Comments: Patient will improve attention skills by sustaining consistent behavioral response during continuous and repetitive activity (e.g., listening for target words, auditory/reading comprehension tasks)  2/9/21: The game of iwi for consistency of following directions to a new game, pt was  70% accurate.  1/26/21: Word placement by occurrence and pt was 90% accurate, reversal and pt was 50% accurate. 1/15/21: One Pile Card Game: pt was 80% accurate. 11/3/20: One Pile Card Game:  Pt was 85% accurate.  10/29/20: One Pile Card Game: pt was 80% accurate with min to mod cues.   -HG     Patient will improve attention skills by focusing on environmental stimuli within left visuospatial field  80%:;with cues  -HG     Status: Patient will improve attention skills by focusing on environmental stimuli within left visuospatial field  Progressing as expected  -HG     Comments: Patient will improve attention skills by focusing on environmental stimuli within left visuospatial field  3/2/21: Copying boxed information and pt was 70% accurate with mod cues. 1/26/21: Copying boxed information: pt was 75% accurate.   11/3/20: Shapes and figures: 3 in a row and pt was 80% accurate with mod verbal and visual cues.   -HG        Dysphagia Goals    Patient will safely consume the recommended diet without complications such as aspiration pneumonia  regular diet and thin liquids  -HG     Status: Patient will safely consume the recommended diet without complications such as aspiration pneumonia  New  -HG     Patient will improve stage transition duration of swallow/improve timing to reduce food falling into the airway by decreasing swallow delay after neurosensory  stimulation  three second prep;suck-swallow;super-supraglottic swallow;with cues  -HG     Status: Patient will improve stage transition duration of swallow/improve timing to reduce food falling into the airway by decreasing swallow delay after neurosensory  stimulation  New  -HG     Patient will increase laryngeal elevation to reduce residue that might fall into airway by completing  super-supraglottic swallow;falsetto/pitch glide;with cues  -HG     Status: Patient will increase laryngeal elevation to reduce residue that might fall into airway by completing  New  -HG      Patient will increase closure of larynx to keep food from falling into the airway by completing  super-supraglottic swallow;push-pull with breath hold;with cues  -HG     Status: Patient will increase closure of larynx to keep food from falling into the airway by completing  New  -HG     Patient will increase strength of tongue base and posterior pharyngeal walls to reduce residue that might fall into airway by completing  effotful swallow;Bernarda (tongue hold);with cues  -HG     Status: Patient will increase strength of tongue base and posterior pharyngeal walls to reduce residue that might fall into airway by completing  New  -HG        Other Goals    Other Adult Goal- 1  Pt will complete thought organization tasks with 80% accuracy.  -HG     Status: Other Adult Goal- 1  Progressing as expected  -HG     Comments: Other Adult Goal- 1  3/2/21: SLUMS assesement from 2/23/21 and pt was 6/15 for divergent naming. This date, for divergent naming: pt was 80% accurate. 2/9/21: Following written directions: pt was 80% accurate.  1/15/21: SLUMS naming: pt was 8/15. 12/8/20: Category matrix: pt was 90% accurate.  Crossword puzzle and pt was 85% accurate. 11/24/20: Divergent naming: pt was 75-90% accurate . 11/17/20: Category Matrix: pt was 80% accurate I'ly. 11/10/20: Fill in word given first initial: pt was 100% accurate. 10/29/20: divergent naming: pt was 70% accurate. 90  -HG     Other Adult Goal- 2  Pt will complete reasoning and problem solving assessment with goals to follow as indicated.  -HG     Status: Other Adult Goal- 2  Progressing as expected  -HG     Comments: Other Adult Goal- 2  3/2/21: Deductive reasoning: pt was 95% accurate. Clock work: pt was 9/12.  Emergent situations and pt was 90% accurate. 1/26/21: Connecting the dots alphabetically and pt required consistent cues. 12/8/20: Six step sequencing: pt was 90% accurate. 11/10/20: Written Directions: pt was 90% accurate. 11/3/20: FROMAJE: Pt scored a 15  placing pt in the severe dementia or depression range.   -HG     Other Adult Goal- 3  Pt will improve RBANS Total score to at least a 70 placing pt in the [place] range.   -HG     Status: Other Adult Goal- 3  Progressing as expected  -HG     Comments: Other Adult Goal- 3  3/1/21: From re-assessment using SLUMS on 2/23/21, pt was 8/20 placing pt in the Dementia range.   -HG        SLP Time Calculation    SLP Goal Re-Cert Due Date  03/25/21  -HG       User Key  (r) = Recorded By, (t) = Taken By, (c) = Cosigned By    Initials Name Provider Type    Lisa Marquis MS CCC-SLP Speech and Language Pathologist          OP SLP Education     Row Name 03/02/21 1500       Education    Education Comments  Hmwk for 6 related animals and journal work.   -HG      User Key  (r) = Recorded By, (t) = Taken By, (c) = Cosigned By    Initials Name Effective Dates    Lisa Marquis MS CCC-SLP 08/09/20 -           OP SLP Assessment/Plan - 03/02/21 1500        SLP Assessment    Functional Problems  Swallowing;Speech Language- Adult/Cognition   -HG    Impact on Function: Adult Speech Language/Cognition  Difficulty communicating wants, needs, and ideas;Difficulty communicating in a medical emergency;Restrictions in personal and social life;Difficulty sequencing thoughts to express complex messages;Unable to understand written/spoken language;Difficulty following directions;Difficulty sequencing or problem solving to complete ADLs;Unrealistic view of abilities/deficits;Lack of insight or awareness of deficits, safety issues;Decreased recall of personal information and medical history;Trouble learning or remembering new information;Poor attention to task;Poor judgment;Requires supervision   -HG    Clinical Impression: Speech Language-Adult/Congnition  Severe:;Cognitive Communication Impairment   -HG    Clinical Impression Comments  SLUMS Total score on 2/23/21 was an 8/20- Dementia range.   -HG    Please refer to paper survey  for additional self-reported information  Yes   -HG    Please refer to items scanned into chart for additional diagnostic informaiton and handouts as provided by clinician  Yes   -HG    SLP Diagnosis  Severe cognitive impairment   -HG    Prognosis  Excellent (comment)   -HG    Patient/caregiver participated in establishment of treatment plan and goals  Yes   -HG    Patient would benefit from skilled therapy intervention  Yes   -HG       SLP Plan    Plan Comments  Cont with Cog tx.    -HG      User Key  (r) = Recorded By, (t) = Taken By, (c) = Cosigned By    Initials Name Provider Type     Lisa Graves MS CCC-SLP Speech and Language Pathologist                 Time Calculation:   SLP Start Time: 1500    Therapy Charges for Today     Code Description Service Date Service Provider Modifiers Qty    [id number]  ST TREATMENT SPEECH 4 3/2/2021 Lisa Graves MS CCC-SLP GN 1                   Lisa Graves MS CCC-SLP  3/2/2021

## 2021-03-09 ENCOUNTER — HOSPITAL ENCOUNTER (OUTPATIENT)
Dept: SPEECH THERAPY | Facility: HOSPITAL | Age: 76
Setting detail: THERAPIES SERIES
Discharge: HOME | End: 2021-03-09

## 2021-03-09 DIAGNOSIS — F02.80 LATE ONSET ALZHEIMER'S DISEASE WITHOUT BEHAVIORAL DISTURBANCE (HCC): Primary | ICD-10-CM

## 2021-03-09 DIAGNOSIS — G30.1 LATE ONSET ALZHEIMER'S DISEASE WITHOUT BEHAVIORAL DISTURBANCE (HCC): Primary | ICD-10-CM

## 2021-03-09 PROCEDURE — 92526 ORAL FUNCTION THERAPY: CPT

## 2021-03-09 NOTE — THERAPY TREATMENT NOTE
Outpatient Speech Language Pathology   Adult Swallow Treatment Note   [facility]     Patient Name: Brittny Nicolas  : 1945  MRN: [id number]  Today's Date: 3/9/2021         Visit Date: 2021   Patient Active Problem List   Diagnosis   • Achalasia   • Acute upper respiratory infection   • Atypical chest pain   • Benign essential hypertension   • Carotid artery stenosis   • Cataract   • Cough   • Acute cystitis   • Screening for malignant neoplasm   • Gastroesophageal reflux disease   • Essential familial hypercholesterolemia   • Gastrointestinal bleeding   • Inguinal hernia   • Insomnia   • Leg cramps   • Meniere's disease   • Night sweats   • Osteoporosis   • Peripheral neuropathy   • Retinal artery occlusion   • Rheumatoid arthritis (CMS/HCC)   • Stroke syndrome   • Suprapubic pain   • Transient ischemic attack   • Positive reaction to tuberculin skin test   • Vitamin D deficiency   • Weight loss   • H/O colonoscopy   • Fatigue   • Hypothyroidism   • Erosive esophagitis   • Left lower quadrant pain   • Acute pain of left shoulder   • Pharyngitis   • Memory loss   • Rib pain on left side   • Hyponatremia   • Late onset Alzheimer's disease without behavioral disturbance (CMS/[facility])        Visit Dx:    ICD-10-CM ICD-9-CM   1. Late onset Alzheimer's disease without behavioral disturbance (CMS/[facility])  G30.1 331.0    F02.80 294.10                         SLP OP Goals     Row Name 21 1500          Goal Type Needed    Goal Type Needed  Memory;Attention/Orientation;Auditory Comprehension;Other Adult Goals  -HG        Subjective Comments    Subjective Comments  Pt alert, cooperative, accompanied with daughter.   -HG        Auditory Comprehension Goals    Patient will comprehend spoken information in all settings  90%:;with cues  -HG     Status: Patient will comprehend spoken information in all settings  Progressing as expected  -HG     Comments: Patient will comprehend spoken information in all settings  3/2/21:  WH questions and pt was 90% accurate. 10/29/20: Pt's daughter states that she isn't sure her mom can read or understands the labels in her home for kitchen items.   -HG     Patient will demonstrate comprehension of spoken language by answering questions about a short paragraph  80%:;with cues  -HG     Status: Patient will demonstrate comprehension of spoken language by answering questions about a short paragraph  Progressing as expected  -HG     Comments: Patient will demonstrate comprehension of spoken language by answering questions about a short paragraph  2/23/21:  12-22 word paragraphs: 90% accurate.  22-36 word paragraphs: pt was 70% accurate.   -HG        Memory Goals    Patient and family will implement compensatory strategies to maximize patient’s Memory function so patient can continue to participate in daily activities  90%:;with cues  -HG     Status: Patient and family will implement compensatory strategies to maximize patient’s Memory function so patient can continue to participate in daily activities  New  -HG     Patient will demonstrate improved ability to recall information by immediately recalling a series of words  80%:;related;after delay;with cues  -HG     Status: Patient will demonstrate improved ability to recall information by immediately recalling a series of words  Progressing as expected  -HG     Comments: Patient will demonstrate improved ability to recall information by immediately recalling a series of words  3/9/21: SRT for taking pills one at time and pt made it up to 10 mins. 3/2/21: Pt recalled all of her swallowing exercises and demonstrated them for recall purposes.  SLUMS Immediate recall of paragraph, pt was 0/8.  1/26/21: Immediate recall of 3 words in a row and pt was 90% accurate. 1/15/21: SLUMS: Immediate recall of paragraph and pt was 1/8. 11/24/20: Mental Manipulation for progression and pt was 90% accurate. 11/17/20: Mental Manipulation by attribute, pt was 90% accurate  and by word placement, pt was 90% accurate. 11/3/20: Immediate recall on FROMAJE: pt was 2/3.  -HG     Patient’s memory skills will be enhanced as reported by patient by utilizing internal memory strategies to recall up to 3 pieces of information after a 5- minute delay  80%:;with cues  -HG     Status: Patient’s memory skills will be enhanced as reported by patient by utilizing internal memory strategies to recall up to 3 pieces of information after a 5- minute delay  Progressing as expected  -HG     Comments: Patient’s memory skills will be enhanced as reported by patient by utilizing internal memory strategies to recall up to 3 pieces of information after a 5- minute delay  3/9/21: Delayed recall of 6 related animals and pt was 6/6. 3/2/21: Delayed recall of 4 related animals and pt was 4/4, SLUMS delayed was 0/5.  2/9/21: Delayed recall of 3 animal from 2 weeks ago: pt was 0/3 w/o cue. After repetition during session, pt was 3/3 x 3.  1/26/21: Delayed recall of 2 related pictures: pt was 1/15/21: SLUMS Delayed recall of 5 un-related words and pt was 0/5 I'ly and with cues, pt was 4/5.  12/8/20: Delayed recall of 2 new related pictures, pt was 1/2 to 2/2. 11/24/20: Delayed recall of 6 cont'd pictures from previous session: pt was 3/6 I'ly. 5/6 with min cues, 6/6 by the end of session. 11/17/20: Delayed recall of 6 related words: pt was 4/6, 5/6 and 6/6 all with written cue. 11/10/20: Delayed recall of 5 related words and pt was 3/5 with first initial presented. After review and use of first initial, pt was 5/5.   11/3/20: Delayed recall of 4 related (familiar to pt) pt was 3/4 x 2, with an increased written cue, pt was 4/4. 10/29/20: Delayed recall of 3 related pictures: pt was 2/3x 2, 3/3 x 3.    -HG     Patient’s memory skills will be enhanced as reported by patient by using external memory aides  80%:;with cues  -HG     Status: Patient’s memory skills will be enhanced as reported by patient by using external  memory aides  Progressing as expected  -HG     Comments: Patient’s memory skills will be enhanced as reported by patient by using external memory aides  3/9/21: Pt had not written in journal, pt's journal is now set for the coming week for each day. 3/2/21: Pt had not written in journal since last tx session. 2/9/21: Pt returned with journal with entries since a few days ago. 1/26/21: Pt brought in journal but had not written in it so SLP had pt start an entry for today. 1/15/21: Re-educated pt and daughter on importance of journaling. 12/8/20: Pt has not been writing in her journal. Started entry for today.   11/24/20: Pt had only journaled one time since last session- ideas for cues in order to prompt pt to write in her journal. 11/17/20: Pt continues to journal. 11/10/20: Reinforced journaling and purpose and need for reference. 11/3/20: Reinforced use of daily journaling. 10/29/20: Pt had not written in her journal yet- provided visual cues.   -HG     Patient will demonstrate improved ability to recall information by stating activities patient has completed that day  80%:;with cues  -HG     Status: Patient will demonstrate improved ability to recall information by stating activities patient has completed that day  Progressing as expected  -HG     Comments: Patient will demonstrate improved ability to recall information by stating activities patient has completed that day  3/9/21: Pt struggled with recall. of her lunch and afternoon chore without cues from daughter. 3/21:  Pt did recall completing swallowing exercises and returned with handout with markings representing how often she completed them. 2/9/21: Pt recalled foods that she had not eaten, daughter had to clarify for accuracy for journal. 1/26/21: Recall of meals this date with accurate recall of breakfast and no recall of lunch- required cue from daughter. 1/15/21: Pt does not recall having a nightmare per her daughter's report and with a cue, could  recall what she had for breakfast: 11/3/20: [place] did write but not much. With cues, was able to recall events from weekend.   -HG        Attention/Orientation Goals    Patient will be able to interact safely in home environment  With Supervision  -HG     Status: Patient will be able to interact safely in home environment  Progressing as expected  -HG     Comments: Patient will be able to interact safely in home environment  3/2/21: Pt aware of what to do if home alone and smells spoke or needs help since there is no land line phone at home. 1/15/21: SLUMS Dig[employer] Span and pt was 2/2.   -HG     Patient will improve orientation skills by orienting to self, place, time, and situation  80%:;with cues  -HG     Status: Patient will improve orientation skills by orienting to self, place, time, and situation  Progressing as expected  -HG     Comments: Patient will improve orientation skills by orienting to self, place, time, and situation  2/9/21: Pt used calendar in session for orientation purposes.  1/26/21: Pt has a calendar at home to use but was not oriented to date when starting her journal sheet. 11/24/20: Pt aware of date and day of week. Encouraged use of journal in order to keep up with orientation. 11/17/20: Pt used the calendar and pt was accurate with day of week and date.  11/10/20: Using calendar in office, pt was able to ID current date.  10/29/20: Provided calendar to assist with orientation.   -HG     Patient will improve attention skills by sustaining consistent behavioral response during continuous and repetitive activity (e.g., listening for target words, auditory/reading comprehension tasks)  with cues;5 minute task  -HG     Status: Patient will improve attention skills by sustaining consistent behavioral response during continuous and repetitive activity (e.g., listening for target words, auditory/reading comprehension tasks)  Progressing as expected  -HG      Comments: Patient will improve attention skills by sustaining consistent behavioral response during continuous and repetitive activity (e.g., listening for target words, auditory/reading comprehension tasks)  2/9/21: The game of TRASH for consistency of following directions to a new game, pt was 70% accurate.  1/26/21: Word placement by occurrence and pt was 90% accurate, reversal and pt was 50% accurate. 1/15/21: One Pile Card Game: pt was 80% accurate. 11/3/20: One Pile Card Game:  Pt was 85% accurate.  10/29/20: One Pile Card Game: pt was 80% accurate with min to mod cues.   -HG     Patient will improve attention skills by focusing on environmental stimuli within left visuospatial field  80%:;with cues  -HG     Status: Patient will improve attention skills by focusing on environmental stimuli within left visuospatial field  Progressing as expected  -HG     Comments: Patient will improve attention skills by focusing on environmental stimuli within left visuospatial field  3/2/21: Copying boxed information and pt was 70% accurate with mod cues. 1/26/21: Copying boxed information: pt was 75% accurate.   11/3/20: Shapes and figures: 3 in a row and pt was 80% accurate with mod verbal and visual cues.   -HG        Dysphagia Goals    Patient will safely consume the recommended diet without complications such as aspiration pneumonia  regular diet and thin liquids  -HG     Status: Patient will safely consume the recommended diet without complications such as aspiration pneumonia  Progressing as expected  -HG     Comments: Patient will safely consume the recommended diet without complications such as aspiration pneumonia  3/9/21: Pt reports no difficulties eating and drinking at home where daughter reports hearing the large gulps.   -HG     Patient will improve stage transition duration of swallow/improve timing to reduce food falling into the airway by decreasing swallow delay after neurosensory  stimulation  three second  prep;suck-swallow;super-supraglottic swallow;with cues  -HG     Status: Patient will improve stage transition duration of swallow/improve timing to reduce food falling into the airway by decreasing swallow delay after neurosensory  stimulation  Progressing as expected  -HG     Comments: Patient will improve stage transition duration of swallow/improve timing to reduce food falling into the airway by decreasing swallow delay after neurosensory  stimulation  3/9/21: Pt completed during session x 2 each.   -HG     Patient will increase laryngeal elevation to reduce residue that might fall into airway by completing  super-supraglottic swallow;falsetto/pitch glide;with cues  -HG     Status: Patient will increase laryngeal elevation to reduce residue that might fall into airway by completing  Progressing as expected  -HG     Comments: Patient will increase laryngeal elevation to reduce residue that might fall into airway by completing  3/9/21: Pt completed during session x 2 each.   -HG     Patient will increase closure of larynx to keep food from falling into the airway by completing  super-supraglottic swallow;push-pull with breath hold;with cues  -HG     Status: Patient will increase closure of larynx to keep food from falling into the airway by completing  Progressing as expected  -HG     Comments: Patient will increase closure of larynx to keep food from falling into the airway by completing  3/9/21: Pt completed during session x 2 each.   -HG     Patient will increase strength of tongue base and posterior pharyngeal walls to reduce residue that might fall into airway by completing  effotful swallow;Bernarda (tongue hold);with cues  -HG     Status: Patient will increase strength of tongue base and posterior pharyngeal walls to reduce residue that might fall into airway by completing  Progressing as expected  -HG     Comments: Patient will increase strength of tongue base and posterior pharyngeal walls to reduce residue  that might fall into airway by completing  3/9/21: Pt completed during session x 2 each.   -HG        Other Goals    Other Adult Goal- 1  Pt will complete thought organization tasks with 80% accuracy.  -HG     Status: Other Adult Goal- 1  Progressing as expected  -HG     Comments: Other Adult Goal- 1  3/9/21: 5 step sentence sequencing: pt was 100% accurate. 3/2/21: SLUMS assesement from 2/23/21 and pt was 6/15 for divergent naming. This date, for divergent naming: pt was 80% accurate. 2/9/21: Following written directions: pt was 80% accurate.  1/15/21: SLUMS naming: pt was 8/15. 12/8/20: Category matrix: pt was 90% accurate.  Crossword puzzle and pt was 85% accurate. 11/24/20: Divergent naming: pt was 75-90% accurate . 11/17/20: Category Matrix: pt was 80% accurate I'ly. 11/10/20: Fill in word given first initial: pt was 100% accurate. 10/29/20: divergent naming: pt was 70% accurate. 90  -HG     Other Adult Goal- 2  Pt will complete reasoning and problem solving assessment with goals to follow as indicated.  -HG     Status: Other Adult Goal- 2  Progressing as expected  -HG     Comments: Other Adult Goal- 2  3/2/21: Deductive reasoning: pt was 95% accurate. Clock work: pt was 9/12.  Emergent situations and pt was 90% accurate. 1/26/21: Connecting the dots alphabetically and pt required consistent cues. 12/8/20: Six step sequencing: pt was 90% accurate. 11/10/20: Written Directions: pt was 90% accurate. 11/3/20: FROMAJE: Pt scored a 15 placing pt in the severe dementia or depression range.   -HG     Other Adult Goal- 3  Pt will improve RBANS Total score to at least a 70 placing pt in the [place] range.   -HG     Status: Other Adult Goal- 3  Progressing as expected  -HG     Comments: Other Adult Goal- 3  3/1/21: From re-assessment using SLUMS on 2/23/21, pt was 8/20 placing pt in the Dementia range.   -HG        SLP Time Calculation    SLP Goal Re-Cert Due Date  03/25/21  -HG       User Key  (r) = Recorded By, (t)  = Taken By, (c) = Cosigned By    Initials Name Provider Type    Lisa Marquis MS CCC-SLP Speech and Language Pathologist          OP SLP Education     Row Name 03/09/21 1500       Education    Education Comments  Education for continuing to do swallowing exercises.   -      User Key  (r) = Recorded By, (t) = Taken By, (c) = Cosigned By    Initials Name Effective Dates    Lisa Marquis MS CCC-SLP 08/09/20 -           OP SLP Assessment/Plan - 03/09/21 1500        SLP Plan    Plan Comments  Cont with Dysphagia tx.    -      User Key  (r) = Recorded By, (t) = Taken By, (c) = Cosigned By    Initials Name Provider Type    Lisa Marquis MS CCC-SLP Speech and Language Pathologist                Time Calculation:   SLP Start Time: 1500    Therapy Charges for Today     Code Description Service Date Service Provider Modifiers Qty    [id number]  ST TREATMENT SWALLOW 4 3/9/2021 Lisa Graves MS CCC-SLP GN 1                   Lisa Graves MS CCC-SLP  3/9/2021

## 2021-03-16 ENCOUNTER — HOSPITAL ENCOUNTER (OUTPATIENT)
Dept: SPEECH THERAPY | Facility: HOSPITAL | Age: 76
Setting detail: THERAPIES SERIES
Discharge: HOME | End: 2021-03-16

## 2021-03-16 DIAGNOSIS — F02.80 LATE ONSET ALZHEIMER'S DISEASE WITHOUT BEHAVIORAL DISTURBANCE (HCC): Primary | ICD-10-CM

## 2021-03-16 DIAGNOSIS — G30.1 LATE ONSET ALZHEIMER'S DISEASE WITHOUT BEHAVIORAL DISTURBANCE (HCC): Primary | ICD-10-CM

## 2021-03-16 PROCEDURE — 92507 TX SP LANG VOICE COMM INDIV: CPT

## 2021-03-16 NOTE — THERAPY TREATMENT NOTE
Outpatient Speech Language Pathology   Adult Speech Language Cognitive Treatment Note   [place]     Patient Name: Brittny Nicolas  : 1945  MRN: [id number]  Today's Date: 3/16/2021         Visit Date: 2021   Patient Active Problem List   Diagnosis   • Achalasia   • Acute upper respiratory infection   • Atypical chest pain   • Benign essential hypertension   • Carotid artery stenosis   • Cataract   • Cough   • Acute cystitis   • Screening for malignant neoplasm   • Gastroesophageal reflux disease   • Essential familial hypercholesterolemia   • Gastrointestinal bleeding   • Inguinal hernia   • Insomnia   • Leg cramps   • Meniere's disease   • Night sweats   • Osteoporosis   • Peripheral neuropathy   • Retinal artery occlusion   • Rheumatoid arthritis (CMS/HCC)   • Stroke syndrome   • Suprapubic pain   • Transient ischemic attack   • Positive reaction to tuberculin skin test   • Vitamin D deficiency   • Weight loss   • H/O colonoscopy   • Fatigue   • Hypothyroidism   • Erosive esophagitis   • Left lower quadrant pain   • Acute pain of left shoulder   • Pharyngitis   • Memory loss   • Rib pain on left side   • Hyponatremia   • Late onset Alzheimer's disease without behavioral disturbance (CMS/[facility])          Visit Dx:    ICD-10-CM ICD-9-CM   1. Late onset Alzheimer's disease without behavioral disturbance (CMS/HCC)  G30.1 331.0    F02.80 294.10                         SLP OP Goals     Row Name 21 1500          Goal Type Needed    Goal Type Needed  Memory;Attention/Orientation;Auditory Comprehension;Other Adult Goals  -HG        Subjective Comments    Subjective Comments  Pt alert, cooperative, accompanied with daughter.   -HG        Auditory Comprehension Goals    Patient will comprehend spoken information in all settings  90%:;with cues  -HG     Status: Patient will comprehend spoken information in all settings  Progressing as expected  -HG     Comments: Patient will comprehend spoken information in  all settings  3/2/21: WH questions and pt was 90% accurate. 10/29/20: Pt's daughter states that she isn't sure her mom can read or understands the labels in her home for kitchen items.   -HG     Patient will demonstrate comprehension of spoken language by answering questions about a short paragraph  80%:;with cues  -HG     Status: Patient will demonstrate comprehension of spoken language by answering questions about a short paragraph  Progressing as expected  -HG     Comments: Patient will demonstrate comprehension of spoken language by answering questions about a short paragraph  2/23/21:  12-22 word paragraphs: 90% accurate.  22-36 word paragraphs: pt was 70% accurate.   -HG        Memory Goals    Patient and family will implement compensatory strategies to maximize patient’s Memory function so patient can continue to participate in daily activities  90%:;with cues  -HG     Status: Patient and family will implement compensatory strategies to maximize patient’s Memory function so patient can continue to participate in daily activities  New  -HG     Patient will demonstrate improved ability to recall information by immediately recalling a series of words  80%:;related;after delay;with cues  -HG     Status: Patient will demonstrate improved ability to recall information by immediately recalling a series of words  Progressing as expected  -HG     Comments: Patient will demonstrate improved ability to recall information by immediately recalling a series of words  3/16/21: SRT for pee pads at home for cat, pt was successful for 8 min delay, 16 mins required a look at the cue card.    Dtr reports that pt is nowing taking her pills one at a time which was targeted last with SRT. 3/9/21: SRT for taking pills one at time and pt made it up to 10 mins. 3/2/21: Pt recalled all of her swallowing exercises and demonstrated them for recall purposes.  ALFONSO Immediate recall of paragraph, pt was 0/8.  1/26/21: Immediate recall of 3  words in a row and pt was 90% accurate. 1/15/21: SLUMS: Immediate recall of paragraph and pt was 1/8. 11/24/20: Mental Manipulation for progression and pt was 90% accurate. 11/17/20: Mental Manipulation by attribute, pt was 90% accurate and by word placement, pt was 90% accurate. 11/3/20: Immediate recall on FROMAJE: pt was 2/3.  -HG     Patient’s memory skills will be enhanced as reported by patient by utilizing internal memory strategies to recall up to 3 pieces of information after a 5- minute delay  80%:;with cues  -HG     Status: Patient’s memory skills will be enhanced as reported by patient by utilizing internal memory strategies to recall up to 3 pieces of information after a 5- minute delay  Progressing as expected  -HG     Comments: Patient’s memory skills will be enhanced as reported by patient by utilizing internal memory strategies to recall up to 3 pieces of information after a 5- minute delay  3/9/21: Delayed recall of 6 related animals and pt was 6/6. 3/2/21: Delayed recall of 4 related animals and pt was 4/4, SLUMS delayed was 0/5.  2/9/21: Delayed recall of 3 animal from 2 weeks ago: pt was 0/3 w/o cue. After repetition during session, pt was 3/3 x 3.  1/26/21: Delayed recall of 2 related pictures: pt was 1/15/21: SLUMS Delayed recall of 5 un-related words and pt was 0/5 I'ly and with cues, pt was 4/5.  12/8/20: Delayed recall of 2 new related pictures, pt was 1/2 to 2/2. 11/24/20: Delayed recall of 6 cont'd pictures from previous session: pt was 3/6 I'ly. 5/6 with min cues, 6/6 by the end of session. 11/17/20: Delayed recall of 6 related words: pt was 4/6, 5/6 and 6/6 all with written cue. 11/10/20: Delayed recall of 5 related words and pt was 3/5 with first initial presented. After review and use of first initial, pt was 5/5.   11/3/20: Delayed recall of 4 related (familiar to pt) pt was 3/4 x 2, with an increased written cue, pt was 4/4. 10/29/20: Delayed recall of 3 related pictures: pt was  2/3x 2, 3/3 x 3.    -HG     Patient’s memory skills will be enhanced as reported by patient by using external memory aides  80%:;with cues  -HG     Status: Patient’s memory skills will be enhanced as reported by patient by using external memory aides  Progressing as expected  -HG     Comments: Patient’s memory skills will be enhanced as reported by patient by using external memory aides  3/16/21: Pt had one entry since last session- started entry for this date. 3/9/21: Pt had not written in journal, pt's journal is now set for the coming week for each day. 3/2/21: Pt had not written in journal since last tx session. 2/9/21: Pt returned with journal with entries since a few days ago. 1/26/21: Pt brought in journal but had not written in it so SLP had pt start an entry for today. 1/15/21: Re-educated pt and daughter on importance of journaling. 12/8/20: Pt has not been writing in her journal. Started entry for today.   11/24/20: Pt had only journaled one time since last session- ideas for cues in order to prompt pt to write in her journal. 11/17/20: Pt continues to journal. 11/10/20: Reinforced journaling and purpose and need for reference. 11/3/20: Reinforced use of daily journaling. 10/29/20: Pt had not written in her journal yet- provided visual cues.   -HG     Patient will demonstrate improved ability to recall information by stating activities patient has completed that day  80%:;with cues  -HG     Status: Patient will demonstrate improved ability to recall information by stating activities patient has completed that day  Progressing as expected  -HG     Comments: Patient will demonstrate improved ability to recall information by stating activities patient has completed that day  3/9/21: Pt struggled with recall. of her lunch and afternoon chore without cues from daughter. 3/21:  Pt did recall completing swallowing exercises and returned with handout with markings representing how often she completed them.  2/9/21: Pt recalled foods that she had not eaten, daughter had to clarify for accuracy for journal. 1/26/21: Recall of meals this date with accurate recall of breakfast and no recall of lunch- required cue from daughter. 1/15/21: Pt does not recall having a nightmare per her daughter's report and with a cue, could recall what she had for breakfast: 11/3/20: [place] did write but not much. With cues, was able to recall events from weekend.   -HG        Attention/Orientation Goals    Patient will be able to interact safely in home environment  With Supervision  -HG     Status: Patient will be able to interact safely in home environment  Progressing as expected  -HG     Comments: Patient will be able to interact safely in home environment  3/2/21: Pt aware of what to do if home alone and smells spoke or needs help since there is no land line phone at home. 1/15/21: SLUMS [employer]an and pt was 2/2.   -HG     Patient will improve orientation skills by orienting to self, place, time, and situation  80%:;with cues  -HG     Status: Patient will improve orientation skills by orienting to self, place, time, and situation  Progressing as expected  -HG     Comments: Patient will improve orientation skills by orienting to self, place, time, and situation  2/9/21: Pt used calendar in session for orientation purposes.  1/26/21: Pt has a calendar at home to use but was not oriented to date when starting her journal sheet. 11/24/20: Pt aware of date and day of week. Encouraged use of journal in order to keep up with orientation. 11/17/20: Pt used the calendar and pt was accurate with day of week and date.  11/10/20: Using calendar in office, pt was able to ID current date.  10/29/20: Provided calendar to assist with orientation.   -HG     Patient will improve attention skills by sustaining consistent behavioral response during continuous and repetitive activity (e.g., listening for target words,  auditory/reading comprehension tasks)  with cues;5 minute task  -HG     Status: Patient will improve attention skills by sustaining consistent behavioral response during continuous and repetitive activity (e.g., listening for target words, auditory/reading comprehension tasks)  Progressing as expected  -HG     Comments: Patient will improve attention skills by sustaining consistent behavioral response during continuous and repetitive activity (e.g., listening for target words, auditory/reading comprehension tasks)  3/16/21: The game of TRASH: pt was 80% accurate with mod cues.  2/9/21: The game of TRASH for consistency of following directions to a new game, pt was 70% accurate.  1/26/21: Word placement by occurrence and pt was 90% accurate, reversal and pt was 50% accurate. 1/15/21: One Pile Card Game: pt was 80% accurate. 11/3/20: One Pile Card Game:  Pt was 85% accurate.  10/29/20: One Pile Card Game: pt was 80% accurate with min to mod cues.   -HG     Patient will improve attention skills by focusing on environmental stimuli within left visuospatial field  80%:;with cues  -HG     Status: Patient will improve attention skills by focusing on environmental stimuli within left visuospatial field  Progressing as expected  -HG     Comments: Patient will improve attention skills by focusing on environmental stimuli within left visuospatial field  3/16/21: Card sorting for two different tasks and pt was 60% accurate I'ly, with visual prompts, pt was 80% accurate. 3/2/21: Copying boxed information and pt was 70% accurate with mod cues. 1/26/21: Copying boxed information: pt was 75% accurate.   11/3/20: Shapes and figures: 3 in a row and pt was 80% accurate with mod verbal and visual cues.   -HG        Dysphagia Goals    Patient will safely consume the recommended diet without complications such as aspiration pneumonia  regular diet and thin liquids  -HG     Status: Patient will safely consume the recommended diet without  complications such as aspiration pneumonia  Progressing as expected  -HG     Comments: Patient will safely consume the recommended diet without complications such as aspiration pneumonia  3/9/21: Pt reports no difficulties eating and drinking at home where daughter reports hearing the large gulps.   -HG     Patient will improve stage transition duration of swallow/improve timing to reduce food falling into the airway by decreasing swallow delay after neurosensory  stimulation  three second prep;suck-swallow;super-supraglottic swallow;with cues  -HG     Status: Patient will improve stage transition duration of swallow/improve timing to reduce food falling into the airway by decreasing swallow delay after neurosensory  stimulation  Progressing as expected  -HG     Comments: Patient will improve stage transition duration of swallow/improve timing to reduce food falling into the airway by decreasing swallow delay after neurosensory  stimulation  3/9/21: Pt completed during session x 2 each.   -HG     Patient will increase laryngeal elevation to reduce residue that might fall into airway by completing  super-supraglottic swallow;falsetto/pitch glide;with cues  -HG     Status: Patient will increase laryngeal elevation to reduce residue that might fall into airway by completing  Progressing as expected  -HG     Comments: Patient will increase laryngeal elevation to reduce residue that might fall into airway by completing  3/9/21: Pt completed during session x 2 each.   -HG     Patient will increase closure of larynx to keep food from falling into the airway by completing  super-supraglottic swallow;push-pull with breath hold;with cues  -HG     Status: Patient will increase closure of larynx to keep food from falling into the airway by completing  Progressing as expected  -HG     Comments: Patient will increase closure of larynx to keep food from falling into the airway by completing  3/9/21: Pt completed during session x 2  each.   -HG     Patient will increase strength of tongue base and posterior pharyngeal walls to reduce residue that might fall into airway by completing  effotful swallow;Bernarda (tongue hold);with cues  -HG     Status: Patient will increase strength of tongue base and posterior pharyngeal walls to reduce residue that might fall into airway by completing  Progressing as expected  -HG     Comments: Patient will increase strength of tongue base and posterior pharyngeal walls to reduce residue that might fall into airway by completing  3/9/21: Pt completed during session x 2 each.   -HG        Other Goals    Other Adult Goal- 1  Pt will complete thought organization tasks with 80% accuracy.  -HG     Status: Other Adult Goal- 1  Progressing as expected  -HG     Comments: Other Adult Goal- 1  3/9/21: 5 step sentence sequencing: pt was 100% accurate. 3/2/21: SLUMS assesement from 2/23/21 and pt was 6/15 for divergent naming. This date, for divergent naming: pt was 80% accurate. 2/9/21: Following written directions: pt was 80% accurate.  1/15/21: SLUMS naming: pt was 8/15. 12/8/20: Category matrix: pt was 90% accurate.  Crossword puzzle and pt was 85% accurate. 11/24/20: Divergent naming: pt was 75-90% accurate . 11/17/20: Category Matrix: pt was 80% accurate I'ly. 11/10/20: Fill in word given first initial: pt was 100% accurate. 10/29/20: divergent naming: pt was 70% accurate. 90  -HG     Other Adult Goal- 2  Pt will complete reasoning and problem solving assessment with goals to follow as indicated.  -HG     Status: Other Adult Goal- 2  Progressing as expected  -HG     Comments: Other Adult Goal- 2  3/16/21: Word search and pt was 80% accurate.  3/2/21: Deductive reasoning: pt was 95% accurate. Clock work: pt was 9/12.  Emergent situations and pt was 90% accurate. 1/26/21: Connecting the dots alphabetically and pt required consistent cues. 12/8/20: Six step sequencing: pt was 90% accurate. 11/10/20: Written Directions: pt  was 90% accurate. 11/3/20: FROMAJE: Pt scored a 15 placing pt in the severe dementia or depression range.   -HG     Other Adult Goal- 3  Pt will improve RBANS Total score to at least a 70 placing pt in the [place] range.   -HG     Status: Other Adult Goal- 3  Progressing as expected  -HG     Comments: Other Adult Goal- 3  3/1/21: From re-assessment using SLUMS on 2/23/21, pt was 8/20 placing pt in the Dementia range.   -HG        SLP Time Calculation    SLP Goal Re-Cert Due Date  03/25/21  -HG       User Key  (r) = Recorded By, (t) = Taken By, (c) = Cosigned By    Initials Name Provider Type    Lisa Marquis MS CCC-SLP Speech and Language Pathologist          OP SLP Education     Row Name 03/16/21 1500       Education    Education Comments  Hmwk for journaling and 8 pictures.   -HG      User Key  (r) = Recorded By, (t) = Taken By, (c) = Cosigned By    Initials Name Effective Dates    Lisa Marquis MS CCC-SLP 08/09/20 -           OP SLP Assessment/Plan - 03/16/21 1500        SLP Plan    Plan Comments  Cont with Cog tx.   -HG      User Key  (r) = Recorded By, (t) = Taken By, (c) = Cosigned By    Initials Name Provider Type    Lisa Marquis MS CCC-SLP Speech and Language Pathologist                 Time Calculation:   SLP Start Time: 1500    Therapy Charges for Today     Code Description Service Date Service Provider Modifiers Qty    [id number]  ST TREATMENT SPEECH 4 3/16/2021 Lisa Graves MS CCC-SLP GN 1                   Lisa Graves MS CCC-SLP  3/16/2021

## 2021-03-23 ENCOUNTER — HOSPITAL ENCOUNTER (OUTPATIENT)
Dept: SPEECH THERAPY | Facility: HOSPITAL | Age: 76
Setting detail: THERAPIES SERIES
Discharge: HOME | End: 2021-03-23

## 2021-03-23 ENCOUNTER — OFFICE VISIT (OUTPATIENT)
Dept: GASTROENTEROLOGY | Facility: CLINIC | Age: 76
End: 2021-03-23

## 2021-03-23 VITALS
TEMPERATURE: 98.1 F | SYSTOLIC BLOOD PRESSURE: 159 MMHG | DIASTOLIC BLOOD PRESSURE: 83 MMHG | BODY MASS INDEX: 22.39 KG/M2 | HEART RATE: 69 BPM | WEIGHT: 126.4 LBS | HEIGHT: 63 IN

## 2021-03-23 DIAGNOSIS — G30.1 LATE ONSET ALZHEIMER'S DISEASE WITHOUT BEHAVIORAL DISTURBANCE (HCC): Primary | ICD-10-CM

## 2021-03-23 DIAGNOSIS — F02.80 LATE ONSET ALZHEIMER'S DISEASE WITHOUT BEHAVIORAL DISTURBANCE (HCC): Primary | ICD-10-CM

## 2021-03-23 DIAGNOSIS — R13.19 ESOPHAGEAL DYSPHAGIA: Primary | ICD-10-CM

## 2021-03-23 PROCEDURE — 99213 OFFICE O/P EST LOW 20 MIN: CPT | Performed by: NURSE PRACTITIONER

## 2021-03-23 PROCEDURE — 92526 ORAL FUNCTION THERAPY: CPT

## 2021-03-23 NOTE — THERAPY TREATMENT NOTE
Outpatient Speech Language Pathology   Adult Swallow Treatment Note  [facility]     Patient Name: Brittny Nicolas  : 1945  MRN: [id number]  Today's Date: 3/23/2021         Visit Date: 2021   Patient Active Problem List   Diagnosis   • Achalasia   • Acute upper respiratory infection   • Atypical chest pain   • Benign essential hypertension   • Carotid artery stenosis   • Cataract   • Cough   • Acute cystitis   • Screening for malignant neoplasm   • Gastroesophageal reflux disease   • Essential familial hypercholesterolemia   • Gastrointestinal bleeding   • Inguinal hernia   • Insomnia   • Leg cramps   • Meniere's disease   • Night sweats   • Osteoporosis   • Peripheral neuropathy   • Retinal artery occlusion   • Rheumatoid arthritis (CMS/[facility])   • Stroke syndrome   • Suprapubic pain   • Transient ischemic attack   • Positive reaction to tuberculin skin test   • Vitamin D deficiency   • Weight loss   • H/O colonoscopy   • Fatigue   • Hypothyroidism   • Erosive esophagitis   • Left lower quadrant pain   • Acute pain of left shoulder   • Pharyngitis   • Memory loss   • Rib pain on left side   • Hyponatremia   • Late onset Alzheimer's disease without behavioral disturbance (CMS/[facility])        Visit Dx:    ICD-10-CM ICD-9-CM   1. Late onset Alzheimer's disease without behavioral disturbance (CMS/[facility])  G30.1 331.0    F02.80 294.10                         SLP OP Goals     Row Name 21 1500          Goal Type Needed    Goal Type Needed  Memory;Attention/Orientation;Auditory Comprehension;Other Adult Goals  -HG        Subjective Comments    Subjective Comments  Pt alert, cooperative, accompanied with daughter, returned with folder and journal.   -HG        Auditory Comprehension Goals    Patient will comprehend spoken information in all settings  90%:;with cues  -HG     Status: Patient will comprehend spoken information in all settings  Progressing as expected  -HG     Comments: Patient will comprehend spoken  information in all settings  3/2/21: WH questions and pt was 90% accurate. 10/29/20: Pt's daughter states that she isn't sure her mom can read or understands the labels in her home for kitchen items.   -HG     Patient will demonstrate comprehension of spoken language by answering questions about a short paragraph  80%:;with cues  -HG     Status: Patient will demonstrate comprehension of spoken language by answering questions about a short paragraph  Progressing as expected  -HG     Comments: Patient will demonstrate comprehension of spoken language by answering questions about a short paragraph  2/23/21:  12-22 word paragraphs: 90% accurate.  22-36 word paragraphs: pt was 70% accurate.   -HG        Memory Goals    Patient and family will implement compensatory strategies to maximize patient’s Memory function so patient can continue to participate in daily activities  90%:;with cues  -HG     Status: Patient and family will implement compensatory strategies to maximize patient’s Memory function so patient can continue to participate in daily activities  New  -HG     Patient will demonstrate improved ability to recall information by immediately recalling a series of words  80%:;related;after delay;with cues  -HG     Status: Patient will demonstrate improved ability to recall information by immediately recalling a series of words  Progressing as expected  -HG     Comments: Patient will demonstrate improved ability to recall information by immediately recalling a series of words  3/16/21: SRT for pee pads at home for cat, pt was successful for 8 min delay, 16 mins required a look at the cue card.    Dtr reports that pt is nowing taking her pills one at a time which was targeted last with SRT. 3/9/21: SRT for taking pills one at time and pt made it up to 10 mins. 3/2/21: Pt recalled all of her swallowing exercises and demonstrated them for recall purposes.  SLUMS Immediate recall of paragraph, pt was 0/8.  1/26/21:  Immediate recall of 3 words in a row and pt was 90% accurate. 1/15/21: SLUMS: Immediate recall of paragraph and pt was 1/8. 11/24/20: Mental Manipulation for progression and pt was 90% accurate. 11/17/20: Mental Manipulation by attribute, pt was 90% accurate and by word placement, pt was 90% accurate. 11/3/20: Immediate recall on FROMAJE: pt was 2/3.  -HG     Patient’s memory skills will be enhanced as reported by patient by utilizing internal memory strategies to recall up to 3 pieces of information after a 5- minute delay  80%:;with cues  -HG     Status: Patient’s memory skills will be enhanced as reported by patient by utilizing internal memory strategies to recall up to 3 pieces of information after a 5- minute delay  Progressing as expected  -HG     Comments: Patient’s memory skills will be enhanced as reported by patient by utilizing internal memory strategies to recall up to 3 pieces of information after a 5- minute delay  3/9/21: Delayed recall of 6 related animals and pt was 6/6. 3/2/21: Delayed recall of 4 related animals and pt was 4/4, SLUMS delayed was 0/5.  2/9/21: Delayed recall of 3 animal from 2 weeks ago: pt was 0/3 w/o cue. After repetition during session, pt was 3/3 x 3.  1/26/21: Delayed recall of 2 related pictures: pt was 1/15/21: SLUMS Delayed recall of 5 un-related words and pt was 0/5 I'ly and with cues, pt was 4/5.  12/8/20: Delayed recall of 2 new related pictures, pt was 1/2 to 2/2. 11/24/20: Delayed recall of 6 cont'd pictures from previous session: pt was 3/6 I'ly. 5/6 with min cues, 6/6 by the end of session. 11/17/20: Delayed recall of 6 related words: pt was 4/6, 5/6 and 6/6 all with written cue. 11/10/20: Delayed recall of 5 related words and pt was 3/5 with first initial presented. After review and use of first initial, pt was 5/5.   11/3/20: Delayed recall of 4 related (familiar to pt) pt was 3/4 x 2, with an increased written cue, pt was 4/4. 10/29/20: Delayed recall of 3  related pictures: pt was 2/3x 2, 3/3 x 3.    -HG     Patient’s memory skills will be enhanced as reported by patient by using external memory aides  80%:;with cues  -HG     Status: Patient’s memory skills will be enhanced as reported by patient by using external memory aides  Progressing as expected  -HG     Comments: Patient’s memory skills will be enhanced as reported by patient by using external memory aides  3/16/21: Pt had one entry since last session- started entry for this date. 3/9/21: Pt had not written in journal, pt's journal is now set for the coming week for each day. 3/2/21: Pt had not written in journal since last tx session. 2/9/21: Pt returned with journal with entries since a few days ago. 1/26/21: Pt brought in journal but had not written in it so SLP had pt start an entry for today. 1/15/21: Re-educated pt and daughter on importance of journaling. 12/8/20: Pt has not been writing in her journal. Started entry for today.   11/24/20: Pt had only journaled one time since last session- ideas for cues in order to prompt pt to write in her journal. 11/17/20: Pt continues to journal. 11/10/20: Reinforced journaling and purpose and need for reference. 11/3/20: Reinforced use of daily journaling. 10/29/20: Pt had not written in her journal yet- provided visual cues.   -HG     Patient will demonstrate improved ability to recall information by stating activities patient has completed that day  80%:;with cues  -HG     Status: Patient will demonstrate improved ability to recall information by stating activities patient has completed that day  Progressing as expected  -HG     Comments: Patient will demonstrate improved ability to recall information by stating activities patient has completed that day  3/9/21: Pt struggled with recall. of her lunch and afternoon chore without cues from daughter. 3/21:  Pt did recall completing swallowing exercises and returned with handout with markings representing how often  she completed them. 2/9/21: Pt recalled foods that she had not eaten, daughter had to clarify for accuracy for journal. 1/26/21: Recall of meals this date with accurate recall of breakfast and no recall of lunch- required cue from daughter. 1/15/21: Pt does not recall having a nightmare per her daughter's report and with a cue, could recall what she had for breakfast: 11/3/20: [place] did write but not much. With cues, was able to recall events from weekend.   -HG        Attention/Orientation Goals    Patient will be able to interact safely in home environment  With Supervision  -HG     Status: Patient will be able to interact safely in home environment  Progressing as expected  -HG     Comments: Patient will be able to interact safely in home environment  3/2/21: Pt aware of what to do if home alone and smells spoke or needs help since there is no land line phone at home. 1/15/21: SLUMS Dig[employer] Span and pt was 2/2.   -HG     Patient will improve orientation skills by orienting to self, place, time, and situation  80%:;with cues  -HG     Status: Patient will improve orientation skills by orienting to self, place, time, and situation  Progressing as expected  -HG     Comments: Patient will improve orientation skills by orienting to self, place, time, and situation  2/9/21: Pt used calendar in session for orientation purposes.  1/26/21: Pt has a calendar at home to use but was not oriented to date when starting her journal sheet. 11/24/20: Pt aware of date and day of week. Encouraged use of journal in order to keep up with orientation. 11/17/20: Pt used the calendar and pt was accurate with day of week and date.  11/10/20: Using calendar in office, pt was able to ID current date.  10/29/20: Provided calendar to assist with orientation.   -HG     Patient will improve attention skills by sustaining consistent behavioral response during continuous and repetitive activity (e.g.,  listening for target words, auditory/reading comprehension tasks)  with cues;5 minute task  -HG     Status: Patient will improve attention skills by sustaining consistent behavioral response during continuous and repetitive activity (e.g., listening for target words, auditory/reading comprehension tasks)  Progressing as expected  -HG     Comments: Patient will improve attention skills by sustaining consistent behavioral response during continuous and repetitive activity (e.g., listening for target words, auditory/reading comprehension tasks)  3/16/21: The game of TRASH: pt was 80% accurate with mod cues.  2/9/21: The game of RF Controls for consistency of following directions to a new game, pt was 70% accurate.  1/26/21: Word placement by occurrence and pt was 90% accurate, reversal and pt was 50% accurate. 1/15/21: One Pile Card Game: pt was 80% accurate. 11/3/20: One Pile Card Game:  Pt was 85% accurate.  10/29/20: One Pile Card Game: pt was 80% accurate with min to mod cues.   -HG     Patient will improve attention skills by focusing on environmental stimuli within left visuospatial field  80%:;with cues  -HG     Status: Patient will improve attention skills by focusing on environmental stimuli within left visuospatial field  Progressing as expected  -HG     Comments: Patient will improve attention skills by focusing on environmental stimuli within left visuospatial field  3/16/21: Card sorting for two different tasks and pt was 60% accurate I'ly, with visual prompts, pt was 80% accurate. 3/2/21: Copying boxed information and pt was 70% accurate with mod cues. 1/26/21: Copying boxed information: pt was 75% accurate.   11/3/20: Shapes and figures: 3 in a row and pt was 80% accurate with mod verbal and visual cues.   -HG        Dysphagia Goals    Patient will safely consume the recommended diet without complications such as aspiration pneumonia  regular diet and thin liquids  -HG     Status: Patient will safely consume  the recommended diet without complications such as aspiration pneumonia  Progressing as expected  -HG     Comments: Patient will safely consume the recommended diet without complications such as aspiration pneumonia  3/23/21: No coughing exhibited during session. Pt and daughter note that every once awhile there is residue present where a throat clear is needed or a double swallow. Pt being followed by GI with scope ordered this date. 3/9/21: Pt reports no difficulties eating and drinking at home where daughter reports hearing the large gulps.   -HG     Patient will improve stage transition duration of swallow/improve timing to reduce food falling into the airway by decreasing swallow delay after neurosensory  stimulation  three second prep;suck-swallow;super-supraglottic swallow;with cues  -HG     Status: Patient will improve stage transition duration of swallow/improve timing to reduce food falling into the airway by decreasing swallow delay after neurosensory  stimulation  Progressing as expected  -HG     Comments: Patient will improve stage transition duration of swallow/improve timing to reduce food falling into the airway by decreasing swallow delay after neurosensory  stimulation  3/23/21: Reviewed with pt and she demonstrated accuracy.   3/9/21: Pt completed during session x 2 each.   -HG     Patient will increase laryngeal elevation to reduce residue that might fall into airway by completing  super-supraglottic swallow;falsetto/pitch glide;with cues  -HG     Status: Patient will increase laryngeal elevation to reduce residue that might fall into airway by completing  Progressing as expected  -HG     Comments: Patient will increase laryngeal elevation to reduce residue that might fall into airway by completing  3/23/21: Reviewed with pt and she demonstrated accuracy. 3/9/21: Pt completed during session x 2 each.   -HG     Patient will increase closure of larynx to keep food from falling into the airway by  completing  super-supraglottic swallow;push-pull with breath hold;with cues  -HG     Status: Patient will increase closure of larynx to keep food from falling into the airway by completing  Progressing as expected  -HG     Comments: Patient will increase closure of larynx to keep food from falling into the airway by completing  3/23/21: Reviewed with pt and she demonstrated accuracy.  3/9/21: Pt completed during session x 2 each.   -HG     Patient will increase strength of tongue base and posterior pharyngeal walls to reduce residue that might fall into airway by completing  effotful swallow;Bernarda (tongue hold);with cues  -HG     Status: Patient will increase strength of tongue base and posterior pharyngeal walls to reduce residue that might fall into airway by completing  Progressing as expected  -HG     Comments: Patient will increase strength of tongue base and posterior pharyngeal walls to reduce residue that might fall into airway by completing  3/23/21: Reviewed with pt and she demonstrated accuracy.  3/9/21: Pt completed during session x 2 each.   -HG        Other Goals    Other Adult Goal- 1  Pt will complete thought organization tasks with 80% accuracy.  -HG     Status: Other Adult Goal- 1  Progressing as expected  -HG     Comments: Other Adult Goal- 1  3/9/21: 5 step sentence sequencing: pt was 100% accurate. 3/2/21: SLUMS assesement from 2/23/21 and pt was 6/15 for divergent naming. This date, for divergent naming: pt was 80% accurate. 2/9/21: Following written directions: pt was 80% accurate.  1/15/21: SLUMS naming: pt was 8/15. 12/8/20: Category matrix: pt was 90% accurate.  Crossword puzzle and pt was 85% accurate. 11/24/20: Divergent naming: pt was 75-90% accurate . 11/17/20: Category Matrix: pt was 80% accurate I'ly. 11/10/20: Fill in word given first initial: pt was 100% accurate. 10/29/20: divergent naming: pt was 70% accurate. 90  -HG     Other Adult Goal- 2  Pt will complete reasoning and  problem solving assessment with goals to follow as indicated.  -HG     Status: Other Adult Goal- 2  Progressing as expected  -HG     Comments: Other Adult Goal- 2  3/16/21: Word search and pt was 80% accurate.  3/2/21: Deductive reasoning: pt was 95% accurate. Clock work: pt was 9/12.  Emergent situations and pt was 90% accurate. 1/26/21: Connecting the dots alphabetically and pt required consistent cues. 12/8/20: Six step sequencing: pt was 90% accurate. 11/10/20: Written Directions: pt was 90% accurate. 11/3/20: FROMAJE: Pt scored a 15 placing pt in the severe dementia or depression range.   -HG     Other Adult Goal- 3  Pt will improve RBANS Total score to at least a 70 placing pt in the [place] range.   -HG     Status: Other Adult Goal- 3  Progressing as expected  -HG     Comments: Other Adult Goal- 3  3/1/21: From re-assessment using SLUMS on 2/23/21, pt was 8/20 placing pt in the Dementia range.   -HG        SLP Time Calculation    SLP Goal Re-Cert Due Date  04/22/21  -HG       User Key  (r) = Recorded By, (t) = Taken By, (c) = Cosigned By    Initials Name Provider Type    Lisa Marquis MS CCC-SLP Speech and Language Pathologist          OP SLP Education     Row Name 03/23/21 1500       Education    Education Comments  Hmwk for 2 un-related pictures.   -HG      User Key  (r) = Recorded By, (t) = Taken By, (c) = Cosigned By    Initials Name Effective Dates    Lisa Marquis MS CCC-SLP 08/09/20 -           OP SLP Assessment/Plan - 03/23/21 1500        SLP Plan    Plan Comments  Cont with Cog tx.    -HG      User Key  (r) = Recorded By, (t) = Taken By, (c) = Cosigned By    Initials Name Provider Type    Lisa Marquis MS CCC-SLP Speech and Language Pathologist                Time Calculation:   SLP Start Time: 1500    Therapy Charges for Today     Code Description Service Date Service Provider Modifiers Qty    [id number]  ST TREATMENT SWALLOW 4 3/23/2021 Lisa Graves MS CCC-SLP GN  1                   Lisa Graves, MS CCC-SLP  3/23/2021

## 2021-03-23 NOTE — PROGRESS NOTES
GASTROENTEROLOGY OFFICE NOTE  Brittny Nicolas  [id number]  1945    CARE TEAM  Patient Care Team:  Dalila Liz MD as PCP - General (Endocrinology)    Referring Provider: Dalila Liz MD    Chief Complaint   Patient presents with   • Difficulty Swallowing     swallow study done        HISTORY OF PRESENT ILLNESS:  The patient is a 75-year-old female with Alzheimer's disease.  She lives with her daughter in [place] but requires very little oversight.  Her daughter has noticed lately that the patient is having a hard time swallowing pills.  The patient reports that she has to use her hand to stroke pills down.  Apparently this can happen with other foods as well but always with pills.  She has been very mindful of what she has been eating lately.  She has been evaluated by speech therapy who recommended a gastroenterology referral for additional evaluation of reflux and esophageal dysphagia.    PAST MEDICAL HISTORY  Past Medical History:   Diagnosis Date   • Acid reflux disease    • Arthritis    • Carotid artery occlusion    • Depression    • Graves disease    • Hypertension    • Stomach problems    • Stroke (CMS/HCC)    • Thyroid disease         PAST SURGICAL HISTORY  Past Surgical History:   Procedure Laterality Date   • AUGMENTATION MAMMAPLASTY      1978   • BREAST AUGMENTATION     • CAROTID ARTERY ANGIOPLASTY     • COLONOSCOPY     • D & C CERVICAL BIOPSY      Dilation and Curettage of Cervical Stump   • HERNIA REPAIR     • HERNIA REPAIR     • THROMBOENDARTERECTOMY      carotid   • UPPER GASTROINTESTINAL ENDOSCOPY          MEDICATIONS:    Current Outpatient Medications:   •  amLODIPine (NORVASC) 5 MG tablet, TAKE 1 TABLET BY MOUTH EVERY DAY, Disp: 90 tablet, Rfl: 3  •  aspirin 81 MG tablet, Take  by mouth., Disp: , Rfl:   •  atorvastatin (LIPITOR) 20 MG tablet, TAKE 1 TABLET BY MOUTH EVERYDAY AT BEDTIME, Disp: 90 tablet, Rfl: 1  •  Calcium-Magnesium (CALCIUM MAGNESIUM 750) 300-300 MG tablet,  Take 2 tablets by mouth Daily., Disp: , Rfl:   •  cetirizine (zyrTEC) 10 MG tablet, Take 10 mg by mouth Daily., Disp: , Rfl:   •  Cholecalciferol (VITAMIN D) 2000 UNITS capsule, Take 1 capsule by mouth., Disp: , Rfl:   •  dexlansoprazole (Dexilant) 60 MG capsule, Take 1 capsule by mouth Daily., Disp: 90 capsule, Rfl: 1  •  donepezil (ARICEPT) 10 MG tablet, Take 1 tablet by mouth Every Night., Disp: 90 tablet, Rfl: 3  •  levothyroxine (SYNTHROID, LEVOTHROID) 112 MCG tablet, TAKE 1 TABLET BY MOUTH EVERY DAY, Disp: 90 tablet, Rfl: 3  •  memantine (NAMENDA) 10 MG tablet, Take 1 tablet by mouth 2 (Two) Times a Day., Disp: 180 tablet, Rfl: 3  •  vitamin B-12 (CYANOCOBALAMIN) 500 MCG tablet, Take 500 mcg by mouth Daily., Disp: , Rfl:     ALLERGIES  Allergies   Allergen Reactions   • Boniva [Ibandronic Acid] GI Intolerance     Refractory gerd and abdominal pain        FAMILY HISTORY:  Family History   Problem Relation Age of Onset   • Cystic fibrosis Other    • Cervical cancer Mother    • Cancer Paternal Grandmother    • Colon cancer Neg Hx    • Colon polyps Neg Hx        SOCIAL HISTORY  Social History     Socioeconomic History   • Marital status:      Spouse name: Not on file   • Number of children: Not on file   • Years of education: Not on file   • Highest education level: Not on file   Tobacco Use   • Smoking status: Former Smoker   • Smokeless tobacco: Never Used   Vaping Use   • Vaping Use: Never used   Substance and Sexual Activity   • Alcohol use: Yes     Comment: rarely   • Drug use: No   • Sexual activity: Not Currently       REVIEW OF SYSTEMS  Review of Systems   Constitutional: Negative for activity change, appetite change, chills, diaphoresis, fatigue, fever, unexpected weight gain and unexpected weight loss.   HENT: Positive for trouble swallowing. Negative for voice change.    Gastrointestinal: Positive for GERD. Negative for abdominal distention, abdominal pain, anal bleeding, blood in stool,  "constipation, diarrhea, nausea, rectal pain, vomiting and indigestion.   Neurological: Positive for memory problem.         PHYSICAL EXAM   /83 (BP Location: Right arm, Patient Position: Sitting, Cuff Size: Adult)   Pulse 69   Temp 98.1 °F (36.7 °C) (Temporal)   Ht 160 cm (62.99\")   Wt 57.3 kg (126 lb 6.4 oz)   BMI 22.40 kg/m²   Physical Exam  Constitutional:       General: She is not in acute distress.     Appearance: She is well-developed.   HENT:      Head: Normocephalic and atraumatic.      Nose: Nose normal.   Eyes:      Conjunctiva/sclera: Conjunctivae normal.      Pupils: Pupils are equal, round, and reactive to light.   Pulmonary:      Effort: Pulmonary effort is normal.   Neurological:      Mental Status: She is alert and oriented to person, place, and time.   Psychiatric:         Behavior: Behavior normal.         Judgment: Judgment normal.       Results Review:  [facility] records reviewed and discussed with patient.     ASSESSMENT / PLAN  1.  Dysphagia  -EGD    No follow-ups on file.    I discussed the patients findings and my recommendations with patient    CARLOS Briones                    "

## 2021-03-30 ENCOUNTER — HOSPITAL ENCOUNTER (OUTPATIENT)
Dept: SPEECH THERAPY | Facility: HOSPITAL | Age: 76
Setting detail: THERAPIES SERIES
Discharge: HOME | End: 2021-03-30

## 2021-03-30 DIAGNOSIS — G30.1 LATE ONSET ALZHEIMER'S DISEASE WITHOUT BEHAVIORAL DISTURBANCE (HCC): Primary | ICD-10-CM

## 2021-03-30 DIAGNOSIS — F02.80 LATE ONSET ALZHEIMER'S DISEASE WITHOUT BEHAVIORAL DISTURBANCE (HCC): Primary | ICD-10-CM

## 2021-03-30 PROCEDURE — 92507 TX SP LANG VOICE COMM INDIV: CPT

## 2021-03-30 NOTE — THERAPY TREATMENT NOTE
Outpatient Speech Language Pathology   Adult Swallow Treatment Note  [facility]     Patient Name: Brittny Nicolas  : 1945  MRN: [id number]  Today's Date: 3/30/2021         Visit Date: 2021   Patient Active Problem List   Diagnosis   • Achalasia   • Acute upper respiratory infection   • Atypical chest pain   • Benign essential hypertension   • Carotid artery stenosis   • Cataract   • Cough   • Acute cystitis   • Screening for malignant neoplasm   • Gastroesophageal reflux disease   • Essential familial hypercholesterolemia   • Gastrointestinal bleeding   • Inguinal hernia   • Insomnia   • Leg cramps   • Meniere's disease   • Night sweats   • Osteoporosis   • Peripheral neuropathy   • Retinal artery occlusion   • Rheumatoid arthritis (CMS/HCC)   • Stroke syndrome   • Suprapubic pain   • Transient ischemic attack   • Positive reaction to tuberculin skin test   • Vitamin D deficiency   • Weight loss   • H/O colonoscopy   • Fatigue   • Hypothyroidism   • Erosive esophagitis   • Left lower quadrant pain   • Acute pain of left shoulder   • Pharyngitis   • Memory loss   • Rib pain on left side   • Hyponatremia   • Late onset Alzheimer's disease without behavioral disturbance (CMS/[facility])        Visit Dx:    ICD-10-CM ICD-9-CM   1. Late onset Alzheimer's disease without behavioral disturbance (CMS/HCC)  G30.1 331.0    F02.80 294.10                         SLP OP Goals     Row Name 21 1500          Goal Type Needed    Goal Type Needed  Memory;Attention/Orientation;Auditory Comprehension;Other Adult Goals  -HG        Subjective Comments    Subjective Comments  Pt alert, cooperative, accompanied with daughter. Daughter reported that pt had wondered off over the weekend and was extremely upset and tore things up in her room.   -HG        Auditory Comprehension Goals    Patient will comprehend spoken information in all settings  90%:;with cues  -HG     Status: Patient will comprehend spoken information in all  settings  Progressing as expected  -HG     Comments: Patient will comprehend spoken information in all settings  3/2/21: WH questions and pt was 90% accurate. 10/29/20: Pt's daughter states that she isn't sure her mom can read or understands the labels in her home for kitchen items.   -HG     Patient will demonstrate comprehension of spoken language by answering questions about a short paragraph  80%:;with cues  -HG     Status: Patient will demonstrate comprehension of spoken language by answering questions about a short paragraph  Progressing as expected  -HG     Comments: Patient will demonstrate comprehension of spoken language by answering questions about a short paragraph  2/23/21:  12-22 word paragraphs: 90% accurate.  22-36 word paragraphs: pt was 70% accurate.   -HG        Memory Goals    Patient and family will implement compensatory strategies to maximize patient’s Memory function so patient can continue to participate in daily activities  90%:;with cues  -HG     Status: Patient and family will implement compensatory strategies to maximize patient’s Memory function so patient can continue to participate in daily activities  New  -HG     Patient will demonstrate improved ability to recall information by immediately recalling a series of words  80%:;related;after delay;with cues  -HG     Status: Patient will demonstrate improved ability to recall information by immediately recalling a series of words  Progressing as expected  -HG     Comments: Patient will demonstrate improved ability to recall information by immediately recalling a series of words  3/16/21: SRT for pee pads at home for cat, pt was successful for 8 min delay, 16 mins required a look at the cue card.    Dtr reports that pt is nowing taking her pills one at a time which was targeted last with SRT. 3/9/21: SRT for taking pills one at time and pt made it up to 10 mins. 3/2/21: Pt recalled all of her swallowing exercises and demonstrated them for  recall purposes.  SLUMS Immediate recall of paragraph, pt was 0/8.  1/26/21: Immediate recall of 3 words in a row and pt was 90% accurate. 1/15/21: SLUMS: Immediate recall of paragraph and pt was 1/8. 11/24/20: Mental Manipulation for progression and pt was 90% accurate. 11/17/20: Mental Manipulation by attribute, pt was 90% accurate and by word placement, pt was 90% accurate. 11/3/20: Immediate recall on FROMAJE: pt was 2/3.  -HG     Patient’s memory skills will be enhanced as reported by patient by utilizing internal memory strategies to recall up to 3 pieces of information after a 5- minute delay  80%:;with cues  -HG     Status: Patient’s memory skills will be enhanced as reported by patient by utilizing internal memory strategies to recall up to 3 pieces of information after a 5- minute delay  Progressing as expected  -HG     Comments: Patient’s memory skills will be enhanced as reported by patient by utilizing internal memory strategies to recall up to 3 pieces of information after a 5- minute delay  3/9/21: Delayed recall of 6 related animals and pt was 6/6. 3/2/21: Delayed recall of 4 related animals and pt was 4/4, SLUMS delayed was 0/5.  2/9/21: Delayed recall of 3 animal from 2 weeks ago: pt was 0/3 w/o cue. After repetition during session, pt was 3/3 x 3.  1/26/21: Delayed recall of 2 related pictures: pt was 1/15/21: SLUMS Delayed recall of 5 un-related words and pt was 0/5 I'ly and with cues, pt was 4/5.  12/8/20: Delayed recall of 2 new related pictures, pt was 1/2 to 2/2. 11/24/20: Delayed recall of 6 cont'd pictures from previous session: pt was 3/6 I'ly. 5/6 with min cues, 6/6 by the end of session. 11/17/20: Delayed recall of 6 related words: pt was 4/6, 5/6 and 6/6 all with written cue. 11/10/20: Delayed recall of 5 related words and pt was 3/5 with first initial presented. After review and use of first initial, pt was 5/5.   11/3/20: Delayed recall of 4 related (familiar to pt) pt was 3/4 x 2,  with an increased written cue, pt was 4/4. 10/29/20: Delayed recall of 3 related pictures: pt was 2/3x 2, 3/3 x 3.    -HG     Patient’s memory skills will be enhanced as reported by patient by using external memory aides  80%:;with cues  -HG     Status: Patient’s memory skills will be enhanced as reported by patient by using external memory aides  Progressing as expected  -HG     Comments: Patient’s memory skills will be enhanced as reported by patient by using external memory aides  3/16/21: Pt had one entry since last session- started entry for this date. 3/9/21: Pt had not written in journal, pt's journal is now set for the coming week for each day. 3/2/21: Pt had not written in journal since last tx session. 2/9/21: Pt returned with journal with entries since a few days ago. 1/26/21: Pt brought in journal but had not written in it so SLP had pt start an entry for today. 1/15/21: Re-educated pt and daughter on importance of journaling. 12/8/20: Pt has not been writing in her journal. Started entry for today.   11/24/20: Pt had only journaled one time since last session- ideas for cues in order to prompt pt to write in her journal. 11/17/20: Pt continues to journal. 11/10/20: Reinforced journaling and purpose and need for reference. 11/3/20: Reinforced use of daily journaling. 10/29/20: Pt had not written in her journal yet- provided visual cues.   -HG     Patient will demonstrate improved ability to recall information by stating activities patient has completed that day  80%:;with cues  -HG     Status: Patient will demonstrate improved ability to recall information by stating activities patient has completed that day  Progressing as expected  -HG     Comments: Patient will demonstrate improved ability to recall information by stating activities patient has completed that day  3/9/21: Pt struggled with recall. of her lunch and afternoon chore without cues from daughter. 3/21:  Pt did recall completing swallowing  exercises and returned with handout with markings representing how often she completed them. 2/9/21: Pt recalled foods that she had not eaten, daughter had to clarify for accuracy for journal. 1/26/21: Recall of meals this date with accurate recall of breakfast and no recall of lunch- required cue from daughter. 1/15/21: Pt does not recall having a nightmare per her daughter's report and with a cue, could recall what she had for breakfast: 11/3/20: [place] did write but not much. With cues, was able to recall events from weekend.   -HG        Attention/Orientation Goals    Patient will be able to interact safely in home environment  With Supervision  -HG     Status: Patient will be able to interact safely in home environment  Progressing as expected  -HG     Comments: Patient will be able to interact safely in home environment  3/2/21: Pt aware of what to do if home alone and smells spoke or needs help since there is no land line phone at home. 1/15/21: SLUMS [employer]an and pt was 2/2.   -HG     Patient will improve orientation skills by orienting to self, place, time, and situation  80%:;with cues  -HG     Status: Patient will improve orientation skills by orienting to self, place, time, and situation  Progressing as expected  -HG     Comments: Patient will improve orientation skills by orienting to self, place, time, and situation  2/9/21: Pt used calendar in session for orientation purposes.  1/26/21: Pt has a calendar at home to use but was not oriented to date when starting her journal sheet. 11/24/20: Pt aware of date and day of week. Encouraged use of journal in order to keep up with orientation. 11/17/20: Pt used the calendar and pt was accurate with day of week and date.  11/10/20: Using calendar in office, pt was able to ID current date.  10/29/20: Provided calendar to assist with orientation.   -HG     Patient will improve attention skills by sustaining consistent  behavioral response during continuous and repetitive activity (e.g., listening for target words, auditory/reading comprehension tasks)  with cues;5 minute task  -HG     Status: Patient will improve attention skills by sustaining consistent behavioral response during continuous and repetitive activity (e.g., listening for target words, auditory/reading comprehension tasks)  Progressing as expected  -HG     Comments: Patient will improve attention skills by sustaining consistent behavioral response during continuous and repetitive activity (e.g., listening for target words, auditory/reading comprehension tasks)  3/16/21: The game of TRASH: pt was 80% accurate with mod cues.  2/9/21: The game of TRASH for consistency of following directions to a new game, pt was 70% accurate.  1/26/21: Word placement by occurrence and pt was 90% accurate, reversal and pt was 50% accurate. 1/15/21: One Pile Card Game: pt was 80% accurate. 11/3/20: One Pile Card Game:  Pt was 85% accurate.  10/29/20: One Pile Card Game: pt was 80% accurate with min to mod cues.   -HG     Patient will improve attention skills by focusing on environmental stimuli within left visuospatial field  80%:;with cues  -HG     Status: Patient will improve attention skills by focusing on environmental stimuli within left visuospatial field  Progressing as expected  -HG     Comments: Patient will improve attention skills by focusing on environmental stimuli within left visuospatial field  3/16/21: Card sorting for two different tasks and pt was 60% accurate I'ly, with visual prompts, pt was 80% accurate. 3/2/21: Copying boxed information and pt was 70% accurate with mod cues. 1/26/21: Copying boxed information: pt was 75% accurate.   11/3/20: Shapes and figures: 3 in a row and pt was 80% accurate with mod verbal and visual cues.   -HG        Dysphagia Goals    Patient will safely consume the recommended diet without complications such as aspiration pneumonia  regular  diet and thin liquids  -HG     Status: Patient will safely consume the recommended diet without complications such as aspiration pneumonia  Progressing as expected  -HG     Comments: Patient will safely consume the recommended diet without complications such as aspiration pneumonia  3/30/21: 3/23/21: No coughing exhibited during session. Pt and daughter note that every once awhile there is residue present where a throat clear is needed or a double swallow. Pt being followed by GI with scope ordered this date. 3/9/21: Pt reports no difficulties eating and drinking at home where daughter reports hearing the large gulps.   -HG     Patient will improve stage transition duration of swallow/improve timing to reduce food falling into the airway by decreasing swallow delay after neurosensory  stimulation  three second prep;suck-swallow;super-supraglottic swallow;with cues  -HG     Status: Patient will improve stage transition duration of swallow/improve timing to reduce food falling into the airway by decreasing swallow delay after neurosensory  stimulation  Progressing as expected  -HG     Comments: Patient will improve stage transition duration of swallow/improve timing to reduce food falling into the airway by decreasing swallow delay after neurosensory  stimulation  3/30/21: Reviewed with pt and she completed with accuracy x 3 each.  3/23/21: Reviewed with pt and she demonstrated accuracy.   3/9/21: Pt completed during session x 2 each.   -HG     Patient will increase laryngeal elevation to reduce residue that might fall into airway by completing  super-supraglottic swallow;falsetto/pitch glide;with cues  -HG     Status: Patient will increase laryngeal elevation to reduce residue that might fall into airway by completing  Progressing as expected  -HG     Comments: Patient will increase laryngeal elevation to reduce residue that might fall into airway by completing  3/30/21: Reviewed with pt and she completed with  accuracy x 3 each. 3/23/21: Reviewed with pt and she demonstrated accuracy. 3/9/21: Pt completed during session x 2 each.   -HG     Patient will increase closure of larynx to keep food from falling into the airway by completing  super-supraglottic swallow;push-pull with breath hold;with cues  -HG     Status: Patient will increase closure of larynx to keep food from falling into the airway by completing  Progressing as expected  -HG     Comments: Patient will increase closure of larynx to keep food from falling into the airway by completing  Reviewed with pt and she completed with accuracy x 3 each. 3/23/21: Reviewed with pt and she demonstrated accuracy.  3/9/21: Pt completed during session x 2 each.   -HG     Patient will increase strength of tongue base and posterior pharyngeal walls to reduce residue that might fall into airway by completing  effotful swallow;Bernarda (tongue hold);with cues  -HG     Status: Patient will increase strength of tongue base and posterior pharyngeal walls to reduce residue that might fall into airway by completing  Progressing as expected  -HG     Comments: Patient will increase strength of tongue base and posterior pharyngeal walls to reduce residue that might fall into airway by completing  Reviewed with pt and she completed with accuracy x 3 each. 3/23/21: Reviewed with pt and she demonstrated accuracy.  3/9/21: Pt completed during session x 2 each.   -HG        Other Goals    Other Adult Goal- 1  Pt will complete thought organization tasks with 80% accuracy.  -HG     Status: Other Adult Goal- 1  Progressing as expected  -HG     Comments: Other Adult Goal- 1  3/30/21: A-Z activity for naming and pt was 70% accurate with mod cues. 3/9/21: 5 step sentence sequencing: pt was 100% accurate. 3/2/21: SLUMS assesement from 2/23/21 and pt was 6/15 for divergent naming. This date, for divergent naming: pt was 80% accurate. 2/9/21: Following written directions: pt was 80% accurate.  1/15/21:  SLUMS naming: pt was 8/15. 12/8/20: Category matrix: pt was 90% accurate.  Crossword puzzle and pt was 85% accurate. 11/24/20: Divergent naming: pt was 75-90% accurate . 11/17/20: Category Matrix: pt was 80% accurate I'ly. 11/10/20: Fill in word given first initial: pt was 100% accurate. 10/29/20: divergent naming: pt was 70% accurate. 90  -HG     Other Adult Goal- 2  Pt will complete reasoning and problem solving assessment with goals to follow as indicated.  -HG     Status: Other Adult Goal- 2  Progressing as expected  -HG     Comments: Other Adult Goal- 2  3/16/21: Word search and pt was 80% accurate.  3/2/21: Deductive reasoning: pt was 95% accurate. Clock work: pt was 9/12.  Emergent situations and pt was 90% accurate. 1/26/21: Connecting the dots alphabetically and pt required consistent cues. 12/8/20: Six step sequencing: pt was 90% accurate. 11/10/20: Written Directions: pt was 90% accurate. 11/3/20: FROMAJE: Pt scored a 15 placing pt in the severe dementia or depression range.   -HG     Other Adult Goal- 3  Pt will improve RBANS Total score to at least a 70 placing pt in the [place] range.   -HG     Status: Other Adult Goal- 3  Progressing as expected  -HG     Comments: Other Adult Goal- 3  3/1/21: From re-assessment using SLUMS on 2/23/21, pt was 8/20 placing pt in the Dementia range.   -HG        SLP Time Calculation    SLP Goal Re-Cert Due Date  04/22/21  -HG       User Key  (r) = Recorded By, (t) = Taken By, (c) = Cosigned By    Initials Name Provider Type    Lisa Marquis MS CCC-SLP Speech and Language Pathologist          OP SLP Education     Row Name 03/30/21 1500       Education    Education Comments  Hmwk for journaling and swallowing exercises.   -HG      User Key  (r) = Recorded By, (t) = Taken By, (c) = Cosigned By    Initials Name Effective Dates    Lisa Marquis MS CCC-SLP 08/09/20 -           OP SLP Assessment/Plan - 03/30/21 1500        SLP Plan    Plan Comments  Cont with  Dysphagia tx.    -HG      User Key  (r) = Recorded By, (t) = Taken By, (c) = Cosigned By    Initials Name Provider Type     Lisa Graves MS CCC-SLP Speech and Language Pathologist                Time Calculation:   SLP Start Time: 1500    Therapy Charges for Today     Code Description Service Date Service Provider Modifiers Qty    [id number]  ST TREATMENT SPEECH 4 3/30/2021 Lisa Graves MS CCC-SLP GN 1                   Lisa Graves MS CCC-SLP  3/30/2021

## 2021-04-06 ENCOUNTER — APPOINTMENT (OUTPATIENT)
Dept: SPEECH THERAPY | Facility: HOSPITAL | Age: 76
End: 2021-04-06

## 2021-04-09 ENCOUNTER — APPOINTMENT (OUTPATIENT)
Dept: PREADMISSION TESTING | Facility: HOSPITAL | Age: 76
End: 2021-04-09

## 2021-04-09 PROCEDURE — C9803 HOPD COVID-19 SPEC COLLECT: HCPCS

## 2021-04-09 PROCEDURE — U0004 COV-19 TEST NON-CDC HGH THRU: HCPCS

## 2021-04-10 LAB — SARS-COV-2 RNA NOSE QL NAA+PROBE: NOT DETECTED

## 2021-04-12 ENCOUNTER — OUTSIDE FACILITY SERVICE (OUTPATIENT)
Dept: GASTROENTEROLOGY | Facility: CLINIC | Age: 76
End: 2021-04-12

## 2021-04-12 PROCEDURE — 43249 ESOPH EGD DILATION <30 MM: CPT | Performed by: INTERNAL MEDICINE

## 2021-04-13 ENCOUNTER — HOSPITAL ENCOUNTER (OUTPATIENT)
Dept: SPEECH THERAPY | Facility: HOSPITAL | Age: 76
Setting detail: THERAPIES SERIES
Discharge: HOME | End: 2021-04-13

## 2021-04-13 DIAGNOSIS — F02.80 LATE ONSET ALZHEIMER'S DISEASE WITHOUT BEHAVIORAL DISTURBANCE (HCC): Primary | ICD-10-CM

## 2021-04-13 DIAGNOSIS — G30.1 LATE ONSET ALZHEIMER'S DISEASE WITHOUT BEHAVIORAL DISTURBANCE (HCC): Primary | ICD-10-CM

## 2021-04-13 DIAGNOSIS — R13.19 ESOPHAGEAL DYSPHAGIA: Primary | ICD-10-CM

## 2021-04-13 PROCEDURE — 92507 TX SP LANG VOICE COMM INDIV: CPT

## 2021-04-13 NOTE — THERAPY TREATMENT NOTE
Outpatient Speech Language Pathology   Adult Speech Language Cognitive Treatment Note  [facility]     Patient Name: Brittny Nicolas  : 1945  MRN: [id number]  Today's Date: 2021         Visit Date: 2021   Patient Active Problem List   Diagnosis   • Achalasia   • Acute upper respiratory infection   • Atypical chest pain   • Benign essential hypertension   • Carotid artery stenosis   • Cataract   • Cough   • Acute cystitis   • Screening for malignant neoplasm   • Gastroesophageal reflux disease   • Essential familial hypercholesterolemia   • Gastrointestinal bleeding   • Inguinal hernia   • Insomnia   • Leg cramps   • Meniere's disease   • Night sweats   • Osteoporosis   • Peripheral neuropathy   • Retinal artery occlusion   • Rheumatoid arthritis (CMS/HCC)   • Stroke syndrome   • Suprapubic pain   • Transient ischemic attack   • Positive reaction to tuberculin skin test   • Vitamin D deficiency   • Weight loss   • H/O colonoscopy   • Fatigue   • Hypothyroidism   • Erosive esophagitis   • Left lower quadrant pain   • Acute pain of left shoulder   • Pharyngitis   • Memory loss   • Rib pain on left side   • Hyponatremia   • Late onset Alzheimer's disease without behavioral disturbance (CMS/[facility])          Visit Dx:    ICD-10-CM ICD-9-CM   1. Late onset Alzheimer's disease without behavioral disturbance (CMS/[facility])  G30.1 331.0    F02.80 294.10                         SLP OP Goals     Row Name 21 1500          Goal Type Needed    Goal Type Needed  Memory;Attention/Orientation;Auditory Comprehension;Other Adult Goals  (Pended)   -LB        Subjective Comments    Subjective Comments  Pt alert, cooperative, accompanied by daughter.  (Pended)   -LB        Auditory Comprehension Goals    Patient will comprehend spoken information in all settings  90%:;with cues  (Pended)   -LB     Status: Patient will comprehend spoken information in all settings  Progressing as expected  (Pended)   -LB     Comments:  Patient will comprehend spoken information in all settings  3/2/21: WH questions and pt was 90% accurate. 10/29/20: Pt's daughter states that she isn't sure her mom can read or understands the labels in her home for kitchen items.   (Pended)   -LB     Patient will demonstrate comprehension of spoken language by answering questions about a short paragraph  80%:;with cues  (Pended)   -LB     Status: Patient will demonstrate comprehension of spoken language by answering questions about a short paragraph  Progressing as expected  (Pended)   -LB     Comments: Patient will demonstrate comprehension of spoken language by answering questions about a short paragraph  2/23/21:  12-22 word paragraphs: 90% accurate.  22-36 word paragraphs: pt was 70% accurate.   (Pended)   -LB        Memory Goals    Patient and family will implement compensatory strategies to maximize patient’s Memory function so patient can continue to participate in daily activities  90%:;with cues  (Pended)   -LB     Status: Patient and family will implement compensatory strategies to maximize patient’s Memory function so patient can continue to participate in daily activities  New  (Pended)   -LB     Patient will demonstrate improved ability to recall information by immediately recalling a series of words  80%:;related;after delay;with cues  (Pended)   -LB     Status: Patient will demonstrate improved ability to recall information by immediately recalling a series of words  Progressing as expected  (Pended)   -LB     Comments: Patient will demonstrate improved ability to recall information by immediately recalling a series of words  3/16/21: SRT for pee pads at home for cat, pt was successful for 8 min delay, 16 mins required a look at the cue card.    Dtr reports that pt is nowing taking her pills one at a time which was targeted last with SRT. 3/9/21: SRT for taking pills one at time and pt made it up to 10 mins. 3/2/21: Pt recalled all of her swallowing  exercises and demonstrated them for recall purposes.  SLUMS Immediate recall of paragraph, pt was 0/8.  1/26/21: Immediate recall of 3 words in a row and pt was 90% accurate. 1/15/21: SLUMS: Immediate recall of paragraph and pt was 1/8. 11/24/20: Mental Manipulation for progression and pt was 90% accurate. 11/17/20: Mental Manipulation by attribute, pt was 90% accurate and by word placement, pt was 90% accurate. 11/3/20: Immediate recall on FROMAJE: pt was 2/3.  (Pended)   -LB     Patient’s memory skills will be enhanced as reported by patient by utilizing internal memory strategies to recall up to 3 pieces of information after a 5- minute delay  80%:;with cues  (Pended)   -LB     Status: Patient’s memory skills will be enhanced as reported by patient by utilizing internal memory strategies to recall up to 3 pieces of information after a 5- minute delay  Progressing as expected  (Pended)   -LB     Comments: Patient’s memory skills will be enhanced as reported by patient by utilizing internal memory strategies to recall up to 3 pieces of information after a 5- minute delay  3/9/21: Delayed recall of 6 related animals and pt was 6/6. 3/2/21: Delayed recall of 4 related animals and pt was 4/4, SLUMS delayed was 0/5.  2/9/21: Delayed recall of 3 animal from 2 weeks ago: pt was 0/3 w/o cue. After repetition during session, pt was 3/3 x 3.  1/26/21: Delayed recall of 2 related pictures: pt was 1/15/21: SLUMS Delayed recall of 5 un-related words and pt was 0/5 I'ly and with cues, pt was 4/5.  12/8/20: Delayed recall of 2 new related pictures, pt was 1/2 to 2/2. 11/24/20: Delayed recall of 6 cont'd pictures from previous session: pt was 3/6 I'ly. 5/6 with min cues, 6/6 by the end of session. 11/17/20: Delayed recall of 6 related words: pt was 4/6, 5/6 and 6/6 all with written cue. 11/10/20: Delayed recall of 5 related words and pt was 3/5 with first initial presented. After review and use of first initial, pt was 5/5.    11/3/20: Delayed recall of 4 related (familiar to pt) pt was 3/4 x 2, with an increased written cue, pt was 4/4. 10/29/20: Delayed recall of 3 related pictures: pt was 2/3x 2, 3/3 x 3.    (Pended)   -LB     Patient’s memory skills will be enhanced as reported by patient by using external memory aides  80%:;with cues  (Pended)   -LB     Status: Patient’s memory skills will be enhanced as reported by patient by using external memory aides  Progressing as expected  (Pended)   -LB     Comments: Patient’s memory skills will be enhanced as reported by patient by using external memory aides  4/13/21: Pt noted she infrequently uses journal but was willing to try creating a daily agenda. 3/16/21: Pt had one entry since last session- started entry for this date. 3/9/21: Pt had not written in journal, pt's journal is now set for the coming week for each day. 3/2/21: Pt had not written in journal since last tx session. 2/9/21: Pt returned with journal with entries since a few days ago. 1/26/21: Pt brought in journal but had not written in it so SLP had pt start an entry for today. 1/15/21: Re-educated pt and daughter on importance of journaling. 12/8/20: Pt has not been writing in her journal. Started entry for today.   11/24/20: Pt had only journaled one time since last session- ideas for cues in order to prompt pt to write in her journal. 11/17/20: Pt continues to journal. 11/10/20: Reinforced journaling and purpose and need for reference. 11/3/20: Reinforced use of daily journaling. 10/29/20: Pt had not written in her journal yet- provided visual cues.   (Pended)   -LB     Patient will demonstrate improved ability to recall information by stating activities patient has completed that day  80%:;with cues  (Pended)   -LB     Status: Patient will demonstrate improved ability to recall information by stating activities patient has completed that day  Progressing as expected  (Pended)   -LB     Comments: Patient will demonstrate  improved ability to recall information by stating activities patient has completed that day  4/13/21: Pt stated that her family had been busy all day so she had only watched TV. 3/9/21: Pt struggled with recall. of her lunch and afternoon chore without cues from daughter. 3/21:  Pt did recall completing swallowing exercises and returned with handout with markings representing how often she completed them. 2/9/21: Pt recalled foods that she had not eaten, daughter had to clarify for accuracy for journal. 1/26/21: Recall of meals this date with accurate recall of breakfast and no recall of lunch- required cue from daughter. 1/15/21: Pt does not recall having a nightmare per her daughter's report and with a cue, could recall what she had for breakfast: 11/3/20: [place] did write but not much. With cues, was able to recall events from weekend.   (Pended)   -LB        Attention/Orientation Goals    Patient will be able to interact safely in home environment  With Supervision  (Pended)   -LB     Status: Patient will be able to interact safely in home environment  Progressing as expected  (Pended)   -LB     Comments: Patient will be able to interact safely in home environment  4/13/21: Pt remarks that she is independent. Pt's daughter remarks that she has to occasionally remind pt to take baths. 3/2/21: Pt aware of what to do if home alone and smells spoke or needs help since there is no land line phone at home. 1/15/21: SLUMS [employer]an and pt was 2/2.   (Pended)   -LB     Patient will improve orientation skills by orienting to self, place, time, and situation  80%:;with cues  (Pended)   -LB     Status: Patient will improve orientation skills by orienting to self, place, time, and situation  Progressing as expected  (Pended)   -LB     Comments: Patient will improve orientation skills by orienting to self, place, time, and situation  2/9/21: Pt used calendar in session for orientation  purposes.  1/26/21: Pt has a calendar at home to use but was not oriented to date when starting her journal sheet. 11/24/20: Pt aware of date and day of week. Encouraged use of journal in order to keep up with orientation. 11/17/20: Pt used the calendar and pt was accurate with day of week and date.  11/10/20: Using calendar in office, pt was able to ID current date.  10/29/20: Provided calendar to assist with orientation.   (Pended)   -LB     Patient will improve attention skills by sustaining consistent behavioral response during continuous and repetitive activity (e.g., listening for target words, auditory/reading comprehension tasks)  with cues;5 minute task  (Pended)   -LB     Status: Patient will improve attention skills by sustaining consistent behavioral response during continuous and repetitive activity (e.g., listening for target words, auditory/reading comprehension tasks)  Progressing as expected  (Pended)   -LB     Comments: Patient will improve attention skills by sustaining consistent behavioral response during continuous and repetitive activity (e.g., listening for target words, auditory/reading comprehension tasks)  4/13/21: Pt played  board card game. Pt was 100% accurate with switching between cards for alternating attention task. 3/16/21: The game of TRASH: pt was 80% accurate with mod cues.  2/9/21: The game of TRASH for consistency of following directions to a new game, pt was 70% accurate.  1/26/21: Word placement by occurrence and pt was 90% accurate, reversal and pt was 50% accurate. 1/15/21: One Pile Card Game: pt was 80% accurate. 11/3/20: One Pile Card Game:  Pt was 85% accurate.  10/29/20: One Pile Card Game: pt was 80% accurate with min to mod cues.   (Pended)   -LB     Patient will improve attention skills by focusing on environmental stimuli within left visuospatial field  80%:;with cues  (Pended)   -LB     Status: Patient will improve attention skills by focusing on  environmental stimuli within left visuospatial field  Progressing as expected  (Pended)   -LB     Comments: Patient will improve attention skills by focusing on environmental stimuli within left visuospatial field  4/13/21: Pt sorted cards into three piles with 100% accuracy. 3/16/21: Card sorting for two different tasks and pt was 60% accurate I'ly, with visual prompts, pt was 80% accurate. 3/2/21: Copying boxed information and pt was 70% accurate with mod cues. 1/26/21: Copying boxed information: pt was 75% accurate.   11/3/20: Shapes and figures: 3 in a row and pt was 80% accurate with mod verbal and visual cues.   (Pended)   -LB        Dysphagia Goals    Patient will safely consume the recommended diet without complications such as aspiration pneumonia  regular diet and thin liquids  (Pended)   -LB     Status: Patient will safely consume the recommended diet without complications such as aspiration pneumonia  Progressing as expected  (Pended)   -LB     Comments: Patient will safely consume the recommended diet without complications such as aspiration pneumonia  4/13/21: Pt reviewed each exercise during session. 3/23/21: No coughing exhibited during session. Pt and daughter note that every once awhile there is residue present where a throat clear is needed or a double swallow. Pt being followed by GI with scope ordered this date. 3/9/21: Pt reports no difficulties eating and drinking at home where daughter reports hearing the large gulps.   (Pended)   -LB     Patient will improve stage transition duration of swallow/improve timing to reduce food falling into the airway by decreasing swallow delay after neurosensory  stimulation  three second prep;suck-swallow;super-supraglottic swallow;with cues  (Pended)   -LB     Status: Patient will improve stage transition duration of swallow/improve timing to reduce food falling into the airway by decreasing swallow delay after neurosensory  stimulation  Progressing as  expected  (Pended)   -LB     Comments: Patient will improve stage transition duration of swallow/improve timing to reduce food falling into the airway by decreasing swallow delay after neurosensory  stimulation  4/13/21: Pt reviewed each exercise during session. 3/30/21: Reviewed with pt and she completed with accuracy x 3 each.  3/23/21: Reviewed with pt and she demonstrated accuracy.   3/9/21: Pt completed during session x 2 each.   (Pended)   -LB     Patient will increase laryngeal elevation to reduce residue that might fall into airway by completing  super-supraglottic swallow;falsetto/pitch glide;with cues  (Pended)   -LB     Status: Patient will increase laryngeal elevation to reduce residue that might fall into airway by completing  Progressing as expected  (Pended)   -LB     Comments: Patient will increase laryngeal elevation to reduce residue that might fall into airway by completing  4/13/21: Pt reviewed each exercise during session. 3/30/21: Reviewed with pt and she completed with accuracy x 3 each. 3/23/21: Reviewed with pt and she demonstrated accuracy. 3/9/21: Pt completed during session x 2 each.   (Pended)   -LB     Patient will increase closure of larynx to keep food from falling into the airway by completing  super-supraglottic swallow;push-pull with breath hold;with cues  (Pended)   -LB     Status: Patient will increase closure of larynx to keep food from falling into the airway by completing  Progressing as expected  (Pended)   -LB     Comments: Patient will increase closure of larynx to keep food from falling into the airway by completing  4/13/21: Pt reviewed each exercise during session. 3/30/21: Reviewed with pt and she completed with accuracy x 3 each. 3/23/21: Reviewed with pt and she demonstrated accuracy.  3/9/21: Pt completed during session x 2 each.   (Pended)   -LB     Patient will increase strength of tongue base and posterior pharyngeal walls to reduce residue that might fall into  airway by completing  effotful swallow;Bernarda (tongue hold);with cues  (Pended)   -LB     Status: Patient will increase strength of tongue base and posterior pharyngeal walls to reduce residue that might fall into airway by completing  Progressing as expected  (Pended)   -LB     Comments: Patient will increase strength of tongue base and posterior pharyngeal walls to reduce residue that might fall into airway by completing  4/13/21: Pt reviewed each exercise during session. 3/30/21: Reviewed with pt and she completed with accuracy x 3 each. 3/23/21: Reviewed with pt and she demonstrated accuracy.  3/9/21: Pt completed during session x 2 each.   (Pended)   -LB        Other Goals    Other Adult Goal- 1  Pt will complete thought organization tasks with 80% accuracy.  (Pended)   -LB     Status: Other Adult Goal- 1  Progressing as expected  (Pended)   -LB     Comments: Other Adult Goal- 1  3/30/21: A-Z activity for naming and pt was 70% accurate with mod cues. 3/9/21: 5 step sentence sequencing: pt was 100% accurate. 3/2/21: SLUMS assesement from 2/23/21 and pt was 6/15 for divergent naming. This date, for divergent naming: pt was 80% accurate. 2/9/21: Following written directions: pt was 80% accurate.  1/15/21: SLUMS naming: pt was 8/15. 12/8/20: Category matrix: pt was 90% accurate.  Crossword puzzle and pt was 85% accurate. 11/24/20: Divergent naming: pt was 75-90% accurate . 11/17/20: Category Matrix: pt was 80% accurate I'ly. 11/10/20: Fill in word given first initial: pt was 100% accurate. 10/29/20: divergent naming: pt was 70% accurate. 90  (Pended)   -LB     Other Adult Goal- 2  Pt will complete reasoning and problem solving assessment with goals to follow as indicated.  (Pended)   -LB     Status: Other Adult Goal- 2  Progressing as expected  (Pended)   -LB     Comments: Other Adult Goal- 2  3/16/21: Word search and pt was 80% accurate.  3/2/21: Deductive reasoning: pt was 95% accurate. Clock work: pt was 9/12.   Emergent situations and pt was 90% accurate. 1/26/21: Connecting the dots alphabetically and pt required consistent cues. 12/8/20: Six step sequencing: pt was 90% accurate. 11/10/20: Written Directions: pt was 90% accurate. 11/3/20: FROMAJE: Pt scored a 15 placing pt in the severe dementia or depression range.   (Pended)   -LB     Other Adult Goal- 3  Pt will improve RBANS Total score to at least a 70 placing pt in the [place] range.   (Pended)   -LB     Status: Other Adult Goal- 3  Progressing as expected  (Pended)   -LB     Comments: Other Adult Goal- 3  3/1/21: From re-assessment using SLUMS on 2/23/21, pt was 8/20 placing pt in the Dementia range.   (Pended)   -LB        SLP Time Calculation    SLP Goal Re-Cert Due Date  04/22/21  (Pended)   -LB       User Key  (r) = Recorded By, (t) = Taken By, (c) = Cosigned By    Initials Name Provider Type    Ferny Ball Speech Therapy Student Speech Therapy Student          OP SLP Education     Row Name 04/13/21 1500       Education    Education Comments  Hmwk for creating daily agenda.  (Pended)   -LB      User Key  (r) = Recorded By, (t) = Taken By, (c) = Cosigned By    Initials Name Effective Dates    Ferny Ball Speech Therapy Student 03/09/21 -           OP SLP Assessment/Plan - 04/13/21 1600        SLP Plan    Plan Comments  Continue with cog tx  (Pended)    -LB      User Key  (r) = Recorded By, (t) = Taken By, (c) = Cosigned By    Initials Name Provider Type    Ferny Ball Speech Therapy Student Speech Therapy Student                 Time Calculation:   SLP Start Time: (P) 1500    Therapy Charges for Today     Code Description Service Date Service Provider Modifiers Qty    [id number] HC ST TREATMENT SPEECH 4 4/13/2021 Ferny Escamilla Speech Therapy Student GN 1                   Ferny Escamilla Speech Therapy Student  4/13/2021

## 2021-04-20 ENCOUNTER — APPOINTMENT (OUTPATIENT)
Dept: PREADMISSION TESTING | Facility: HOSPITAL | Age: 76
End: 2021-04-20

## 2021-04-20 ENCOUNTER — HOSPITAL ENCOUNTER (OUTPATIENT)
Dept: SPEECH THERAPY | Facility: HOSPITAL | Age: 76
Setting detail: THERAPIES SERIES
Discharge: HOME | End: 2021-04-20

## 2021-04-20 DIAGNOSIS — F02.80 LATE ONSET ALZHEIMER'S DISEASE WITHOUT BEHAVIORAL DISTURBANCE (HCC): Primary | ICD-10-CM

## 2021-04-20 DIAGNOSIS — G30.1 LATE ONSET ALZHEIMER'S DISEASE WITHOUT BEHAVIORAL DISTURBANCE (HCC): Primary | ICD-10-CM

## 2021-04-20 PROCEDURE — U0004 COV-19 TEST NON-CDC HGH THRU: HCPCS

## 2021-04-20 PROCEDURE — C9803 HOPD COVID-19 SPEC COLLECT: HCPCS

## 2021-04-20 PROCEDURE — 92507 TX SP LANG VOICE COMM INDIV: CPT

## 2021-04-20 NOTE — THERAPY TREATMENT NOTE
Outpatient Speech Language Pathology   Adult Speech Language Cognitive Treatment Note  [facility]     Patient Name: Brittny Nicolas  : 1945  MRN: [id number]  Today's Date: 2021         Visit Date: 2021   Patient Active Problem List   Diagnosis   • Achalasia   • Acute upper respiratory infection   • Atypical chest pain   • Benign essential hypertension   • Carotid artery stenosis   • Cataract   • Cough   • Acute cystitis   • Screening for malignant neoplasm   • Gastroesophageal reflux disease   • Essential familial hypercholesterolemia   • Gastrointestinal bleeding   • Inguinal hernia   • Insomnia   • Leg cramps   • Meniere's disease   • Night sweats   • Osteoporosis   • Peripheral neuropathy   • Retinal artery occlusion   • Rheumatoid arthritis (CMS/HCC)   • Stroke syndrome   • Suprapubic pain   • Transient ischemic attack   • Positive reaction to tuberculin skin test   • Vitamin D deficiency   • Weight loss   • H/O colonoscopy   • Fatigue   • Hypothyroidism   • Erosive esophagitis   • Left lower quadrant pain   • Acute pain of left shoulder   • Pharyngitis   • Memory loss   • Rib pain on left side   • Hyponatremia   • Late onset Alzheimer's disease without behavioral disturbance (CMS/[facility])          Visit Dx:    ICD-10-CM ICD-9-CM   1. Late onset Alzheimer's disease without behavioral disturbance (CMS/HCC)  G30.1 331.0    F02.80 294.10                         SLP OP Goals     Row Name 21 1500          Goal Type Needed    Goal Type Needed  Auditory Comprehension;Memory;Attention/Orientation;Other Adult Goals  (Pended)   -LB        Subjective Comments    Subjective Comments  Pt alert, cooperative, and accompanied by her daughter.  (Pended)   -LB        Auditory Comprehension Goals    Patient will comprehend spoken information in all settings  90%:;with cues  (Pended)   -LB     Status: Patient will comprehend spoken information in all settings  Progressing as expected  (Pended)   -LB      Comments: Patient will comprehend spoken information in all settings  3/2/21: WH questions and pt was 90% accurate. 10/29/20: Pt's daughter states that she isn't sure her mom can read or understands the labels in her home for kitchen items.   (Pended)   -LB     Patient will demonstrate comprehension of spoken language by answering questions about a short paragraph  80%:;with cues  (Pended)   -LB     Status: Patient will demonstrate comprehension of spoken language by answering questions about a short paragraph  Progressing as expected  (Pended)   -LB     Comments: Patient will demonstrate comprehension of spoken language by answering questions about a short paragraph  4/20/21: 22-36 word paragraphs: 70% accurate with moderate cues. 2/23/21: 12-22 word paragraphs: 90% accurate.  22-36 word paragraphs: pt was 70% accurate.   (Pended)   -LB        Memory Goals    Patient and family will implement compensatory strategies to maximize patient’s Memory function so patient can continue to participate in daily activities  90%:;with cues  (Pended)   -LB     Status: Patient and family will implement compensatory strategies to maximize patient’s Memory function so patient can continue to participate in daily activities  Progressing as expected  (Pended)   -LB     Comments: Patient and family will implement compensatory strategies to maximize patient’s Memory function so patient can continue to participate in daily activities  4/20/21: Pt noted she would journal on her own, have a shared calendar with her family, and start a memory book.   (Pended)   -LB     Patient will demonstrate improved ability to recall information by immediately recalling a series of words  80%:;related;after delay;with cues  (Pended)   -LB     Status: Patient will demonstrate improved ability to recall information by immediately recalling a series of words  Progressing as expected  (Pended)   -LB     Comments: Patient will demonstrate improved ability to  recall information by immediately recalling a series of words  3/16/21: SRT for pee pads at home for cat, pt was successful for 8 min delay, 16 mins required a look at the cue card.    Dtr reports that pt is nowing taking her pills one at a time which was targeted last with SRT. 3/9/21: SRT for taking pills one at time and pt made it up to 10 mins. 3/2/21: Pt recalled all of her swallowing exercises and demonstrated them for recall purposes.  SLUMS Immediate recall of paragraph, pt was 0/8.  1/26/21: Immediate recall of 3 words in a row and pt was 90% accurate. 1/15/21: SLUMS: Immediate recall of paragraph and pt was 1/8. 11/24/20: Mental Manipulation for progression and pt was 90% accurate. 11/17/20: Mental Manipulation by attribute, pt was 90% accurate and by word placement, pt was 90% accurate. 11/3/20: Immediate recall on FROMAJE: pt was 2/3.  (Pended)   -LB     Patient’s memory skills will be enhanced as reported by patient by utilizing internal memory strategies to recall up to 3 pieces of information after a 5- minute delay  80%:;with cues  (Pended)   -LB     Status: Patient’s memory skills will be enhanced as reported by patient by utilizing internal memory strategies to recall up to 3 pieces of information after a 5- minute delay  Progressing as expected  (Pended)   -LB     Comments: Patient’s memory skills will be enhanced as reported by patient by utilizing internal memory strategies to recall up to 3 pieces of information after a 5- minute delay  3/9/21: Delayed recall of 6 related animals and pt was 6/6. 3/2/21: Delayed recall of 4 related animals and pt was 4/4, SLUMS delayed was 0/5.  2/9/21: Delayed recall of 3 animal from 2 weeks ago: pt was 0/3 w/o cue. After repetition during session, pt was 3/3 x 3.  1/26/21: Delayed recall of 2 related pictures: pt was 1/15/21: SLUMS Delayed recall of 5 un-related words and pt was 0/5 I'ly and with cues, pt was 4/5.  12/8/20: Delayed recall of 2 new related  pictures, pt was 1/2 to 2/2. 11/24/20: Delayed recall of 6 cont'd pictures from previous session: pt was 3/6 I'ly. 5/6 with min cues, 6/6 by the end of session. 11/17/20: Delayed recall of 6 related words: pt was 4/6, 5/6 and 6/6 all with written cue. 11/10/20: Delayed recall of 5 related words and pt was 3/5 with first initial presented. After review and use of first initial, pt was 5/5.   11/3/20: Delayed recall of 4 related (familiar to pt) pt was 3/4 x 2, with an increased written cue, pt was 4/4. 10/29/20: Delayed recall of 3 related pictures: pt was 2/3x 2, 3/3 x 3.    (Pended)   -LB     Patient’s memory skills will be enhanced as reported by patient by using external memory aides  80%:;with cues  (Pended)   -LB     Status: Patient’s memory skills will be enhanced as reported by patient by using external memory aides  Progressing as expected  (Pended)   -LB     Comments: Patient’s memory skills will be enhanced as reported by patient by using external memory aides  4/20/21: Pt noted she was journaling more often now. 3/16/21: Pt had one entry since last session- started entry for this date. 3/9/21: Pt had not written in journal, pt's journal is now set for the coming week for each day. 3/2/21: Pt had not written in journal since last tx session. 2/9/21: Pt returned with journal with entries since a few days ago. 1/26/21: Pt brought in journal but had not written in it so SLP had pt start an entry for today. 1/15/21: Re-educated pt and daughter on importance of journaling. 12/8/20: Pt has not been writing in her journal. Started entry for today.   11/24/20: Pt had only journaled one time since last session- ideas for cues in order to prompt pt to write in her journal. 11/17/20: Pt continues to journal. 11/10/20: Reinforced journaling and purpose and need for reference. 11/3/20: Reinforced use of daily journaling. 10/29/20: Pt had not written in her journal yet- provided visual cues.   (Pended)   -FOREIGN     Patient  "will demonstrate improved ability to recall information by stating activities patient has completed that day  80%:;with cues  (Pended)   -LB     Status: Patient will demonstrate improved ability to recall information by stating activities patient has completed that day  Progressing as expected  (Pended)   -LB     Comments: Patient will demonstrate improved ability to recall information by stating activities patient has completed that day  4/20/21: Pt noted she has not done much today. Pt stated she would like to do more around the house to \"help out.\" 3/9/21: Pt struggled with recall. of her lunch and afternoon chore without cues from daughter. 3/21:  Pt did recall completing swallowing exercises and returned with handout with markings representing how often she completed them. 2/9/21: Pt recalled foods that she had not eaten, daughter had to clarify for accuracy for journal. 1/26/21: Recall of meals this date with accurate recall of breakfast and no recall of lunch- required cue from daughter. 1/15/21: Pt does not recall having a nightmare per her daughter's report and with a cue, could recall what she had for breakfast: 11/3/20: [place] did write but not much. With cues, was able to recall events from weekend.   (Pended)   -LB        Attention/Orientation Goals    Patient will be able to interact safely in home environment  With Supervision  (Pended)   -LB     Status: Patient will be able to interact safely in home environment  Progressing as expected  (Pended)   -LB     Comments: Patient will be able to interact safely in home environment  4/20/21: Pt's daughter states that pt will occasionally forget ADL's such as bathing. Pt agreed that use of a shared family calendar may aid in independence. 3/2/21: Pt aware of what to do if home alone and smells smoke or needs help since there is no land line phone at home. 1/15/21: SLUMS [employer]an and pt was 2/2.   (Pended)   -LB     " Patient will improve orientation skills by orienting to self, place, time, and situation  80%:;with cues  (Pended)   -LB     Status: Patient will improve orientation skills by orienting to self, place, time, and situation  Progressing as expected  (Pended)   -LB     Comments: Patient will improve orientation skills by orienting to self, place, time, and situation  2/9/21: Pt used calendar in session for orientation purposes.  1/26/21: Pt has a calendar at home to use but was not oriented to date when starting her journal sheet. 11/24/20: Pt aware of date and day of week. Encouraged use of journal in order to keep up with orientation. 11/17/20: Pt used the calendar and pt was accurate with day of week and date.  11/10/20: Using calendar in office, pt was able to ID current date.  10/29/20: Provided calendar to assist with orientation.   (Pended)   -LB     Patient will improve attention skills by sustaining consistent behavioral response during continuous and repetitive activity (e.g., listening for target words, auditory/reading comprehension tasks)  with cues;5 minute task  (Pended)   -LB     Status: Patient will improve attention skills by sustaining consistent behavioral response during continuous and repetitive activity (e.g., listening for target words, auditory/reading comprehension tasks)  Progressing as expected  (Pended)   -LB     Comments: Patient will improve attention skills by sustaining consistent behavioral response during continuous and repetitive activity (e.g., listening for target words, auditory/reading comprehension tasks)  3/16/21: The game of TRASH: pt was 80% accurate with mod cues.  2/9/21: The game of TRASH for consistency of following directions to a new game, pt was 70% accurate.  1/26/21: Word placement by occurrence and pt was 90% accurate, reversal and pt was 50% accurate. 1/15/21: One Pile Card Game: pt was 80% accurate. 11/3/20: One Pile Card Game:  Pt was 85% accurate.  10/29/20: One  Pile Card Game: pt was 80% accurate with min to mod cues.   (Pended)   -LB     Patient will improve attention skills by focusing on environmental stimuli within left visuospatial field  80%:;with cues  (Pended)   -LB     Status: Patient will improve attention skills by focusing on environmental stimuli within left visuospatial field  Progressing as expected  (Pended)   -LB     Comments: Patient will improve attention skills by focusing on environmental stimuli within left visuospatial field  3/16/21: Card sorting for two different tasks and pt was 60% accurate I'ly, with visual prompts, pt was 80% accurate. 3/2/21: Copying boxed information and pt was 70% accurate with mod cues. 1/26/21: Copying boxed information: pt was 75% accurate.   11/3/20: Shapes and figures: 3 in a row and pt was 80% accurate with mod verbal and visual cues.   (Pended)   -LB        Dysphagia Goals    Patient will safely consume the recommended diet without complications such as aspiration pneumonia  regular diet and thin liquids  (Pended)   -LB     Status: Patient will safely consume the recommended diet without complications such as aspiration pneumonia  Progressing as expected  (Pended)   -LB     Comments: Patient will safely consume the recommended diet without complications such as aspiration pneumonia  3/30/21: 3/23/21: No coughing exhibited during session. Pt and daughter note that every once awhile there is residue present where a throat clear is needed or a double swallow. Pt being followed by GI with scope ordered this date. 3/9/21: Pt reports no difficulties eating and drinking at home where daughter reports hearing the large gulps.   (Pended)   -LB     Patient will improve stage transition duration of swallow/improve timing to reduce food falling into the airway by decreasing swallow delay after neurosensory  stimulation  three second prep;suck-swallow;super-supraglottic swallow;with cues  (Pended)   -LB     Status: Patient will  improve stage transition duration of swallow/improve timing to reduce food falling into the airway by decreasing swallow delay after neurosensory  stimulation  Progressing as expected  (Pended)   -LB     Comments: Patient will improve stage transition duration of swallow/improve timing to reduce food falling into the airway by decreasing swallow delay after neurosensory  stimulation  3/30/21: Reviewed with pt and she completed with accuracy x 3 each.  3/23/21: Reviewed with pt and she demonstrated accuracy.   3/9/21: Pt completed during session x 2 each.   (Pended)   -LB     Patient will increase laryngeal elevation to reduce residue that might fall into airway by completing  super-supraglottic swallow;falsetto/pitch glide;with cues  (Pended)   -LB     Status: Patient will increase laryngeal elevation to reduce residue that might fall into airway by completing  Progressing as expected  (Pended)   -LB     Comments: Patient will increase laryngeal elevation to reduce residue that might fall into airway by completing  3/30/21: Reviewed with pt and she completed with accuracy x 3 each. 3/23/21: Reviewed with pt and she demonstrated accuracy. 3/9/21: Pt completed during session x 2 each.   (Pended)   -LB     Patient will increase closure of larynx to keep food from falling into the airway by completing  super-supraglottic swallow;push-pull with breath hold;with cues  (Pended)   -LB     Status: Patient will increase closure of larynx to keep food from falling into the airway by completing  Progressing as expected  (Pended)   -LB     Comments: Patient will increase closure of larynx to keep food from falling into the airway by completing  Reviewed with pt and she completed with accuracy x 3 each. 3/23/21: Reviewed with pt and she demonstrated accuracy.  3/9/21: Pt completed during session x 2 each.   (Pended)   -LB     Patient will increase strength of tongue base and posterior pharyngeal walls to reduce residue that might  fall into airway by completing  effotful swallow;Bernarda (tongue hold);with cues  (Pended)   -LB     Status: Patient will increase strength of tongue base and posterior pharyngeal walls to reduce residue that might fall into airway by completing  Progressing as expected  (Pended)   -LB     Comments: Patient will increase strength of tongue base and posterior pharyngeal walls to reduce residue that might fall into airway by completing  Reviewed with pt and she completed with accuracy x 3 each. 3/23/21: Reviewed with pt and she demonstrated accuracy.  3/9/21: Pt completed during session x 2 each.   (Pended)   -LB        Other Goals    Other Adult Goal- 1  Pt will complete thought organization tasks with 80% accuracy.  (Pended)   -LB     Status: Other Adult Goal- 1  Progressing as expected  (Pended)   -LB     Comments: Other Adult Goal- 1  4/20/21: Pt completed grocery list alphabetizing task with 70% accuracy independently. Pt required moderate cues to correct error and to refocus to task. 3/30/21: A-Z activity for naming and pt was 70% accurate with mod cues. 3/9/21: 5 step sentence sequencing: pt was 100% accurate. 3/2/21: SLUMS assesement from 2/23/21 and pt was 6/15 for divergent naming. This date, for divergent naming: pt was 80% accurate. 2/9/21: Following written directions: pt was 80% accurate.  1/15/21: SLUMS naming: pt was 8/15. 12/8/20: Category matrix: pt was 90% accurate.  Crossword puzzle and pt was 85% accurate. 11/24/20: Divergent naming: pt was 75-90% accurate . 11/17/20: Category Matrix: pt was 80% accurate I'ly. 11/10/20: Fill in word given first initial: pt was 100% accurate. 10/29/20: divergent naming: pt was 70% accurate. 90  (Pended)   -LB     Other Adult Goal- 2  Pt will complete reasoning and problem solving assessment with goals to follow as indicated.  (Pended)   -LB     Status: Other Adult Goal- 2  Progressing as expected  (Pended)   -LB     Comments: Other Adult Goal- 2  3/16/21: Word  search and pt was 80% accurate.  3/2/21: Deductive reasoning: pt was 95% accurate. Clock work: pt was 9/12.  Emergent situations and pt was 90% accurate. 1/26/21: Connecting the dots alphabetically and pt required consistent cues. 12/8/20: Six step sequencing: pt was 90% accurate. 11/10/20: Written Directions: pt was 90% accurate. 11/3/20: FROMAJE: Pt scored a 15 placing pt in the severe dementia or depression range.   (Pended)   -LB     Other Adult Goal- 3  Pt will improve RBANS Total score to at least a 70 placing pt in the [place] range.   (Pended)   -LB     Status: Other Adult Goal- 3  Progressing as expected  (Pended)   -LB     Comments: Other Adult Goal- 3  3/1/21: From re-assessment using SLUMS on 2/23/21, pt was 8/20 placing pt in the Dementia range.   (Pended)   -LB        SLP Time Calculation    SLP Goal Re-Cert Due Date  04/22/21  (Pended)   -LB       User Key  (r) = Recorded By, (t) = Taken By, (c) = Cosigned By    Initials Name Provider Type    Ferny Ball Speech Therapy Student Speech Therapy Student          OP SLP Education     Row Name 04/20/21 1500       Education    Education Comments  Hmwk for grocery list alphabetizing. Extensive education about a shared calendar, journaling, and memory book for remembering family memories.   (Pended)   -LB      User Key  (r) = Recorded By, (t) = Taken By, (c) = Cosigned By    Initials Name Effective Dates    Ferny Ball Speech Therapy Student 03/09/21 -           OP SLP Assessment/Plan - 04/20/21 1500        SLP Plan    Plan Comments  Cont with cog tx  (Pended)    -LB      User Key  (r) = Recorded By, (t) = Taken By, (c) = Cosigned By    Initials Name Provider Type    Ferny Ball Speech Therapy Student Speech Therapy Student                 Time Calculation:   SLP Start Time: (P) 1500    Therapy Charges for Today     Code Description Service Date Service Provider Modifiers Qty    [id number]  ST TREATMENT SPEECH 4 4/20/2021 Andi  Ferny, Speech Therapy Student GN 1                   Ferny Escamilla, Speech Therapy Student  4/20/2021

## 2021-04-21 LAB — SARS-COV-2 RNA PNL SPEC NAA+PROBE: NOT DETECTED

## 2021-04-22 ENCOUNTER — HOSPITAL ENCOUNTER (OUTPATIENT)
Dept: GENERAL RADIOLOGY | Facility: HOSPITAL | Age: 76
Discharge: HOME | End: 2021-04-22
Admitting: INTERNAL MEDICINE

## 2021-04-22 DIAGNOSIS — R13.19 ESOPHAGEAL DYSPHAGIA: ICD-10-CM

## 2021-04-22 PROCEDURE — 74240 X-RAY XM UPR GI TRC 1CNTRST: CPT

## 2021-04-22 PROCEDURE — A9270 NON-COVERED ITEM OR SERVICE: HCPCS | Performed by: INTERNAL MEDICINE

## 2021-04-22 PROCEDURE — 63710000001 BARIUM SULFATE 96 % RECONSTITUTED SUSPENSION: Performed by: INTERNAL MEDICINE

## 2021-04-22 RX ADMIN — BARIUM SULFATE 183 ML: 960 POWDER, FOR SUSPENSION ORAL at 11:26

## 2021-04-27 ENCOUNTER — HOSPITAL ENCOUNTER (OUTPATIENT)
Dept: SPEECH THERAPY | Facility: HOSPITAL | Age: 76
Setting detail: THERAPIES SERIES
Discharge: HOME | End: 2021-04-27

## 2021-04-27 DIAGNOSIS — F02.80 LATE ONSET ALZHEIMER'S DISEASE WITHOUT BEHAVIORAL DISTURBANCE (HCC): Primary | ICD-10-CM

## 2021-04-27 DIAGNOSIS — G30.1 LATE ONSET ALZHEIMER'S DISEASE WITHOUT BEHAVIORAL DISTURBANCE (HCC): Primary | ICD-10-CM

## 2021-04-27 PROCEDURE — 92507 TX SP LANG VOICE COMM INDIV: CPT

## 2021-04-27 NOTE — THERAPY PROGRESS REPORT/RE-CERT
Outpatient Speech Language Pathology   Adult Speech Language Cognitive Progress Note  [facility]     Patient Name: Brittny Nicolas  : 1945  MRN: [id number]  Today's Date: 2021         Visit Date: 2021   Patient Active Problem List   Diagnosis   • Achalasia   • Acute upper respiratory infection   • Atypical chest pain   • Benign essential hypertension   • Carotid artery stenosis   • Cataract   • Cough   • Acute cystitis   • Screening for malignant neoplasm   • Gastroesophageal reflux disease   • Essential familial hypercholesterolemia   • Gastrointestinal bleeding   • Inguinal hernia   • Insomnia   • Leg cramps   • Meniere's disease   • Night sweats   • Osteoporosis   • Peripheral neuropathy   • Retinal artery occlusion   • Rheumatoid arthritis (CMS/HCC)   • Stroke syndrome   • Suprapubic pain   • Transient ischemic attack   • Positive reaction to tuberculin skin test   • Vitamin D deficiency   • Weight loss   • H/O colonoscopy   • Fatigue   • Hypothyroidism   • Erosive esophagitis   • Left lower quadrant pain   • Acute pain of left shoulder   • Pharyngitis   • Memory loss   • Rib pain on left side   • Hyponatremia   • Late onset Alzheimer's disease without behavioral disturbance (CMS/[facility])          Visit Dx:    ICD-10-CM ICD-9-CM   1. Late onset Alzheimer's disease without behavioral disturbance (CMS/HCC)  G30.1 331.0    F02.80 294.10                         SLP OP Goals     Row Name 21 1500          Goal Type Needed    Goal Type Needed  Memory;Attention/Orientation;Other Adult Goals  (Pended)   -LB        Subjective Comments    Subjective Comments  Pt alert, cooperative, and accompanied by daughter. Pt did not return with homework.  (Pended)   -LB        Auditory Comprehension Goals    Patient will comprehend spoken information in all settings  90%:;with cues  (Pended)   -LB     Status: Patient will comprehend spoken information in all settings  Progressing as expected  (Pended)   -LB      "Comments: Patient will comprehend spoken information in all settings  3/2/21: WH questions and pt was 90% accurate. 10/29/20: Pt's daughter states that she isn't sure her mom can read or understands the labels in her home for kitchen items.   (Pended)   -LB     Patient will demonstrate comprehension of spoken language by answering questions about a short paragraph  80%:;with cues  (Pended)   -LB     Status: Patient will demonstrate comprehension of spoken language by answering questions about a short paragraph  Progressing as expected  (Pended)   -LB     Comments: Patient will demonstrate comprehension of spoken language by answering questions about a short paragraph  4/27/21: 36-50 word paragraphs and pt was 100% accurate when writing \"key details\" from each sentence. 4/20/21: 22-36 word paragraphs: 70% accurate with moderate cues. 2/23/21: 12-22 word paragraphs: 90% accurate.  22-36 word paragraphs: pt was 70% accurate.   (Pended)   -LB        Memory Goals    Patient and family will implement compensatory strategies to maximize patient’s Memory function so patient can continue to participate in daily activities  90%:;with cues  (Pended)   -LB     Status: Patient and family will implement compensatory strategies to maximize patient’s Memory function so patient can continue to participate in daily activities  Progressing as expected  (Pended)   -LB     Comments: Patient and family will implement compensatory strategies to maximize patient’s Memory function so patient can continue to participate in daily activities  4/27/21: Pt noted she has started writing about her aunt as if writing a letter to her. Pt states she wrote the \"letter\" with information that she remembered about her. 4/20/21: Pt noted she would journal on her own, have a shared calendar with her family, and start a memory book.   (Pended)   -LB     Patient will demonstrate improved ability to recall information by immediately recalling a series of " words  80%:;related;after delay;with cues  (Pended)   -LB     Status: Patient will demonstrate improved ability to recall information by immediately recalling a series of words  Progressing as expected  (Pended)   -LB     Comments: Patient will demonstrate improved ability to recall information by immediately recalling a series of words  3/16/21: SRT for pee pads at home for cat, pt was successful for 8 min delay, 16 mins required a look at the cue card.    Dtr reports that pt is nowing taking her pills one at a time which was targeted last with SRT. 3/9/21: SRT for taking pills one at time and pt made it up to 10 mins. 3/2/21: Pt recalled all of her swallowing exercises and demonstrated them for recall purposes.  SLUMS Immediate recall of paragraph, pt was 0/8.  1/26/21: Immediate recall of 3 words in a row and pt was 90% accurate. 1/15/21: SLUMS: Immediate recall of paragraph and pt was 1/8. 11/24/20: Mental Manipulation for progression and pt was 90% accurate. 11/17/20: Mental Manipulation by attribute, pt was 90% accurate and by word placement, pt was 90% accurate. 11/3/20: Immediate recall on FROMAJE: pt was 2/3.  (Pended)   -LB     Patient’s memory skills will be enhanced as reported by patient by utilizing internal memory strategies to recall up to 3 pieces of information after a 5- minute delay  80%:;with cues  (Pended)   -LB     Status: Patient’s memory skills will be enhanced as reported by patient by utilizing internal memory strategies to recall up to 3 pieces of information after a 5- minute delay  Progressing as expected  (Pended)   -LB     Comments: Patient’s memory skills will be enhanced as reported by patient by utilizing internal memory strategies to recall up to 3 pieces of information after a 5- minute delay  3/9/21: Delayed recall of 6 related animals and pt was 6/6. 3/2/21: Delayed recall of 4 related animals and pt was 4/4, SLUMS delayed was 0/5.  2/9/21: Delayed recall of 3 animal from 2  weeks ago: pt was 0/3 w/o cue. After repetition during session, pt was 3/3 x 3.  1/26/21: Delayed recall of 2 related pictures: pt was 1/15/21: SLUMS Delayed recall of 5 un-related words and pt was 0/5 I'ly and with cues, pt was 4/5.  12/8/20: Delayed recall of 2 new related pictures, pt was 1/2 to 2/2. 11/24/20: Delayed recall of 6 cont'd pictures from previous session: pt was 3/6 I'ly. 5/6 with min cues, 6/6 by the end of session. 11/17/20: Delayed recall of 6 related words: pt was 4/6, 5/6 and 6/6 all with written cue. 11/10/20: Delayed recall of 5 related words and pt was 3/5 with first initial presented. After review and use of first initial, pt was 5/5.   11/3/20: Delayed recall of 4 related (familiar to pt) pt was 3/4 x 2, with an increased written cue, pt was 4/4. 10/29/20: Delayed recall of 3 related pictures: pt was 2/3x 2, 3/3 x 3.    (Pended)   -LB     Patient’s memory skills will be enhanced as reported by patient by using external memory aides  80%:;with cues  (Pended)   -LB     Status: Patient’s memory skills will be enhanced as reported by patient by using external memory aides  Progressing as expected  (Pended)   -LB     Comments: Patient’s memory skills will be enhanced as reported by patient by using external memory aides  4/20/21: Pt noted she was journaling more often now. 3/16/21: Pt had one entry since last session- started entry for this date. 3/9/21: Pt had not written in journal, pt's journal is now set for the coming week for each day. 3/2/21: Pt had not written in journal since last tx session. 2/9/21: Pt returned with journal with entries since a few days ago. 1/26/21: Pt brought in journal but had not written in it so SLP had pt start an entry for today. 1/15/21: Re-educated pt and daughter on importance of journaling. 12/8/20: Pt has not been writing in her journal. Started entry for today.   11/24/20: Pt had only journaled one time since last session- ideas for cues in order to prompt  "pt to write in her journal. 11/17/20: Pt continues to journal. 11/10/20: Reinforced journaling and purpose and need for reference. 11/3/20: Reinforced use of daily journaling. 10/29/20: Pt had not written in her journal yet- provided visual cues.   (Pended)   -LB     Patient will demonstrate improved ability to recall information by stating activities patient has completed that day  80%:;with cues  (Pended)   -LB     Status: Patient will demonstrate improved ability to recall information by stating activities patient has completed that day  Progressing as expected  (Pended)   -LB     Comments: Patient will demonstrate improved ability to recall information by stating activities patient has completed that day  4/20/21: Pt noted she has not done much today. Pt stated she would like to do more around the house to \"help out.\" 3/9/21: Pt struggled with recall. of her lunch and afternoon chore without cues from daughter. 3/21:  Pt did recall completing swallowing exercises and returned with handout with markings representing how often she completed them. 2/9/21: Pt recalled foods that she had not eaten, daughter had to clarify for accuracy for journal. 1/26/21: Recall of meals this date with accurate recall of breakfast and no recall of lunch- required cue from daughter. 1/15/21: Pt does not recall having a nightmare per her daughter's report and with a cue, could recall what she had for breakfast: 11/3/20: [place] did write but not much. With cues, was able to recall events from weekend.   (Pended)   -LB        Attention/Orientation Goals    Patient will be able to interact safely in home environment  With Supervision  (Pended)   -LB     Status: Patient will be able to interact safely in home environment  Progressing as expected  (Pended)   -LB     Comments: Patient will be able to interact safely in home environment  4/27/21: Pt states she is not having difficulties performing " "ADL's. She states \"I feed the critters and feed myself.\"4/20/21: Pt's daughter states that pt will occasionally forget ADL's such as bathing. Pt agreed that use of a shared family calendar may aid in independence. 3/2/21: Pt aware of what to do if home alone and smells smoke or needs help since there is no land line phone at home. 1/15/21: SLUMS Dig[employer] Span and pt was 2/2.   (Pended)   -LB     Patient will improve orientation skills by orienting to self, place, time, and situation  80%:;with cues  (Pended)   -LB     Status: Patient will improve orientation skills by orienting to self, place, time, and situation  Progressing as expected  (Pended)   -LB     Comments: Patient will improve orientation skills by orienting to self, place, time, and situation  4/27/21: Pt oriented to self, place, month. Pt did not know date or year. 2/9/21: Pt used calendar in session for orientation purposes.  1/26/21: Pt has a calendar at home to use but was not oriented to date when starting her journal sheet. 11/24/20: Pt aware of date and day of week. Encouraged use of journal in order to keep up with orientation. 11/17/20: Pt used the calendar and pt was accurate with day of week and date.  11/10/20: Using calendar in office, pt was able to ID current date.  10/29/20: Provided calendar to assist with orientation.   (Pended)   -LB     Patient will improve attention skills by sustaining consistent behavioral response during continuous and repetitive activity (e.g., listening for target words, auditory/reading comprehension tasks)  with cues;5 minute task  (Pended)   -LB     Status: Patient will improve attention skills by sustaining consistent behavioral response during continuous and repetitive activity (e.g., listening for target words, auditory/reading comprehension tasks)  Progressing as expected  (Pended)   -LB     Comments: Patient will improve attention skills by sustaining consistent behavioral response during continuous and " repetitive activity (e.g., listening for target words, auditory/reading comprehension tasks)  3/16/21: The game of TRASH: pt was 80% accurate with mod cues.  2/9/21: The game of TRASH for consistency of following directions to a new game, pt was 70% accurate.  1/26/21: Word placement by occurrence and pt was 90% accurate, reversal and pt was 50% accurate. 1/15/21: One Pile Card Game: pt was 80% accurate. 11/3/20: One Pile Card Game:  Pt was 85% accurate.  10/29/20: One Pile Card Game: pt was 80% accurate with min to mod cues.   (Pended)   -LB     Patient will improve attention skills by focusing on environmental stimuli within left visuospatial field  80%:;with cues  (Pended)   -LB     Status: Patient will improve attention skills by focusing on environmental stimuli within left visuospatial field  Progressing as expected  (Pended)   -LB     Comments: Patient will improve attention skills by focusing on environmental stimuli within left visuospatial field  3/16/21: Card sorting for two different tasks and pt was 60% accurate I'ly, with visual prompts, pt was 80% accurate. 3/2/21: Copying boxed information and pt was 70% accurate with mod cues. 1/26/21: Copying boxed information: pt was 75% accurate.   11/3/20: Shapes and figures: 3 in a row and pt was 80% accurate with mod verbal and visual cues.   (Pended)   -LB        Dysphagia Goals    Patient will safely consume the recommended diet without complications such as aspiration pneumonia  regular diet and thin liquids  (Pended)   -LB     Status: Patient will safely consume the recommended diet without complications such as aspiration pneumonia  Progressing as expected  (Pended)   -LB     Comments: Patient will safely consume the recommended diet without complications such as aspiration pneumonia  4/27/21: No coughing noted during session. Pt displayed one double swallow during a trial of the super supra glottic swallow. 3/23/21: No coughing exhibited during session.  Pt and daughter note that every once awhile there is residue present where a throat clear is needed or a double swallow. Pt being followed by GI with scope ordered this date. 3/9/21: Pt reports no difficulties eating and drinking at home where daughter reports hearing the large gulps.   (Pended)   -LB     Patient will improve stage transition duration of swallow/improve timing to reduce food falling into the airway by decreasing swallow delay after neurosensory  stimulation  three second prep;suck-swallow;super-supraglottic swallow;with cues  (Pended)   -LB     Status: Patient will improve stage transition duration of swallow/improve timing to reduce food falling into the airway by decreasing swallow delay after neurosensory  stimulation  Progressing as expected  (Pended)   -LB     Comments: Patient will improve stage transition duration of swallow/improve timing to reduce food falling into the airway by decreasing swallow delay after neurosensory  stimulation  4/27/21: Pt completed each exercise x3. 3/30/21: Reviewed with pt and she completed with accuracy x 3 each.  3/23/21: Reviewed with pt and she demonstrated accuracy.   3/9/21: Pt completed during session x 2 each.   (Pended)   -LB     Patient will increase laryngeal elevation to reduce residue that might fall into airway by completing  super-supraglottic swallow;falsetto/pitch glide;with cues  (Pended)   -LB     Status: Patient will increase laryngeal elevation to reduce residue that might fall into airway by completing  Progressing as expected  (Pended)   -LB     Comments: Patient will increase laryngeal elevation to reduce residue that might fall into airway by completing  4/27/21: Pt completed each exercise x3. 3/30/21: Reviewed with pt and she completed with accuracy x 3 each. 3/23/21: Reviewed with pt and she demonstrated accuracy. 3/9/21: Pt completed during session x 2 each.   (Pended)   -LB     Patient will increase closure of larynx to keep food from  falling into the airway by completing  super-supraglottic swallow;push-pull with breath hold;with cues  (Pended)   -LB     Status: Patient will increase closure of larynx to keep food from falling into the airway by completing  Progressing as expected  (Pended)   -LB     Comments: Patient will increase closure of larynx to keep food from falling into the airway by completing  4/27/21: Pt completed each exercise x3. One double swallow noted on a trial of the SGS  3/30/21:Reviewed with pt and she completed with accuracy x 3 each. 3/23/21: Reviewed with pt and she demonstrated accuracy.  3/9/21: Pt completed during session x 2 each.   (Pended)   -LB     Patient will increase strength of tongue base and posterior pharyngeal walls to reduce residue that might fall into airway by completing  effotful swallow;Bernarda (tongue hold);with cues  (Pended)   -LB     Status: Patient will increase strength of tongue base and posterior pharyngeal walls to reduce residue that might fall into airway by completing  Progressing as expected  (Pended)   -LB     Comments: Patient will increase strength of tongue base and posterior pharyngeal walls to reduce residue that might fall into airway by completing  4/27/21: Pt completed each exercise x3. 3/30/21: Reviewed with pt and she completed with accuracy x 3 each. 3/23/21: Reviewed with pt and she demonstrated accuracy.  3/9/21: Pt completed during session x 2 each.   (Pended)   -LB        Other Goals    Other Adult Goal- 1  Pt will complete thought organization tasks with 80% accuracy.  (Pended)   -LB     Status: Other Adult Goal- 1  Progressing as expected  (Pended)   -LB     Comments: Other Adult Goal- 1  4/20/21: Pt completed grocery list alphabetizing task with 70% accuracy independently. Pt required moderate cues to correct error and to refocus to task. 3/30/21: A-Z activity for naming and pt was 70% accurate with mod cues. 3/9/21: 5 step sentence sequencing: pt was 100% accurate.  3/2/21: SLUMS assesement from 2/23/21 and pt was 6/15 for divergent naming. This date, for divergent naming: pt was 80% accurate. 2/9/21: Following written directions: pt was 80% accurate.  1/15/21: SLUMS naming: pt was 8/15. 12/8/20: Category matrix: pt was 90% accurate.  Crossword puzzle and pt was 85% accurate. 11/24/20: Divergent naming: pt was 75-90% accurate . 11/17/20: Category Matrix: pt was 80% accurate I'ly. 11/10/20: Fill in word given first initial: pt was 100% accurate. 10/29/20: divergent naming: pt was 70% accurate. 90  (Pended)   -LB     Other Adult Goal- 2  Pt will complete reasoning and problem solving assessment with goals to follow as indicated.  (Pended)   -LB     Status: Other Adult Goal- 2  Progressing as expected  (Pended)   -LB     Comments: Other Adult Goal- 2  3/16/21: Word search and pt was 80% accurate.  3/2/21: Deductive reasoning: pt was 95% accurate. Clock work: pt was 9/12.  Emergent situations and pt was 90% accurate. 1/26/21: Connecting the dots alphabetically and pt required consistent cues. 12/8/20: Six step sequencing: pt was 90% accurate. 11/10/20: Written Directions: pt was 90% accurate. 11/3/20: FROMAJE: Pt scored a 15 placing pt in the severe dementia or depression range.   (Pended)   -LB     Other Adult Goal- 3  Pt will improve RBANS Total score to at least a 70 placing pt in the [place] range.   (Pended)   -LB     Status: Other Adult Goal- 3  Progressing as expected  (Pended)   -LB     Comments: Other Adult Goal- 3  4/27/21: SLUMS score of 9 places pt in Dementia range. This is improved from 2 months ago. 3/1/21: From re-assessment using SLUMS on 2/23/21, pt was 8/20 placing pt in the Dementia range.   (Pended)   -LB        SLP Time Calculation    SLP Goal Re-Cert Due Date  05/27/21  (Pended)   -LB       User Key  (r) = Recorded By, (t) = Taken By, (c) = Cosigned By    Initials Name Provider Type    Ferny Ball, Speech Therapy Student Speech Therapy Student         OP SLP Education     Row Name 04/27/21 1500       Education    Education Comments  Hmwk for starting a shared calendar, memory book, and writing key elements of paragraphs.  (Pended)   -LB      User Key  (r) = Recorded By, (t) = Taken By, (c) = Cosigned By    Initials Name Effective Dates    LB Andi Ferny, Speech Therapy Student 03/09/21 -         OP SLP Assessment/Plan - 04/27/21 1500        SLP Assessment    Functional Problems  Speech Language- Adult/Cognition  (Pended)    -LB    Impact on Function: Adult Speech Language/Cognition  Difficulty following directions;Lack of insight or awareness of deficits, safety issues;Trouble learning or remembering new information;Poor attention to task;Requires supervision  (Pended)    -LB    Clinical Impression: Speech Language-Adult/Congnition  Severe:;Cognitive Communication Impairment  (Pended)    -LB    Impact on Function: Swallowing  Risk of aspiration;Risk of pneumonia  (Pended)    -LB    Clinical Impression: Swallowing  Mild:;oropharyngeal phase dysphagia  (Pended)    -LB    Functional Problems Comment  Pt's daughter reports difficulties with performing ADL's, memory loss difficulties.  (Pended)    -LB    Clinical Impression Comments  SLUMS score of 9 places pt in dementia category.  (Pended)    -LB    Please refer to paper survey for additional self-reported information  Yes  (Pended)    -LB    Please refer to items scanned into chart for additional diagnostic informaiton and handouts as provided by clinician  Yes  (Pended)    -LB    SLP Diagnosis  Severe cog/com deficits  (Pended)    -LB    Prognosis  Excellent (comment)  (Pended)    -LB    Patient/caregiver participated in establishment of treatment plan and goals  Yes  (Pended)    -LB    Patient would benefit from skilled therapy intervention  Yes  (Pended)    -LB       SLP Plan    Frequency  1-2x/week  (Pended)    -LB    Duration  8 weeks  (Pended)    -LB    Planned CPT's?  SLP INDIVIDUAL SPEECH THERAPY:  61342  (Pended)    -LB    Expected Duration of Therapy Session (SLP Eval)  60  (Pended)    -LB    Plan Comments  Cont with cog comm tx  (Pended)    -LB      User Key  (r) = Recorded By, (t) = Taken By, (c) = Cosigned By    Initials Name Provider Type    Ferny Ball, Speech Therapy Student Speech Therapy Student               Time Calculation:   SLP Start Time: (P) 1400  Untimed Charges  71995-CO Treatment/ST Modification Prosth Aug Alter : (P) 70  Total Minutes  Untimed Charges Total Minutes: (P) 70   Total Minutes: (P) 70    Therapy Charges for Today     Code Description Service Date Service Provider Modifiers Qty    [id number] HC ST TREATMENT SPEECH 5 4/27/2021 Ferny Escamilla, Speech Therapy Student GN 1                   Ferny Escamilla Speech Therapy Student  4/27/2021

## 2021-04-27 NOTE — THERAPY PROGRESS REPORT/RE-CERT
Outpatient Speech Language Pathology   Adult Speech Language Cognitive Progress Note  [facility]     Patient Name: Brittny Nicolas  : 1945  MRN: [id number]  Today's Date: 2021         Visit Date: 2021   Patient Active Problem List   Diagnosis   • Achalasia   • Acute upper respiratory infection   • Atypical chest pain   • Benign essential hypertension   • Carotid artery stenosis   • Cataract   • Cough   • Acute cystitis   • Screening for malignant neoplasm   • Gastroesophageal reflux disease   • Essential familial hypercholesterolemia   • Gastrointestinal bleeding   • Inguinal hernia   • Insomnia   • Leg cramps   • Meniere's disease   • Night sweats   • Osteoporosis   • Peripheral neuropathy   • Retinal artery occlusion   • Rheumatoid arthritis (CMS/HCC)   • Stroke syndrome   • Suprapubic pain   • Transient ischemic attack   • Positive reaction to tuberculin skin test   • Vitamin D deficiency   • Weight loss   • H/O colonoscopy   • Fatigue   • Hypothyroidism   • Erosive esophagitis   • Left lower quadrant pain   • Acute pain of left shoulder   • Pharyngitis   • Memory loss   • Rib pain on left side   • Hyponatremia   • Late onset Alzheimer's disease without behavioral disturbance (CMS/[facility])          Visit Dx:    ICD-10-CM ICD-9-CM   1. Late onset Alzheimer's disease without behavioral disturbance (CMS/HCC)  G30.1 331.0    F02.80 294.10                         SLP OP Goals     Row Name 21 1500          Goal Type Needed    Goal Type Needed  Memory;Attention/Orientation;Other Adult Goals  (Pended)   -LB        Subjective Comments    Subjective Comments  Pt alert, cooperative, and accompanied by daughter. Pt did not return with homework.  (Pended)   -LB        Auditory Comprehension Goals    Patient will comprehend spoken information in all settings  90%:;with cues  (Pended)   -LB     Status: Patient will comprehend spoken information in all settings  Progressing as expected  (Pended)   -LB      "Comments: Patient will comprehend spoken information in all settings  3/2/21: WH questions and pt was 90% accurate. 10/29/20: Pt's daughter states that she isn't sure her mom can read or understands the labels in her home for kitchen items.   (Pended)   -LB     Patient will demonstrate comprehension of spoken language by answering questions about a short paragraph  80%:;with cues  (Pended)   -LB     Status: Patient will demonstrate comprehension of spoken language by answering questions about a short paragraph  Progressing as expected  (Pended)   -LB     Comments: Patient will demonstrate comprehension of spoken language by answering questions about a short paragraph  4/27/21: 36-50 word paragraphs and pt was 100% accurate when writing \"key details\" from each sentence. 4/20/21: 22-36 word paragraphs: 70% accurate with moderate cues. 2/23/21: 12-22 word paragraphs: 90% accurate.  22-36 word paragraphs: pt was 70% accurate.   (Pended)   -LB        Memory Goals    Patient and family will implement compensatory strategies to maximize patient’s Memory function so patient can continue to participate in daily activities  90%:;with cues  (Pended)   -LB     Status: Patient and family will implement compensatory strategies to maximize patient’s Memory function so patient can continue to participate in daily activities  Progressing as expected  (Pended)   -LB     Comments: Patient and family will implement compensatory strategies to maximize patient’s Memory function so patient can continue to participate in daily activities  4/27/21: Pt noted she has started writing about her aunt as if writing a letter to her. Pt states she wrote the \"letter\" with information that she remembered about her. 4/20/21: Pt noted she would journal on her own, have a shared calendar with her family, and start a memory book.   (Pended)   -LB     Patient will demonstrate improved ability to recall information by immediately recalling a series of " words  80%:;related;after delay;with cues  (Pended)   -LB     Status: Patient will demonstrate improved ability to recall information by immediately recalling a series of words  Progressing as expected  (Pended)   -LB     Comments: Patient will demonstrate improved ability to recall information by immediately recalling a series of words  3/16/21: SRT for pee pads at home for cat, pt was successful for 8 min delay, 16 mins required a look at the cue card.    Dtr reports that pt is nowing taking her pills one at a time which was targeted last with SRT. 3/9/21: SRT for taking pills one at time and pt made it up to 10 mins. 3/2/21: Pt recalled all of her swallowing exercises and demonstrated them for recall purposes.  SLUMS Immediate recall of paragraph, pt was 0/8.  1/26/21: Immediate recall of 3 words in a row and pt was 90% accurate. 1/15/21: SLUMS: Immediate recall of paragraph and pt was 1/8. 11/24/20: Mental Manipulation for progression and pt was 90% accurate. 11/17/20: Mental Manipulation by attribute, pt was 90% accurate and by word placement, pt was 90% accurate. 11/3/20: Immediate recall on FROMAJE: pt was 2/3.  (Pended)   -LB     Patient’s memory skills will be enhanced as reported by patient by utilizing internal memory strategies to recall up to 3 pieces of information after a 5- minute delay  80%:;with cues  (Pended)   -LB     Status: Patient’s memory skills will be enhanced as reported by patient by utilizing internal memory strategies to recall up to 3 pieces of information after a 5- minute delay  Progressing as expected  (Pended)   -LB     Comments: Patient’s memory skills will be enhanced as reported by patient by utilizing internal memory strategies to recall up to 3 pieces of information after a 5- minute delay  3/9/21: Delayed recall of 6 related animals and pt was 6/6. 3/2/21: Delayed recall of 4 related animals and pt was 4/4, SLUMS delayed was 0/5.  2/9/21: Delayed recall of 3 animal from 2  weeks ago: pt was 0/3 w/o cue. After repetition during session, pt was 3/3 x 3.  1/26/21: Delayed recall of 2 related pictures: pt was 1/15/21: SLUMS Delayed recall of 5 un-related words and pt was 0/5 I'ly and with cues, pt was 4/5.  12/8/20: Delayed recall of 2 new related pictures, pt was 1/2 to 2/2. 11/24/20: Delayed recall of 6 cont'd pictures from previous session: pt was 3/6 I'ly. 5/6 with min cues, 6/6 by the end of session. 11/17/20: Delayed recall of 6 related words: pt was 4/6, 5/6 and 6/6 all with written cue. 11/10/20: Delayed recall of 5 related words and pt was 3/5 with first initial presented. After review and use of first initial, pt was 5/5.   11/3/20: Delayed recall of 4 related (familiar to pt) pt was 3/4 x 2, with an increased written cue, pt was 4/4. 10/29/20: Delayed recall of 3 related pictures: pt was 2/3x 2, 3/3 x 3.    (Pended)   -LB     Patient’s memory skills will be enhanced as reported by patient by using external memory aides  80%:;with cues  (Pended)   -LB     Status: Patient’s memory skills will be enhanced as reported by patient by using external memory aides  Progressing as expected  (Pended)   -LB     Comments: Patient’s memory skills will be enhanced as reported by patient by using external memory aides  4/20/21: Pt noted she was journaling more often now. 3/16/21: Pt had one entry since last session- started entry for this date. 3/9/21: Pt had not written in journal, pt's journal is now set for the coming week for each day. 3/2/21: Pt had not written in journal since last tx session. 2/9/21: Pt returned with journal with entries since a few days ago. 1/26/21: Pt brought in journal but had not written in it so SLP had pt start an entry for today. 1/15/21: Re-educated pt and daughter on importance of journaling. 12/8/20: Pt has not been writing in her journal. Started entry for today.   11/24/20: Pt had only journaled one time since last session- ideas for cues in order to prompt  "pt to write in her journal. 11/17/20: Pt continues to journal. 11/10/20: Reinforced journaling and purpose and need for reference. 11/3/20: Reinforced use of daily journaling. 10/29/20: Pt had not written in her journal yet- provided visual cues.   (Pended)   -LB     Patient will demonstrate improved ability to recall information by stating activities patient has completed that day  80%:;with cues  (Pended)   -LB     Status: Patient will demonstrate improved ability to recall information by stating activities patient has completed that day  Progressing as expected  (Pended)   -LB     Comments: Patient will demonstrate improved ability to recall information by stating activities patient has completed that day  4/20/21: Pt noted she has not done much today. Pt stated she would like to do more around the house to \"help out.\" 3/9/21: Pt struggled with recall. of her lunch and afternoon chore without cues from daughter. 3/21:  Pt did recall completing swallowing exercises and returned with handout with markings representing how often she completed them. 2/9/21: Pt recalled foods that she had not eaten, daughter had to clarify for accuracy for journal. 1/26/21: Recall of meals this date with accurate recall of breakfast and no recall of lunch- required cue from daughter. 1/15/21: Pt does not recall having a nightmare per her daughter's report and with a cue, could recall what she had for breakfast: 11/3/20: [place] did write but not much. With cues, was able to recall events from weekend.   (Pended)   -LB        Attention/Orientation Goals    Patient will be able to interact safely in home environment  With Supervision  (Pended)   -LB     Status: Patient will be able to interact safely in home environment  Progressing as expected  (Pended)   -LB     Comments: Patient will be able to interact safely in home environment  4/27/21: Pt states she is not having difficulties performing " "ADL's. She states \"I feed the critters and feed myself.\"4/20/21: Pt's daughter states that pt will occasionally forget ADL's such as bathing. Pt agreed that use of a shared family calendar may aid in independence. 3/2/21: Pt aware of what to do if home alone and smells smoke or needs help since there is no land line phone at home. 1/15/21: SLUMS Dig[employer] Span and pt was 2/2.   (Pended)   -LB     Patient will improve orientation skills by orienting to self, place, time, and situation  80%:;with cues  (Pended)   -LB     Status: Patient will improve orientation skills by orienting to self, place, time, and situation  Progressing as expected  (Pended)   -LB     Comments: Patient will improve orientation skills by orienting to self, place, time, and situation  4/27/21: Pt oriented to self, place, month. Pt did not know date or year. 2/9/21: Pt used calendar in session for orientation purposes.  1/26/21: Pt has a calendar at home to use but was not oriented to date when starting her journal sheet. 11/24/20: Pt aware of date and day of week. Encouraged use of journal in order to keep up with orientation. 11/17/20: Pt used the calendar and pt was accurate with day of week and date.  11/10/20: Using calendar in office, pt was able to ID current date.  10/29/20: Provided calendar to assist with orientation.   (Pended)   -LB     Patient will improve attention skills by sustaining consistent behavioral response during continuous and repetitive activity (e.g., listening for target words, auditory/reading comprehension tasks)  with cues;5 minute task  (Pended)   -LB     Status: Patient will improve attention skills by sustaining consistent behavioral response during continuous and repetitive activity (e.g., listening for target words, auditory/reading comprehension tasks)  Progressing as expected  (Pended)   -LB     Comments: Patient will improve attention skills by sustaining consistent behavioral response during continuous and " repetitive activity (e.g., listening for target words, auditory/reading comprehension tasks)  3/16/21: The game of TRASH: pt was 80% accurate with mod cues.  2/9/21: The game of TRASH for consistency of following directions to a new game, pt was 70% accurate.  1/26/21: Word placement by occurrence and pt was 90% accurate, reversal and pt was 50% accurate. 1/15/21: One Pile Card Game: pt was 80% accurate. 11/3/20: One Pile Card Game:  Pt was 85% accurate.  10/29/20: One Pile Card Game: pt was 80% accurate with min to mod cues.   (Pended)   -LB     Patient will improve attention skills by focusing on environmental stimuli within left visuospatial field  80%:;with cues  (Pended)   -LB     Status: Patient will improve attention skills by focusing on environmental stimuli within left visuospatial field  Progressing as expected  (Pended)   -LB     Comments: Patient will improve attention skills by focusing on environmental stimuli within left visuospatial field  3/16/21: Card sorting for two different tasks and pt was 60% accurate I'ly, with visual prompts, pt was 80% accurate. 3/2/21: Copying boxed information and pt was 70% accurate with mod cues. 1/26/21: Copying boxed information: pt was 75% accurate.   11/3/20: Shapes and figures: 3 in a row and pt was 80% accurate with mod verbal and visual cues.   (Pended)   -LB        Dysphagia Goals    Patient will safely consume the recommended diet without complications such as aspiration pneumonia  regular diet and thin liquids  (Pended)   -LB     Status: Patient will safely consume the recommended diet without complications such as aspiration pneumonia  Progressing as expected  (Pended)   -LB     Comments: Patient will safely consume the recommended diet without complications such as aspiration pneumonia  4/27/21: No coughing noted during session. Pt displayed one double swallow during a trial of the super supra glottic swallow. 3/23/21: No coughing exhibited during session.  Pt and daughter note that every once awhile there is residue present where a throat clear is needed or a double swallow. Pt being followed by GI with scope ordered this date. 3/9/21: Pt reports no difficulties eating and drinking at home where daughter reports hearing the large gulps.   (Pended)   -LB     Patient will improve stage transition duration of swallow/improve timing to reduce food falling into the airway by decreasing swallow delay after neurosensory  stimulation  three second prep;suck-swallow;super-supraglottic swallow;with cues  (Pended)   -LB     Status: Patient will improve stage transition duration of swallow/improve timing to reduce food falling into the airway by decreasing swallow delay after neurosensory  stimulation  Progressing as expected  (Pended)   -LB     Comments: Patient will improve stage transition duration of swallow/improve timing to reduce food falling into the airway by decreasing swallow delay after neurosensory  stimulation  4/27/21: Pt completed each exercise x3. 3/30/21: Reviewed with pt and she completed with accuracy x 3 each.  3/23/21: Reviewed with pt and she demonstrated accuracy.   3/9/21: Pt completed during session x 2 each.   (Pended)   -LB     Patient will increase laryngeal elevation to reduce residue that might fall into airway by completing  super-supraglottic swallow;falsetto/pitch glide;with cues  (Pended)   -LB     Status: Patient will increase laryngeal elevation to reduce residue that might fall into airway by completing  Progressing as expected  (Pended)   -LB     Comments: Patient will increase laryngeal elevation to reduce residue that might fall into airway by completing  4/27/21: Pt completed each exercise x3. 3/30/21: Reviewed with pt and she completed with accuracy x 3 each. 3/23/21: Reviewed with pt and she demonstrated accuracy. 3/9/21: Pt completed during session x 2 each.   (Pended)   -LB     Patient will increase closure of larynx to keep food from  falling into the airway by completing  super-supraglottic swallow;push-pull with breath hold;with cues  (Pended)   -LB     Status: Patient will increase closure of larynx to keep food from falling into the airway by completing  Progressing as expected  (Pended)   -LB     Comments: Patient will increase closure of larynx to keep food from falling into the airway by completing  4/27/21: Pt completed each exercise x3. One double swallow noted on a trial of the SGS  3/30/21:Reviewed with pt and she completed with accuracy x 3 each. 3/23/21: Reviewed with pt and she demonstrated accuracy.  3/9/21: Pt completed during session x 2 each.   (Pended)   -LB     Patient will increase strength of tongue base and posterior pharyngeal walls to reduce residue that might fall into airway by completing  effotful swallow;Bernarda (tongue hold);with cues  (Pended)   -LB     Status: Patient will increase strength of tongue base and posterior pharyngeal walls to reduce residue that might fall into airway by completing  Progressing as expected  (Pended)   -LB     Comments: Patient will increase strength of tongue base and posterior pharyngeal walls to reduce residue that might fall into airway by completing  4/27/21: Pt completed each exercise x3. 3/30/21: Reviewed with pt and she completed with accuracy x 3 each. 3/23/21: Reviewed with pt and she demonstrated accuracy.  3/9/21: Pt completed during session x 2 each.   (Pended)   -LB        Other Goals    Other Adult Goal- 1  Pt will complete thought organization tasks with 80% accuracy.  (Pended)   -LB     Status: Other Adult Goal- 1  Progressing as expected  (Pended)   -LB     Comments: Other Adult Goal- 1  4/20/21: Pt completed grocery list alphabetizing task with 70% accuracy independently. Pt required moderate cues to correct error and to refocus to task. 3/30/21: A-Z activity for naming and pt was 70% accurate with mod cues. 3/9/21: 5 step sentence sequencing: pt was 100% accurate.  3/2/21: SLUMS assesement from 2/23/21 and pt was 6/15 for divergent naming. This date, for divergent naming: pt was 80% accurate. 2/9/21: Following written directions: pt was 80% accurate.  1/15/21: SLUMS naming: pt was 8/15. 12/8/20: Category matrix: pt was 90% accurate.  Crossword puzzle and pt was 85% accurate. 11/24/20: Divergent naming: pt was 75-90% accurate . 11/17/20: Category Matrix: pt was 80% accurate I'ly. 11/10/20: Fill in word given first initial: pt was 100% accurate. 10/29/20: divergent naming: pt was 70% accurate. 90  (Pended)   -LB     Other Adult Goal- 2  Pt will complete reasoning and problem solving assessment with goals to follow as indicated.  (Pended)   -LB     Status: Other Adult Goal- 2  Progressing as expected  (Pended)   -LB     Comments: Other Adult Goal- 2  3/16/21: Word search and pt was 80% accurate.  3/2/21: Deductive reasoning: pt was 95% accurate. Clock work: pt was 9/12.  Emergent situations and pt was 90% accurate. 1/26/21: Connecting the dots alphabetically and pt required consistent cues. 12/8/20: Six step sequencing: pt was 90% accurate. 11/10/20: Written Directions: pt was 90% accurate. 11/3/20: FROMAJE: Pt scored a 15 placing pt in the severe dementia or depression range.   (Pended)   -LB     Other Adult Goal- 3  Pt will improve RBANS Total score to at least a 70 placing pt in the [place] range.   (Pended)   -LB     Status: Other Adult Goal- 3  Progressing as expected  (Pended)   -LB     Comments: Other Adult Goal- 3  4/27/21: SLUMS score of 9 places pt in Dementia range. This is improved from 2 months ago. 3/1/21: From re-assessment using SLUMS on 2/23/21, pt was 8/20 placing pt in the Dementia range.   (Pended)   -LB        SLP Time Calculation    SLP Goal Re-Cert Due Date  05/27/21  (Pended)   -LB       User Key  (r) = Recorded By, (t) = Taken By, (c) = Cosigned By    Initials Name Provider Type    Ferny Ball, Speech Therapy Student Speech Therapy Student         OP SLP Education     Row Name 04/27/21 1500       Education    Education Comments  Hmwk for starting a shared calendar, memory book, and writing key elements of paragraphs.  (Pended)   -LB      User Key  (r) = Recorded By, (t) = Taken By, (c) = Cosigned By    Initials Name Effective Dates    LB Andi Ferny, Speech Therapy Student 03/09/21 -         OP SLP Assessment/Plan - 04/27/21 1500        SLP Assessment    Functional Problems  Speech Language- Adult/Cognition  (Pended)    -LB    Impact on Function: Adult Speech Language/Cognition  Difficulty following directions;Lack of insight or awareness of deficits, safety issues;Trouble learning or remembering new information;Poor attention to task;Requires supervision  (Pended)    -LB    Clinical Impression: Speech Language-Adult/Congnition  Severe:;Cognitive Communication Impairment  (Pended)    -LB    Impact on Function: Swallowing  Risk of aspiration;Risk of pneumonia  (Pended)    -LB    Clinical Impression: Swallowing  Mild:;oropharyngeal phase dysphagia  (Pended)    -LB    Functional Problems Comment  Pt's daughter reports difficulties with performing ADL's, memory loss difficulties.  (Pended)    -LB    Clinical Impression Comments  SLUMS score of 9 places pt in dementia category.  (Pended)    -LB    Please refer to paper survey for additional self-reported information  Yes  (Pended)    -LB    Please refer to items scanned into chart for additional diagnostic informaiton and handouts as provided by clinician  Yes  (Pended)    -LB    SLP Diagnosis  Severe cog/com deficits  (Pended)    -LB    Prognosis  Excellent (comment)  (Pended)    -LB    Patient/caregiver participated in establishment of treatment plan and goals  Yes  (Pended)    -LB    Patient would benefit from skilled therapy intervention  Yes  (Pended)    -LB       SLP Plan    Frequency  1-2x/week  (Pended)    -LB    Duration  8 weeks  (Pended)    -LB    Planned CPT's?  SLP INDIVIDUAL SPEECH THERAPY:  04326  (Pended)    -LB    Expected Duration of Therapy Session (SLP Eval)  60  (Pended)    -LB    Plan Comments  Cont with cog comm tx  (Pended)    -LB      User Key  (r) = Recorded By, (t) = Taken By, (c) = Cosigned By    Initials Name Provider Type    Ferny Ball, Speech Therapy Student Speech Therapy Student               Time Calculation:   SLP Start Time: (P) 1500  Untimed Charges  98024-PU Treatment/ST Modification Prosth Aug Alter : (P) 70  Total Minutes  Untimed Charges Total Minutes: (P) 70   Total Minutes: (P) 70    Therapy Charges for Today     Code Description Service Date Service Provider Modifiers Qty    [id number] HC ST TREATMENT SPEECH 5 4/27/2021 Ferny Escamilla, Speech Therapy Student GN 1                   Ferny Escamilla Speech Therapy Student  4/27/2021

## 2021-05-21 ENCOUNTER — HOSPITAL ENCOUNTER (OUTPATIENT)
Dept: SPEECH THERAPY | Facility: HOSPITAL | Age: 76
Setting detail: THERAPIES SERIES
Discharge: HOME | End: 2021-05-21

## 2021-05-21 DIAGNOSIS — G30.1 LATE ONSET ALZHEIMER'S DISEASE WITHOUT BEHAVIORAL DISTURBANCE (HCC): Primary | ICD-10-CM

## 2021-05-21 DIAGNOSIS — F02.80 LATE ONSET ALZHEIMER'S DISEASE WITHOUT BEHAVIORAL DISTURBANCE (HCC): Primary | ICD-10-CM

## 2021-05-21 PROCEDURE — 92526 ORAL FUNCTION THERAPY: CPT

## 2021-05-21 PROCEDURE — 92507 TX SP LANG VOICE COMM INDIV: CPT

## 2021-05-28 ENCOUNTER — APPOINTMENT (OUTPATIENT)
Dept: SPEECH THERAPY | Facility: HOSPITAL | Age: 76
End: 2021-05-28

## 2021-06-15 ENCOUNTER — OFFICE VISIT (OUTPATIENT)
Dept: ENDOCRINOLOGY | Facility: CLINIC | Age: 76
End: 2021-06-15

## 2021-06-15 ENCOUNTER — APPOINTMENT (OUTPATIENT)
Dept: LAB | Facility: HOSPITAL | Age: 76
End: 2021-06-15

## 2021-06-15 ENCOUNTER — LAB (OUTPATIENT)
Dept: LAB | Facility: HOSPITAL | Age: 76
End: 2021-06-15

## 2021-06-15 VITALS
WEIGHT: 131.4 LBS | OXYGEN SATURATION: 97 % | SYSTOLIC BLOOD PRESSURE: 104 MMHG | HEIGHT: 63 IN | DIASTOLIC BLOOD PRESSURE: 64 MMHG | HEART RATE: 70 BPM | BODY MASS INDEX: 23.28 KG/M2

## 2021-06-15 DIAGNOSIS — E03.9 ACQUIRED HYPOTHYROIDISM: ICD-10-CM

## 2021-06-15 DIAGNOSIS — E55.9 VITAMIN D DEFICIENCY: ICD-10-CM

## 2021-06-15 DIAGNOSIS — E78.01 ESSENTIAL FAMILIAL HYPERCHOLESTEROLEMIA: Primary | ICD-10-CM

## 2021-06-15 DIAGNOSIS — I10 BENIGN ESSENTIAL HYPERTENSION: ICD-10-CM

## 2021-06-15 LAB
25(OH)D3 SERPL-MCNC: 40.4 NG/ML (ref 30–100)
ALBUMIN SERPL-MCNC: 4.2 G/DL (ref 3.5–5.2)
ALBUMIN/GLOB SERPL: 1.3 G/DL
ALP SERPL-CCNC: 106 U/L (ref 39–117)
ALT SERPL W P-5'-P-CCNC: 10 U/L (ref 1–33)
ANION GAP SERPL CALCULATED.3IONS-SCNC: 10.7 MMOL/L (ref 5–15)
AST SERPL-CCNC: 16 U/L (ref 1–32)
BILIRUB SERPL-MCNC: 0.3 MG/DL (ref 0–1.2)
BUN SERPL-MCNC: 13 MG/DL (ref 8–23)
BUN/CREAT SERPL: 23.2 (ref 7–25)
CALCIUM SPEC-SCNC: 9.3 MG/DL (ref 8.6–10.5)
CHLORIDE SERPL-SCNC: 100 MMOL/L (ref 98–107)
CHOLEST SERPL-MCNC: 136 MG/DL (ref 0–200)
CO2 SERPL-SCNC: 25.3 MMOL/L (ref 22–29)
CREAT SERPL-MCNC: 0.56 MG/DL (ref 0.57–1)
GFR SERPL CREATININE-BSD FRML MDRD: 105 ML/MIN/1.73
GLOBULIN UR ELPH-MCNC: 3.3 GM/DL
GLUCOSE SERPL-MCNC: 75 MG/DL (ref 65–99)
HCV AB SER DONR QL: NORMAL
HDLC SERPL-MCNC: 66 MG/DL (ref 40–60)
LDLC SERPL CALC-MCNC: 51 MG/DL (ref 0–100)
LDLC/HDLC SERPL: 0.74 {RATIO}
POTASSIUM SERPL-SCNC: 4.1 MMOL/L (ref 3.5–5.2)
PROT SERPL-MCNC: 7.5 G/DL (ref 6–8.5)
SODIUM SERPL-SCNC: 136 MMOL/L (ref 136–145)
T4 FREE SERPL-MCNC: 1.16 NG/DL (ref 0.93–1.7)
TRIGL SERPL-MCNC: 105 MG/DL (ref 0–150)
TSH SERPL DL<=0.05 MIU/L-ACNC: 10.5 UIU/ML (ref 0.27–4.2)
VLDLC SERPL-MCNC: 19 MG/DL (ref 5–40)

## 2021-06-15 PROCEDURE — 99214 OFFICE O/P EST MOD 30 MIN: CPT | Performed by: INTERNAL MEDICINE

## 2021-06-15 PROCEDURE — 82306 VITAMIN D 25 HYDROXY: CPT | Performed by: INTERNAL MEDICINE

## 2021-06-15 PROCEDURE — 84443 ASSAY THYROID STIM HORMONE: CPT | Performed by: INTERNAL MEDICINE

## 2021-06-15 PROCEDURE — 80061 LIPID PANEL: CPT | Performed by: INTERNAL MEDICINE

## 2021-06-15 PROCEDURE — 86803 HEPATITIS C AB TEST: CPT | Performed by: INTERNAL MEDICINE

## 2021-06-15 PROCEDURE — 80053 COMPREHEN METABOLIC PANEL: CPT | Performed by: INTERNAL MEDICINE

## 2021-06-15 PROCEDURE — 84439 ASSAY OF FREE THYROXINE: CPT | Performed by: INTERNAL MEDICINE

## 2021-06-15 NOTE — PROGRESS NOTES
Brittny Nicolas 76 y.o.  CC:Follow-up, Hypothyroidism, Hyperlipidemia, Hypertension, Osteoporosis, and Heartburn      [place]: Follow-up, Hypothyroidism, Hyperlipidemia, Hypertension, Osteoporosis, and Heartburn    bp is good   Is on lipitor 20 mg daily   Is on low fat diet   Is taking synthroid 112 mcg daily   Has gained weight- 11 lbs  Eating better     Allergies   Allergen Reactions   • Boniva [Ibandronic Acid] GI Intolerance     Refractory gerd and abdominal pain        Current Outpatient Medications:   •  amLODIPine (NORVASC) 5 MG tablet, TAKE 1 TABLET BY MOUTH EVERY DAY, Disp: 90 tablet, Rfl: 3  •  atorvastatin (LIPITOR) 20 MG tablet, TAKE 1 TABLET BY MOUTH EVERYDAY AT BEDTIME, Disp: 90 tablet, Rfl: 1  •  Cholecalciferol (VITAMIN D) 2000 UNITS capsule, Take 1 capsule by mouth., Disp: , Rfl:   •  dexlansoprazole (Dexilant) 60 MG capsule, Take 1 capsule by mouth Daily., Disp: 90 capsule, Rfl: 1  •  donepezil (ARICEPT) 10 MG tablet, Take 1 tablet by mouth Every Night., Disp: 90 tablet, Rfl: 3  •  levothyroxine (SYNTHROID, LEVOTHROID) 112 MCG tablet, TAKE 1 TABLET BY MOUTH EVERY DAY, Disp: 90 tablet, Rfl: 3  •  memantine (NAMENDA) 10 MG tablet, Take 1 tablet by mouth 2 (Two) Times a Day., Disp: 180 tablet, Rfl: 3  Patient Active Problem List    Diagnosis    • Hyponatremia [E87.1]    • Late onset Alzheimer's disease without behavioral disturbance (CMS/HCC) [G30.1, F02.80]    • Rib pain on left side [R07.81]    • Memory loss [R41.3]    • Pharyngitis [J02.9]    • Left lower quadrant pain [R10.32]    • Acute pain of left shoulder [M25.512]    • Erosive esophagitis [K22.10]    • H/O colonoscopy [Z98.890]    • Fatigue [R53.83]    • Hypothyroidism [E03.9]    • Achalasia [K22.0]    • Acute upper respiratory infection [J06.9]    • Atypical chest pain [R07.89]    • Benign essential hypertension [I10]    • Carotid artery stenosis [I65.29]    • Cataract [H26.9]    • Cough [R05]    • Acute cystitis [N30.00]    • Screening for  malignant neoplasm [Z12.9]    • Gastroesophageal reflux disease [K21.9]    • Essential familial hypercholesterolemia [E78.01]    • Gastrointestinal bleeding [K92.2]    • Inguinal hernia [K40.90]    • Insomnia [G47.00]    • Leg cramps [R25.2]    • Meniere's disease [H81.09]    • Night sweats [R61]    • Osteoporosis [M81.0]    • Peripheral neuropathy [G62.9]    • Retinal artery occlusion [H34.9]    • Rheumatoid arthritis (CMS/HCC) [M06.9]    • Stroke syndrome [KEZ0640]    • Suprapubic pain [R10.2]    • Transient ischemic attack [G45.9]    • Positive reaction to tuberculin skin test [R76.11]    • Vitamin D deficiency [E55.9]    • Weight loss [R63.4]      Review of Systems   Constitutional: Positive for appetite change and unexpected weight change. Negative for activity change.   HENT: Negative for congestion and rhinorrhea.    Eyes: Negative for visual disturbance.   Respiratory: Negative for cough and shortness of breath.    Cardiovascular: Negative for palpitations and leg swelling.   Gastrointestinal: Negative for constipation, diarrhea and nausea.   Genitourinary: Negative for hematuria.   Musculoskeletal: Positive for arthralgias. Negative for back pain, gait problem, joint swelling and myalgias.   Skin: Negative for color change, rash and wound.   Allergic/Immunologic: Negative for environmental allergies, food allergies and immunocompromised state.   Neurological: Negative for dizziness, weakness and light-headedness.   Psychiatric/Behavioral: Negative for confusion, decreased concentration, dysphoric mood and sleep disturbance. The patient is not nervous/anxious.      Social History     Socioeconomic History   • Marital status:      Spouse name: Not on file   • Number of children: Not on file   • Years of education: Not on file   • Highest education level: Not on file   Tobacco Use   • Smoking status: Former Smoker   • Smokeless tobacco: Never Used   Vaping Use   • Vaping Use: Never used   Substance and  "Sexual Activity   • Alcohol use: Yes     Comment: rarely   • Drug use: No   • Sexual activity: Not Currently     Family History   Problem Relation Age of Onset   • Cystic fibrosis Other    • Cervical cancer Mother    • Cancer Paternal Grandmother    • Colon cancer Neg Hx    • Colon polyps Neg Hx      /64   Pulse 70   Ht 160 cm (63\")   Wt 59.6 kg (131 lb 6.4 oz)   SpO2 97%   BMI 23.28 kg/m²   Physical Exam  Vitals and nursing note reviewed.   Constitutional:       Appearance: Normal appearance. She is well-developed.   HENT:      Head: Normocephalic and atraumatic.   Eyes:      General: Lids are normal.      Extraocular Movements: Extraocular movements intact.      Conjunctiva/sclera: Conjunctivae normal.      Pupils: Pupils are equal, round, and reactive to light.   Neck:      Thyroid: No thyroid mass or thyromegaly.      Vascular: No carotid bruit.      Trachea: Trachea normal. No tracheal deviation.   Cardiovascular:      Rate and Rhythm: Normal rate and regular rhythm.      Pulses: Normal pulses.      Heart sounds: Murmur heard.   No friction rub. No gallop.    Pulmonary:      Effort: Pulmonary effort is normal. No respiratory distress.      Breath sounds: Normal breath sounds. No wheezing.   Musculoskeletal:         General: No deformity. Normal range of motion.      Cervical back: Normal range of motion and neck supple.   Lymphadenopathy:      Cervical: No cervical adenopathy.   Skin:     General: Skin is warm and dry.      Findings: No erythema or rash.      Nails: There is no clubbing.   Neurological:      Mental Status: She is alert and oriented to person, place, and time.      Cranial Nerves: No cranial nerve deficit.      Deep Tendon Reflexes: Reflexes are normal and symmetric. Reflexes normal.   Psychiatric:         Speech: Speech normal.         Behavior: Behavior normal.         Thought Content: Thought content normal.         Judgment: Judgment normal.       Results for orders placed or " performed in visit on 04/20/21   COVID-19, M,T,W, [facility] IN-HOUSE NP/OP SWAB IN UTM/VTM/[facility] 24HR TAT - Swab, Nasopharynx    Specimen: Nasopharynx; Swab   Result Value Ref Range    COVID19 Not Detected Not Detected - Ref. Range     Diagnoses and all orders for this visit:    1. Essential familial hypercholesterolemia (Primary)  Assessment & Plan:  bp is good  Continue monitoring and medications     Orders:  -     Comprehensive Metabolic Panel  -     Lipid Panel  -     Hepatitis C Antibody    2. Benign essential hypertension  Assessment & Plan:  bp is good   Continue monitoring and medication       3. Acquired hypothyroidism  Assessment & Plan:  On synthroid 112 mcg daily   Check tfts     Orders:  -     TSH  -     T4, Free    4. Vitamin D deficiency  Assessment & Plan:  Continue supplement  Update vitamin D levels     Orders:  -     Vitamin D 25 Hydroxy  Return in about 4 months (around 10/15/2021) for Recheck.    Dalila Liz MD  Signed Dalila Liz MD

## 2021-06-19 RX ORDER — LEVOTHYROXINE SODIUM 0.12 MG/1
125 TABLET ORAL DAILY
Qty: 90 TABLET | Refills: 1 | Status: SHIPPED | OUTPATIENT
Start: 2021-06-19 | End: 2021-12-23

## 2021-07-19 RX ORDER — LEVOTHYROXINE SODIUM 112 UG/1
TABLET ORAL
Qty: 90 TABLET | Refills: 3 | OUTPATIENT
Start: 2021-07-19

## 2021-07-21 ENCOUNTER — OFFICE VISIT (OUTPATIENT)
Dept: NEUROLOGY | Facility: CLINIC | Age: 76
End: 2021-07-21

## 2021-07-21 VITALS
OXYGEN SATURATION: 95 % | SYSTOLIC BLOOD PRESSURE: 128 MMHG | BODY MASS INDEX: 22.71 KG/M2 | HEART RATE: 76 BPM | DIASTOLIC BLOOD PRESSURE: 68 MMHG | WEIGHT: 133 LBS | HEIGHT: 64 IN

## 2021-07-21 DIAGNOSIS — F02.80 LATE ONSET ALZHEIMER'S DISEASE WITHOUT BEHAVIORAL DISTURBANCE (HCC): Primary | ICD-10-CM

## 2021-07-21 DIAGNOSIS — R13.12 OROPHARYNGEAL DYSPHAGIA: ICD-10-CM

## 2021-07-21 DIAGNOSIS — G30.1 LATE ONSET ALZHEIMER'S DISEASE WITHOUT BEHAVIORAL DISTURBANCE (HCC): Primary | ICD-10-CM

## 2021-07-21 PROCEDURE — 99213 OFFICE O/P EST LOW 20 MIN: CPT | Performed by: NURSE PRACTITIONER

## 2021-07-21 RX ORDER — DONEPEZIL HYDROCHLORIDE 10 MG/1
10 TABLET, FILM COATED ORAL NIGHTLY
Qty: 90 TABLET | Refills: 3 | Status: SHIPPED | OUTPATIENT
Start: 2021-07-21 | End: 2022-05-11

## 2021-07-21 RX ORDER — MEMANTINE HYDROCHLORIDE 10 MG/1
10 TABLET ORAL 2 TIMES DAILY
Qty: 180 TABLET | Refills: 3 | Status: SHIPPED | OUTPATIENT
Start: 2021-07-21 | End: 2022-05-11

## 2021-07-21 NOTE — PROGRESS NOTES
Subjective:     Patient ID: Brittny Nicolas is a 76 y.o. female.    CC:   Chief Complaint   Patient presents with   • Memory Loss       HPI:   History of Present Illness   Brittny Nicolas is a 76 y.o. female who returns to clinic today for 6 month follow up on Alzheimer's Disease. Her family has noted symptoms since at least early 2019 marked initially by forgetfulness. This has gradually worsened over time. Additional associated symptoms have included impairments in language  and executive function. There are no identified exacerbating or alleviating factors. Her family manages her medications and finances. She has moved in with her daughter in [place] as a result of her cognitive impairment.  Previously followed by Dr. Betancourt in our memory clinic.    Today-7/21/21-She is currently on donepezil 10mg QHS and memantine 10mg BID. Daughter feels her mood has improved since last visit. She is getting out more and they are going on a family trip to [place] later this week. Her daughter has seen some improvement with less repetitiveness. She has completed SLP for cognitive therapy and dysphagia. Was evaluated with Upper GI earlier this year with Dr. Derrell CORDON with findings or oropharyngeal dysphagia but no underlying known cause. She eats and massages her esophagus after eating, drinking and taking pills and has not had coughing or choking. Likes to walk and has had no falls.  Daughter feels she is doing overall about the same. Denies agitation, hallucinations and delusions. She does not drive. Daughter has looked into assisted living but currently she continues to reside at home with her family. She was taken off low dose aspirin due to excessive bruising. Just saw PCP/Endo and synthroid was adjusted.     Prior evaluation has included an unremarkable head CT and screening blood work, which were unremarkable. Hospitalized in August 2020 for hyponatremia at [facility].    The following portions of the patient's history were  reviewed and updated as appropriate: allergies, current medications, past family history, past medical history, past social history, past surgical history and problem list.    Past Medical History:   Diagnosis Date   • Acid reflux disease    • Arthritis    • Carotid artery occlusion    • Dementia (CMS/HCC)    • Depression    • Graves disease    • Hyperlipidemia    • Hypertension    • Memory loss    • Stomach problems    • Stroke (CMS/HCC)    • Thyroid disease    • Transient ischemic attack    • Vision loss        Past Surgical History:   Procedure Laterality Date   • AUGMENTATION MAMMAPLASTY      1978   • BREAST AUGMENTATION     • CAROTID ARTERY ANGIOPLASTY     • CAROTID ENDARTERECTOMY     • CAROTID STENT     • COLONOSCOPY     • D & C CERVICAL BIOPSY      Dilation and Curettage of Cervical Stump   • HERNIA REPAIR     • HERNIA REPAIR     • THROMBOENDARTERECTOMY      carotid   • UPPER GASTROINTESTINAL ENDOSCOPY         Social History     Socioeconomic History   • Marital status:      Spouse name: Not on file   • Number of children: Not on file   • Years of education: Not on file   • Highest education level: Not on file   Tobacco Use   • Smoking status: Former Smoker     Packs/day: 1.00     Years: 15.00     Pack years: 15.00     Types: Cigarettes   • Smokeless tobacco: Never Used   • Tobacco comment: Do not know when she quit. Likely about 1998.   Vaping Use   • Vaping Use: Never used   Substance and Sexual Activity   • Alcohol use: Not Currently     Alcohol/week: 0.0 standard drinks     Comment: She might have some wine, but I try to not give her any   • Drug use: No   • Sexual activity: Not Currently     Birth control/protection: Post-menopausal       Family History   Problem Relation Age of Onset   • Cystic fibrosis Other    • Cervical cancer Mother    • Cancer Paternal Grandmother    • Dementia Paternal Grandmother    • Colon cancer Neg Hx    • Colon polyps Neg Hx         Review of Systems   Constitutional:  "Negative for chills, fatigue, fever and unexpected weight change.   HENT: Negative for ear pain, hearing loss, nosebleeds, rhinorrhea and sore throat.    Eyes: Negative for photophobia, pain, discharge, itching and visual disturbance.   Respiratory: Negative for cough, chest tightness, shortness of breath and wheezing.    Cardiovascular: Negative for chest pain, palpitations and leg swelling.   Gastrointestinal: Negative for abdominal pain, blood in stool, constipation, diarrhea, nausea and vomiting.   Genitourinary: Negative for dysuria, frequency, hematuria and urgency.   Musculoskeletal: Negative for arthralgias, back pain, gait problem, joint swelling, myalgias, neck pain and neck stiffness.   Skin: Negative for rash and wound.   Allergic/Immunologic: Negative for environmental allergies and food allergies.   Neurological: Negative for dizziness, tremors, seizures, syncope, speech difficulty, weakness, light-headedness, numbness and headaches.   Hematological: Negative for adenopathy. Does not bruise/bleed easily.   Psychiatric/Behavioral: Negative for agitation, confusion, decreased concentration, hallucinations, sleep disturbance and suicidal ideas. The patient is not nervous/anxious.    All other systems reviewed and are negative.       Objective:  /68   Pulse 76   Ht 162.6 cm (64\")   Wt 60.3 kg (133 lb)   SpO2 95%   BMI 22.83 kg/m²     Neurologic Exam     Mental Status   Oriented to person.   Oriented to place.   Disoriented to time.   Speech: speech is normal   Level of consciousness: alert    Cranial Nerves   Cranial nerves II through XII intact.     Motor Exam   Muscle bulk: normal  Overall muscle tone: normal    Strength   Strength 5/5 throughout.     Gait, Coordination, and Reflexes     Gait  Gait: normal    Coordination   Finger to nose coordination: normal    Tremor   Resting tremor: absent  Intention tremor: absent  Action tremor: absent      Physical Exam  Constitutional:       " Appearance: Normal appearance.   Neurological:      Mental Status: She is alert.      Coordination: Finger-Nose-Finger Test normal.      Gait: Gait is intact.      Deep Tendon Reflexes: Strength normal.   Psychiatric:         Mood and Affect: Mood normal.         Speech: Speech normal.         Behavior: Behavior is slowed.         Thought Content: Thought content normal.         Cognition and Memory: Cognition is impaired. She exhibits impaired recent memory.         Judgment: Judgment normal.     MMSE 19 7/21/21   MMSE 17/30 1/27/21, MMSE 20 6/2020       Assessment/Plan:       Diagnoses and all orders for this visit:    1. Late onset Alzheimer's disease without behavioral disturbance (CMS/HCC) (Primary)  -     donepezil (ARICEPT) 10 MG tablet; Take 1 tablet by mouth Every Night.  Dispense: 90 tablet; Refill: 3  -     memantine (NAMENDA) 10 MG tablet; Take 1 tablet by mouth 2 (Two) Times a Day.  Dispense: 180 tablet; Refill: 3    2. Oropharyngeal dysphagia  Comments:  continue slp exercises at home, no recent choking, eating and drinking ok.            Continue current meds. Continue slp exercises for dysphagia-no concerns about aspiration at this time and managing ok. F/U in 6 months or sooner if needed. Reviewed SLP, GI notes and recommendations for cognitive therapy and swallowing.     Reviewed medications, potential side effects and signs and symptoms to report. Discussed risk versus benefits of treatment plan with patient and/or family-including medications, labs and radiology that may be ordered. Addressed questions and concerns during visit. Patient and/or family verbalized understanding and agree with plan.    AS THE PROVIDER, I PERSONALLY WORE PPE DURING ENTIRE FACE TO FACE ENCOUNTER IN CLINIC WITH THE PATIENT. PATIENT ALSO WORE PPE DURING ENTIRE FACE TO FACE ENCOUNTER EXCEPT FOR A MAX OF 30 SECONDS DURING NEUROLOGICAL EVALUATION OF CRANIAL NERVES AND THEN MASK WAS PLACED BACK OVER PATIENT FACE FOR  REMAINDER OF VISIT. I WASHED MY HANDS BEFORE AND AFTER VISIT.    During this visit the following were done:  Labs Reviewed []    Labs Ordered []    Radiology Reports Reviewed [x]    Radiology Ordered []    PCP Records Reviewed [x]    Referring Provider Records Reviewed []    ER Records Reviewed []    Hospital Records Reviewed []    History Obtained From Family []    Radiology Images Reviewed []    Other Reviewed [x]    Records Requested []      Muniar Bloom, CARLOS  7/21/2021

## 2021-08-19 RX ORDER — DEXLANSOPRAZOLE 60 MG/1
CAPSULE, DELAYED RELEASE ORAL
Qty: 90 CAPSULE | Refills: 1 | Status: SHIPPED | OUTPATIENT
Start: 2021-08-19 | End: 2022-02-16

## 2021-08-26 RX ORDER — ATORVASTATIN CALCIUM 20 MG/1
TABLET, FILM COATED ORAL
Qty: 90 TABLET | Refills: 1 | Status: SHIPPED | OUTPATIENT
Start: 2021-08-26 | End: 2021-12-30

## 2021-11-02 ENCOUNTER — DOCUMENTATION (OUTPATIENT)
Dept: SPEECH THERAPY | Facility: HOSPITAL | Age: 76
End: 2021-11-02

## 2021-11-02 DIAGNOSIS — R13.12 OROPHARYNGEAL DYSPHAGIA: ICD-10-CM

## 2021-11-02 DIAGNOSIS — F02.80 LATE ONSET ALZHEIMER'S DISEASE WITHOUT BEHAVIORAL DISTURBANCE (HCC): Primary | ICD-10-CM

## 2021-11-02 DIAGNOSIS — G30.1 LATE ONSET ALZHEIMER'S DISEASE WITHOUT BEHAVIORAL DISTURBANCE (HCC): Primary | ICD-10-CM

## 2021-11-02 NOTE — THERAPY DISCHARGE NOTE
"Speech Language Pathology Discharge Summary         Patient Name: Brittny Nicolas  : 1945  MRN: [id number]    Today's Date: 2021       SLP OP Goals     Row Name 21 1500          Goal Type Needed    Goal Type Needed Auditory Comprehension; Memory; Attention/Orientation; Other Adult Goals  -HG            Subjective Comments    Subjective Comments Pt seen for a total of ~18 sessions for memory and dysphagia.  -HG            Auditory Comprehension Goals    Patient will comprehend spoken information in all settings 90%:; with cues  -HG     Status: Patient will comprehend spoken information in all settings Progressing as expected  -HG     Comments: Patient will comprehend spoken information in all settings 3/2/21: WH questions and pt was 90% accurate. 10/29/20: Pt's daughter states that she isn't sure her mom can read or understands the labels in her home for kitchen items.   -HG     Patient will demonstrate comprehension of spoken language by answering questions about a short paragraph 80%:; with cues  -HG     Status: Patient will demonstrate comprehension of spoken language by answering questions about a short paragraph Progressing as expected  -HG     Comments: Patient will demonstrate comprehension of spoken language by answering questions about a short paragraph 21: 36-50 word paragraphs and pt was 100% accurate when writing \"key details\" from each sentence. 21: 22-36 word paragraphs: 70% accurate with moderate cues. 21: 12-22 word paragraphs: 90% accurate.  22-36 word paragraphs: pt was 70% accurate.   -HG            Memory Goals    Patient and family will implement compensatory strategies to maximize patient’s Memory function so patient can continue to participate in daily activities 90%:; with cues  -HG     Status: Patient and family will implement compensatory strategies to maximize patient’s Memory function so patient can continue to participate in daily activities Progressing " "as expected  -HG     Comments: Patient and family will implement compensatory strategies to maximize patient’s Memory function so patient can continue to participate in daily activities 4/27/21: Pt noted she has started writing about her aunt as if writing a letter to her. Pt states she wrote the \"letter\" with information that she remembered about her. 4/20/21: Pt noted she would journal on her own, have a shared calendar with her family, and start a memory book.   -HG     Patient will demonstrate improved ability to recall information by immediately recalling a series of words 80%:; related; after delay; with cues  -HG     Status: Patient will demonstrate improved ability to recall information by immediately recalling a series of words Progressing as expected  -HG     Comments: Patient will demonstrate improved ability to recall information by immediately recalling a series of words 3/16/21: SRT for pee pads at home for cat, pt was successful for 8 min delay, 16 mins required a look at the cue card.    Dtr reports that pt is nowing taking her pills one at a time which was targeted last with SRT. 3/9/21: SRT for taking pills one at time and pt made it up to 10 mins. 3/2/21: Pt recalled all of her swallowing exercises and demonstrated them for recall purposes.  SLUMS Immediate recall of paragraph, pt was 0/8.  1/26/21: Immediate recall of 3 words in a row and pt was 90% accurate. 1/15/21: SLUMS: Immediate recall of paragraph and pt was 1/8. 11/24/20: Mental Manipulation for progression and pt was 90% accurate. 11/17/20: Mental Manipulation by attribute, pt was 90% accurate and by word placement, pt was 90% accurate. 11/3/20: Immediate recall on FROMAJE: pt was 2/3.  -HG     Patient’s memory skills will be enhanced as reported by patient by utilizing internal memory strategies to recall up to 3 pieces of information after a 5- minute delay 80%:; with cues  -HG     Status: Patient’s memory skills will be enhanced as " reported by patient by utilizing internal memory strategies to recall up to 3 pieces of information after a 5- minute delay Progressing as expected  -HG     Comments: Patient’s memory skills will be enhanced as reported by patient by utilizing internal memory strategies to recall up to 3 pieces of information after a 5- minute delay 3/9/21: Delayed recall of 6 related animals and pt was 6/6. 3/2/21: Delayed recall of 4 related animals and pt was 4/4, SLUMS delayed was 0/5.  2/9/21: Delayed recall of 3 animal from 2 weeks ago: pt was 0/3 w/o cue. After repetition during session, pt was 3/3 x 3.  1/26/21: Delayed recall of 2 related pictures: pt was 1/15/21: SLUMS Delayed recall of 5 un-related words and pt was 0/5 I'ly and with cues, pt was 4/5.  12/8/20: Delayed recall of 2 new related pictures, pt was 1/2 to 2/2. 11/24/20: Delayed recall of 6 cont'd pictures from previous session: pt was 3/6 I'ly. 5/6 with min cues, 6/6 by the end of session. 11/17/20: Delayed recall of 6 related words: pt was 4/6, 5/6 and 6/6 all with written cue. 11/10/20: Delayed recall of 5 related words and pt was 3/5 with first initial presented. After review and use of first initial, pt was 5/5.   11/3/20: Delayed recall of 4 related (familiar to pt) pt was 3/4 x 2, with an increased written cue, pt was 4/4. 10/29/20: Delayed recall of 3 related pictures: pt was 2/3x 2, 3/3 x 3.    -HG     Patient’s memory skills will be enhanced as reported by patient by using external memory aides 80%:; with cues  -HG     Status: Patient’s memory skills will be enhanced as reported by patient by using external memory aides Progressing as expected  -HG     Comments: Patient’s memory skills will be enhanced as reported by patient by using external memory aides 4/20/21: Pt noted she was journaling more often now. 3/16/21: Pt had one entry since last session- started entry for this date. 3/9/21: Pt had not written in journal, pt's journal is now set for the  "coming week for each day. 3/2/21: Pt had not written in journal since last tx session. 2/9/21: Pt returned with journal with entries since a few days ago. 1/26/21: Pt brought in journal but had not written in it so SLP had pt start an entry for today. 1/15/21: Re-educated pt and daughter on importance of journaling. 12/8/20: Pt has not been writing in her journal. Started entry for today.   11/24/20: Pt had only journaled one time since last session- ideas for cues in order to prompt pt to write in her journal. 11/17/20: Pt continues to journal. 11/10/20: Reinforced journaling and purpose and need for reference. 11/3/20: Reinforced use of daily journaling. 10/29/20: Pt had not written in her journal yet- provided visual cues.   -HG     Patient will demonstrate improved ability to recall information by stating activities patient has completed that day 80%:; with cues  -HG     Status: Patient will demonstrate improved ability to recall information by stating activities patient has completed that day Progressing as expected  -HG     Comments: Patient will demonstrate improved ability to recall information by stating activities patient has completed that day 4/20/21: Pt noted she has not done much today. Pt stated she would like to do more around the house to \"help out.\" 3/9/21: Pt struggled with recall. of her lunch and afternoon chore without cues from daughter. 3/21:  Pt did recall completing swallowing exercises and returned with handout with markings representing how often she completed them. 2/9/21: Pt recalled foods that she had not eaten, daughter had to clarify for accuracy for journal. 1/26/21: Recall of meals this date with accurate recall of breakfast and no recall of lunch- required cue from daughter. 1/15/21: Pt does not recall having a nightmare per her daughter's report and with a cue, could recall what she had for breakfast: 11/3/20: [place] but did write but not much. " "With cues, was able to recall events from weekend.   -HG            Attention/Orientation Goals    Patient will be able to interact safely in home environment With Supervision  -HG     Status: Patient will be able to interact safely in home environment Progressing as expected  -HG     Comments: Patient will be able to interact safely in home environment 21: Pt states she is not having difficulties performing ADL's. She states \"I feed the critters and feed myself.\"21: Pt's daughter states that pt will occasionally forget ADL's such as bathing. Pt agreed that use of a shared family calendar may aid in independence. 3/2/21: Pt aware of what to do if home alone and smells smoke or needs help since there is no land line phone at home. 1/15/21: SLUMS Digit Span and pt was 2/2.   -HG     Patient will improve orientation skills by orienting to self, place, time, and situation 80%:; with cues  -HG     Status: Patient will improve orientation skills by orienting to self, place, time, and situation Progressing as expected  -HG     Comments: Patient will improve orientation skills by orienting to self, place, time, and situation 21: Pt oriented to  and name. Not oriented to place, age, time. 21: Pt oriented to self, place, month. Pt did not know date or year. 21: Pt used calendar in session for orientation purposes.  21: Pt has a calendar at home to use but was not oriented to date when starting her journal sheet. 20: Pt aware of date and day of week. Encouraged use of journal in order to keep up with orientation. 20: Pt used the calendar and pt was accurate with day of week and date.  11/10/20: Using calendar in office, pt was able to ID current date.  10/29/20: Provided calendar to assist with orientation.   -HG     Patient will improve attention skills by sustaining consistent behavioral response during continuous and repetitive activity (e.g., listening for target words, " auditory/reading comprehension tasks) with cues; 5 minute task  -HG     Status: Patient will improve attention skills by sustaining consistent behavioral response during continuous and repetitive activity (e.g., listening for target words, auditory/reading comprehension tasks) Progressing as expected  -HG     Comments: Patient will improve attention skills by sustaining consistent behavioral response during continuous and repetitive activity (e.g., listening for target words, auditory/reading comprehension tasks) 5/21/21: Pt completed So Many card game and was 100% accurate with min cues. 3/16/21: The game of TRASH: pt was 80% accurate with mod cues.  2/9/21: The game of TRASH for consistency of following directions to a new game, pt was 70% accurate.  1/26/21: Word placement by occurrence and pt was 90% accurate, reversal and pt was 50% accurate. 1/15/21: One Pile Card Game: pt was 80% accurate. 11/3/20: One Pile Card Game:  Pt was 85% accurate.  10/29/20: One Pile Card Game: pt was 80% accurate with min to mod cues.   -HG     Patient will improve attention skills by focusing on environmental stimuli within left visuospatial field 80%:; with cues  -HG     Status: Patient will improve attention skills by focusing on environmental stimuli within left visuospatial field Progressing as expected  -HG     Comments: Patient will improve attention skills by focusing on environmental stimuli within left visuospatial field 3/16/21: Card sorting for two different tasks and pt was 60% accurate I'ly, with visual prompts, pt was 80% accurate. 3/2/21: Copying boxed information and pt was 70% accurate with mod cues. 1/26/21: Copying boxed information: pt was 75% accurate.   11/3/20: Shapes and figures: 3 in a row and pt was 80% accurate with mod verbal and visual cues.   -HG            Dysphagia Goals    Patient will safely consume the recommended diet without complications such as aspiration pneumonia regular diet and thin  liquids  -HG     Status: Patient will safely consume the recommended diet without complications such as aspiration pneumonia Progressing as expected  -HG     Comments: Patient will safely consume the recommended diet without complications such as aspiration pneumonia 5/21/21: No s/s with solid or thin trials. Pt states she is not having difficulties with eating/drinking at this time. 4/27/21: No coughing noted during session. Pt displayed one double swallow during a trial of the super supra glottic swallow. 3/23/21: No coughing exhibited during session. Pt and daughter note that every once awhile there is residue present where a throat clear is needed or a double swallow. Pt being followed by GI with scope ordered this date. 3/9/21: Pt reports no difficulties eating and drinking at home where daughter reports hearing the large gulps.   -HG     Patient will improve stage transition duration of swallow/improve timing to reduce food falling into the airway by decreasing swallow delay after neurosensory  stimulation three second prep; suck-swallow; super-supraglottic swallow; with cues  -HG     Status: Patient will improve stage transition duration of swallow/improve timing to reduce food falling into the airway by decreasing swallow delay after neurosensory  stimulation Progressing as expected  -HG     Comments: Patient will improve stage transition duration of swallow/improve timing to reduce food falling into the airway by decreasing swallow delay after neurosensory  stimulation 5/21/21: Pt completed x2. 4/27/21: Pt completed each exercise x3. 3/30/21: Reviewed with pt and she completed with accuracy x 3 each.  3/23/21: Reviewed with pt and she demonstrated accuracy.   3/9/21: Pt completed during session x 2 each.   -HG     Patient will increase laryngeal elevation to reduce residue that might fall into airway by completing super-supraglottic swallow; falsetto/pitch glide; with cues  -HG     Status: Patient will  increase laryngeal elevation to reduce residue that might fall into airway by completing Progressing as expected  -HG     Comments: Patient will increase laryngeal elevation to reduce residue that might fall into airway by completing 5/21/21: Pt completed x 3. 4/27/21: Pt completed each exercise x3. 3/30/21: Reviewed with pt and she completed with accuracy x 3 each. 3/23/21: Reviewed with pt and she demonstrated accuracy. 3/9/21: Pt completed during session x 2 each.   -HG     Patient will increase closure of larynx to keep food from falling into the airway by completing super-supraglottic swallow; push-pull with breath hold; with cues  -HG     Status: Patient will increase closure of larynx to keep food from falling into the airway by completing Progressing as expected  -HG     Comments: Patient will increase closure of larynx to keep food from falling into the airway by completing 5/21/21: Pt completed SGS x2, push-pull x3. 4/27/21: Pt completed each exercise x3. One double swallow noted on a trial of the SGS  3/30/21:Reviewed with pt and she completed with accuracy x 3 each. 3/23/21: Reviewed with pt and she demonstrated accuracy.  3/9/21: Pt completed during session x 2 each.   -HG     Patient will increase strength of tongue base and posterior pharyngeal walls to reduce residue that might fall into airway by completing effotful swallow; Bernarda (tongue hold); with cues  -HG     Status: Patient will increase strength of tongue base and posterior pharyngeal walls to reduce residue that might fall into airway by completing Progressing as expected  -HG     Comments: Patient will increase strength of tongue base and posterior pharyngeal walls to reduce residue that might fall into airway by completing 5/21/21: Pt completed x3. 4/27/21: Pt completed each exercise x3. 3/30/21: Reviewed with pt and she completed with accuracy x 3 each. 3/23/21: Reviewed with pt and she demonstrated accuracy.  3/9/21: Pt completed during  session x 2 each.   -HG            Other Goals    Other Adult Goal- 1 Pt will complete thought organization tasks with 80% accuracy.  -HG     Status: Other Adult Goal- 1 Progressing as expected  -HG     Comments: Other Adult Goal- 1 5/21/21: Pt completed 4 step sequencing task and was 100% accurate independently. 4/20/21: Pt completed grocery list alphabetizing task with 70% accuracy independently. Pt required moderate cues to correct error and to refocus to task. 3/30/21: A-Z activity for naming and pt was 70% accurate with mod cues. 3/9/21: 5 step sentence sequencing: pt was 100% accurate. 3/2/21: SLUMS assesement from 2/23/21 and pt was 6/15 for divergent naming. This date, for divergent naming: pt was 80% accurate. 2/9/21: Following written directions: pt was 80% accurate.  1/15/21: SLUMS naming: pt was 8/15. 12/8/20: Category matrix: pt was 90% accurate.  Crossword puzzle and pt was 85% accurate. 11/24/20: Divergent naming: pt was 75-90% accurate . 11/17/20: Category Matrix: pt was 80% accurate I'ly. 11/10/20: Fill in word given first initial: pt was 100% accurate. 10/29/20: divergent naming: pt was 70% accurate. 90  -HG     Other Adult Goal- 2 Pt will complete reasoning and problem solving assessment with goals to follow as indicated.  -HG     Status: Other Adult Goal- 2 Progressing as expected  -HG     Comments: Other Adult Goal- 2 3/16/21: Word search and pt was 80% accurate.  3/2/21: Deductive reasoning: pt was 95% accurate. Clock work: pt was 9/12.  Emergent situations and pt was 90% accurate. 1/26/21: Connecting the dots alphabetically and pt required consistent cues. 12/8/20: Six step sequencing: pt was 90% accurate. 11/10/20: Written Directions: pt was 90% accurate. 11/3/20: FROMAJE: Pt scored a 15 placing pt in the severe dementia or depression range.   -HG     Other Adult Goal- 3 Pt will improve RBANS Total score to at least a 70 placing pt in the [place] range.   -HG     Status: Other Adult Goal-  3 Progressing as expected  -     Comments: Other Adult Goal- 3 4/27/21: SLUMS score of 9 places pt in Dementia range. This is improved from 2 months ago. 3/1/21: From re-assessment using SLUMS on 2/23/21, pt was 8/20 placing pt in the Dementia range.   -            SLP Time Calculation    SLP Goal Re-Cert Due Date 05/27/21  -           User Key  (r) = Recorded By, (t) = Taken By, (c) = Cosigned By    Initials Name Provider Type     Lisa Graves, MS CCC-SLP Speech and Language Pathologist                       Time Calculation:                    Lisa Graves MS CCC-SLP  11/2/2021

## 2021-11-12 ENCOUNTER — TELEPHONE (OUTPATIENT)
Dept: NEUROLOGY | Facility: CLINIC | Age: 76
End: 2021-11-12

## 2021-11-12 DIAGNOSIS — G30.1 LATE ONSET ALZHEIMER'S DISEASE WITHOUT BEHAVIORAL DISTURBANCE (HCC): ICD-10-CM

## 2021-11-12 DIAGNOSIS — R41.0 DISORIENTATION: ICD-10-CM

## 2021-11-12 DIAGNOSIS — R45.1 AGITATION: Primary | ICD-10-CM

## 2021-11-12 DIAGNOSIS — F02.80 LATE ONSET ALZHEIMER'S DISEASE WITHOUT BEHAVIORAL DISTURBANCE (HCC): ICD-10-CM

## 2021-11-12 RX ORDER — CLONAZEPAM 0.5 MG/1
0.5 TABLET ORAL 2 TIMES DAILY PRN
Qty: 60 TABLET | Refills: 0 | Status: SHIPPED | OUTPATIENT
Start: 2021-11-12 | End: 2021-11-24

## 2021-11-12 NOTE — TELEPHONE ENCOUNTER
I SPOKE WITH PT'S DAUGHTER, INDIO, AND GAVE ALL RECOMMENDATIONS, MEDS, LABS AND APPT AND SHE ACKNOWLEDGED UNDERSTANDING.  PT SCHEDULED FOR 11/24/21

## 2021-11-12 NOTE — TELEPHONE ENCOUNTER
Lets go ahead and add some medication to help with the anxiety and agitation. With her cardiac history, I would recommend a low dose anxiety pill called clonazepam that can be given at night every night to begin with and may add a morning dose if needed. This is very low dose but need to monitor for sedation. I will send the medicine now.    There was no report of agitation at her visit on 7/21/2021-I also recommend getting labs including blood counts, liver and kidney function test and a urine test to rule out infection. Can they take her to a [facility] [place] lab to have these drawn and provide urine sample? I placed lab orders.    The only sooner appointment is Wednesday 11/24/2021 at 3pm. It will be the day before thanksgiving. Otherwise we have no other options for sooner. Still keep the January visit as well.    Thanks, CARLOS Jon

## 2021-11-12 NOTE — TELEPHONE ENCOUNTER
Caller: Adia Villareal    Relationship to patient: Emergency Contact    Best call back number: [phone]    Chief complaint: PT'S DAUGHTER CALLED TO SAY HER MOTHER'S SYMPTOMS ARE GETTING WORSE. PT IS GETTING AGGRESSIVE AND HAS BEEN TRYING TO RUN AWAY. SHE IS HOPING TO GET AN EARLIER APPT FOR HER MOTHER.    Type of visit: 6 MONTH F/U     If rescheduling, when is the original appointment: 1-26-22 AND PT IS ON THE WAIT LIST.    Additional notes:  PLEASE CALL PT IF SOONER APPT IS AVAILABLE.

## 2021-11-19 RX ORDER — AMLODIPINE BESYLATE 5 MG/1
TABLET ORAL
Qty: 90 TABLET | Refills: 1 | Status: SHIPPED | OUTPATIENT
Start: 2021-11-19 | End: 2022-05-17

## 2021-11-22 ENCOUNTER — TELEPHONE (OUTPATIENT)
Dept: ENDOCRINOLOGY | Facility: CLINIC | Age: 76
End: 2021-11-22

## 2021-11-22 PROCEDURE — U0004 COV-19 TEST NON-CDC HGH THRU: HCPCS | Performed by: NURSE PRACTITIONER

## 2021-11-22 NOTE — TELEPHONE ENCOUNTER
Adia was advised that she needs to take her mother to [facility] so any testing that is needed can be done there such as flu test, strep test of CXR  She voiced understanding  
PATIENT'S DAUGHTER CALLED TODAY WANTING TO SEE IF DR KIM CAN SEE PATIENT TODAY. CALLER STATES THAT THE PATIENT IS EXPERIENCING A THICK COUGH WITH A LOW GRADE FEVER. SHE HAS BEEN VACCINATED FOR COVID-19.     CALL BACK [phone] INDIO CALDERON   
[phone]

## 2021-11-24 ENCOUNTER — LAB (OUTPATIENT)
Dept: LAB | Facility: HOSPITAL | Age: 76
End: 2021-11-24

## 2021-11-24 ENCOUNTER — OFFICE VISIT (OUTPATIENT)
Dept: NEUROLOGY | Facility: CLINIC | Age: 76
End: 2021-11-24

## 2021-11-24 VITALS
SYSTOLIC BLOOD PRESSURE: 124 MMHG | BODY MASS INDEX: 23.9 KG/M2 | DIASTOLIC BLOOD PRESSURE: 74 MMHG | WEIGHT: 140 LBS | OXYGEN SATURATION: 93 % | HEART RATE: 68 BPM | HEIGHT: 64 IN

## 2021-11-24 DIAGNOSIS — F02.80 LATE ONSET ALZHEIMER'S DISEASE WITHOUT BEHAVIORAL DISTURBANCE (HCC): Primary | ICD-10-CM

## 2021-11-24 DIAGNOSIS — F02.80 LATE ONSET ALZHEIMER'S DISEASE WITHOUT BEHAVIORAL DISTURBANCE (HCC): ICD-10-CM

## 2021-11-24 DIAGNOSIS — R45.1 AGITATION: ICD-10-CM

## 2021-11-24 DIAGNOSIS — G30.1 LATE ONSET ALZHEIMER'S DISEASE WITHOUT BEHAVIORAL DISTURBANCE (HCC): ICD-10-CM

## 2021-11-24 DIAGNOSIS — G30.1 LATE ONSET ALZHEIMER'S DISEASE WITHOUT BEHAVIORAL DISTURBANCE (HCC): Primary | ICD-10-CM

## 2021-11-24 DIAGNOSIS — R41.0 DELIRIUM: ICD-10-CM

## 2021-11-24 DIAGNOSIS — R26.9 GAIT DISTURBANCE: ICD-10-CM

## 2021-11-24 LAB
AMMONIA BLD-SCNC: 10 UMOL/L (ref 11–51)
BASOPHILS # BLD AUTO: 0.02 10*3/MM3 (ref 0–0.2)
BASOPHILS NFR BLD AUTO: 0.4 % (ref 0–1.5)
BILIRUB UR QL STRIP: NEGATIVE
CLARITY UR: CLEAR
COLOR UR: YELLOW
DEPRECATED RDW RBC AUTO: 37.8 FL (ref 37–54)
EOSINOPHIL # BLD AUTO: 0.03 10*3/MM3 (ref 0–0.4)
EOSINOPHIL NFR BLD AUTO: 0.6 % (ref 0.3–6.2)
ERYTHROCYTE [DISTWIDTH] IN BLOOD BY AUTOMATED COUNT: 13.2 % (ref 12.3–15.4)
GLUCOSE UR STRIP-MCNC: NEGATIVE MG/DL
HCT VFR BLD AUTO: 36.8 % (ref 34–46.6)
HGB BLD-MCNC: 12.4 G/DL (ref 12–15.9)
HGB UR QL STRIP.AUTO: NEGATIVE
IMM GRANULOCYTES # BLD AUTO: 0.02 10*3/MM3 (ref 0–0.05)
IMM GRANULOCYTES NFR BLD AUTO: 0.4 % (ref 0–0.5)
KETONES UR QL STRIP: NEGATIVE
LEUKOCYTE ESTERASE UR QL STRIP.AUTO: NEGATIVE
LYMPHOCYTES # BLD AUTO: 1.12 10*3/MM3 (ref 0.7–3.1)
LYMPHOCYTES NFR BLD AUTO: 22.8 % (ref 19.6–45.3)
MCH RBC QN AUTO: 26.4 PG (ref 26.6–33)
MCHC RBC AUTO-ENTMCNC: 33.7 G/DL (ref 31.5–35.7)
MCV RBC AUTO: 78.3 FL (ref 79–97)
MONOCYTES # BLD AUTO: 0.61 10*3/MM3 (ref 0.1–0.9)
MONOCYTES NFR BLD AUTO: 12.4 % (ref 5–12)
NEUTROPHILS NFR BLD AUTO: 3.12 10*3/MM3 (ref 1.7–7)
NEUTROPHILS NFR BLD AUTO: 63.4 % (ref 42.7–76)
NITRITE UR QL STRIP: NEGATIVE
NRBC BLD AUTO-RTO: 0 /100 WBC (ref 0–0.2)
PH UR STRIP.AUTO: 6.5 [PH] (ref 5–8)
PLATELET # BLD AUTO: 217 10*3/MM3 (ref 140–450)
PMV BLD AUTO: 10.1 FL (ref 6–12)
PROT UR QL STRIP: NEGATIVE
RBC # BLD AUTO: 4.7 10*6/MM3 (ref 3.77–5.28)
SP GR UR STRIP: <1.005 (ref 1–1.03)
UROBILINOGEN UR QL STRIP: ABNORMAL
WBC NRBC COR # BLD: 4.92 10*3/MM3 (ref 3.4–10.8)

## 2021-11-24 PROCEDURE — 84439 ASSAY OF FREE THYROXINE: CPT

## 2021-11-24 PROCEDURE — 84443 ASSAY THYROID STIM HORMONE: CPT

## 2021-11-24 PROCEDURE — 36415 COLL VENOUS BLD VENIPUNCTURE: CPT

## 2021-11-24 PROCEDURE — 85025 COMPLETE CBC W/AUTO DIFF WBC: CPT

## 2021-11-24 PROCEDURE — 80053 COMPREHEN METABOLIC PANEL: CPT

## 2021-11-24 PROCEDURE — 81003 URINALYSIS AUTO W/O SCOPE: CPT

## 2021-11-24 PROCEDURE — 99214 OFFICE O/P EST MOD 30 MIN: CPT | Performed by: NURSE PRACTITIONER

## 2021-11-24 PROCEDURE — 82140 ASSAY OF AMMONIA: CPT

## 2021-11-24 RX ORDER — CLONAZEPAM 0.5 MG/1
0.5 TABLET ORAL 2 TIMES DAILY PRN
Qty: 60 TABLET | Refills: 1 | Status: SHIPPED | OUTPATIENT
Start: 2021-11-24 | End: 2021-12-04 | Stop reason: HOSPADM

## 2021-11-24 NOTE — PROGRESS NOTES
Subjective:     Patient ID: Brittny Nicolas is a 76 y.o. female.    CC:   Chief Complaint   Patient presents with   • Memory Loss   • Altered Mental Status   • Agitation       HPI:   History of Present Illness     Brittny Nicolas is a 76 y.o. female who returns to clinic today for sooner follow up on Alzheimer's Disease. Her family has noted symptoms since at least early 2019 marked initially by forgetfulness. This has gradually worsened over time. Additional associated symptoms have included impairments in language  and executive function. There are no identified exacerbating or alleviating factors. Her family manages her medications and finances. She has moved in with her daughter in [place] as a result of her cognitive impairment.  Previously followed by Dr. Betancourt in our memory clinic.     Today-11/24/2021-She is currently on donepezil 10mg QHS and memantine 10mg BID.   Her daughter called around 11/12/2021 reporting increased agitation, increased confusion and difficulty with sleeping.  We did prescribe at that time some low-dose clonazepam 0.5 mg to be taken 1-2 times a day as needed for agitation and anxiety.  She started out with twice a day but this was causing her to be too tired during the daytime.  She is taking this at night and this is helped significantly with her agitation, irritability and she is sleeping much better.  She is tolerating the medication at bedtime.  She continues to have difficulty with her gait and some shuffling that her daughter has noticed.  Not much tremor noted.  No history of Parkinson's disease.  Has had chronic difficulty with dysphagia.  Is followed by GI.  Unknown etiology.  She massages her throat to get food down.  Has not had choking.  She does not drive. Daughter has looked into assisted living but currently she continues to reside at home with her family. She was taken off low dose aspirin due to excessive bruising.  She was tested for Covid, strep and flu test 2  "days ago and these were all negative.  She is on Augmentin for upper respiratory infection.  She has had a mild cough.  No Covid contacts.  She also is able to get blood work and urine sample tested today.  Her daughter does feel like she is improved over the past 12 days with the addition of the clonazepam.  She did previously try some physical therapy for her gait and balance but she became agitated with going there.  Her daughter reports that this brought up \"bad memories\".  She also did not like doing the cognitive therapy.  She would be willing to retry the physical therapy after the holidays in January 2022.     Prior evaluation has included an unremarkable head CT and screening blood work, which were unremarkable. Hospitalized in August 2020 for hyponatremia at [facility].     The following portions of the patient's history were reviewed and updated as appropriate: allergies, current medications, past family history, past medical history, past social history, past surgical history and problem list.    Past Medical History:   Diagnosis Date   • Acid reflux disease    • Arthritis    • Carotid artery occlusion    • Dementia (HCC)    • Depression    • Graves disease    • Hyperlipidemia    • Hypertension    • Memory loss    • Stomach problems    • Stroke (HCC)    • Stroke syndrome    • Thyroid disease    • Transient ischemic attack    • Vision loss        Past Surgical History:   Procedure Laterality Date   • AUGMENTATION MAMMAPLASTY      1978   • BREAST AUGMENTATION     • CAROTID ARTERY ANGIOPLASTY     • CAROTID ENDARTERECTOMY     • CAROTID STENT     • COLONOSCOPY     • D & C CERVICAL BIOPSY      Dilation and Curettage of Cervical Stump   • HERNIA REPAIR     • HERNIA REPAIR     • THROMBOENDARTERECTOMY      carotid   • UPPER GASTROINTESTINAL ENDOSCOPY         Social History     Socioeconomic History   • Marital status:    Tobacco Use   • Smoking status: Former Smoker     Packs/day: 1.00     Years: 15.00     " Pack years: 15.00     Types: Cigarettes   • Smokeless tobacco: Never Used   • Tobacco comment: Do not know when she quit. Likely about 1998.   Vaping Use   • Vaping Use: Never used   Substance and Sexual Activity   • Alcohol use: Not Currently     Alcohol/week: 0.0 standard drinks     Comment: She might have some wine, but I try to not give her any   • Drug use: No   • Sexual activity: Not Currently     Birth control/protection: Post-menopausal       Family History   Problem Relation Age of Onset   • Cystic fibrosis Other    • Cervical cancer Mother    • Cancer Paternal Grandmother    • Dementia Paternal Grandmother    • Colon cancer Neg Hx    • Colon polyps Neg Hx         Review of Systems   Constitutional: Negative for chills, fatigue, fever and unexpected weight change.   HENT: Negative for ear pain, hearing loss, nosebleeds, rhinorrhea and sore throat.    Eyes: Negative for photophobia, pain, discharge, itching and visual disturbance.   Respiratory: Negative for cough, chest tightness, shortness of breath and wheezing.    Cardiovascular: Negative for chest pain, palpitations and leg swelling.   Gastrointestinal: Negative for abdominal pain, blood in stool, constipation, diarrhea, nausea and vomiting.   Genitourinary: Negative for dysuria, frequency, hematuria and urgency.   Musculoskeletal: Positive for gait problem. Negative for arthralgias, back pain, joint swelling, myalgias, neck pain and neck stiffness.   Skin: Negative for rash and wound.   Allergic/Immunologic: Negative for environmental allergies and food allergies.   Neurological: Negative for dizziness, tremors, seizures, syncope, speech difficulty, weakness, light-headedness, numbness and headaches.   Hematological: Negative for adenopathy. Does not bruise/bleed easily.   Psychiatric/Behavioral: Positive for agitation, behavioral problems, confusion and decreased concentration. Negative for hallucinations, sleep disturbance and suicidal ideas. The  "patient is nervous/anxious.    All other systems reviewed and are negative.       Objective:  /74   Pulse 68   Ht 162.6 cm (64\")   Wt 63.5 kg (140 lb)   SpO2 93%   BMI 24.03 kg/m²     Neurologic Exam     Mental Status   Oriented to person.   Disoriented to place.   Disoriented to time.   Registration: recalls 3 of 3 objects. Recall of objects at 5 minutes: 0.   Attention: decreased. Concentration: decreased.   Speech: speech is normal   Level of consciousness: alert    Cranial Nerves   Cranial nerves II through XII intact.     Motor Exam   Muscle bulk: normal  Overall muscle tone: normal    Strength   Strength 5/5 throughout.     Gait, Coordination, and Reflexes     Gait  Gait: (some shuffling noted today with decreased arm swing)    Coordination   Finger to nose coordination: normal  Heel to shin coordination: normal  Tandem walking coordination: abnormal    Tremor   Resting tremor: absent  Intention tremor: absent  Action tremor: absent  Normal finger and heel taps bilaterally, no tremors noted, must push up with one hand to get out of chair, turns with slowness and some shuffling       Physical Exam  Constitutional:       Appearance: Normal appearance.   Neurological:      Mental Status: She is alert.      Coordination: Finger-Nose-Finger Test and Heel to Shin Test normal.      Gait: Tandem walk abnormal.      Deep Tendon Reflexes: Strength normal.   Psychiatric:         Mood and Affect: Affect is blunt.         Speech: Speech normal.         Behavior: Behavior is slowed.         Thought Content: Thought content normal.         Cognition and Memory: Memory is impaired. She exhibits impaired recent memory.         Judgment: Judgment normal.         MMSE 16/30 11/24/21  MMSE 19 7/21/21   MMSE 17/30 1/27/21   MMSE 20 6/2020  Assessment/Plan:       Diagnoses and all orders for this visit:    1. Late onset Alzheimer's disease without behavioral disturbance (HCC) (Primary)  -     CBC & Differential; " Future  -     Comprehensive Metabolic Panel; Future  -     TSH; Future  -     T4, free; Future  -     Ammonia; Future  -     Urinalysis With Culture If Indicated - Urine, Clean Catch; Future  -     clonazePAM (KlonoPIN) 0.5 MG tablet; Take 1 tablet by mouth 2 (Two) Times a Day As Needed (agitation).  Dispense: 60 tablet; Refill: 1    2. Delirium  -     CBC & Differential; Future  -     Comprehensive Metabolic Panel; Future  -     TSH; Future  -     T4, free; Future  -     Ammonia; Future  -     Urinalysis With Culture If Indicated - Urine, Clean Catch; Future    3. Gait disturbance  -     Ambulatory Referral to Physical Therapy Evaluate and treat    4. Agitation  -     clonazePAM (KlonoPIN) 0.5 MG tablet; Take 1 tablet by mouth 2 (Two) Times a Day As Needed (agitation).  Dispense: 60 tablet; Refill: 1           She will get labs today.  We will continue the clonazepam 0.5 mg at bedtime for now. Sent refills to be placed on hold and can fill when due.  We will order physical therapy to start in January 2022.  Declines speech-language pathology.  Want to rule out any other etiology of increased delirium and agitation.  I did discuss this could be a UTI or other infection or could be related to progression of Alzheimer's.  We are scheduled to follow-up in January 2022 for reevaluation or sooner if needed. Monitor shuffling gait; if not better with PT, consider trial of sinemet, however no other PD symptoms or findings one exam at this time.  Reviewed medications, potential side effects and signs and symptoms to report. Discussed risk versus benefits of treatment plan with patient and/or family-including medications, labs and radiology that may be ordered. Addressed questions and concerns during visit. Patient and/or family verbalized understanding and agree with plan.    AS THE PROVIDER, I PERSONALLY WORE PPE DURING ENTIRE FACE TO FACE ENCOUNTER IN CLINIC WITH THE PATIENT. PATIENT ALSO WORE PPE DURING ENTIRE FACE TO  FACE ENCOUNTER EXCEPT FOR A MAX OF 30 SECONDS DURING NEUROLOGICAL EVALUATION OF CRANIAL NERVES AND THEN MASK WAS PLACED BACK OVER PATIENT FACE FOR REMAINDER OF VISIT. I WASHED MY HANDS BEFORE AND AFTER VISIT.    During this visit the following were done:  Labs Reviewed []    Labs Ordered [x]    Radiology Reports Reviewed []    Radiology Ordered []    PCP Records Reviewed []    Referring Provider Records Reviewed []    ER Records Reviewed []    Hospital Records Reviewed []    History Obtained From Family [x]    Radiology Images Reviewed []    Other Reviewed []    Records Requested []      Munira Bloom, CARLOS  11/24/2021

## 2021-11-25 LAB
ALBUMIN SERPL-MCNC: 4.2 G/DL (ref 3.5–5.2)
ALBUMIN/GLOB SERPL: 1.2 G/DL
ALP SERPL-CCNC: 105 U/L (ref 39–117)
ALT SERPL W P-5'-P-CCNC: 21 U/L (ref 1–33)
ANION GAP SERPL CALCULATED.3IONS-SCNC: 10.7 MMOL/L (ref 5–15)
AST SERPL-CCNC: 31 U/L (ref 1–32)
BILIRUB SERPL-MCNC: 0.3 MG/DL (ref 0–1.2)
BUN SERPL-MCNC: 10 MG/DL (ref 8–23)
BUN/CREAT SERPL: 13.2 (ref 7–25)
CALCIUM SPEC-SCNC: 9.3 MG/DL (ref 8.6–10.5)
CHLORIDE SERPL-SCNC: 98 MMOL/L (ref 98–107)
CO2 SERPL-SCNC: 26.3 MMOL/L (ref 22–29)
CREAT SERPL-MCNC: 0.76 MG/DL (ref 0.57–1)
GFR SERPL CREATININE-BSD FRML MDRD: 74 ML/MIN/1.73
GLOBULIN UR ELPH-MCNC: 3.6 GM/DL
GLUCOSE SERPL-MCNC: 92 MG/DL (ref 65–99)
POTASSIUM SERPL-SCNC: 4 MMOL/L (ref 3.5–5.2)
PROT SERPL-MCNC: 7.8 G/DL (ref 6–8.5)
SODIUM SERPL-SCNC: 135 MMOL/L (ref 136–145)
T4 FREE SERPL-MCNC: 1.6 NG/DL (ref 0.93–1.7)
TSH SERPL DL<=0.05 MIU/L-ACNC: 6.5 UIU/ML (ref 0.27–4.2)

## 2021-11-27 ENCOUNTER — HOSPITAL ENCOUNTER (INPATIENT)
Facility: HOSPITAL | Age: 76
LOS: 7 days | Discharge: NURSING, REHAB OR LONG-TERM CARE | End: 2021-12-04
Attending: EMERGENCY MEDICINE | Admitting: INTERNAL MEDICINE

## 2021-11-27 ENCOUNTER — APPOINTMENT (OUTPATIENT)
Dept: GENERAL RADIOLOGY | Facility: HOSPITAL | Age: 76
End: 2021-11-27

## 2021-11-27 DIAGNOSIS — R09.02 HYPOXEMIA REQUIRING SUPPLEMENTAL OXYGEN: ICD-10-CM

## 2021-11-27 DIAGNOSIS — Z99.81 HYPOXEMIA REQUIRING SUPPLEMENTAL OXYGEN: ICD-10-CM

## 2021-11-27 DIAGNOSIS — J10.1 INFLUENZA A WITH RESPIRATORY MANIFESTATIONS: Primary | ICD-10-CM

## 2021-11-27 DIAGNOSIS — F03.90 DEMENTIA WITHOUT BEHAVIORAL DISTURBANCE, UNSPECIFIED DEMENTIA TYPE: ICD-10-CM

## 2021-11-27 DIAGNOSIS — F41.9 ANXIETY AND DEPRESSION: ICD-10-CM

## 2021-11-27 DIAGNOSIS — F32.A ANXIETY AND DEPRESSION: ICD-10-CM

## 2021-11-27 PROBLEM — J96.01 ACUTE RESPIRATORY FAILURE WITH HYPOXIA (HCC): Status: ACTIVE | Noted: 2021-11-27

## 2021-11-27 PROBLEM — D64.9 ANEMIA: Status: ACTIVE | Noted: 2021-11-27

## 2021-11-27 LAB
ALBUMIN SERPL-MCNC: 3.8 G/DL (ref 3.5–5.2)
ALBUMIN/GLOB SERPL: 1.1 G/DL
ALP SERPL-CCNC: 103 U/L (ref 39–117)
ALT SERPL W P-5'-P-CCNC: 14 U/L (ref 1–33)
ANION GAP SERPL CALCULATED.3IONS-SCNC: 10 MMOL/L (ref 5–15)
AST SERPL-CCNC: 18 U/L (ref 1–32)
B PARAPERT DNA SPEC QL NAA+PROBE: NOT DETECTED
B PERT DNA SPEC QL NAA+PROBE: NOT DETECTED
BASOPHILS # BLD AUTO: 0.02 10*3/MM3 (ref 0–0.2)
BASOPHILS NFR BLD AUTO: 0.4 % (ref 0–1.5)
BILIRUB SERPL-MCNC: 0.4 MG/DL (ref 0–1.2)
BUN SERPL-MCNC: 8 MG/DL (ref 8–23)
BUN/CREAT SERPL: 12.9 (ref 7–25)
C PNEUM DNA NPH QL NAA+NON-PROBE: NOT DETECTED
CALCIUM SPEC-SCNC: 9 MG/DL (ref 8.6–10.5)
CHLORIDE SERPL-SCNC: 97 MMOL/L (ref 98–107)
CO2 SERPL-SCNC: 26 MMOL/L (ref 22–29)
CREAT SERPL-MCNC: 0.62 MG/DL (ref 0.57–1)
DEPRECATED RDW RBC AUTO: 42.9 FL (ref 37–54)
EOSINOPHIL # BLD AUTO: 0.04 10*3/MM3 (ref 0–0.4)
EOSINOPHIL NFR BLD AUTO: 0.7 % (ref 0.3–6.2)
ERYTHROCYTE [DISTWIDTH] IN BLOOD BY AUTOMATED COUNT: 13.8 % (ref 12.3–15.4)
FLUAV H3 RNA NPH QL NAA+PROBE: DETECTED
FLUBV RNA ISLT QL NAA+PROBE: NOT DETECTED
GFR SERPL CREATININE-BSD FRML MDRD: 93 ML/MIN/1.73
GLOBULIN UR ELPH-MCNC: 3.4 GM/DL
GLUCOSE SERPL-MCNC: 85 MG/DL (ref 65–99)
HADV DNA SPEC NAA+PROBE: NOT DETECTED
HCOV 229E RNA SPEC QL NAA+PROBE: NOT DETECTED
HCOV HKU1 RNA SPEC QL NAA+PROBE: NOT DETECTED
HCOV NL63 RNA SPEC QL NAA+PROBE: NOT DETECTED
HCOV OC43 RNA SPEC QL NAA+PROBE: NOT DETECTED
HCT VFR BLD AUTO: 36 % (ref 34–46.6)
HGB BLD-MCNC: 11.3 G/DL (ref 12–15.9)
HMPV RNA NPH QL NAA+NON-PROBE: NOT DETECTED
HOLD SPECIMEN: NORMAL
HPIV1 RNA ISLT QL NAA+PROBE: NOT DETECTED
HPIV2 RNA SPEC QL NAA+PROBE: NOT DETECTED
HPIV3 RNA NPH QL NAA+PROBE: NOT DETECTED
HPIV4 P GENE NPH QL NAA+PROBE: NOT DETECTED
IMM GRANULOCYTES # BLD AUTO: 0.02 10*3/MM3 (ref 0–0.05)
IMM GRANULOCYTES NFR BLD AUTO: 0.4 % (ref 0–0.5)
LYMPHOCYTES # BLD AUTO: 1.21 10*3/MM3 (ref 0.7–3.1)
LYMPHOCYTES NFR BLD AUTO: 21.6 % (ref 19.6–45.3)
M PNEUMO IGG SER IA-ACNC: NOT DETECTED
MCH RBC QN AUTO: 26.5 PG (ref 26.6–33)
MCHC RBC AUTO-ENTMCNC: 31.4 G/DL (ref 31.5–35.7)
MCV RBC AUTO: 84.3 FL (ref 79–97)
MONOCYTES # BLD AUTO: 0.48 10*3/MM3 (ref 0.1–0.9)
MONOCYTES NFR BLD AUTO: 8.6 % (ref 5–12)
NEUTROPHILS NFR BLD AUTO: 3.83 10*3/MM3 (ref 1.7–7)
NEUTROPHILS NFR BLD AUTO: 68.3 % (ref 42.7–76)
NRBC BLD AUTO-RTO: 0 /100 WBC (ref 0–0.2)
NT-PROBNP SERPL-MCNC: 326.9 PG/ML (ref 0–1800)
PLATELET # BLD AUTO: 228 10*3/MM3 (ref 140–450)
PMV BLD AUTO: 9.5 FL (ref 6–12)
POTASSIUM SERPL-SCNC: 3.8 MMOL/L (ref 3.5–5.2)
PROT SERPL-MCNC: 7.2 G/DL (ref 6–8.5)
QT INTERVAL: 410 MS
QTC INTERVAL: 412 MS
RBC # BLD AUTO: 4.27 10*6/MM3 (ref 3.77–5.28)
RHINOVIRUS RNA SPEC NAA+PROBE: NOT DETECTED
RSV RNA NPH QL NAA+NON-PROBE: NOT DETECTED
SARS-COV-2 RNA NPH QL NAA+NON-PROBE: NOT DETECTED
SODIUM SERPL-SCNC: 133 MMOL/L (ref 136–145)
TROPONIN T SERPL-MCNC: <0.01 NG/ML (ref 0–0.03)
WBC NRBC COR # BLD: 5.6 10*3/MM3 (ref 3.4–10.8)
WHOLE BLOOD HOLD SPECIMEN: NORMAL
WHOLE BLOOD HOLD SPECIMEN: NORMAL

## 2021-11-27 PROCEDURE — 25010000002 METHYLPREDNISOLONE PER 40 MG: Performed by: EMERGENCY MEDICINE

## 2021-11-27 PROCEDURE — 0202U NFCT DS 22 TRGT SARS-COV-2: CPT | Performed by: EMERGENCY MEDICINE

## 2021-11-27 PROCEDURE — 99223 1ST HOSP IP/OBS HIGH 75: CPT | Performed by: INTERNAL MEDICINE

## 2021-11-27 PROCEDURE — 84145 PROCALCITONIN (PCT): CPT | Performed by: INTERNAL MEDICINE

## 2021-11-27 PROCEDURE — 36415 COLL VENOUS BLD VENIPUNCTURE: CPT

## 2021-11-27 PROCEDURE — 71045 X-RAY EXAM CHEST 1 VIEW: CPT

## 2021-11-27 PROCEDURE — 25010000002 HEPARIN (PORCINE) PER 1000 UNITS: Performed by: PHYSICIAN ASSISTANT

## 2021-11-27 PROCEDURE — 85025 COMPLETE CBC W/AUTO DIFF WBC: CPT | Performed by: EMERGENCY MEDICINE

## 2021-11-27 PROCEDURE — 83880 ASSAY OF NATRIURETIC PEPTIDE: CPT | Performed by: EMERGENCY MEDICINE

## 2021-11-27 PROCEDURE — 93005 ELECTROCARDIOGRAM TRACING: CPT | Performed by: EMERGENCY MEDICINE

## 2021-11-27 PROCEDURE — 99285 EMERGENCY DEPT VISIT HI MDM: CPT

## 2021-11-27 PROCEDURE — 25010000002 METHYLPREDNISOLONE PER 40 MG: Performed by: PHYSICIAN ASSISTANT

## 2021-11-27 PROCEDURE — 80053 COMPREHEN METABOLIC PANEL: CPT | Performed by: EMERGENCY MEDICINE

## 2021-11-27 PROCEDURE — 84484 ASSAY OF TROPONIN QUANT: CPT | Performed by: EMERGENCY MEDICINE

## 2021-11-27 RX ORDER — IPRATROPIUM BROMIDE AND ALBUTEROL SULFATE 2.5; .5 MG/3ML; MG/3ML
3 SOLUTION RESPIRATORY (INHALATION) EVERY 4 HOURS PRN
Status: DISCONTINUED | OUTPATIENT
Start: 2021-11-27 | End: 2021-12-01 | Stop reason: SDUPTHER

## 2021-11-27 RX ORDER — DONEPEZIL HYDROCHLORIDE 10 MG/1
10 TABLET, FILM COATED ORAL NIGHTLY
Status: DISCONTINUED | OUTPATIENT
Start: 2021-11-27 | End: 2021-12-04 | Stop reason: HOSPADM

## 2021-11-27 RX ORDER — PANTOPRAZOLE SODIUM 40 MG/1
40 TABLET, DELAYED RELEASE ORAL
Status: DISCONTINUED | OUTPATIENT
Start: 2021-11-28 | End: 2021-12-04 | Stop reason: HOSPADM

## 2021-11-27 RX ORDER — LEVOTHYROXINE SODIUM 0.12 MG/1
125 TABLET ORAL
Status: DISCONTINUED | OUTPATIENT
Start: 2021-11-28 | End: 2021-12-04 | Stop reason: HOSPADM

## 2021-11-27 RX ORDER — IPRATROPIUM BROMIDE AND ALBUTEROL SULFATE 2.5; .5 MG/3ML; MG/3ML
3 SOLUTION RESPIRATORY (INHALATION)
Status: DISCONTINUED | OUTPATIENT
Start: 2021-11-27 | End: 2021-12-01

## 2021-11-27 RX ORDER — MEMANTINE HYDROCHLORIDE 10 MG/1
10 TABLET ORAL 2 TIMES DAILY
Status: DISCONTINUED | OUTPATIENT
Start: 2021-11-27 | End: 2021-12-04 | Stop reason: HOSPADM

## 2021-11-27 RX ORDER — SODIUM CHLORIDE 0.9 % (FLUSH) 0.9 %
10 SYRINGE (ML) INJECTION EVERY 12 HOURS SCHEDULED
Status: DISCONTINUED | OUTPATIENT
Start: 2021-11-27 | End: 2021-12-04 | Stop reason: HOSPADM

## 2021-11-27 RX ORDER — SODIUM CHLORIDE 0.9 % (FLUSH) 0.9 %
10 SYRINGE (ML) INJECTION AS NEEDED
Status: DISCONTINUED | OUTPATIENT
Start: 2021-11-27 | End: 2021-12-04 | Stop reason: HOSPADM

## 2021-11-27 RX ORDER — SODIUM CHLORIDE, SODIUM LACTATE, POTASSIUM CHLORIDE, CALCIUM CHLORIDE 600; 310; 30; 20 MG/100ML; MG/100ML; MG/100ML; MG/100ML
75 INJECTION, SOLUTION INTRAVENOUS CONTINUOUS
Status: ACTIVE | OUTPATIENT
Start: 2021-11-27 | End: 2021-11-28

## 2021-11-27 RX ORDER — AMLODIPINE BESYLATE 5 MG/1
5 TABLET ORAL DAILY
Status: DISCONTINUED | OUTPATIENT
Start: 2021-11-28 | End: 2021-12-04 | Stop reason: HOSPADM

## 2021-11-27 RX ORDER — CLONAZEPAM 0.5 MG/1
0.5 TABLET ORAL 2 TIMES DAILY PRN
Status: DISCONTINUED | OUTPATIENT
Start: 2021-11-27 | End: 2021-12-04 | Stop reason: HOSPADM

## 2021-11-27 RX ORDER — HEPARIN SODIUM 5000 [USP'U]/ML
5000 INJECTION, SOLUTION INTRAVENOUS; SUBCUTANEOUS EVERY 12 HOURS SCHEDULED
Status: DISCONTINUED | OUTPATIENT
Start: 2021-11-27 | End: 2021-12-04 | Stop reason: HOSPADM

## 2021-11-27 RX ORDER — METHYLPREDNISOLONE SODIUM SUCCINATE 40 MG/ML
40 INJECTION, POWDER, LYOPHILIZED, FOR SOLUTION INTRAMUSCULAR; INTRAVENOUS EVERY 8 HOURS
Status: DISCONTINUED | OUTPATIENT
Start: 2021-11-27 | End: 2021-11-27

## 2021-11-27 RX ORDER — ACETAMINOPHEN 325 MG/1
650 TABLET ORAL EVERY 4 HOURS PRN
Status: DISCONTINUED | OUTPATIENT
Start: 2021-11-27 | End: 2021-12-04 | Stop reason: HOSPADM

## 2021-11-27 RX ORDER — METHYLPREDNISOLONE SODIUM SUCCINATE 125 MG/2ML
60 INJECTION, POWDER, LYOPHILIZED, FOR SOLUTION INTRAMUSCULAR; INTRAVENOUS EVERY 12 HOURS
Status: DISCONTINUED | OUTPATIENT
Start: 2021-11-28 | End: 2021-11-29

## 2021-11-27 RX ORDER — CHOLECALCIFEROL (VITAMIN D3) 125 MCG
5 CAPSULE ORAL NIGHTLY PRN
Status: DISCONTINUED | OUTPATIENT
Start: 2021-11-27 | End: 2021-12-04 | Stop reason: HOSPADM

## 2021-11-27 RX ORDER — ATORVASTATIN CALCIUM 20 MG/1
20 TABLET, FILM COATED ORAL DAILY
Status: DISCONTINUED | OUTPATIENT
Start: 2021-11-28 | End: 2021-12-04 | Stop reason: HOSPADM

## 2021-11-27 RX ORDER — ONDANSETRON 2 MG/ML
4 INJECTION INTRAMUSCULAR; INTRAVENOUS EVERY 6 HOURS PRN
Status: DISCONTINUED | OUTPATIENT
Start: 2021-11-27 | End: 2021-12-04 | Stop reason: HOSPADM

## 2021-11-27 RX ORDER — METHYLPREDNISOLONE SODIUM SUCCINATE 40 MG/ML
40 INJECTION, POWDER, LYOPHILIZED, FOR SOLUTION INTRAMUSCULAR; INTRAVENOUS ONCE
Status: COMPLETED | OUTPATIENT
Start: 2021-11-27 | End: 2021-11-27

## 2021-11-27 RX ADMIN — SODIUM CHLORIDE, PRESERVATIVE FREE 10 ML: 5 INJECTION INTRAVENOUS at 22:24

## 2021-11-27 RX ADMIN — DONEPEZIL HYDROCHLORIDE 10 MG: 10 TABLET, FILM COATED ORAL at 22:24

## 2021-11-27 RX ADMIN — MEMANTINE 10 MG: 10 TABLET ORAL at 22:24

## 2021-11-27 RX ADMIN — SODIUM CHLORIDE, POTASSIUM CHLORIDE, SODIUM LACTATE AND CALCIUM CHLORIDE 75 ML/HR: 600; 310; 30; 20 INJECTION, SOLUTION INTRAVENOUS at 22:24

## 2021-11-27 RX ADMIN — METHYLPREDNISOLONE SODIUM SUCCINATE 40 MG: 40 INJECTION, POWDER, FOR SOLUTION INTRAMUSCULAR; INTRAVENOUS at 22:24

## 2021-11-27 RX ADMIN — METHYLPREDNISOLONE SODIUM SUCCINATE 40 MG: 40 INJECTION, POWDER, FOR SOLUTION INTRAMUSCULAR; INTRAVENOUS at 15:46

## 2021-11-27 RX ADMIN — HEPARIN SODIUM 5000 UNITS: 5000 INJECTION INTRAVENOUS; SUBCUTANEOUS at 22:24

## 2021-11-28 LAB
ANION GAP SERPL CALCULATED.3IONS-SCNC: 14 MMOL/L (ref 5–15)
BUN SERPL-MCNC: 10 MG/DL (ref 8–23)
BUN/CREAT SERPL: 17.9 (ref 7–25)
CALCIUM SPEC-SCNC: 9.3 MG/DL (ref 8.6–10.5)
CHLORIDE SERPL-SCNC: 98 MMOL/L (ref 98–107)
CO2 SERPL-SCNC: 23 MMOL/L (ref 22–29)
CREAT SERPL-MCNC: 0.56 MG/DL (ref 0.57–1)
DEPRECATED RDW RBC AUTO: 41.6 FL (ref 37–54)
ERYTHROCYTE [DISTWIDTH] IN BLOOD BY AUTOMATED COUNT: 13.7 % (ref 12.3–15.4)
GFR SERPL CREATININE-BSD FRML MDRD: 105 ML/MIN/1.73
GLUCOSE SERPL-MCNC: 164 MG/DL (ref 65–99)
HCT VFR BLD AUTO: 38.7 % (ref 34–46.6)
HGB BLD-MCNC: 12 G/DL (ref 12–15.9)
MAGNESIUM SERPL-MCNC: 1.9 MG/DL (ref 1.6–2.4)
MCH RBC QN AUTO: 25.9 PG (ref 26.6–33)
MCHC RBC AUTO-ENTMCNC: 31 G/DL (ref 31.5–35.7)
MCV RBC AUTO: 83.6 FL (ref 79–97)
PLATELET # BLD AUTO: 226 10*3/MM3 (ref 140–450)
PMV BLD AUTO: 9.9 FL (ref 6–12)
POTASSIUM SERPL-SCNC: 3.3 MMOL/L (ref 3.5–5.2)
POTASSIUM SERPL-SCNC: 3.7 MMOL/L (ref 3.5–5.2)
PROCALCITONIN SERPL-MCNC: 0.04 NG/ML (ref 0–0.25)
RBC # BLD AUTO: 4.63 10*6/MM3 (ref 3.77–5.28)
SODIUM SERPL-SCNC: 135 MMOL/L (ref 136–145)
WBC NRBC COR # BLD: 3.78 10*3/MM3 (ref 3.4–10.8)

## 2021-11-28 PROCEDURE — 25010000002 METHYLPREDNISOLONE PER 125 MG: Performed by: INTERNAL MEDICINE

## 2021-11-28 PROCEDURE — 80048 BASIC METABOLIC PNL TOTAL CA: CPT | Performed by: PHYSICIAN ASSISTANT

## 2021-11-28 PROCEDURE — 83735 ASSAY OF MAGNESIUM: CPT | Performed by: NURSE PRACTITIONER

## 2021-11-28 PROCEDURE — 84132 ASSAY OF SERUM POTASSIUM: CPT | Performed by: NURSE PRACTITIONER

## 2021-11-28 PROCEDURE — 85027 COMPLETE CBC AUTOMATED: CPT | Performed by: PHYSICIAN ASSISTANT

## 2021-11-28 PROCEDURE — 94640 AIRWAY INHALATION TREATMENT: CPT

## 2021-11-28 PROCEDURE — 94799 UNLISTED PULMONARY SVC/PX: CPT

## 2021-11-28 PROCEDURE — 0 MAGNESIUM SULFATE 4 GM/100ML SOLUTION: Performed by: NURSE PRACTITIONER

## 2021-11-28 PROCEDURE — 25010000002 HEPARIN (PORCINE) PER 1000 UNITS: Performed by: PHYSICIAN ASSISTANT

## 2021-11-28 PROCEDURE — 99233 SBSQ HOSP IP/OBS HIGH 50: CPT | Performed by: INTERNAL MEDICINE

## 2021-11-28 PROCEDURE — 93005 ELECTROCARDIOGRAM TRACING: CPT | Performed by: INTERNAL MEDICINE

## 2021-11-28 RX ORDER — OSELTAMIVIR PHOSPHATE 30 MG/1
30 CAPSULE ORAL EVERY 12 HOURS SCHEDULED
Status: COMPLETED | OUTPATIENT
Start: 2021-11-28 | End: 2021-12-02

## 2021-11-28 RX ORDER — MAGNESIUM SULFATE HEPTAHYDRATE 40 MG/ML
2 INJECTION, SOLUTION INTRAVENOUS AS NEEDED
Status: DISCONTINUED | OUTPATIENT
Start: 2021-11-28 | End: 2021-12-04 | Stop reason: HOSPADM

## 2021-11-28 RX ORDER — POTASSIUM CHLORIDE 750 MG/1
40 CAPSULE, EXTENDED RELEASE ORAL AS NEEDED
Status: DISCONTINUED | OUTPATIENT
Start: 2021-11-28 | End: 2021-12-04 | Stop reason: HOSPADM

## 2021-11-28 RX ORDER — MAGNESIUM SULFATE HEPTAHYDRATE 40 MG/ML
4 INJECTION, SOLUTION INTRAVENOUS AS NEEDED
Status: DISCONTINUED | OUTPATIENT
Start: 2021-11-28 | End: 2021-12-04 | Stop reason: HOSPADM

## 2021-11-28 RX ORDER — POTASSIUM CHLORIDE 1.5 G/1.77G
40 POWDER, FOR SOLUTION ORAL AS NEEDED
Status: DISCONTINUED | OUTPATIENT
Start: 2021-11-28 | End: 2021-12-04 | Stop reason: HOSPADM

## 2021-11-28 RX ADMIN — OSELTAMIVIR PHOSPHATE 30 MG: 30 CAPSULE ORAL at 22:16

## 2021-11-28 RX ADMIN — METHYLPREDNISOLONE SODIUM SUCCINATE 60 MG: 125 INJECTION, POWDER, FOR SOLUTION INTRAMUSCULAR; INTRAVENOUS at 05:46

## 2021-11-28 RX ADMIN — Medication 5 MG: at 22:16

## 2021-11-28 RX ADMIN — DONEPEZIL HYDROCHLORIDE 10 MG: 10 TABLET, FILM COATED ORAL at 22:16

## 2021-11-28 RX ADMIN — LEVOTHYROXINE SODIUM 125 MCG: 125 TABLET ORAL at 05:46

## 2021-11-28 RX ADMIN — IPRATROPIUM BROMIDE AND ALBUTEROL SULFATE 3 ML: 2.5; .5 SOLUTION RESPIRATORY (INHALATION) at 09:07

## 2021-11-28 RX ADMIN — PANTOPRAZOLE SODIUM 40 MG: 40 TABLET, DELAYED RELEASE ORAL at 08:44

## 2021-11-28 RX ADMIN — MEMANTINE 10 MG: 10 TABLET ORAL at 22:16

## 2021-11-28 RX ADMIN — HEPARIN SODIUM 5000 UNITS: 5000 INJECTION INTRAVENOUS; SUBCUTANEOUS at 08:44

## 2021-11-28 RX ADMIN — HEPARIN SODIUM 5000 UNITS: 5000 INJECTION INTRAVENOUS; SUBCUTANEOUS at 22:16

## 2021-11-28 RX ADMIN — ATORVASTATIN CALCIUM 20 MG: 20 TABLET, FILM COATED ORAL at 08:44

## 2021-11-28 RX ADMIN — OSELTAMIVIR PHOSPHATE 30 MG: 30 CAPSULE ORAL at 12:28

## 2021-11-28 RX ADMIN — METHYLPREDNISOLONE SODIUM SUCCINATE 60 MG: 125 INJECTION, POWDER, FOR SOLUTION INTRAMUSCULAR; INTRAVENOUS at 16:59

## 2021-11-28 RX ADMIN — MAGNESIUM SULFATE HEPTAHYDRATE 4 G: 40 INJECTION, SOLUTION INTRAVENOUS at 22:42

## 2021-11-28 RX ADMIN — IPRATROPIUM BROMIDE AND ALBUTEROL SULFATE 3 ML: 2.5; .5 SOLUTION RESPIRATORY (INHALATION) at 18:58

## 2021-11-28 RX ADMIN — SODIUM CHLORIDE, PRESERVATIVE FREE 10 ML: 5 INJECTION INTRAVENOUS at 22:17

## 2021-11-28 RX ADMIN — IPRATROPIUM BROMIDE AND ALBUTEROL SULFATE 3 ML: 2.5; .5 SOLUTION RESPIRATORY (INHALATION) at 17:21

## 2021-11-28 RX ADMIN — AMLODIPINE BESYLATE 5 MG: 5 TABLET ORAL at 08:44

## 2021-11-28 RX ADMIN — MEMANTINE 10 MG: 10 TABLET ORAL at 08:44

## 2021-11-28 RX ADMIN — PANTOPRAZOLE SODIUM 40 MG: 40 TABLET, DELAYED RELEASE ORAL at 16:59

## 2021-11-29 LAB
ANION GAP SERPL CALCULATED.3IONS-SCNC: 11 MMOL/L (ref 5–15)
BASOPHILS # BLD AUTO: 0.03 10*3/MM3 (ref 0–0.2)
BASOPHILS NFR BLD AUTO: 0.2 % (ref 0–1.5)
BUN SERPL-MCNC: 12 MG/DL (ref 8–23)
BUN/CREAT SERPL: 17.9 (ref 7–25)
CALCIUM SPEC-SCNC: 8.4 MG/DL (ref 8.6–10.5)
CHLORIDE SERPL-SCNC: 102 MMOL/L (ref 98–107)
CO2 SERPL-SCNC: 23 MMOL/L (ref 22–29)
CREAT SERPL-MCNC: 0.67 MG/DL (ref 0.57–1)
DEPRECATED RDW RBC AUTO: 47.4 FL (ref 37–54)
EOSINOPHIL # BLD AUTO: 0 10*3/MM3 (ref 0–0.4)
EOSINOPHIL NFR BLD AUTO: 0 % (ref 0.3–6.2)
ERYTHROCYTE [DISTWIDTH] IN BLOOD BY AUTOMATED COUNT: 14 % (ref 12.3–15.4)
GFR SERPL CREATININE-BSD FRML MDRD: 85 ML/MIN/1.73
GLUCOSE SERPL-MCNC: 117 MG/DL (ref 65–99)
HCT VFR BLD AUTO: 40.9 % (ref 34–46.6)
HGB BLD-MCNC: 11.8 G/DL (ref 12–15.9)
IMM GRANULOCYTES # BLD AUTO: 0.12 10*3/MM3 (ref 0–0.05)
IMM GRANULOCYTES NFR BLD AUTO: 0.8 % (ref 0–0.5)
LYMPHOCYTES # BLD AUTO: 0.63 10*3/MM3 (ref 0.7–3.1)
LYMPHOCYTES NFR BLD AUTO: 4 % (ref 19.6–45.3)
MAGNESIUM SERPL-MCNC: 3 MG/DL (ref 1.6–2.4)
MCH RBC QN AUTO: 26.5 PG (ref 26.6–33)
MCHC RBC AUTO-ENTMCNC: 28.9 G/DL (ref 31.5–35.7)
MCV RBC AUTO: 91.7 FL (ref 79–97)
MONOCYTES # BLD AUTO: 0.45 10*3/MM3 (ref 0.1–0.9)
MONOCYTES NFR BLD AUTO: 2.9 % (ref 5–12)
NEUTROPHILS NFR BLD AUTO: 14.51 10*3/MM3 (ref 1.7–7)
NEUTROPHILS NFR BLD AUTO: 92.1 % (ref 42.7–76)
NRBC BLD AUTO-RTO: 0 /100 WBC (ref 0–0.2)
PLATELET # BLD AUTO: 279 10*3/MM3 (ref 140–450)
PMV BLD AUTO: 9.9 FL (ref 6–12)
POTASSIUM SERPL-SCNC: 4.2 MMOL/L (ref 3.5–5.2)
RBC # BLD AUTO: 4.46 10*6/MM3 (ref 3.77–5.28)
SODIUM SERPL-SCNC: 136 MMOL/L (ref 136–145)
WBC NRBC COR # BLD: 15.74 10*3/MM3 (ref 3.4–10.8)

## 2021-11-29 PROCEDURE — 99232 SBSQ HOSP IP/OBS MODERATE 35: CPT | Performed by: INTERNAL MEDICINE

## 2021-11-29 PROCEDURE — 25010000002 HEPARIN (PORCINE) PER 1000 UNITS: Performed by: PHYSICIAN ASSISTANT

## 2021-11-29 PROCEDURE — 83735 ASSAY OF MAGNESIUM: CPT | Performed by: INTERNAL MEDICINE

## 2021-11-29 PROCEDURE — 97161 PT EVAL LOW COMPLEX 20 MIN: CPT

## 2021-11-29 PROCEDURE — 85025 COMPLETE CBC W/AUTO DIFF WBC: CPT | Performed by: INTERNAL MEDICINE

## 2021-11-29 PROCEDURE — 80048 BASIC METABOLIC PNL TOTAL CA: CPT | Performed by: INTERNAL MEDICINE

## 2021-11-29 PROCEDURE — 25010000002 METHYLPREDNISOLONE PER 40 MG: Performed by: INTERNAL MEDICINE

## 2021-11-29 PROCEDURE — 25010000002 FUROSEMIDE PER 20 MG: Performed by: INTERNAL MEDICINE

## 2021-11-29 PROCEDURE — 25010000002 METHYLPREDNISOLONE PER 125 MG: Performed by: INTERNAL MEDICINE

## 2021-11-29 PROCEDURE — 94799 UNLISTED PULMONARY SVC/PX: CPT

## 2021-11-29 RX ORDER — METHYLPREDNISOLONE SODIUM SUCCINATE 40 MG/ML
20 INJECTION, POWDER, LYOPHILIZED, FOR SOLUTION INTRAMUSCULAR; INTRAVENOUS EVERY 12 HOURS
Status: DISCONTINUED | OUTPATIENT
Start: 2021-11-29 | End: 2021-11-30

## 2021-11-29 RX ORDER — FUROSEMIDE 10 MG/ML
20 INJECTION INTRAMUSCULAR; INTRAVENOUS ONCE
Status: COMPLETED | OUTPATIENT
Start: 2021-11-29 | End: 2021-11-29

## 2021-11-29 RX ADMIN — HEPARIN SODIUM 5000 UNITS: 5000 INJECTION INTRAVENOUS; SUBCUTANEOUS at 09:52

## 2021-11-29 RX ADMIN — IPRATROPIUM BROMIDE AND ALBUTEROL SULFATE 3 ML: 2.5; .5 SOLUTION RESPIRATORY (INHALATION) at 07:09

## 2021-11-29 RX ADMIN — Medication 5 MG: at 22:19

## 2021-11-29 RX ADMIN — SODIUM CHLORIDE, PRESERVATIVE FREE 10 ML: 5 INJECTION INTRAVENOUS at 09:53

## 2021-11-29 RX ADMIN — METHYLPREDNISOLONE SODIUM SUCCINATE 20 MG: 40 INJECTION, POWDER, FOR SOLUTION INTRAMUSCULAR; INTRAVENOUS at 16:38

## 2021-11-29 RX ADMIN — FUROSEMIDE 20 MG: 10 INJECTION, SOLUTION INTRAVENOUS at 09:52

## 2021-11-29 RX ADMIN — LEVOTHYROXINE SODIUM 125 MCG: 125 TABLET ORAL at 05:06

## 2021-11-29 RX ADMIN — ACETAMINOPHEN 650 MG: 325 TABLET, FILM COATED ORAL at 14:22

## 2021-11-29 RX ADMIN — IPRATROPIUM BROMIDE AND ALBUTEROL SULFATE 3 ML: 2.5; .5 SOLUTION RESPIRATORY (INHALATION) at 19:22

## 2021-11-29 RX ADMIN — MEMANTINE 10 MG: 10 TABLET ORAL at 22:19

## 2021-11-29 RX ADMIN — OSELTAMIVIR PHOSPHATE 30 MG: 30 CAPSULE ORAL at 09:53

## 2021-11-29 RX ADMIN — HEPARIN SODIUM 5000 UNITS: 5000 INJECTION INTRAVENOUS; SUBCUTANEOUS at 22:19

## 2021-11-29 RX ADMIN — AMLODIPINE BESYLATE 5 MG: 5 TABLET ORAL at 09:53

## 2021-11-29 RX ADMIN — ACETAMINOPHEN 650 MG: 325 TABLET, FILM COATED ORAL at 09:53

## 2021-11-29 RX ADMIN — SODIUM CHLORIDE, PRESERVATIVE FREE 10 ML: 5 INJECTION INTRAVENOUS at 22:20

## 2021-11-29 RX ADMIN — IPRATROPIUM BROMIDE AND ALBUTEROL SULFATE 3 ML: 2.5; .5 SOLUTION RESPIRATORY (INHALATION) at 13:10

## 2021-11-29 RX ADMIN — PANTOPRAZOLE SODIUM 40 MG: 40 TABLET, DELAYED RELEASE ORAL at 09:53

## 2021-11-29 RX ADMIN — MEMANTINE 10 MG: 10 TABLET ORAL at 09:53

## 2021-11-29 RX ADMIN — OSELTAMIVIR PHOSPHATE 30 MG: 30 CAPSULE ORAL at 22:19

## 2021-11-29 RX ADMIN — DONEPEZIL HYDROCHLORIDE 10 MG: 10 TABLET, FILM COATED ORAL at 22:19

## 2021-11-29 RX ADMIN — CLONAZEPAM 0.5 MG: 0.5 TABLET ORAL at 22:19

## 2021-11-29 RX ADMIN — ATORVASTATIN CALCIUM 20 MG: 20 TABLET, FILM COATED ORAL at 09:53

## 2021-11-29 RX ADMIN — PANTOPRAZOLE SODIUM 40 MG: 40 TABLET, DELAYED RELEASE ORAL at 16:38

## 2021-11-29 RX ADMIN — METHYLPREDNISOLONE SODIUM SUCCINATE 60 MG: 125 INJECTION, POWDER, FOR SOLUTION INTRAMUSCULAR; INTRAVENOUS at 05:06

## 2021-11-29 NOTE — PROGRESS NOTES
Please notify the patient's daughter that she does not have a urinary tract infection.  Her sodium is minimally low but appears to have from low for the past several years by reviewing prior labs with PCP.  Thyroid level is slightly elevated but is improved from her June testing.  Recommend she follow-up with primary care to evaluate as needed.  Blood counts look good overall with no evidence of infection.  We will continue to treat as we discussed last Wednesday.  Thanks, CARLOS Jon.    MARKOS Liz

## 2021-11-30 ENCOUNTER — TELEPHONE (OUTPATIENT)
Dept: NEUROLOGY | Facility: CLINIC | Age: 76
End: 2021-11-30

## 2021-11-30 LAB
GLUCOSE BLDC GLUCOMTR-MCNC: 84 MG/DL (ref 70–130)
QT INTERVAL: 432 MS
QTC INTERVAL: 434 MS

## 2021-11-30 PROCEDURE — 25010000002 HEPARIN (PORCINE) PER 1000 UNITS: Performed by: PHYSICIAN ASSISTANT

## 2021-11-30 PROCEDURE — 82962 GLUCOSE BLOOD TEST: CPT

## 2021-11-30 PROCEDURE — 94799 UNLISTED PULMONARY SVC/PX: CPT

## 2021-11-30 PROCEDURE — 25010000002 METHYLPREDNISOLONE PER 40 MG: Performed by: INTERNAL MEDICINE

## 2021-11-30 PROCEDURE — 63710000001 PREDNISONE PER 1 MG: Performed by: INTERNAL MEDICINE

## 2021-11-30 PROCEDURE — 99232 SBSQ HOSP IP/OBS MODERATE 35: CPT | Performed by: INTERNAL MEDICINE

## 2021-11-30 RX ORDER — PREDNISONE 20 MG/1
40 TABLET ORAL
Status: DISCONTINUED | OUTPATIENT
Start: 2021-11-30 | End: 2021-12-04 | Stop reason: HOSPADM

## 2021-11-30 RX ADMIN — OSELTAMIVIR PHOSPHATE 30 MG: 30 CAPSULE ORAL at 21:36

## 2021-11-30 RX ADMIN — MEMANTINE 10 MG: 10 TABLET ORAL at 09:28

## 2021-11-30 RX ADMIN — Medication 5 MG: at 21:36

## 2021-11-30 RX ADMIN — CLONAZEPAM 0.5 MG: 0.5 TABLET ORAL at 09:28

## 2021-11-30 RX ADMIN — MEMANTINE 10 MG: 10 TABLET ORAL at 21:36

## 2021-11-30 RX ADMIN — DONEPEZIL HYDROCHLORIDE 10 MG: 10 TABLET, FILM COATED ORAL at 21:36

## 2021-11-30 RX ADMIN — PANTOPRAZOLE SODIUM 40 MG: 40 TABLET, DELAYED RELEASE ORAL at 15:58

## 2021-11-30 RX ADMIN — ATORVASTATIN CALCIUM 20 MG: 20 TABLET, FILM COATED ORAL at 09:28

## 2021-11-30 RX ADMIN — PREDNISONE 40 MG: 20 TABLET ORAL at 15:58

## 2021-11-30 RX ADMIN — ACETAMINOPHEN 650 MG: 325 TABLET, FILM COATED ORAL at 06:23

## 2021-11-30 RX ADMIN — OSELTAMIVIR PHOSPHATE 30 MG: 30 CAPSULE ORAL at 09:28

## 2021-11-30 RX ADMIN — IPRATROPIUM BROMIDE AND ALBUTEROL SULFATE 3 ML: 2.5; .5 SOLUTION RESPIRATORY (INHALATION) at 20:35

## 2021-11-30 RX ADMIN — HEPARIN SODIUM 5000 UNITS: 5000 INJECTION INTRAVENOUS; SUBCUTANEOUS at 09:27

## 2021-11-30 RX ADMIN — HEPARIN SODIUM 5000 UNITS: 5000 INJECTION INTRAVENOUS; SUBCUTANEOUS at 21:37

## 2021-11-30 RX ADMIN — IPRATROPIUM BROMIDE AND ALBUTEROL SULFATE 3 ML: 2.5; .5 SOLUTION RESPIRATORY (INHALATION) at 07:24

## 2021-11-30 RX ADMIN — PANTOPRAZOLE SODIUM 40 MG: 40 TABLET, DELAYED RELEASE ORAL at 09:28

## 2021-11-30 RX ADMIN — AMLODIPINE BESYLATE 5 MG: 5 TABLET ORAL at 09:28

## 2021-11-30 RX ADMIN — LEVOTHYROXINE SODIUM 125 MCG: 125 TABLET ORAL at 06:23

## 2021-11-30 RX ADMIN — IPRATROPIUM BROMIDE AND ALBUTEROL SULFATE 3 ML: 2.5; .5 SOLUTION RESPIRATORY (INHALATION) at 10:41

## 2021-11-30 RX ADMIN — SODIUM CHLORIDE, PRESERVATIVE FREE 10 ML: 5 INJECTION INTRAVENOUS at 21:36

## 2021-11-30 NOTE — TELEPHONE ENCOUNTER
----- Message from CARLOS Negron sent at 11/29/2021 12:20 PM EST -----  Please notify the patient's daughter that she does not have a urinary tract infection.  Her sodium is minimally low but appears to have from low for the past several years by reviewing prior labs with PCP.  Thyroid level is slightly elevated but is improved from her June testing.  Recommend she follow-up with primary care to evaluate as needed.  Blood counts look good overall with no evidence of infection.  We will continue to treat as we discussed last Wednesday.  Thanks, CARLOS Jon.    MARKOS Liz

## 2021-12-01 PROCEDURE — 63710000001 PREDNISONE PER 1 MG: Performed by: INTERNAL MEDICINE

## 2021-12-01 PROCEDURE — 99232 SBSQ HOSP IP/OBS MODERATE 35: CPT | Performed by: NURSE PRACTITIONER

## 2021-12-01 PROCEDURE — 25010000002 HEPARIN (PORCINE) PER 1000 UNITS: Performed by: PHYSICIAN ASSISTANT

## 2021-12-01 RX ORDER — IPRATROPIUM BROMIDE AND ALBUTEROL SULFATE 2.5; .5 MG/3ML; MG/3ML
3 SOLUTION RESPIRATORY (INHALATION) EVERY 4 HOURS PRN
Status: DISCONTINUED | OUTPATIENT
Start: 2021-12-01 | End: 2021-12-04 | Stop reason: HOSPADM

## 2021-12-01 RX ADMIN — MEMANTINE 10 MG: 10 TABLET ORAL at 21:20

## 2021-12-01 RX ADMIN — DONEPEZIL HYDROCHLORIDE 10 MG: 10 TABLET, FILM COATED ORAL at 21:20

## 2021-12-01 RX ADMIN — SODIUM CHLORIDE, PRESERVATIVE FREE 10 ML: 5 INJECTION INTRAVENOUS at 21:20

## 2021-12-01 RX ADMIN — MEMANTINE 10 MG: 10 TABLET ORAL at 09:13

## 2021-12-01 RX ADMIN — HEPARIN SODIUM 5000 UNITS: 5000 INJECTION INTRAVENOUS; SUBCUTANEOUS at 21:20

## 2021-12-01 RX ADMIN — AMLODIPINE BESYLATE 5 MG: 5 TABLET ORAL at 09:12

## 2021-12-01 RX ADMIN — PANTOPRAZOLE SODIUM 40 MG: 40 TABLET, DELAYED RELEASE ORAL at 09:14

## 2021-12-01 RX ADMIN — OSELTAMIVIR PHOSPHATE 30 MG: 30 CAPSULE ORAL at 09:13

## 2021-12-01 RX ADMIN — ATORVASTATIN CALCIUM 20 MG: 20 TABLET, FILM COATED ORAL at 09:12

## 2021-12-01 RX ADMIN — LEVOTHYROXINE SODIUM 125 MCG: 125 TABLET ORAL at 06:26

## 2021-12-01 RX ADMIN — PREDNISONE 40 MG: 20 TABLET ORAL at 09:13

## 2021-12-01 RX ADMIN — HEPARIN SODIUM 5000 UNITS: 5000 INJECTION INTRAVENOUS; SUBCUTANEOUS at 09:12

## 2021-12-01 RX ADMIN — OSELTAMIVIR PHOSPHATE 30 MG: 30 CAPSULE ORAL at 21:20

## 2021-12-01 NOTE — CASE MANAGEMENT/SOCIAL WORK
Continued Stay Note  [facility]     Patient Name: Brittny Nicolas  MRN: [id number]  Today's Date: 12/1/2021    Admit Date: 11/27/2021     Discharge Plan     Row Name 12/01/21 1417       Plan    Plan Skilled Nursing Facility    Patient/Family in Agreement with Plan yes    Plan Comments I spoke with pt's daughter on the phone today. She states plan is for pt to go to skilled nursing facility for rehab. I informed daughter that I had already spoke with [facility] and they are on an admission hold due to staffing. I also let her know I spoke with [facility] because they have no Medicaid beds available. She requested that a referral be made to [facility], states that it is closer to her home so she can visit pt easier. I spoke with Zeina/Willy, she states they have a bed available at [facility] and that she will make referral to [facility]. I updated daughter on this. I left a Voicemail for Deepali/Fiz. No other needs voiced or identified at this time. CM will continue to follow.    Final Discharge Disposition Code 03 - skilled nursing facility (SNF)               Discharge Codes    No documentation.               Expected Discharge Date and Time     Expected Discharge Date Expected Discharge Time    Dec 4, 2021             Makeda James RN

## 2021-12-01 NOTE — PROGRESS NOTES
[facility] Medicine Services  PROGRESS NOTE    Patient Name: Brittny Nicolas  : 1945  MRN: [id number]    Date of Admission: 2021  Primary Care Physician: Dalila Liz MD    Subjective   Subjective     CC:  F/u influenza, hypoxia    HPI:  Sleeping, arouses easily. NAD. Currently on room air. Pleasantly confused.     ROS:  Gen- No fevers, chills  CV- No chest pain, palpitations  Resp- +non productive cough, no dyspnea  GI- No N/V/D, abd pain       Objective   Objective     Vital Signs:   Temp:  [97.5 °F (36.4 °C)-98.3 °F (36.8 °C)] 98.3 °F (36.8 °C)  Heart Rate:  [53-70] 65  Resp:  [16-18] 18  BP: (113-155)/(70-98) 155/78  Flow (L/min):  [2] 2     Physical Exam:  Constitutional: No acute distress, sleeping, arouses   HENT: NCAT, mucous membranes moist  Respiratory: Rhonchi with wheezing, respiratory effort normal on RA  Cardiovascular: RRR, no murmurs, rubs, or gallops  Gastrointestinal: Positive bowel sounds, soft, nontender, nondistended  Musculoskeletal: No bilateral ankle edema  Psychiatric: Appropriate affect, cooperative  Neurologic: Oriented to person and place, GREER, follows commands, speech clear  Skin: No rashes       Results Reviewed:  LAB RESULTS:      Lab 21  1113 21  1052 21  1540 21  1539 21  1556   WBC 15.74* 3.78 5.60  --  4.92   HEMOGLOBIN 11.8* 12.0 11.3*  --  12.4   HEMATOCRIT 40.9 38.7 36.0  --  36.8   PLATELETS 279 226 228  --  217   NEUTROS ABS 14.51*  --  3.83  --  3.12   IMMATURE GRANS (ABS) 0.12*  --  0.02  --  0.02   LYMPHS ABS 0.63*  --  1.21  --  1.12   MONOS ABS 0.45  --  0.48  --  0.61   EOS ABS 0.00  --  0.04  --  0.03   MCV 91.7 83.6 84.3  --  78.3*   PROCALCITONIN  --   --   --  0.04  --          Lab 21  0550 21  2101 21  1052 21  1539 21  1556   SODIUM 136  --  135* 133* 135*   POTASSIUM 4.2 3.7 3.3* 3.8 4.0   CHLORIDE 102  --  98 97* 98   CO2 23.0  --  23.0 26.0 26.3   ANION  GAP 11.0  --  14.0 10.0 10.7   BUN 12  --  10 8 10   CREATININE 0.67  --  0.56* 0.62 0.76   GLUCOSE 117*  --  164* 85 92   CALCIUM 8.4*  --  9.3 9.0 9.3   MAGNESIUM 3.0* 1.9  --   --   --    TSH  --   --   --   --  6.500*         Lab 11/27/21  1539 11/24/21  1556   TOTAL PROTEIN 7.2 7.8   ALBUMIN 3.80 4.20   GLOBULIN 3.4 3.6   ALT (SGPT) 14 21   AST (SGOT) 18 31   BILIRUBIN 0.4 0.3   ALK PHOS 103 105         Lab 11/27/21  1539   PROBNP 326.9   TROPONIN T <0.010                 Brief Urine Lab Results  (Last result in the past 365 days)      Color   Clarity   Blood   Leuk Est   Nitrite   Protein   CREAT   Urine HCG        11/24/21 1556 Yellow   Clear   Negative   Negative   Negative   Negative                 Microbiology Results Abnormal     None          No radiology results from the last 24 hrs        I have reviewed the medications:  Scheduled Meds:amLODIPine, 5 mg, Oral, Daily  atorvastatin, 20 mg, Oral, Daily  donepezil, 10 mg, Oral, Nightly  heparin (porcine), 5,000 Units, Subcutaneous, Q12H  levothyroxine, 125 mcg, Oral, Q AM  memantine, 10 mg, Oral, BID  oseltamivir, 30 mg, Oral, Q12H  pantoprazole, 40 mg, Oral, BID AC  predniSONE, 40 mg, Oral, Daily With Breakfast  sodium chloride, 10 mL, Intravenous, Q12H      Continuous Infusions:   PRN Meds:.•  acetaminophen  •  clonazePAM  •  ipratropium-albuterol  •  magnesium sulfate **OR** magnesium sulfate **OR** magnesium sulfate  •  melatonin  •  ondansetron  •  potassium chloride  •  potassium chloride  •  sodium chloride  •  sodium chloride    Assessment/Plan   Assessment & Plan     Active Hospital Problems    Diagnosis  POA   • **Influenza A with respiratory manifestations [J10.1]  Yes   • Anxiety and depression [F41.9, F32.A]  Yes   • Acute respiratory failure with hypoxia (HCC) [J96.01]  Yes   • Anemia [D64.9]  Yes   • Late onset Alzheimer's disease without behavioral disturbance (HCC) [G30.1, F02.80]  Yes   • Hypothyroidism [E03.9]  Yes   • Rheumatoid  arthritis (HCC) [M06.9]  Yes   • Gastroesophageal reflux disease [K21.9]  Yes   • Benign essential hypertension [I10]  Yes      Resolved Hospital Problems   No resolved problems to display.        Brief Hospital Course to date:  Brittny Nicolas is a 76 y.o. female with a past medical history significant for Alzheimer's dementia, rheumatoid arthritis, hypertension, GERD, hypothyroidism, anemia, and anxiety/depression. She presents today with complaints of progressively worsening SOB going on for the past week.     Acute Respiratory Failure Secondary to Influenza A:  - baseline is no O2, wean as able  -  PO prednisone today  - scheduled duo-nebs  - droplet precautions  - continue tamiflu x 5 days  - supportive care     Hypertension:  - controlled and stable. Continue norvasc     Alzheimer's dementia:  - continue namenda and aricept  - as needed Klonopin  - fall precautions  - sitter discontinued, will need placement at discharge, LTC    Rheumatoid Arthritis     GERD:  - continue PPI     Hypothyroidism:  - continue synthroid      Anemia:  -H/H stable and at baseline. monitor    DVT prophylaxis:  Medical DVT prophylaxis orders are present.       AM-PAC 6 Clicks Score (PT): 18 (11/30/21 0800)    Disposition: I expect the patient to be discharged to LTC facility when bed available     CODE STATUS:   Code Status and Medical Interventions:   Ordered at: 11/27/21 1923     Level Of Support Discussed With:    Patient     Code Status (Patient has no pulse and is not breathing):    CPR (Attempt to Resuscitate)     Medical Interventions (Patient has pulse or is breathing):    Full Support       Steffany Robins, CARLOS  12/01/21

## 2021-12-02 LAB
BASOPHILS # BLD AUTO: 0.02 10*3/MM3 (ref 0–0.2)
BASOPHILS NFR BLD AUTO: 0.2 % (ref 0–1.5)
DEPRECATED RDW RBC AUTO: 43.5 FL (ref 37–54)
EOSINOPHIL # BLD AUTO: 0.04 10*3/MM3 (ref 0–0.4)
EOSINOPHIL NFR BLD AUTO: 0.4 % (ref 0.3–6.2)
ERYTHROCYTE [DISTWIDTH] IN BLOOD BY AUTOMATED COUNT: 14.1 % (ref 12.3–15.4)
HCT VFR BLD AUTO: 36.2 % (ref 34–46.6)
HGB BLD-MCNC: 11.3 G/DL (ref 12–15.9)
IMM GRANULOCYTES # BLD AUTO: 0.11 10*3/MM3 (ref 0–0.05)
IMM GRANULOCYTES NFR BLD AUTO: 1.2 % (ref 0–0.5)
LYMPHOCYTES # BLD AUTO: 1.96 10*3/MM3 (ref 0.7–3.1)
LYMPHOCYTES NFR BLD AUTO: 21 % (ref 19.6–45.3)
MCH RBC QN AUTO: 26.3 PG (ref 26.6–33)
MCHC RBC AUTO-ENTMCNC: 31.2 G/DL (ref 31.5–35.7)
MCV RBC AUTO: 84.4 FL (ref 79–97)
MONOCYTES # BLD AUTO: 0.82 10*3/MM3 (ref 0.1–0.9)
MONOCYTES NFR BLD AUTO: 8.8 % (ref 5–12)
NEUTROPHILS NFR BLD AUTO: 6.39 10*3/MM3 (ref 1.7–7)
NEUTROPHILS NFR BLD AUTO: 68.4 % (ref 42.7–76)
NRBC BLD AUTO-RTO: 0 /100 WBC (ref 0–0.2)
PLATELET # BLD AUTO: 373 10*3/MM3 (ref 140–450)
PMV BLD AUTO: 9.7 FL (ref 6–12)
RBC # BLD AUTO: 4.29 10*6/MM3 (ref 3.77–5.28)
WBC NRBC COR # BLD: 9.34 10*3/MM3 (ref 3.4–10.8)

## 2021-12-02 PROCEDURE — 99232 SBSQ HOSP IP/OBS MODERATE 35: CPT | Performed by: NURSE PRACTITIONER

## 2021-12-02 PROCEDURE — 97110 THERAPEUTIC EXERCISES: CPT

## 2021-12-02 PROCEDURE — 85025 COMPLETE CBC W/AUTO DIFF WBC: CPT | Performed by: NURSE PRACTITIONER

## 2021-12-02 PROCEDURE — 97530 THERAPEUTIC ACTIVITIES: CPT

## 2021-12-02 PROCEDURE — 25010000002 HEPARIN (PORCINE) PER 1000 UNITS: Performed by: PHYSICIAN ASSISTANT

## 2021-12-02 PROCEDURE — 63710000001 PREDNISONE PER 1 MG: Performed by: INTERNAL MEDICINE

## 2021-12-02 RX ADMIN — ATORVASTATIN CALCIUM 20 MG: 20 TABLET, FILM COATED ORAL at 08:37

## 2021-12-02 RX ADMIN — PREDNISONE 40 MG: 20 TABLET ORAL at 08:37

## 2021-12-02 RX ADMIN — PANTOPRAZOLE SODIUM 40 MG: 40 TABLET, DELAYED RELEASE ORAL at 06:41

## 2021-12-02 RX ADMIN — OSELTAMIVIR PHOSPHATE 30 MG: 30 CAPSULE ORAL at 22:31

## 2021-12-02 RX ADMIN — MEMANTINE 10 MG: 10 TABLET ORAL at 22:31

## 2021-12-02 RX ADMIN — AMLODIPINE BESYLATE 5 MG: 5 TABLET ORAL at 08:37

## 2021-12-02 RX ADMIN — SODIUM CHLORIDE, PRESERVATIVE FREE 10 ML: 5 INJECTION INTRAVENOUS at 08:38

## 2021-12-02 RX ADMIN — PANTOPRAZOLE SODIUM 40 MG: 40 TABLET, DELAYED RELEASE ORAL at 17:11

## 2021-12-02 RX ADMIN — PANTOPRAZOLE SODIUM 40 MG: 40 TABLET, DELAYED RELEASE ORAL at 08:37

## 2021-12-02 RX ADMIN — HEPARIN SODIUM 5000 UNITS: 5000 INJECTION INTRAVENOUS; SUBCUTANEOUS at 08:38

## 2021-12-02 RX ADMIN — DONEPEZIL HYDROCHLORIDE 10 MG: 10 TABLET, FILM COATED ORAL at 22:31

## 2021-12-02 RX ADMIN — MEMANTINE 10 MG: 10 TABLET ORAL at 08:37

## 2021-12-02 RX ADMIN — Medication 5 MG: at 22:53

## 2021-12-02 RX ADMIN — HEPARIN SODIUM 5000 UNITS: 5000 INJECTION INTRAVENOUS; SUBCUTANEOUS at 22:31

## 2021-12-02 RX ADMIN — OSELTAMIVIR PHOSPHATE 30 MG: 30 CAPSULE ORAL at 08:37

## 2021-12-02 RX ADMIN — LEVOTHYROXINE SODIUM 125 MCG: 125 TABLET ORAL at 05:21

## 2021-12-02 NOTE — PLAN OF CARE
Goal Outcome Evaluation:  Plan of Care Reviewed With: patient        Progress: improving  Outcome Summary: Pt alert to self only but follows commands safely. Performed bed mobility with SBA and ambulated 5 feet to chair without AD required. Showing good balance and participation with LE ther ex.

## 2021-12-02 NOTE — THERAPY TREATMENT NOTE
Patient Name: Brittny Nicolas  : 1945    MRN: [id number]                              Today's Date: 2021       Admit Date: 2021    Visit Dx:     ICD-10-CM ICD-9-CM   1. Influenza A with respiratory manifestations  J10.1 487.1   2. Hypoxemia requiring supplemental oxygen  R09.02 799.02    Z99.81    3. Dementia without behavioral disturbance, unspecified dementia type (HCC)  F03.90 294.20     Patient Active Problem List   Diagnosis   • Achalasia   • Acute upper respiratory infection   • Atypical chest pain   • Benign essential hypertension   • Carotid artery stenosis   • Cataract   • Cough   • Acute cystitis   • Screening for malignant neoplasm   • Gastroesophageal reflux disease   • Essential familial hypercholesterolemia   • Gastrointestinal bleeding   • Inguinal hernia   • Insomnia   • Leg cramps   • Meniere's disease   • Night sweats   • Osteoporosis   • Peripheral neuropathy   • Retinal artery occlusion   • Rheumatoid arthritis (HCC)   • Stroke syndrome   • Suprapubic pain   • Transient ischemic attack   • Positive reaction to tuberculin skin test   • Vitamin D deficiency   • Weight loss   • H/O colonoscopy   • Fatigue   • Hypothyroidism   • Erosive esophagitis   • Left lower quadrant pain   • Acute pain of left shoulder   • Pharyngitis   • Memory loss   • Rib pain on left side   • Hyponatremia   • Late onset Alzheimer's disease without behavioral disturbance ([facility])   • Oropharyngeal dysphagia   • Influenza A with respiratory manifestations   • Anxiety and depression   • Acute respiratory failure with hypoxia ([facility])   • Anemia     Past Medical History:   Diagnosis Date   • Acid reflux disease    • Arthritis    • Carotid artery occlusion    • Dementia (HCC)    • Depression    • Graves disease    • Hyperlipidemia    • Hypertension    • Memory loss    • Stomach problems    • Stroke (HCC)    • Stroke syndrome    • Thyroid disease    • Transient ischemic attack    • Vision loss      Past Surgical  History:   Procedure Laterality Date   • AUGMENTATION MAMMAPLASTY      1978   • BREAST AUGMENTATION     • CAROTID ARTERY ANGIOPLASTY     • CAROTID ENDARTERECTOMY     • CAROTID STENT     • COLONOSCOPY     • D & C CERVICAL BIOPSY      Dilation and Curettage of Cervical Stump   • HERNIA REPAIR     • HERNIA REPAIR     • THROMBOENDARTERECTOMY      carotid   • UPPER GASTROINTESTINAL ENDOSCOPY        General Information     [facility] Name 12/02/21 [facility]          Physical Therapy Time and Intention    Document Type therapy note (daily note)  -     Mode of Treatment physical therapy  -[facility] Name 12/02/21 1333          General Information    Patient Profile Reviewed yes  -     Existing Precautions/Restrictions fall; other (see comments)  confusion, droplet  -[facility] Name 12/02/21 1333          Cognition    Orientation Status (Cognition) oriented to; person; disoriented to; place; situation; time  -[facility] Name 12/02/21 1333          Safety Issues, Functional Mobility    Safety Issues Affecting Function (Mobility) insight into deficits/self-awareness; safety precaution awareness; safety precautions follow-through/compliance; sequencing abilities  -     Impairments Affecting Function (Mobility) balance; cognition; postural/trunk control; strength  -           User Key  (r) = Recorded By, (t) = Taken By, (c) = Cosigned By    Initials Name Provider Type     Franco Hoffmann, PT Physical Therapist               Mobility     [facility] Name 12/02/21 1333          Bed Mobility    Bed Mobility supine-sit; scooting/bridging  -     Scooting/Bridging [place] (Bed Mobility) standby assist; verbal cues  -     Supine-Sit [place] (Bed Mobility) standby assist; verbal cues  -     Comment (Bed Mobility) cues for safe sequencing  -[facility] Name 12/02/21 1333          Transfers    Comment (Transfers) cues for sequencing to recliner safely  -[facility] Name 12/02/21 1333          Sit-Stand Transfer    Sit-Stand  [place] (Transfers) contact guard; verbal cues  -     Row Name 12/02/21 1333          Gait/Stairs (Locomotion)    [place] Level (Gait) contact guard; verbal cues  -     Distance in Feet (Gait) 5  -     Deviations/Abnormal Patterns (Gait) bilateral deviations; gait speed decreased; weight shifting decreased; lake decreased  -     Bilateral Gait Deviations forward flexed posture; heel strike decreased  -     Comment (Gait/Stairs) Assistance to help pt up to recliner for lunch thus limiting gait distance this date but pt showing good balance without AD. Pt on droplet precautions. Cues for safe sequencing as needed but pt showing adequate safety awareness.  -           User Key  (r) = Recorded By, (t) = Taken By, (c) = Cosigned By    Initials Name Provider Type    Franco Nolan PT Physical Therapist               Obj/Interventions     Row Name 12/02/21 1335          Motor Skills    Therapeutic Exercise other (see comments)  SLR x 10 each leg, hip ER/IR x 10 each leg  -     Row Name 12/02/21 1335          Balance    Balance Assessment sitting static balance; sitting dynamic balance; standing static balance; standing dynamic balance  -     Static Sitting Balance WFL  -     Dynamic Sitting Balance WFL  -     Static Standing Balance WFL  -     Dynamic Standing Balance WFL  -     Balance Interventions sitting; standing; sit to stand; supported; static; dynamic; minimal challenge  -           User Key  (r) = Recorded By, (t) = Taken By, (c) = Cosigned By    Initials Name Provider Type    Franco Nolan, PT Physical Therapist               Goals/Plan    No documentation.                Clinical Impression     Row Name 12/02/21 3286          Pain    Additional Documentation Pain Scale: FACES Pre/Post-Treatment (Group)  -     Row Name 12/02/21 2408          Pain Scale: FACES Pre/Post-Treatment    Pain: FACES Scale, Pretreatment 2-->hurts little bit  -     Posttreatment Pain  Rating 2-->hurts little bit  -     Pain Location - Side Right  -     Pain Location - Orientation lateral  -     Pain Location abdomen  -     Pre/Posttreatment Pain Comment pt reporting pain at right abdominal area  -     Row Name 12/02/21 4249          Plan of Care Review    Plan of Care Reviewed With patient  -     Progress improving  -     Outcome Summary Pt alert to self only but follows commands safely. Performed bed mobility with SBA and ambulated 5 feet to chair without AD required. Showing good balance and participation with LE ther ex.  -     Row Name 12/02/21 1333          Therapy Assessment/Plan (PT)    Rehab Potential (PT) good, to achieve stated therapy goals  -     Criteria for Skilled Interventions Met (PT) yes; meets criteria; skilled treatment is necessary  -     Row Name 12/02/21 1336          Vital Signs    Pre Patient Position Supine  -     Intra Patient Position Standing  -     Post Patient Position Sitting  -     Row Name 12/02/21 1339          Positioning and Restraints    Pre-Treatment Position in bed  -     Post Treatment Position chair  -     In Chair notified nsg; reclined; call light within reach; encouraged to call for assist; exit alarm on; legs elevated  -           User Key  (r) = Recorded By, (t) = Taken By, (c) = Cosigned By    Initials Name Provider Type     Franco Hoffmann, PT Physical Therapist               Outcome Measures     Row Name 12/02/21 6121          How much help from another person do you currently need...    Turning from your back to your side while in flat bed without using bedrails? 4  -JH     Moving from lying on back to sitting on the side of a flat bed without bedrails? 4  -JH     Moving to and from a bed to a chair (including a wheelchair)? 3  -JH     Standing up from a chair using your arms (e.g., wheelchair, bedside chair)? 4  -JH     Climbing 3-5 steps with a railing? 3  -JH     To walk in hospital room? 4  -JH     AM-PAC 6  Clicks Score (PT) 22  -     Row Name 12/02/21 1338          Functional Assessment    Outcome Measure Options AM-PAC 6 Clicks Basic Mobility (PT)  -           User Key  (r) = Recorded By, (t) = Taken By, (c) = Cosigned By    Initials Name Provider Type    Franco Nolan, PT Physical Therapist                             Physical Therapy Education                 Title: PT OT SLP Therapies (Done)     Topic: Physical Therapy (Done)     Point: Mobility training (Done)     Learning Progress Summary           Patient Acceptance, E,TB, VU by  at 12/2/2021 1339    Comment: edu on safety with mobility    Eager, E, VU,NR by  at 11/29/2021 1045    Comment: Educated pt. safety/technique with bed mobility, transfers, ambulation, PT POC                   Point: Home exercise program (Done)     Learning Progress Summary           Patient Acceptance, E,TB, VU by  at 12/2/2021 1339    Comment: edu on safety with mobility                   Point: Body mechanics (Done)     Learning Progress Summary           Patient Acceptance, E,TB, VU by  at 12/2/2021 1339    Comment: edu on safety with mobility    Eager, E, VU,NR by  at 11/29/2021 1045    Comment: Educated pt. safety/technique with bed mobility, transfers, ambulation, PT POC                   Point: Precautions (Done)     Learning Progress Summary           Patient Acceptance, E,TB, VU by  at 12/2/2021 1339    Comment: edu on safety with mobility    Eager, E, VU,NR by  at 11/29/2021 1045    Comment: Educated pt. safety/technique with bed mobility, transfers, ambulation, PT POC                               User Key     Initials Effective Dates Name Provider Type [facility] 09/22/20 -  Franco Hoffmann, PT Physical Therapist PT     06/01/21 -  Yady Zamora PT Physical Therapist PT              PT Recommendation and Plan     Plan of Care Reviewed With: patient  Progress: improving  Outcome Summary: Pt alert to self only but follows commands safely.  Performed bed mobility with SBA and ambulated 5 feet to chair without AD required. Showing good balance and participation with LE ther ex.     Time Calculation:    PT Charges     Row Name 12/02/21 1339             Time Calculation    Start Time 1305  -      PT Received On 12/02/21  -      PT Goal Re-Cert Due Date 12/09/21  -              Time Calculation- PT    Total Timed Code Minutes- PT 25 minute(s)  -              Timed Charges    85205 - PT Therapeutic Exercise Minutes 5  -      25220 - PT Therapeutic Activity Minutes 20  -              Total Minutes    Timed Charges Total Minutes 25  -       Total Minutes 25  -            User Key  (r) = Recorded By, (t) = Taken By, (c) = Cosigned By    Initials Name Provider Type     Franco Hoffmann, PT Physical Therapist              Therapy Charges for Today     Code Description Service Date Service Provider Modifiers Qty    59238442764 HC PT THER PROC EA 15 MIN 12/2/2021 Franco Hoffmann, PT GP 1    75498246977 HC PT THERAPEUTIC ACT EA 15 MIN 12/2/2021 Franco Hoffmann, PT GP 1          PT G-Codes  Outcome Measure Options: AM-PAC 6 Clicks Basic Mobility (PT)  AM-PAC 6 Clicks Score (PT): 22    Franco Hoffmann PT  12/2/2021

## 2021-12-02 NOTE — CASE MANAGEMENT/SOCIAL WORK
Continued Stay Note  [facility]     Patient Name: Brittny Nicolas  MRN: [id number]  Today's Date: 12/2/2021    Admit Date: 11/27/2021     Discharge Plan     Row Name 12/02/21 1303       Plan    [facility]    Patient/Family in Agreement with Plan yes    Plan Comments Per Emma with [facility], they can offer Mrs. Nicolas a skilled rehab bed that transitions into longterm care pending insurance approval. I updated Adia, Mrs. Nicolas's daughter. She is in agreement with this plan. [facility] will initiate insurance precert today, once updated PT notes are available in Epic.    Final Discharge Disposition Code 03 - skilled nursing facility (SNF)               Discharge Codes    No documentation.               Expected Discharge Date and Time     Expected Discharge Date Expected Discharge Time    Dec 3, 2021             Brandon Falcon RN

## 2021-12-02 NOTE — PLAN OF CARE
Problem: Adult Inpatient Plan of Care  Goal: Plan of Care Review  Flowsheets (Taken 12/2/2021 1845)  Progress: no change  Plan of Care Reviewed With: patient  Outcome Summary: Pt rested well. VSS. RA. Patient continue to constantly take off tele monitor and remove IV. Recieved an order to leave IV out unless needed or a dexcline in respiratory status. Pt ambulating with standby to restroom. Plan of care discussed. Needs reinforcement.  Goal: Absence of Hospital-Acquired Illness or Injury  Intervention: Identify and Manage Fall Risk  Recent Flowsheet Documentation  Taken 12/2/2021 1800 by Raffi Cooper RN  Safety Promotion/Fall Prevention:   activity supervised   clutter free environment maintained   assistive device/personal items within reach   gait belt   lighting adjusted   nonskid shoes/slippers when out of bed   fall prevention program maintained   toileting scheduled   room organization consistent   safety round/check completed  Taken 12/2/2021 1600 by Raffi Cooper RN  Safety Promotion/Fall Prevention:   activity supervised   assistive device/personal items within reach   clutter free environment maintained   fall prevention program maintained   gait belt   lighting adjusted   nonskid shoes/slippers when out of bed   room organization consistent   safety round/check completed   toileting scheduled  Taken 12/2/2021 1400 by Raffi Cooper RN  Safety Promotion/Fall Prevention:   activity supervised   assistive device/personal items within reach   clutter free environment maintained   fall prevention program maintained   gait belt   lighting adjusted   nonskid shoes/slippers when out of bed   room organization consistent   safety round/check completed   toileting scheduled  Taken 12/2/2021 1200 by Raffi Cooper RN  Safety Promotion/Fall Prevention:   activity supervised   assistive device/personal items within reach   clutter free environment maintained   gait belt   lighting adjusted    nonskid shoes/slippers when out of bed   room organization consistent   safety round/check completed   toileting scheduled   fall prevention program maintained  Taken 12/2/2021 1000 by Raffi Cooper RN  Safety Promotion/Fall Prevention:   activity supervised   assistive device/personal items within reach   clutter free environment maintained   fall prevention program maintained   gait belt   lighting adjusted   nonskid shoes/slippers when out of bed   room organization consistent   toileting scheduled   safety round/check completed  Taken 12/2/2021 0837 by Raffi Cooper RN  Safety Promotion/Fall Prevention:   activity supervised   assistive device/personal items within reach   clutter free environment maintained   fall prevention program maintained   gait belt   lighting adjusted   nonskid shoes/slippers when out of bed   room organization consistent   safety round/check completed   toileting scheduled  Intervention: Prevent Skin Injury  Recent Flowsheet Documentation  Taken 12/2/2021 1800 by Raffi Cooper RN  Body Position:   neutral body alignment   legs elevated  Skin Protection:   adhesive use limited   incontinence pads utilized   tubing/devices free from skin contact  Taken 12/2/2021 1600 by Raffi Cooper RN  Body Position:   position changed independently   neutral body alignment   legs elevated  Skin Protection:   adhesive use limited   incontinence pads utilized   tubing/devices free from skin contact  Taken 12/2/2021 1400 by Raffi Cooper RN  Body Position:   neutral body alignment   legs elevated  Skin Protection:   adhesive use limited   incontinence pads utilized   tubing/devices free from skin contact  Taken 12/2/2021 1200 by Raffi Cooper RN  Body Position: position changed independently  Skin Protection:   adhesive use limited   incontinence pads utilized   tubing/devices free from skin contact  Taken 12/2/2021 1000 by Raffi Cooper RN  Body Position: side-lying,  right  Skin Protection:   adhesive use limited   incontinence pads utilized   tubing/devices free from skin contact  Taken 12/2/2021 0837 by Raffi Cooper RN  Body Position:   position changed independently   neutral body alignment  Skin Protection:   adhesive use limited   incontinence pads utilized   tubing/devices free from skin contact  Intervention: Prevent and Manage VTE (venous thromboembolism) Risk  Recent Flowsheet Documentation  Taken 12/2/2021 0837 by Raffi Cooper RN  VTE Prevention/Management: (see mar)   bilateral   dorsiflexion/plantar flexion performed   other (see comments)  Intervention: Prevent Infection  Recent Flowsheet Documentation  Taken 12/2/2021 1800 by Raffi Cooper RN  Infection Prevention:   environmental surveillance performed   rest/sleep promoted   single patient room provided  Taken 12/2/2021 1600 by Raffi Cooper RN  Infection Prevention:   environmental surveillance performed   rest/sleep promoted   single patient room provided  Taken 12/2/2021 1400 by Raffi Cooper RN  Infection Prevention:   environmental surveillance performed   rest/sleep promoted   single patient room provided  Taken 12/2/2021 1200 by Raffi Cooper RN  Infection Prevention:   environmental surveillance performed   rest/sleep promoted   single patient room provided  Taken 12/2/2021 1000 by Raffi Cooper RN  Infection Prevention:   environmental surveillance performed   rest/sleep promoted   single patient room provided  Taken 12/2/2021 0837 by Raffi Cooper RN  Infection Prevention:   environmental surveillance performed   rest/sleep promoted   single patient room provided  Goal: Optimal Comfort and Wellbeing  Intervention: Provide Person-Centered Care  Recent Flowsheet Documentation  Taken 12/2/2021 0837 by Raffi Cooper RN  Trust Relationship/Rapport:   care explained   choices provided   questions answered   questions encouraged     Problem: Fall Injury  Risk  Goal: Absence of Fall and Fall-Related Injury  Intervention: Identify and Manage Contributors to Fall Injury Risk  Recent Flowsheet Documentation  Taken 12/2/2021 0837 by Raffi Cooper RN  Medication Review/Management: medications reviewed  Intervention: Promote Injury-Free Environment  Recent Flowsheet Documentation  Taken 12/2/2021 1800 by Raffi Cooper RN  Safety Promotion/Fall Prevention:   activity supervised   clutter free environment maintained   assistive device/personal items within reach   gait belt   lighting adjusted   nonskid shoes/slippers when out of bed   fall prevention program maintained   toileting scheduled   room organization consistent   safety round/check completed  Taken 12/2/2021 1600 by Raffi Cooper RN  Safety Promotion/Fall Prevention:   activity supervised   assistive device/personal items within reach   clutter free environment maintained   fall prevention program maintained   gait belt   lighting adjusted   nonskid shoes/slippers when out of bed   room organization consistent   safety round/check completed   toileting scheduled  Taken 12/2/2021 1400 by Raffi Cooper RN  Safety Promotion/Fall Prevention:   activity supervised   assistive device/personal items within reach   clutter free environment maintained   fall prevention program maintained   gait belt   lighting adjusted   nonskid shoes/slippers when out of bed   room organization consistent   safety round/check completed   toileting scheduled  Taken 12/2/2021 1200 by Raffi Cooper RN  Safety Promotion/Fall Prevention:   activity supervised   assistive device/personal items within reach   clutter free environment maintained   gait belt   lighting adjusted   nonskid shoes/slippers when out of bed   room organization consistent   safety round/check completed   toileting scheduled   fall prevention program maintained  Taken 12/2/2021 1000 by Raffi Cooper RN  Safety Promotion/Fall Prevention:    activity supervised   assistive device/personal items within reach   clutter free environment maintained   fall prevention program maintained   gait belt   lighting adjusted   nonskid shoes/slippers when out of bed   room organization consistent   toileting scheduled   safety round/check completed  Taken 12/2/2021 0837 by Raffi Cooper RN  Safety Promotion/Fall Prevention:   activity supervised   assistive device/personal items within reach   clutter free environment maintained   fall prevention program maintained   gait belt   lighting adjusted   nonskid shoes/slippers when out of bed   room organization consistent   safety round/check completed   toileting scheduled     Problem: Gas Exchange Impaired  Goal: Optimal Gas Exchange  Intervention: Optimize Oxygenation and Ventilation  Recent Flowsheet Documentation  Taken 12/2/2021 1200 by Raffi Cooper RN  Head of Bed (HOB): HOB at 30-45 degrees  Taken 12/2/2021 1000 by Raffi Cooper RN  Head of Bed (HOB): HOB at 30 degrees  Taken 12/2/2021 0837 by Raffi Cooper RN  Head of Bed (HOB): HOB at 20-30 degrees     Problem: Skin Injury Risk Increased  Goal: Skin Health and Integrity  Intervention: Optimize Skin Protection  Recent Flowsheet Documentation  Taken 12/2/2021 1800 by Raffi Cooper RN  Pressure Reduction Techniques:   frequent weight shift encouraged   pressure points protected   rest period provided between sit times  Pressure Reduction Devices:   pressure-redistributing mattress utilized   positioning supports utilized  Skin Protection:   adhesive use limited   incontinence pads utilized   tubing/devices free from skin contact  Taken 12/2/2021 1600 by Raffi Cooper RN  Pressure Reduction Techniques:   frequent weight shift encouraged   pressure points protected   rest period provided between sit times  Pressure Reduction Devices:   pressure-redistributing mattress utilized   positioning supports utilized  Skin Protection:   adhesive  use limited   incontinence pads utilized   tubing/devices free from skin contact  Taken 12/2/2021 1400 by Raffi Cooper RN  Pressure Reduction Techniques:   frequent weight shift encouraged   pressure points protected   rest period provided between sit times  Pressure Reduction Devices:   pressure-redistributing mattress utilized   positioning supports utilized  Skin Protection:   adhesive use limited   incontinence pads utilized   tubing/devices free from skin contact  Taken 12/2/2021 1200 by Raffi Cooper RN  Pressure Reduction Techniques:   frequent weight shift encouraged   pressure points protected   rest period provided between sit times  Head of Bed (HOB): HOB at 30-45 degrees  Pressure Reduction Devices:   pressure-redistributing mattress utilized   positioning supports utilized  Skin Protection:   adhesive use limited   incontinence pads utilized   tubing/devices free from skin contact  Taken 12/2/2021 1000 by Raffi Cooper RN  Pressure Reduction Techniques:   frequent weight shift encouraged   rest period provided between sit times   pressure points protected  Head of Bed (HOB): HOB at 30 degrees  Pressure Reduction Devices:   pressure-redistributing mattress utilized   positioning supports utilized  Skin Protection:   adhesive use limited   incontinence pads utilized   tubing/devices free from skin contact  Taken 12/2/2021 0837 by Raffi Cooper RN  Pressure Reduction Techniques:   frequent weight shift encouraged   pressure points protected   rest period provided between sit times  Head of Bed (HOB): HOB at 20-30 degrees  Pressure Reduction Devices:   pressure-redistributing mattress utilized   positioning supports utilized  Skin Protection:   adhesive use limited   incontinence pads utilized   tubing/devices free from skin contact     Problem: Fall Injury Risk  Goal: Absence of Fall and Fall-Related Injury  Intervention: Identify and Manage Contributors to Fall Injury Risk  Recent  Flowsheet Documentation  Taken 12/2/2021 0837 by Raffi Cooper RN  Medication Review/Management: medications reviewed  Intervention: Promote Injury-Free Environment  Recent Flowsheet Documentation  Taken 12/2/2021 1800 by Raffi Cooper RN  Safety Promotion/Fall Prevention:   activity supervised   clutter free environment maintained   assistive device/personal items within reach   gait belt   lighting adjusted   nonskid shoes/slippers when out of bed   fall prevention program maintained   toileting scheduled   room organization consistent   safety round/check completed  Taken 12/2/2021 1600 by Raffi Cooper RN  Safety Promotion/Fall Prevention:   activity supervised   assistive device/personal items within reach   clutter free environment maintained   fall prevention program maintained   gait belt   lighting adjusted   nonskid shoes/slippers when out of bed   room organization consistent   safety round/check completed   toileting scheduled  Taken 12/2/2021 1400 by Raffi Cooper RN  Safety Promotion/Fall Prevention:   activity supervised   assistive device/personal items within reach   clutter free environment maintained   fall prevention program maintained   gait belt   lighting adjusted   nonskid shoes/slippers when out of bed   room organization consistent   safety round/check completed   toileting scheduled  Taken 12/2/2021 1200 by Raffi Cooper RN  Safety Promotion/Fall Prevention:   activity supervised   assistive device/personal items within reach   clutter free environment maintained   gait belt   lighting adjusted   nonskid shoes/slippers when out of bed   room organization consistent   safety round/check completed   toileting scheduled   fall prevention program maintained  Taken 12/2/2021 1000 by Raffi Cooper RN  Safety Promotion/Fall Prevention:   activity supervised   assistive device/personal items within reach   clutter free environment maintained   fall prevention program  maintained   gait belt   lighting adjusted   nonskid shoes/slippers when out of bed   room organization consistent   toileting scheduled   safety round/check completed  Taken 12/2/2021 0837 by Raffi Cooper RN  Safety Promotion/Fall Prevention:   activity supervised   assistive device/personal items within reach   clutter free environment maintained   fall prevention program maintained   gait belt   lighting adjusted   nonskid shoes/slippers when out of bed   room organization consistent   safety round/check completed   toileting scheduled     Problem: Adult Inpatient Plan of Care  Goal: Absence of Hospital-Acquired Illness or Injury  Intervention: Identify and Manage Fall Risk  Recent Flowsheet Documentation  Taken 12/2/2021 1800 by Raffi Cooper RN  Safety Promotion/Fall Prevention:   activity supervised   clutter free environment maintained   assistive device/personal items within reach   gait belt   lighting adjusted   nonskid shoes/slippers when out of bed   fall prevention program maintained   toileting scheduled   room organization consistent   safety round/check completed  Taken 12/2/2021 1600 by Raffi Cooper RN  Safety Promotion/Fall Prevention:   activity supervised   assistive device/personal items within reach   clutter free environment maintained   fall prevention program maintained   gait belt   lighting adjusted   nonskid shoes/slippers when out of bed   room organization consistent   safety round/check completed   toileting scheduled  Taken 12/2/2021 1400 by Raffi Cooper RN  Safety Promotion/Fall Prevention:   activity supervised   assistive device/personal items within reach   clutter free environment maintained   fall prevention program maintained   gait belt   lighting adjusted   nonskid shoes/slippers when out of bed   room organization consistent   safety round/check completed   toileting scheduled  Taken 12/2/2021 1200 by Raffi Cooper RN  Safety Promotion/Fall  Prevention:   activity supervised   assistive device/personal items within reach   clutter free environment maintained   gait belt   lighting adjusted   nonskid shoes/slippers when out of bed   room organization consistent   safety round/check completed   toileting scheduled   fall prevention program maintained  Taken 12/2/2021 1000 by Raffi Cooper RN  Safety Promotion/Fall Prevention:   activity supervised   assistive device/personal items within reach   clutter free environment maintained   fall prevention program maintained   gait belt   lighting adjusted   nonskid shoes/slippers when out of bed   room organization consistent   toileting scheduled   safety round/check completed  Taken 12/2/2021 0837 by Raffi Cooper RN  Safety Promotion/Fall Prevention:   activity supervised   assistive device/personal items within reach   clutter free environment maintained   fall prevention program maintained   gait belt   lighting adjusted   nonskid shoes/slippers when out of bed   room organization consistent   safety round/check completed   toileting scheduled  Intervention: Prevent Skin Injury  Recent Flowsheet Documentation  Taken 12/2/2021 1800 by Raffi Cooper RN  Body Position:   neutral body alignment   legs elevated  Skin Protection:   adhesive use limited   incontinence pads utilized   tubing/devices free from skin contact  Taken 12/2/2021 1600 by Raffi Cooper RN  Body Position:   position changed independently   neutral body alignment   legs elevated  Skin Protection:   adhesive use limited   incontinence pads utilized   tubing/devices free from skin contact  Taken 12/2/2021 1400 by Raffi Cooper RN  Body Position:   neutral body alignment   legs elevated  Skin Protection:   adhesive use limited   incontinence pads utilized   tubing/devices free from skin contact  Taken 12/2/2021 1200 by Raffi Cooper RN  Body Position: position changed independently  Skin Protection:   adhesive use  limited   incontinence pads utilized   tubing/devices free from skin contact  Taken 12/2/2021 1000 by Raffi Cooper RN  Body Position: side-lying, right  Skin Protection:   adhesive use limited   incontinence pads utilized   tubing/devices free from skin contact  Taken 12/2/2021 0837 by Raffi Cooper RN  Body Position:   position changed independently   neutral body alignment  Skin Protection:   adhesive use limited   incontinence pads utilized   tubing/devices free from skin contact  Intervention: Prevent and Manage VTE (venous thromboembolism) Risk  Recent Flowsheet Documentation  Taken 12/2/2021 0837 by Raffi Cooper RN  VTE Prevention/Management: (see mar)   bilateral   dorsiflexion/plantar flexion performed   other (see comments)  Intervention: Prevent Infection  Recent Flowsheet Documentation  Taken 12/2/2021 1800 by Raffi Cooper RN  Infection Prevention:   environmental surveillance performed   rest/sleep promoted   single patient room provided  Taken 12/2/2021 1600 by Raffi Cooper RN  Infection Prevention:   environmental surveillance performed   rest/sleep promoted   single patient room provided  Taken 12/2/2021 1400 by Raffi Cooper RN  Infection Prevention:   environmental surveillance performed   rest/sleep promoted   single patient room provided  Taken 12/2/2021 1200 by Raffi Cooper RN  Infection Prevention:   environmental surveillance performed   rest/sleep promoted   single patient room provided  Taken 12/2/2021 1000 by Raffi Cooper RN  Infection Prevention:   environmental surveillance performed   rest/sleep promoted   single patient room provided  Taken 12/2/2021 0837 by Raffi Cooper RN  Infection Prevention:   environmental surveillance performed   rest/sleep promoted   single patient room provided   Goal Outcome Evaluation:  Plan of Care Reviewed With: patient        Progress: no change  Outcome Summary: Pt rested well. VSS. RA. Patient continue  to constantly take off tele monitor and remove IV. Recieved an order to leave IV out unless needed or a dexcline in respiratory status. Pt ambulating with standby to restroom. Plan of care discussed. Needs reinforcement.

## 2021-12-02 NOTE — PROGRESS NOTES
[facility] Medicine Services  PROGRESS NOTE    Patient Name: Brittny Nicolas  : 1945  MRN: [id number]    Date of Admission: 2021  Primary Care Physician: Dalila Liz MD    Subjective   Subjective     CC:  F/u influenza, hypoxia    HPI:  Dozing in bed, NAD. No family in room. States she isn't very hungry today, not eating much. No respiratory complaints.      ROS:  Gen- No fevers, chills  CV- No chest pain, palpitations  Resp- +non productive cough, no dyspnea  GI- No N/V/D, abd pain       Objective   Objective     Vital Signs:   Temp:  [97.1 °F (36.2 °C)-98.3 °F (36.8 °C)] 97.9 °F (36.6 °C)  Heart Rate:  [48-65] 48  Resp:  [14-18] 18  BP: (108-155)/(60-84) 137/66  Flow (L/min):  [2] 2     Physical Exam:  Constitutional: No acute distress, sleeping, arouses   HENT: NCAT, mucous membranes moist  Respiratory: Rhonchi (clears with cough) with wheezing, respiratory effort normal on RA  Cardiovascular: Bradycardia, no murmurs, rubs, or gallops  Gastrointestinal: Positive bowel sounds, soft, nontender, nondistended  Musculoskeletal: No bilateral ankle edema  Psychiatric: Appropriate affect, cooperative  Neurologic: Oriented to person and place, GREER, follows commands, speech clear  Skin: No rashes       Results Reviewed:  LAB RESULTS:      Lab 21  0944 21  1113 21  1052 21  1540 21  1539   WBC 9.34 15.74* 3.78 5.60  --    HEMOGLOBIN 11.3* 11.8* 12.0 11.3*  --    HEMATOCRIT 36.2 40.9 38.7 36.0  --    PLATELETS 373 279 226 228  --    NEUTROS ABS 6.39 14.51*  --  3.83  --    IMMATURE GRANS (ABS) 0.11* 0.12*  --  0.02  --    LYMPHS ABS 1.96 0.63*  --  1.21  --    MONOS ABS 0.82 0.45  --  0.48  --    EOS ABS 0.04 0.00  --  0.04  --    MCV 84.4 91.7 83.6 84.3  --    PROCALCITONIN  --   --   --   --  0.04         Lab 21  0550 21  2101 21  1052 21  1539   SODIUM 136  --  135* 133*   POTASSIUM 4.2 3.7 3.3* 3.8   CHLORIDE 102  --   98 97*   CO2 23.0  --  23.0 26.0   ANION GAP 11.0  --  14.0 10.0   BUN 12  --  10 8   CREATININE 0.67  --  0.56* 0.62   GLUCOSE 117*  --  164* 85   CALCIUM 8.4*  --  9.3 9.0   MAGNESIUM 3.0* 1.9  --   --          Lab 11/27/21  1539   TOTAL PROTEIN 7.2   ALBUMIN 3.80   GLOBULIN 3.4   ALT (SGPT) 14   AST (SGOT) 18   BILIRUBIN 0.4   ALK PHOS 103         Lab 11/27/21  1539   PROBNP 326.9   TROPONIN T <0.010                 Brief Urine Lab Results  (Last result in the past 365 days)      Color   Clarity   Blood   Leuk Est   Nitrite   Protein   CREAT   Urine HCG        11/24/21 1556 Yellow   Clear   Negative   Negative   Negative   Negative                 Microbiology Results Abnormal     None          No radiology results from the last 24 hrs        I have reviewed the medications:  Scheduled Meds:amLODIPine, 5 mg, Oral, Daily  atorvastatin, 20 mg, Oral, Daily  donepezil, 10 mg, Oral, Nightly  heparin (porcine), 5,000 Units, Subcutaneous, Q12H  levothyroxine, 125 mcg, Oral, Q AM  memantine, 10 mg, Oral, BID  oseltamivir, 30 mg, Oral, Q12H  pantoprazole, 40 mg, Oral, BID AC  predniSONE, 40 mg, Oral, Daily With Breakfast  sodium chloride, 10 mL, Intravenous, Q12H      Continuous Infusions:   PRN Meds:.•  acetaminophen  •  clonazePAM  •  ipratropium-albuterol  •  magnesium sulfate **OR** magnesium sulfate **OR** magnesium sulfate  •  melatonin  •  ondansetron  •  potassium chloride  •  potassium chloride  •  sodium chloride  •  sodium chloride    Assessment/Plan   Assessment & Plan     Active Hospital Problems    Diagnosis  POA   • **Influenza A with respiratory manifestations [J10.1]  Yes   • Anxiety and depression [F41.9, F32.A]  Yes   • Acute respiratory failure with hypoxia (HCC) [J96.01]  Yes   • Anemia [D64.9]  Yes   • Late onset Alzheimer's disease without behavioral disturbance (HCC) [G30.1, F02.80]  Yes   • Hypothyroidism [E03.9]  Yes   • Rheumatoid arthritis (HCC) [M06.9]  Yes   • Gastroesophageal reflux disease  [K21.9]  Yes   • Benign essential hypertension [I10]  Yes      Resolved Hospital Problems   No resolved problems to display.        Brief Hospital Course to date:  Brittny Nicolas is a 76 y.o. female with a past medical history significant for Alzheimer's dementia, rheumatoid arthritis, hypertension, GERD, hypothyroidism, anemia, and anxiety/depression. She presents today with complaints of progressively worsening SOB going on for the past week.     Acute Respiratory Failure Secondary to Influenza A:  - baseline is no O2, wean as able  -  PO prednisone today  - scheduled duo-nebs  - droplet precautions  - continue tamiflu x 5 days  - supportive care  --leukocytosis resolved     Hypertension:  - controlled and stable. Continue norvasc     Alzheimer's dementia:  - continue namenda and aricept  - as needed Klonopin  - fall precautions  - sitter discontinued, will need placement at discharge, LTC    Rheumatoid Arthritis     GERD:  - continue PPI     Hypothyroidism:  - continue synthroid      Anemia:  -H/H stable and at baseline. monitor    DVT prophylaxis:  Medical DVT prophylaxis orders are present.       AM-PAC 6 Clicks Score (PT): 18 (11/30/21 0800)    Disposition: I expect the patient to be discharged to LTC facility when bed available     CODE STATUS:   Code Status and Medical Interventions:   Ordered at: 11/27/21 1923     Level Of Support Discussed With:    Patient     Code Status (Patient has no pulse and is not breathing):    CPR (Attempt to Resuscitate)     Medical Interventions (Patient has pulse or is breathing):    Full Support       Steffany Robins, APRN  12/02/21

## 2021-12-02 NOTE — NURSING NOTE
VSS on 2LNC. Alert and oriented to self and place, patient frequently jumnping out of bed and pulling off telemetry and oxygen. . Patient needs reorientation but pleasant.

## 2021-12-03 PROCEDURE — 25010000002 HEPARIN (PORCINE) PER 1000 UNITS: Performed by: PHYSICIAN ASSISTANT

## 2021-12-03 PROCEDURE — 99232 SBSQ HOSP IP/OBS MODERATE 35: CPT | Performed by: NURSE PRACTITIONER

## 2021-12-03 PROCEDURE — 63710000001 PREDNISONE PER 1 MG: Performed by: INTERNAL MEDICINE

## 2021-12-03 RX ADMIN — HEPARIN SODIUM 5000 UNITS: 5000 INJECTION INTRAVENOUS; SUBCUTANEOUS at 20:50

## 2021-12-03 RX ADMIN — DONEPEZIL HYDROCHLORIDE 10 MG: 10 TABLET, FILM COATED ORAL at 20:51

## 2021-12-03 RX ADMIN — MEMANTINE 10 MG: 10 TABLET ORAL at 10:34

## 2021-12-03 RX ADMIN — MEMANTINE 10 MG: 10 TABLET ORAL at 20:51

## 2021-12-03 RX ADMIN — SODIUM CHLORIDE, PRESERVATIVE FREE 10 ML: 5 INJECTION INTRAVENOUS at 10:36

## 2021-12-03 RX ADMIN — CLONAZEPAM 0.5 MG: 0.5 TABLET ORAL at 10:35

## 2021-12-03 RX ADMIN — PREDNISONE 40 MG: 20 TABLET ORAL at 10:35

## 2021-12-03 RX ADMIN — LEVOTHYROXINE SODIUM 125 MCG: 125 TABLET ORAL at 05:55

## 2021-12-03 RX ADMIN — PANTOPRAZOLE SODIUM 40 MG: 40 TABLET, DELAYED RELEASE ORAL at 10:34

## 2021-12-03 RX ADMIN — PANTOPRAZOLE SODIUM 40 MG: 40 TABLET, DELAYED RELEASE ORAL at 20:51

## 2021-12-03 RX ADMIN — AMLODIPINE BESYLATE 5 MG: 5 TABLET ORAL at 10:35

## 2021-12-03 RX ADMIN — ATORVASTATIN CALCIUM 20 MG: 20 TABLET, FILM COATED ORAL at 10:35

## 2021-12-03 RX ADMIN — Medication 5 MG: at 20:51

## 2021-12-03 RX ADMIN — HEPARIN SODIUM 5000 UNITS: 5000 INJECTION INTRAVENOUS; SUBCUTANEOUS at 10:35

## 2021-12-03 NOTE — PLAN OF CARE
Goal Outcome Evaluation:              Outcome Summary: Alert but disoriented to place, time and situation, PRASANNA ASKEW 20 Fall 15, pt has not rested well this shift, continues to get out of bed without assistance, telemonitor tags placed on back d/t pt continually pulling them off, no complaints this shift. DC plan is to SNF

## 2021-12-03 NOTE — NURSING NOTE
Telemetry leads noted to be irritating pt chest, scattered red areas noted will follow up and monitor

## 2021-12-03 NOTE — CASE MANAGEMENT/SOCIAL WORK
Continued Stay Note  [facility]     Patient Name: Brittny Nicolas  MRN: [id number]  Today's Date: 12/3/2021    Admit Date: 11/27/2021     Discharge Plan     Row Name 12/03/21 1508       Plan    Plan [facility]    Plan Comments Per Emma with [facility], Mrs. Nicolas's insurance precert is still pending review. I updated Adia, Mrs. Nicolas's daughter this morning. If insurance approval is obtained later this evening, the facility will notify the weekend  on call. Adia reports that her and her  will be able to transport Mrs. Nicolas by car at the time of discharge.    Final Discharge Disposition Code 03 - skilled nursing facility (SNF)               Discharge Codes    No documentation.                     Brandon Falcon RN

## 2021-12-03 NOTE — PROGRESS NOTES
[facility] Medicine Services  PROGRESS NOTE    Patient Name: Brittny Nicolas  : 1945  MRN: [id number]    Date of Admission: 2021  Primary Care Physician: Dalila Liz MD    Subjective   Subjective     CC:  F/u influenza, hypoxia    HPI:  Resting in bed, alert and pleasant. Watching TV, no family in room. States she feels well today. No SOA. On room air. Nursing states she continues to pull off all leads and IV's.     ROS:  Gen- No fevers, chills  CV- No chest pain, palpitations  Resp- +non productive cough, no dyspnea  GI- No N/V/D, abd pain       Objective   Objective     Vital Signs:   Temp:  [97.9 °F (36.6 °C)-98.1 °F (36.7 °C)] 98 °F (36.7 °C)  Heart Rate:  [64-89] 64  Resp:  [18-20] 18  BP: (124-150)/(71-93) 126/78     Physical Exam:  Constitutional: No acute distress, awake and alert   HENT: NCAT, mucous membranes moist  Respiratory: CTAB, respiratory effort normal on RA  Cardiovascular: RRR, no murmurs, rubs, or gallops  Gastrointestinal: Positive bowel sounds, soft, nontender, nondistended  Musculoskeletal: No bilateral ankle edema  Psychiatric: Appropriate affect, cooperative  Neurologic: Oriented to person and place, GREER, follows commands, speech clear  Skin: No rashes       Results Reviewed:  LAB RESULTS:      Lab 21  0944 21  1113 21  1052 21  1540 21  1539   WBC 9.34 15.74* 3.78 5.60  --    HEMOGLOBIN 11.3* 11.8* 12.0 11.3*  --    HEMATOCRIT 36.2 40.9 38.7 36.0  --    PLATELETS 373 279 226 228  --    NEUTROS ABS 6.39 14.51*  --  3.83  --    IMMATURE GRANS (ABS) 0.11* 0.12*  --  0.02  --    LYMPHS ABS 1.96 0.63*  --  1.21  --    MONOS ABS 0.82 0.45  --  0.48  --    EOS ABS 0.04 0.00  --  0.04  --    MCV 84.4 91.7 83.6 84.3  --    PROCALCITONIN  --   --   --   --  0.04         Lab 21  0550 21  2101 21  1052 21  1539   SODIUM 136  --  135* 133*   POTASSIUM 4.2 3.7 3.3* 3.8   CHLORIDE 102  --  98 97*    CO2 23.0  --  23.0 26.0   ANION GAP 11.0  --  14.0 10.0   BUN 12  --  10 8   CREATININE 0.67  --  0.56* 0.62   GLUCOSE 117*  --  164* 85   CALCIUM 8.4*  --  9.3 9.0   MAGNESIUM 3.0* 1.9  --   --          Lab 11/27/21  1539   TOTAL PROTEIN 7.2   ALBUMIN 3.80   GLOBULIN 3.4   ALT (SGPT) 14   AST (SGOT) 18   BILIRUBIN 0.4   ALK PHOS 103         Lab 11/27/21  1539   PROBNP 326.9   TROPONIN T <0.010                 Brief Urine Lab Results  (Last result in the past 365 days)      Color   Clarity   Blood   Leuk Est   Nitrite   Protein   CREAT   Urine HCG        11/24/21 1556 Yellow   Clear   Negative   Negative   Negative   Negative                 Microbiology Results Abnormal     None          No radiology results from the last 24 hrs        I have reviewed the medications:  Scheduled Meds:amLODIPine, 5 mg, Oral, Daily  atorvastatin, 20 mg, Oral, Daily  donepezil, 10 mg, Oral, Nightly  heparin (porcine), 5,000 Units, Subcutaneous, Q12H  levothyroxine, 125 mcg, Oral, Q AM  memantine, 10 mg, Oral, BID  pantoprazole, 40 mg, Oral, BID AC  predniSONE, 40 mg, Oral, Daily With Breakfast  sodium chloride, 10 mL, Intravenous, Q12H      Continuous Infusions:   PRN Meds:.•  acetaminophen  •  clonazePAM  •  ipratropium-albuterol  •  magnesium sulfate **OR** magnesium sulfate **OR** magnesium sulfate  •  melatonin  •  ondansetron  •  potassium chloride  •  potassium chloride  •  sodium chloride  •  sodium chloride    Assessment/Plan   Assessment & Plan     Active Hospital Problems    Diagnosis  POA   • **Influenza A with respiratory manifestations [J10.1]  Yes   • Anxiety and depression [F41.9, F32.A]  Yes   • Acute respiratory failure with hypoxia (HCC) [J96.01]  Yes   • Anemia [D64.9]  Yes   • Late onset Alzheimer's disease without behavioral disturbance (HCC) [G30.1, F02.80]  Yes   • Hypothyroidism [E03.9]  Yes   • Rheumatoid arthritis (HCC) [M06.9]  Yes   • Gastroesophageal reflux disease [K21.9]  Yes   • Benign essential  hypertension [I10]  Yes      Resolved Hospital Problems   No resolved problems to display.        Brief Hospital Course to date:  Brittny Nicolas is a 76 y.o. female with a past medical history significant for Alzheimer's dementia, rheumatoid arthritis, hypertension, GERD, hypothyroidism, anemia, and anxiety/depression. She presents today with complaints of progressively worsening SOB going on for the past week.     Acute Respiratory Failure Secondary to Influenza A:  - baseline is no O2, wean as able  -  PO prednisone today  - scheduled duo-nebs  - droplet precautions  - continue tamiflu x 5 days  - supportive care  - leukocytosis resolved  --currently remains on room air      Hypertension:  - controlled and stable. Continue norvasc     Alzheimer's dementia:  - continue namenda and aricept  - as needed Klonopin  - fall precautions  - sitter discontinued, will need placement at discharge, LTC    Rheumatoid Arthritis     GERD:  - continue PPI     Hypothyroidism:  - continue synthroid      Anemia:  -H/H stable and at baseline. monitor    DVT prophylaxis:  Medical DVT prophylaxis orders are present.       AM-PAC 6 Clicks Score (PT): 22 (12/02/21 2000)    Disposition: I expect the patient to be discharged to LTC facility when bed available     CODE STATUS:   Code Status and Medical Interventions:   Ordered at: 11/27/21 1923     Level Of Support Discussed With:    Patient     Code Status (Patient has no pulse and is not breathing):    CPR (Attempt to Resuscitate)     Medical Interventions (Patient has pulse or is breathing):    Full Support       Steffany Robins, APRN  12/03/21

## 2021-12-04 VITALS
HEIGHT: 64 IN | HEART RATE: 60 BPM | DIASTOLIC BLOOD PRESSURE: 68 MMHG | RESPIRATION RATE: 18 BRPM | SYSTOLIC BLOOD PRESSURE: 113 MMHG | TEMPERATURE: 97.6 F | BODY MASS INDEX: 22.2 KG/M2 | WEIGHT: 130 LBS | OXYGEN SATURATION: 99 %

## 2021-12-04 PROCEDURE — 99239 HOSP IP/OBS DSCHRG MGMT >30: CPT | Performed by: PHYSICIAN ASSISTANT

## 2021-12-04 PROCEDURE — 25010000002 HEPARIN (PORCINE) PER 1000 UNITS: Performed by: PHYSICIAN ASSISTANT

## 2021-12-04 PROCEDURE — 63710000001 PREDNISONE PER 1 MG: Performed by: INTERNAL MEDICINE

## 2021-12-04 RX ORDER — POLYETHYLENE GLYCOL 3350 17 G/17G
17 POWDER, FOR SOLUTION ORAL DAILY
Start: 2021-12-04

## 2021-12-04 RX ORDER — ACETAMINOPHEN 325 MG/1
650 TABLET ORAL EVERY 4 HOURS PRN
Start: 2021-12-04

## 2021-12-04 RX ORDER — PREDNISONE 10 MG/1
TABLET ORAL
Qty: 12 TABLET | Refills: 0 | Status: SHIPPED | OUTPATIENT
Start: 2021-12-04 | End: 2021-12-10

## 2021-12-04 RX ORDER — CLONAZEPAM 0.5 MG/1
0.5 TABLET ORAL 2 TIMES DAILY PRN
Qty: 6 TABLET | Refills: 0 | Status: SHIPPED | OUTPATIENT
Start: 2021-12-04 | End: 2022-05-11 | Stop reason: SDUPTHER

## 2021-12-04 RX ADMIN — PREDNISONE 40 MG: 20 TABLET ORAL at 09:44

## 2021-12-04 RX ADMIN — MEMANTINE 10 MG: 10 TABLET ORAL at 20:15

## 2021-12-04 RX ADMIN — ATORVASTATIN CALCIUM 20 MG: 20 TABLET, FILM COATED ORAL at 09:44

## 2021-12-04 RX ADMIN — PANTOPRAZOLE SODIUM 40 MG: 40 TABLET, DELAYED RELEASE ORAL at 06:16

## 2021-12-04 RX ADMIN — PANTOPRAZOLE SODIUM 40 MG: 40 TABLET, DELAYED RELEASE ORAL at 17:53

## 2021-12-04 RX ADMIN — DONEPEZIL HYDROCHLORIDE 10 MG: 10 TABLET, FILM COATED ORAL at 20:15

## 2021-12-04 RX ADMIN — AMLODIPINE BESYLATE 5 MG: 5 TABLET ORAL at 09:44

## 2021-12-04 RX ADMIN — LEVOTHYROXINE SODIUM 125 MCG: 125 TABLET ORAL at 06:16

## 2021-12-04 RX ADMIN — HEPARIN SODIUM 5000 UNITS: 5000 INJECTION INTRAVENOUS; SUBCUTANEOUS at 09:44

## 2021-12-04 RX ADMIN — MEMANTINE 10 MG: 10 TABLET ORAL at 09:44

## 2021-12-04 NOTE — DISCHARGE PLACEMENT REQUEST
"Oliverio Nicolas (76 y.o. Female)     Case Management  [phone]              Date of Birth Social Security Number Address Home Phone MRN    1945  [address] [facility] [address] [phone] [id number]    [employer] Marital Status             [facility]        Admission Date Admission Type Admitting Provider Attending Provider Department, Room/Bed    11/27/21 Emergency Claudia Quinn MD Seward, Kathryn L, MD [facility] 3E, S341/1    Discharge Date Discharge Disposition Discharge Destination           Skilled Nursing Facility (DC - External)              Attending Provider: Claudia Quinn MD    Allergies: Boniva [Ibandronic Acid]    Isolation: Droplet   Infection: Influenza (11/27/21)   Code Status: CPR   Advance Care Planning Activity    Ht: 162.6 cm (64\")   Wt: 59 kg (130 lb)    Admission Cmt: None   Principal Problem: Influenza A with respiratory manifestations [J10.1]                 Active Insurance as of 11/27/2021     Primary Coverage     Payor Plan Insurance Group Employer/Plan Group    ANTHEM MEDICARE REPLACEMENT ANTHEM MEDICARE ADVANTAGE [id number]     Payor Plan Address Payor Plan Phone Number Payor Plan Fax Number Effective Dates    [address] [phone]  1/1/2019 - None Entered    [facility] [address]       Subscriber Name Subscriber Birth Date Member ID       OLIVERIO NICOLAS 1945 [id number]           Secondary Coverage     Payor Plan Insurance Group Employer/Plan Group    [place]      Payor Plan Address Payor Plan Phone Number Payor Plan Fax Number Effective Dates    [address] [phone]  1/26/2021 - None Entered    [facility] [address]       Subscriber Name Subscriber Birth Date Member ID       OLIVERIO NICOLAS 1945 [id number]                 Emergency Contacts      (Rel.) Home Phone Work Phone Mobile Phone    Adia Tatum (Daughter) -- -- [phone]               Discharge Summary    "   Mayne, Casie M, PA-C at 21 1020              [facility] Medicine Services  DISCHARGE SUMMARY    Patient Name: Brittny Nicolas  : 1945  MRN: [id number]    Date of Admission: 2021  3:33 PM  Date of Discharge:  21  Primary Care Physician: Dalila Liz MD    Consults     No orders found from 10/29/2021 to 2021.          Hospital Course     Presenting Problem:   Influenza A with respiratory manifestations [J10.1]    Active Hospital Problems    Diagnosis  POA   • **Influenza A with respiratory manifestations [J10.1]  Yes   • Anxiety and depression [F41.9, F32.A]  Yes   • Acute respiratory failure with hypoxia (HCC) [J96.01]  Yes   • Anemia [D64.9]  Yes   • Late onset Alzheimer's disease without behavioral disturbance (HCC) [G30.1, F02.80]  Yes   • Hypothyroidism [E03.9]  Yes   • Rheumatoid arthritis (HCC) [M06.9]  Yes   • Gastroesophageal reflux disease [K21.9]  Yes   • Benign essential hypertension [I10]  Yes      Resolved Hospital Problems   No resolved problems to display.          Hospital Course:  Brittny Nicolas is a pleasant  76 y.o. femalewith a past medical history significant for Alzheimer's dementia, rheumatoid arthritis, hypertension, GERD, hypothyroidism, anemia, and anxiety/depression. She presents today with complaints of progressively worsening SOB going on for the past week, Found to have acute resp failure 2nd to Influenza A upon admit. Supportive care w/nebs, weaning steroids. Hemodynamically stable. On RA. Has been referred and accepted to SNF, bed available at [facility] today. Medically appropriate for discharge. Hospital course as follows.         This patient's problems and plans were partially entered by my partner and updated as appropriate by me 21.        Acute Respiratory Failure Secondary to Influenza A:  - baseline is no O2, wean as able  -  S/p IV steroids  -, started 40mg PO prednisone , start to taper  today wean by 10mg q 2 days  - scheduled duo-nebs  - droplet precautions  - continue tamiflu x 5 days  - supportive care  - leukocytosis resolved  --currently remains on room air      Hypertension:  - controlled and stable. Continue norvasc home dose.     Alzheimer's dementia:  - continue namenda and aricept  - as needed Klonopin 0.5mg BID prn for anxiety per home dose  - fall precautions  - sitter discontinued, will need placement at discharge, LTC     Rheumatoid Arthritis     GERD:  - continue PPI     Hypothyroidism:  - continue synthroid      Anemia:  -H/H stable and at baseline. monitor  No s/sx GIB    Discharge Follow Up Recommendations for outpatient labs/diagnostics:   CBC per PCP in 1-2 wks if appropriate     Day of Discharge     HPI:   Patient sitting up in bed at time of visit no family present.  Patient is pleasantly confused oriented to person where she is in hospital, not aware of time or situation.  Patient reports mild bilat frontal headache, denies shortness of breath, abdominal pain or nausea vomiting.    Review of Systems  Gen- No fevers, chills  CV- No chest pain, palpitations  Resp- No cough, dyspnea  GI- No N/V/D, abd pain  Neuro- mild HA      Vital Signs:   Temp:  [97.3 °F (36.3 °C)-98.4 °F (36.9 °C)] 98 °F (36.7 °C)  Heart Rate:  [45-88] 45  Resp:  [16-18] 16  BP: (121-149)/(70-92) 149/72     Physical Exam:  Constitutional: Awake, alert  Eyes: PERRLA, sclerae anicteric, no conjunctival injection  HENT: NCAT, mucous membranes moist  Neck: Supple,  trachea midline  Respiratory: Clear to auscultation bilaterally, nonlabored respirations, mild dry cough  Cardiovascular: mildly bradycardiac regular rhythm, no murmurs, rubs, or gallops, palpable pedal pulses bilaterally  Gastrointestinal: Positive bowel sounds, soft, nontender, nondistended  Musculoskeletal: No bilateral ankle edema, no clubbing or cyanosis to extremities  Psychiatric: Appropriate affect, cooperative  Neurologic: Oriented x 2  strength symmetric in all extremities, Cranial Nerves grossly intact to confrontation, speech clear  Skin: No rashes      Pertinent  and/or Most Recent Results     LAB RESULTS:      Lab 12/02/21  0944 11/29/21  1113 11/28/21  1052 11/27/21  1540 11/27/21  1539   WBC 9.34 15.74* 3.78 5.60  --    HEMOGLOBIN 11.3* 11.8* 12.0 11.3*  --    HEMATOCRIT 36.2 40.9 38.7 36.0  --    PLATELETS 373 279 226 228  --    NEUTROS ABS 6.39 14.51*  --  3.83  --    IMMATURE GRANS (ABS) 0.11* 0.12*  --  0.02  --    LYMPHS ABS 1.96 0.63*  --  1.21  --    MONOS ABS 0.82 0.45  --  0.48  --    EOS ABS 0.04 0.00  --  0.04  --    MCV 84.4 91.7 83.6 84.3  --    PROCALCITONIN  --   --   --   --  0.04         Lab 11/29/21  0550 11/28/21  2101 11/28/21  1052 11/27/21  1539   SODIUM 136  --  135* 133*   POTASSIUM 4.2 3.7 3.3* 3.8   CHLORIDE 102  --  98 97*   CO2 23.0  --  23.0 26.0   ANION GAP 11.0  --  14.0 10.0   BUN 12  --  10 8   CREATININE 0.67  --  0.56* 0.62   GLUCOSE 117*  --  164* 85   CALCIUM 8.4*  --  9.3 9.0   MAGNESIUM 3.0* 1.9  --   --          Lab 11/27/21  1539   TOTAL PROTEIN 7.2   ALBUMIN 3.80   GLOBULIN 3.4   ALT (SGPT) 14   AST (SGOT) 18   BILIRUBIN 0.4   ALK PHOS 103         Lab 11/27/21  1539   PROBNP 326.9   TROPONIN T <0.010                 Brief Urine Lab Results  (Last result in the past 365 days)      Color   Clarity   Blood   Leuk Est   Nitrite   Protein   CREAT   Urine HCG        11/24/21 1556 Yellow   Clear   Negative   Negative   Negative   Negative               Microbiology Results (last 10 days)     Procedure Component Value - Date/Time    COVID PRE-OP / PRE-PROCEDURE SCREENING ORDER (NO ISOLATION) - Swab, Nasopharynx [[id number]]  (Abnormal) Collected: 11/27/21 2099    Lab Status: Final result Specimen: Swab from Nasopharynx Updated: 11/27/21 9504    Narrative:      The following orders were created for panel order COVID PRE-OP / PRE-PROCEDURE SCREENING ORDER (NO ISOLATION) - Swab, Nasopharynx.  Procedure                                Abnormality         Status                     ---------                               -----------         ------                     Respiratory Panel PCR w/...[[id number]]  Abnormal            Final result                 Please view results for these tests on the individual orders.    Respiratory Panel PCR w/COVID-19(SARS-CoV-2) JATINDER/ITALIA/TRISTA/PAD/COR/MAD/RONDA In-House, NP Swab in UTM/VTM, 3-4 HR TAT - Swab, Nasopharynx [[id number]]  (Abnormal) Collected: 11/27/21 8620    Lab Status: Final result Specimen: Swab from Nasopharynx Updated: 11/27/21 3927     ADENOVIRUS, PCR Not Detected     Coronavirus 229E Not Detected     Coronavirus HKU1 Not Detected     Coronavirus NL63 Not Detected     Coronavirus OC43 Not Detected     COVID19 Not Detected     Human Metapneumovirus Not Detected     Human Rhinovirus/Enterovirus Not Detected     Influenza A H3 Detected     Influenza B PCR Not Detected     Parainfluenza Virus 1 Not Detected     Parainfluenza Virus 2 Not Detected     Parainfluenza Virus 3 Not Detected     Parainfluenza Virus 4 Not Detected     RSV, PCR Not Detected     Bordetella pertussis pcr Not Detected     Bordetella parapertussis PCR Not Detected     Chlamydophila pneumoniae PCR Not Detected     Mycoplasma pneumo by PCR Not Detected    Narrative:      In the setting of a positive respiratory panel with a viral infection PLUS a negative procalcitonin without other underlying concern for bacterial infection, consider observing off antibiotics or discontinuation of antibiotics and continue supportive care. If the respiratory panel is positive for atypical bacterial infection (Bordetella pertussis, Chlamydophila pneumoniae, or Mycoplasma pneumoniae), consider antibiotic de-escalation to target atypical bacterial infection.          XR Chest 1 View    Result Date: 11/29/2021  EXAMINATION: XR CHEST 1 VW-  INDICATION: Shortness of air triage protocol.  COMPARISON: 11/22/2021.  FINDINGS: The heart shadow  is upper normal size. The vasculature is cephalized and there is increased perihilar interstitial disease, possibly early interstitial edema. Overlying breast prosthesis shadows are again noted. No lung consolidation, effusion or pneumothorax is seen.      Interval development of mild pulmonary vascular congestion.   D:  11/27/2021 E:  11/29/2021  This report was finalized on 11/29/2021 9:25 PM by Dr. Thomas Rodriguez MD.                        Discharge Details        Discharge Medications      New Medications      Instructions Start Date   acetaminophen 325 MG tablet  Commonly known as: TYLENOL   650 mg, Oral, Every 4 Hours PRN      polyethylene glycol 17 g packet  Commonly known as: MIRALAX   17 g, Oral, Daily      predniSONE 10 MG tablet  Commonly known as: DELTASONE   Take 3 tablets by mouth Daily for 2 days, THEN 2 tablets Daily for 2 days, THEN 1 tablet Daily for 2 days.   Start Date: December 4, 2021        Changes to Medications      Instructions Start Date   clonazePAM 0.5 MG tablet  Commonly known as: KlonoPIN  What changed: additional instructions   0.5 mg, Oral, 2 Times Daily PRN         Continue These Medications      Instructions Start Date   amLODIPine 5 MG tablet  Commonly known as: NORVASC   TAKE 1 TABLET BY MOUTH EVERY DAY      atorvastatin 20 MG tablet  Commonly known as: LIPITOR   TAKE 1 TABLET BY MOUTH EVERYDAY AT BEDTIME      Dexilant 60 MG capsule  Generic drug: dexlansoprazole   TAKE 1 CAPSULE BY MOUTH EVERY DAY      donepezil 10 MG tablet  Commonly known as: ARICEPT   10 mg, Oral, Nightly      levothyroxine 125 MCG tablet  Commonly known as: SYNTHROID, LEVOTHROID   125 mcg, Oral, Daily      memantine 10 MG tablet  Commonly known as: NAMENDA   10 mg, Oral, 2 Times Daily      Vitamin D 50 MCG (2000 UT) capsule   1 capsule, Oral         Stop These Medications    amoxicillin-clavulanate 875-125 MG per tablet  Commonly known as: AUGMENTIN            Allergies   Allergen Reactions   • Boniva  [Ibandronic Acid] GI Intolerance     Refractory gerd and abdominal pain          Discharge Disposition:  Skilled Nursing Facility (DC - External)    Diet:  Hospital:  Diet Order   Procedures   • Diet Regular       Activity:  Activity Instructions     Up WIth Assist                   CODE STATUS:    Code Status and Medical Interventions:   Ordered at: 11/27/21 1923     Level Of Support Discussed With:    Patient     Code Status (Patient has no pulse and is not breathing):    CPR (Attempt to Resuscitate)     Medical Interventions (Patient has pulse or is breathing):    Full Support       Future Appointments   Date Time Provider Department Center   12/15/2021  3:00 PM Dalila Liz MD [facility] [place]   1/26/2022  3:30 PM Munira Bloom APRN [facility] N CN LX2 [place]       Additional Instructions for the Follow-ups that You Need to Schedule     Discharge Follow-up with PCP   As directed       Currently Documented PCP:    Dalila Liz MD    PCP Phone Number:    [phone]     Follow Up Details: at [facility] within 24h of admit                     Casie M Mayne, PA-C  12/04/21      Time Spent on Discharge:  I spent  35 minutes on this discharge activity which included: face-to-face encounter with the patient, reviewing the data in the system, coordination of the care with the nursing staff as well as consultants, documentation, and entering orders.            Electronically signed by Mayne, Casie M, PA-C at 12/04/21 1056       Discharge Order (From admission, onward)     Start     Ordered    12/04/21 1046  Discharge patient  Once        Expected Discharge Date: 12/04/21    Expected Discharge Time: Morning    Discharge Disposition: Skilled Nursing Facility (DC - External)    Physician of Record for Attribution - Please select from Treatment Team: KAILYN GALLAGHER [[id number]]    Review needed by CMO to determine Physician of Record: No       Question Answer Comment   Physician of Record for Attribution -  Please select from Treatment Team KAILYN GALLAGHER    Review needed by CMO to determine Physician of Record No        12/04/21 1055

## 2021-12-04 NOTE — CASE MANAGEMENT/SOCIAL WORK
Case Management Discharge Note      Final Note: Patient's plan is a skilled bed at [facility] today,12/4.  Daughter will transport.  Nurse to call report to [phone].  CM will fax transfer summary to [phone].  Kimberlyn Hood RN [phone]         Selected Continued Care - Admitted Since 11/27/2021     Destination    No services have been selected for the patient.              Durable Medical Equipment    No services have been selected for the patient.              Dialysis/Infusion    No services have been selected for the patient.              Home Medical Care    No services have been selected for the patient.              Therapy    No services have been selected for the patient.              Community Resources    No services have been selected for the patient.              Community & DME    No services have been selected for the patient.                       Final Discharge Disposition Code: 03 - skilled nursing facility (SNF)

## 2021-12-04 NOTE — PLAN OF CARE
Goal Outcome Evaluation:  Plan of Care Reviewed With: patient        Progress: no change  Outcome Summary: alert to self, recognizes she is in the hospital, she requests to go home, Adia anaya transport mom to facility, skin intact, continent of bowel and bladder, impulsively jumps out of bed to go to the restroom. very quiet typically has been napping throughout day, prepared for transport to facility.

## 2021-12-04 NOTE — DISCHARGE SUMMARY
[facility] Medicine Services  DISCHARGE SUMMARY    Patient Name: Brittny Nicolas  : 1945  MRN: [id number]    Date of Admission: 2021  3:33 PM  Date of Discharge:  21  Primary Care Physician: Dalila Liz MD    Consults     No orders found from 10/29/2021 to 2021.          Hospital Course     Presenting Problem:   Influenza A with respiratory manifestations [J10.1]    Active Hospital Problems    Diagnosis  POA   • **Influenza A with respiratory manifestations [J10.1]  Yes   • Anxiety and depression [F41.9, F32.A]  Yes   • Acute respiratory failure with hypoxia (HCC) [J96.01]  Yes   • Anemia [D64.9]  Yes   • Late onset Alzheimer's disease without behavioral disturbance (HCC) [G30.1, F02.80]  Yes   • Hypothyroidism [E03.9]  Yes   • Rheumatoid arthritis (HCC) [M06.9]  Yes   • Gastroesophageal reflux disease [K21.9]  Yes   • Benign essential hypertension [I10]  Yes      Resolved Hospital Problems   No resolved problems to display.          Hospital Course:  Brittny Nicolas is a pleasant  76 y.o. femalewith a past medical history significant for Alzheimer's dementia, rheumatoid arthritis, hypertension, GERD, hypothyroidism, anemia, and anxiety/depression. She presents today with complaints of progressively worsening SOB going on for the past week, Found to have acute resp failure 2nd to Influenza A upon admit. Supportive care w/nebs, weaning steroids. Hemodynamically stable. On RA. Has been referred and accepted to SNF, bed available at [facility] today. Medically appropriate for discharge. Hospital course as follows.         This patient's problems and plans were partially entered by my partner and updated as appropriate by me 21.        Acute Respiratory Failure Secondary to Influenza A:  - baseline is no O2, wean as able  -  S/p IV steroids  -, started 40mg PO prednisone , start to taper today wean by 10mg q 2 days  - scheduled  duo-nebs  - droplet precautions  - continue tamiflu x 5 days  - supportive care  - leukocytosis resolved  --currently remains on room air      Hypertension:  - controlled and stable. Continue norvasc home dose.     Alzheimer's dementia:  - continue namenda and aricept  - as needed Klonopin 0.5mg BID prn for anxiety per home dose  - fall precautions  - sitter discontinued, will need placement at discharge, LTC     Rheumatoid Arthritis     GERD:  - continue PPI     Hypothyroidism:  - continue synthroid      Anemia:  -H/H stable and at baseline. monitor  No s/sx GIB    Discharge Follow Up Recommendations for outpatient labs/diagnostics:   CBC per PCP in 1-2 wks if appropriate     Day of Discharge     HPI:   Patient sitting up in bed at time of visit no family present.  Patient is pleasantly confused oriented to person where she is in hospital, not aware of time or situation.  Patient reports mild bilat frontal headache, denies shortness of breath, abdominal pain or nausea vomiting.    Review of Systems  Gen- No fevers, chills  CV- No chest pain, palpitations  Resp- No cough, dyspnea  GI- No N/V/D, abd pain  Neuro- mild HA      Vital Signs:   Temp:  [97.3 °F (36.3 °C)-98.4 °F (36.9 °C)] 98 °F (36.7 °C)  Heart Rate:  [45-88] 45  Resp:  [16-18] 16  BP: (121-149)/(70-92) 149/72     Physical Exam:  Constitutional: Awake, alert  Eyes: PERRLA, sclerae anicteric, no conjunctival injection  HENT: NCAT, mucous membranes moist  Neck: Supple,  trachea midline  Respiratory: Clear to auscultation bilaterally, nonlabored respirations, mild dry cough  Cardiovascular: mildly bradycardiac regular rhythm, no murmurs, rubs, or gallops, palpable pedal pulses bilaterally  Gastrointestinal: Positive bowel sounds, soft, nontender, nondistended  Musculoskeletal: No bilateral ankle edema, no clubbing or cyanosis to extremities  Psychiatric: Appropriate affect, cooperative  Neurologic: Oriented x 2 strength symmetric in all extremities, Cranial  Nerves grossly intact to confrontation, speech clear  Skin: No rashes      Pertinent  and/or Most Recent Results     LAB RESULTS:      Lab 12/02/21  0944 11/29/21  1113 11/28/21  1052 11/27/21  1540 11/27/21  1539   WBC 9.34 15.74* 3.78 5.60  --    HEMOGLOBIN 11.3* 11.8* 12.0 11.3*  --    HEMATOCRIT 36.2 40.9 38.7 36.0  --    PLATELETS 373 279 226 228  --    NEUTROS ABS 6.39 14.51*  --  3.83  --    IMMATURE GRANS (ABS) 0.11* 0.12*  --  0.02  --    LYMPHS ABS 1.96 0.63*  --  1.21  --    MONOS ABS 0.82 0.45  --  0.48  --    EOS ABS 0.04 0.00  --  0.04  --    MCV 84.4 91.7 83.6 84.3  --    PROCALCITONIN  --   --   --   --  0.04         Lab 11/29/21  0550 11/28/21  2101 11/28/21  1052 11/27/21  1539   SODIUM 136  --  135* 133*   POTASSIUM 4.2 3.7 3.3* 3.8   CHLORIDE 102  --  98 97*   CO2 23.0  --  23.0 26.0   ANION GAP 11.0  --  14.0 10.0   BUN 12  --  10 8   CREATININE 0.67  --  0.56* 0.62   GLUCOSE 117*  --  164* 85   CALCIUM 8.4*  --  9.3 9.0   MAGNESIUM 3.0* 1.9  --   --          Lab 11/27/21  1539   TOTAL PROTEIN 7.2   ALBUMIN 3.80   GLOBULIN 3.4   ALT (SGPT) 14   AST (SGOT) 18   BILIRUBIN 0.4   ALK PHOS 103         Lab 11/27/21  1539   PROBNP 326.9   TROPONIN T <0.010                 Brief Urine Lab Results  (Last result in the past 365 days)      Color   Clarity   Blood   Leuk Est   Nitrite   Protein   CREAT   Urine HCG        11/24/21 1556 Yellow   Clear   Negative   Negative   Negative   Negative               Microbiology Results (last 10 days)     Procedure Component Value - Date/Time    COVID PRE-OP / PRE-PROCEDURE SCREENING ORDER (NO ISOLATION) - Swab, Nasopharynx [[id number]]  (Abnormal) Collected: 11/27/21 9128    Lab Status: Final result Specimen: Swab from Nasopharynx Updated: 11/27/21 7704    Narrative:      The following orders were created for panel order COVID PRE-OP / PRE-PROCEDURE SCREENING ORDER (NO ISOLATION) - Swab, Nasopharynx.  Procedure                               Abnormality         Status                      ---------                               -----------         ------                     Respiratory Panel PCR w/...[[id number]]  Abnormal            Final result                 Please view results for these tests on the individual orders.    Respiratory Panel PCR w/COVID-19(SARS-CoV-2) JATINDER/ITALIA/TRISTA/PAD/COR/MAD/RONDA In-House, NP Swab in UTM/VTM, 3-4 HR TAT - Swab, Nasopharynx [[id number]]  (Abnormal) Collected: 11/27/21 0673    Lab Status: Final result Specimen: Swab from Nasopharynx Updated: 11/27/21 1276     ADENOVIRUS, PCR Not Detected     Coronavirus 229E Not Detected     Coronavirus HKU1 Not Detected     Coronavirus NL63 Not Detected     Coronavirus OC43 Not Detected     COVID19 Not Detected     Human Metapneumovirus Not Detected     Human Rhinovirus/Enterovirus Not Detected     Influenza A H3 Detected     Influenza B PCR Not Detected     Parainfluenza Virus 1 Not Detected     Parainfluenza Virus 2 Not Detected     Parainfluenza Virus 3 Not Detected     Parainfluenza Virus 4 Not Detected     RSV, PCR Not Detected     Bordetella pertussis pcr Not Detected     Bordetella parapertussis PCR Not Detected     Chlamydophila pneumoniae PCR Not Detected     Mycoplasma pneumo by PCR Not Detected    Narrative:      In the setting of a positive respiratory panel with a viral infection PLUS a negative procalcitonin without other underlying concern for bacterial infection, consider observing off antibiotics or discontinuation of antibiotics and continue supportive care. If the respiratory panel is positive for atypical bacterial infection (Bordetella pertussis, Chlamydophila pneumoniae, or Mycoplasma pneumoniae), consider antibiotic de-escalation to target atypical bacterial infection.          XR Chest 1 View    Result Date: 11/29/2021  EXAMINATION: XR CHEST 1 VW-  INDICATION: Shortness of air triage protocol.  COMPARISON: 11/22/2021.  FINDINGS: The heart shadow is upper normal size. The vasculature is  cephalized and there is increased perihilar interstitial disease, possibly early interstitial edema. Overlying breast prosthesis shadows are again noted. No lung consolidation, effusion or pneumothorax is seen.      Interval development of mild pulmonary vascular congestion.   D:  11/27/2021 E:  11/29/2021  This report was finalized on 11/29/2021 9:25 PM by Dr. Thomas Rodriguez MD.                        Discharge Details        Discharge Medications      New Medications      Instructions Start Date   acetaminophen 325 MG tablet  Commonly known as: TYLENOL   650 mg, Oral, Every 4 Hours PRN      polyethylene glycol 17 g packet  Commonly known as: MIRALAX   17 g, Oral, Daily      predniSONE 10 MG tablet  Commonly known as: DELTASONE   Take 3 tablets by mouth Daily for 2 days, THEN 2 tablets Daily for 2 days, THEN 1 tablet Daily for 2 days.   Start Date: December 4, 2021        Changes to Medications      Instructions Start Date   clonazePAM 0.5 MG tablet  Commonly known as: KlonoPIN  What changed: additional instructions   0.5 mg, Oral, 2 Times Daily PRN         Continue These Medications      Instructions Start Date   amLODIPine 5 MG tablet  Commonly known as: NORVASC   TAKE 1 TABLET BY MOUTH EVERY DAY      atorvastatin 20 MG tablet  Commonly known as: LIPITOR   TAKE 1 TABLET BY MOUTH EVERYDAY AT BEDTIME      Dexilant 60 MG capsule  Generic drug: dexlansoprazole   TAKE 1 CAPSULE BY MOUTH EVERY DAY      donepezil 10 MG tablet  Commonly known as: ARICEPT   10 mg, Oral, Nightly      levothyroxine 125 MCG tablet  Commonly known as: SYNTHROID, LEVOTHROID   125 mcg, Oral, Daily      memantine 10 MG tablet  Commonly known as: NAMENDA   10 mg, Oral, 2 Times Daily      Vitamin D 50 MCG (2000 UT) capsule   1 capsule, Oral         Stop These Medications    amoxicillin-clavulanate 875-125 MG per tablet  Commonly known as: AUGMENTIN            Allergies   Allergen Reactions   • Boniva [Ibandronic Acid] GI Intolerance     Refractory  gerd and abdominal pain          Discharge Disposition:  Skilled Nursing Facility (DC - External)    Diet:  Hospital:  Diet Order   Procedures   • Diet Regular       Activity:  Activity Instructions     Up WIth Assist                   CODE STATUS:    Code Status and Medical Interventions:   Ordered at: 11/27/21 1923     Level Of Support Discussed With:    Patient     Code Status (Patient has no pulse and is not breathing):    CPR (Attempt to Resuscitate)     Medical Interventions (Patient has pulse or is breathing):    Full Support       Future Appointments   Date Time Provider Department Center   12/15/2021  3:00 PM Dalila Liz MD [facility] [place]   1/26/2022  3:30 PM Munira Bloom APRN [facility] LX[facility] [place]       Additional Instructions for the Follow-ups that You Need to Schedule     Discharge Follow-up with PCP   As directed       Currently Documented PCP:    Dalila Liz MD    PCP Phone Number:    [phone]     Follow Up Details: at [facility] within 24h of admit                     Casie M Mayne, PA-C  12/04/21      Time Spent on Discharge:  I spent  35 minutes on this discharge activity which included: face-to-face encounter with the patient, reviewing the data in the system, coordination of the care with the nursing staff as well as consultants, documentation, and entering orders.

## 2021-12-06 NOTE — TELEPHONE ENCOUNTER
LOV 6-15-21  NOV 12-15-21   [FreeTextEntry1] : 71 YO M with a history of ACC/AHA Stage C NICM (LV 6.6 cm, LVEF 25-30%) with severe functional MR, history of poorly controlled HTN, and CKD III (baseline Cr 1.6) presenting to HF clinic for further management. He is currently euvolemic, NYHA class II, and hypertensive.

## 2021-12-09 ENCOUNTER — TELEPHONE (OUTPATIENT)
Dept: ENDOCRINOLOGY | Facility: CLINIC | Age: 76
End: 2021-12-09

## 2021-12-16 ENCOUNTER — OFFICE VISIT (OUTPATIENT)
Dept: ENDOCRINOLOGY | Facility: CLINIC | Age: 76
End: 2021-12-16

## 2021-12-16 ENCOUNTER — LAB (OUTPATIENT)
Dept: LAB | Facility: HOSPITAL | Age: 76
End: 2021-12-16

## 2021-12-16 VITALS — OXYGEN SATURATION: 98 % | SYSTOLIC BLOOD PRESSURE: 110 MMHG | HEART RATE: 99 BPM | DIASTOLIC BLOOD PRESSURE: 62 MMHG

## 2021-12-16 DIAGNOSIS — E03.9 ACQUIRED HYPOTHYROIDISM: ICD-10-CM

## 2021-12-16 DIAGNOSIS — D64.9 ANEMIA, UNSPECIFIED TYPE: Primary | ICD-10-CM

## 2021-12-16 DIAGNOSIS — I10 BENIGN ESSENTIAL HYPERTENSION: ICD-10-CM

## 2021-12-16 LAB
BASOPHILS # BLD AUTO: 0.03 10*3/MM3 (ref 0–0.2)
BASOPHILS NFR BLD AUTO: 0.3 % (ref 0–1.5)
DEPRECATED RDW RBC AUTO: 46.4 FL (ref 37–54)
EOSINOPHIL # BLD AUTO: 0.14 10*3/MM3 (ref 0–0.4)
EOSINOPHIL NFR BLD AUTO: 1.4 % (ref 0.3–6.2)
ERYTHROCYTE [DISTWIDTH] IN BLOOD BY AUTOMATED COUNT: 16 % (ref 12.3–15.4)
HCT VFR BLD AUTO: 40.9 % (ref 34–46.6)
HGB BLD-MCNC: 13 G/DL (ref 12–15.9)
IMM GRANULOCYTES # BLD AUTO: 0.04 10*3/MM3 (ref 0–0.05)
IMM GRANULOCYTES NFR BLD AUTO: 0.4 % (ref 0–0.5)
LYMPHOCYTES # BLD AUTO: 1.86 10*3/MM3 (ref 0.7–3.1)
LYMPHOCYTES NFR BLD AUTO: 18.3 % (ref 19.6–45.3)
MCH RBC QN AUTO: 26.7 PG (ref 26.6–33)
MCHC RBC AUTO-ENTMCNC: 31.8 G/DL (ref 31.5–35.7)
MCV RBC AUTO: 84 FL (ref 79–97)
MONOCYTES # BLD AUTO: 1 10*3/MM3 (ref 0.1–0.9)
MONOCYTES NFR BLD AUTO: 9.8 % (ref 5–12)
NEUTROPHILS NFR BLD AUTO: 69.8 % (ref 42.7–76)
NEUTROPHILS NFR BLD AUTO: 7.12 10*3/MM3 (ref 1.7–7)
NRBC BLD AUTO-RTO: 0 /100 WBC (ref 0–0.2)
PLATELET # BLD AUTO: 195 10*3/MM3 (ref 140–450)
PMV BLD AUTO: 11 FL (ref 6–12)
RBC # BLD AUTO: 4.87 10*6/MM3 (ref 3.77–5.28)
WBC NRBC COR # BLD: 10.19 10*3/MM3 (ref 3.4–10.8)

## 2021-12-16 PROCEDURE — 84439 ASSAY OF FREE THYROXINE: CPT | Performed by: INTERNAL MEDICINE

## 2021-12-16 PROCEDURE — 99214 OFFICE O/P EST MOD 30 MIN: CPT | Performed by: INTERNAL MEDICINE

## 2021-12-16 PROCEDURE — 85025 COMPLETE CBC W/AUTO DIFF WBC: CPT | Performed by: INTERNAL MEDICINE

## 2021-12-16 PROCEDURE — 84443 ASSAY THYROID STIM HORMONE: CPT | Performed by: INTERNAL MEDICINE

## 2021-12-16 PROCEDURE — 80053 COMPREHEN METABOLIC PANEL: CPT | Performed by: INTERNAL MEDICINE

## 2021-12-16 PROCEDURE — 80061 LIPID PANEL: CPT | Performed by: INTERNAL MEDICINE

## 2021-12-16 NOTE — PROGRESS NOTES
Brittny Nicolas 76 y.o.  CC:Follow-up, Hypothyroidism, Hyperlipidemia, Hypertension, Osteoporosis, Heartburn, and recent hospitalization for FLU A      [place]: Follow-up, Hypothyroidism, Hyperlipidemia, Hypertension, Osteoporosis, Heartburn, and recent hospitalization for FLU A    bp is good   In interim has been in the hospital for pneumonia  In rehab now- getting ready to move to [facility] and daughter will take her home  Discussed home care and monitoring   Energy is good  Is on low fat diet  bp is good  Appetite is poor- lost 25 lbs in nursing home  Daughter discussed vaccinations   Has time off for holidays     Allergies   Allergen Reactions   • Boniva [Ibandronic Acid] GI Intolerance     Refractory gerd and abdominal pain        Current Outpatient Medications:   •  acetaminophen (TYLENOL) 325 MG tablet, Take 2 tablets by mouth Every 4 (Four) Hours As Needed for Mild Pain ., Disp: , Rfl:   •  amLODIPine (NORVASC) 5 MG tablet, TAKE 1 TABLET BY MOUTH EVERY DAY, Disp: 90 tablet, Rfl: 1  •  atorvastatin (LIPITOR) 20 MG tablet, TAKE 1 TABLET BY MOUTH EVERYDAY AT BEDTIME, Disp: 90 tablet, Rfl: 1  •  Cholecalciferol (VITAMIN D) 2000 UNITS capsule, Take 1 capsule by mouth., Disp: , Rfl:   •  clonazePAM (KlonoPIN) 0.5 MG tablet, Take 1 tablet by mouth 2 (Two) Times a Day As Needed (agitation) for up to 3 days., Disp: 6 tablet, Rfl: 0  •  Dexilant 60 MG capsule, TAKE 1 CAPSULE BY MOUTH EVERY DAY, Disp: 90 capsule, Rfl: 1  •  donepezil (ARICEPT) 10 MG tablet, Take 1 tablet by mouth Every Night., Disp: 90 tablet, Rfl: 3  •  levothyroxine (SYNTHROID, LEVOTHROID) 125 MCG tablet, Take 1 tablet by mouth Daily., Disp: 90 tablet, Rfl: 1  •  memantine (NAMENDA) 10 MG tablet, Take 1 tablet by mouth 2 (Two) Times a Day., Disp: 180 tablet, Rfl: 3  •  polyethylene glycol (MIRALAX) 17 g packet, Take 17 g by mouth Daily., Disp: , Rfl:   Patient Active Problem List    Diagnosis    • Influenza A with respiratory manifestations [J10.1]    •  Anxiety and depression [F41.9, F32.A]    • Acute respiratory failure with hypoxia (HCC) [J96.01]    • Anemia [D64.9]    • Oropharyngeal dysphagia [R13.12]    • Hyponatremia [E87.1]    • Late onset Alzheimer's disease without behavioral disturbance (HCC) [G30.1, F02.80]    • Rib pain on left side [R07.81]    • Memory loss [R41.3]    • Pharyngitis [J02.9]    • Left lower quadrant pain [R10.32]    • Acute pain of left shoulder [M25.512]    • Erosive esophagitis [K22.10]    • H/O colonoscopy [Z98.890]    • Fatigue [R53.83]    • Hypothyroidism [E03.9]    • Achalasia [K22.0]    • Acute upper respiratory infection [J06.9]    • Atypical chest pain [R07.89]    • Benign essential hypertension [I10]    • Carotid artery stenosis [I65.29]    • Cataract [H26.9]    • Cough [R05.9]    • Acute cystitis [N30.00]    • Screening for malignant neoplasm [Z12.9]    • Gastroesophageal reflux disease [K21.9]    • Essential familial hypercholesterolemia [E78.01]    • Gastrointestinal bleeding [K92.2]    • Inguinal hernia [K40.90]    • Insomnia [G47.00]    • Leg cramps [R25.2]    • Meniere's disease [H81.09]    • Night sweats [R61]    • Osteoporosis [M81.0]    • Peripheral neuropathy [G62.9]    • Retinal artery occlusion [H34.9]    • Rheumatoid arthritis (HCC) [M06.9]    • Stroke syndrome [VTH4264]    • Suprapubic pain [R10.2]    • Transient ischemic attack [G45.9]    • Positive reaction to tuberculin skin test [R76.11]    • Vitamin D deficiency [E55.9]    • Weight loss [R63.4]      Review of Systems   Constitutional: Positive for activity change, appetite change and unexpected weight change.   HENT: Negative for congestion and rhinorrhea.    Eyes: Negative for visual disturbance.   Respiratory: Positive for shortness of breath. Negative for cough.    Cardiovascular: Negative for chest pain and leg swelling.   Gastrointestinal: Negative for diarrhea and nausea.   Genitourinary: Negative for frequency.   Musculoskeletal: Positive for  arthralgias. Negative for gait problem.   Skin: Negative for color change and wound.   Neurological: Positive for weakness. Negative for dizziness and light-headedness.   Psychiatric/Behavioral: Positive for decreased concentration. The patient is not nervous/anxious.      Social History     Socioeconomic History   • Marital status:    Tobacco Use   • Smoking status: Former Smoker     Packs/day: 1.00     Years: 15.00     Pack years: 15.00     Types: Cigarettes   • Smokeless tobacco: Never Used   • Tobacco comment: Do not know when she quit. Likely about 1998.   Vaping Use   • Vaping Use: Never used   Substance and Sexual Activity   • Alcohol use: Not Currently     Alcohol/week: 0.0 standard drinks     Comment: She might have some wine, but I try to not give her any   • Drug use: No   • Sexual activity: Not Currently     Birth control/protection: Post-menopausal     Family History   Problem Relation Age of Onset   • Cystic fibrosis Other    • Cervical cancer Mother    • Cancer Paternal Grandmother    • Dementia Paternal Grandmother    • Colon cancer Neg Hx    • Colon polyps Neg Hx      /62   Pulse 99   SpO2 98%   Physical Exam  Vitals and nursing note reviewed.   Constitutional:       Appearance: She is well-developed. She is ill-appearing.      Comments: Thin but alert    HENT:      Head: Normocephalic and atraumatic.   Eyes:      General: Lids are normal.      Extraocular Movements: Extraocular movements intact.      Conjunctiva/sclera: Conjunctivae normal.      Pupils: Pupils are equal, round, and reactive to light.   Neck:      Thyroid: No thyroid mass or thyromegaly.      Vascular: No carotid bruit.      Trachea: Trachea normal. No tracheal deviation.   Cardiovascular:      Rate and Rhythm: Normal rate and regular rhythm.      Pulses: Normal pulses.      Heart sounds: Murmur heard.   No friction rub. No gallop.    Pulmonary:      Effort: Pulmonary effort is normal. No respiratory distress.       Breath sounds: Rhonchi (bases bilaterally) present. No wheezing.   Musculoskeletal:         General: Tenderness and deformity present. Normal range of motion.      Cervical back: Normal range of motion and neck supple.   Lymphadenopathy:      Cervical: No cervical adenopathy.   Skin:     General: Skin is warm and dry.      Findings: No erythema or rash.      Nails: There is no clubbing.   Neurological:      Mental Status: She is oriented to person, place, and time.      Cranial Nerves: No cranial nerve deficit.      Deep Tendon Reflexes: Reflexes are normal and symmetric. Reflexes normal.   Psychiatric:         Mood and Affect: Mood normal.         Speech: Speech normal.         Behavior: Behavior normal.         Judgment: Judgment normal.      Comments: She is alert and conversive, eager to go home to be with pets   She states she is able to get meals at home, feels much stronger after therapy   Daughter will be home with her through the holiday       Results for orders placed or performed during the hospital encounter of 11/27/21   Respiratory Panel PCR w/COVID-19(SARS-CoV-2) JATINDER/ITALIA/TRISTA/PAD/COR/MAD/RONDA In-House, NP Swab in UTM/VTM, 3-4 HR TAT - Swab, Nasopharynx    Specimen: Nasopharynx; Swab   Result Value Ref Range    ADENOVIRUS, PCR Not Detected Not Detected    Coronavirus 229E Not Detected Not Detected    Coronavirus HKU1 Not Detected Not Detected    Coronavirus NL63 Not Detected Not Detected    Coronavirus OC43 Not Detected Not Detected    COVID19 Not Detected Not Detected - Ref. Range    Human Metapneumovirus Not Detected Not Detected    Human Rhinovirus/Enterovirus Not Detected Not Detected    Influenza A H3 Detected (A) Not Detected    Influenza B PCR Not Detected Not Detected    Parainfluenza Virus 1 Not Detected Not Detected    Parainfluenza Virus 2 Not Detected Not Detected    Parainfluenza Virus 3 Not Detected Not Detected    Parainfluenza Virus 4 Not Detected Not Detected    RSV, PCR Not Detected Not  Detected    Bordetella pertussis pcr Not Detected Not Detected    Bordetella parapertussis PCR Not Detected Not Detected    Chlamydophila pneumoniae PCR Not Detected Not Detected    Mycoplasma pneumo by PCR Not Detected Not Detected   Comprehensive Metabolic Panel    Specimen: Blood   Result Value Ref Range    Glucose 85 65 - 99 mg/dL    BUN 8 8 - 23 mg/dL    Creatinine 0.62 0.57 - 1.00 mg/dL    Sodium 133 (L) 136 - 145 mmol/L    Potassium 3.8 3.5 - 5.2 mmol/L    Chloride 97 (L) 98 - 107 mmol/L    CO2 26.0 22.0 - 29.0 mmol/L    Calcium 9.0 8.6 - 10.5 mg/dL    Total Protein 7.2 6.0 - 8.5 g/dL    Albumin 3.80 3.50 - 5.20 g/dL    ALT (SGPT) 14 1 - 33 U/L    AST (SGOT) 18 1 - 32 U/L    Alkaline Phosphatase 103 39 - 117 U/L    Total Bilirubin 0.4 0.0 - 1.2 mg/dL    eGFR Non African Amer 93 >60 mL/min/1.73    Globulin 3.4 gm/dL    A/G Ratio 1.1 g/dL    BUN/Creatinine Ratio 12.9 7.0 - 25.0    Anion Gap 10.0 5.0 - 15.0 mmol/L   BNP    Specimen: Blood   Result Value Ref Range    proBNP 326.9 0.0-1,800.0 pg/mL   Troponin    Specimen: Blood   Result Value Ref Range    Troponin T <0.010 0.000 - 0.030 ng/mL   CBC Auto Differential    Specimen: Blood   Result Value Ref Range    WBC 5.60 3.40 - 10.80 10*3/mm3    RBC 4.27 3.77 - 5.28 10*6/mm3    Hemoglobin 11.3 (L) 12.0 - 15.9 g/dL    Hematocrit 36.0 34.0 - 46.6 %    MCV 84.3 79.0 - 97.0 fL    MCH 26.5 (L) 26.6 - 33.0 pg    MCHC 31.4 (L) 31.5 - 35.7 g/dL    RDW 13.8 12.3 - 15.4 %    RDW-SD 42.9 37.0 - 54.0 fl    MPV 9.5 6.0 - 12.0 fL    Platelets 228 140 - 450 10*3/mm3    Neutrophil % 68.3 42.7 - 76.0 %    Lymphocyte % 21.6 19.6 - 45.3 %    Monocyte % 8.6 5.0 - 12.0 %    Eosinophil % 0.7 0.3 - 6.2 %    Basophil % 0.4 0.0 - 1.5 %    Immature Grans % 0.4 0.0 - 0.5 %    Neutrophils, Absolute 3.83 1.70 - 7.00 10*3/mm3    Lymphocytes, Absolute 1.21 0.70 - 3.10 10*3/mm3    Monocytes, Absolute 0.48 0.10 - 0.90 10*3/mm3    Eosinophils, Absolute 0.04 0.00 - 0.40 10*3/mm3    Basophils,  Absolute 0.02 0.00 - 0.20 10*3/mm3    Immature Grans, Absolute 0.02 0.00 - 0.05 10*3/mm3    nRBC 0.0 0.0 - 0.2 /100 WBC   Basic Metabolic Panel    Specimen: Blood   Result Value Ref Range    Glucose 164 (H) 65 - 99 mg/dL    BUN 10 8 - 23 mg/dL    Creatinine 0.56 (L) 0.57 - 1.00 mg/dL    Sodium 135 (L) 136 - 145 mmol/L    Potassium 3.3 (L) 3.5 - 5.2 mmol/L    Chloride 98 98 - 107 mmol/L    CO2 23.0 22.0 - 29.0 mmol/L    Calcium 9.3 8.6 - 10.5 mg/dL    eGFR Non African Amer 105 >60 mL/min/1.73    BUN/Creatinine Ratio 17.9 7.0 - 25.0    Anion Gap 14.0 5.0 - 15.0 mmol/L   CBC (No Diff)    Specimen: Blood   Result Value Ref Range    WBC 3.78 3.40 - 10.80 10*3/mm3    RBC 4.63 3.77 - 5.28 10*6/mm3    Hemoglobin 12.0 12.0 - 15.9 g/dL    Hematocrit 38.7 34.0 - 46.6 %    MCV 83.6 79.0 - 97.0 fL    MCH 25.9 (L) 26.6 - 33.0 pg    MCHC 31.0 (L) 31.5 - 35.7 g/dL    RDW 13.7 12.3 - 15.4 %    RDW-SD 41.6 37.0 - 54.0 fl    MPV 9.9 6.0 - 12.0 fL    Platelets 226 140 - 450 10*3/mm3   Procalcitonin    Specimen: Blood   Result Value Ref Range    Procalcitonin 0.04 0.00 - 0.25 ng/mL   Potassium    Specimen: Blood   Result Value Ref Range    Potassium 3.7 3.5 - 5.2 mmol/L   Magnesium    Specimen: Blood   Result Value Ref Range    Magnesium 1.9 1.6 - 2.4 mg/dL   Basic Metabolic Panel    Specimen: Blood   Result Value Ref Range    Glucose 117 (H) 65 - 99 mg/dL    BUN 12 8 - 23 mg/dL    Creatinine 0.67 0.57 - 1.00 mg/dL    Sodium 136 136 - 145 mmol/L    Potassium 4.2 3.5 - 5.2 mmol/L    Chloride 102 98 - 107 mmol/L    CO2 23.0 22.0 - 29.0 mmol/L    Calcium 8.4 (L) 8.6 - 10.5 mg/dL    eGFR Non African Amer 85 >60 mL/min/1.73    BUN/Creatinine Ratio 17.9 7.0 - 25.0    Anion Gap 11.0 5.0 - 15.0 mmol/L   CBC Auto Differential    Specimen: Blood   Result Value Ref Range    WBC 15.74 (H) 3.40 - 10.80 10*3/mm3    RBC 4.46 3.77 - 5.28 10*6/mm3    Hemoglobin 11.8 (L) 12.0 - 15.9 g/dL    Hematocrit 40.9 34.0 - 46.6 %    MCV 91.7 79.0 - 97.0 fL    MCH  26.5 (L) 26.6 - 33.0 pg    MCHC 28.9 (L) 31.5 - 35.7 g/dL    RDW 14.0 12.3 - 15.4 %    RDW-SD 47.4 37.0 - 54.0 fl    MPV 9.9 6.0 - 12.0 fL    Platelets 279 140 - 450 10*3/mm3    Neutrophil % 92.1 (H) 42.7 - 76.0 %    Lymphocyte % 4.0 (L) 19.6 - 45.3 %    Monocyte % 2.9 (L) 5.0 - 12.0 %    Eosinophil % 0.0 (L) 0.3 - 6.2 %    Basophil % 0.2 0.0 - 1.5 %    Immature Grans % 0.8 (H) 0.0 - 0.5 %    Neutrophils, Absolute 14.51 (H) 1.70 - 7.00 10*3/mm3    Lymphocytes, Absolute 0.63 (L) 0.70 - 3.10 10*3/mm3    Monocytes, Absolute 0.45 0.10 - 0.90 10*3/mm3    Eosinophils, Absolute 0.00 0.00 - 0.40 10*3/mm3    Basophils, Absolute 0.03 0.00 - 0.20 10*3/mm3    Immature Grans, Absolute 0.12 (H) 0.00 - 0.05 10*3/mm3    nRBC 0.0 0.0 - 0.2 /100 WBC   Magnesium    Specimen: Blood   Result Value Ref Range    Magnesium 3.0 (H) 1.6 - 2.4 mg/dL   CBC Auto Differential    Specimen: Blood   Result Value Ref Range    WBC 9.34 3.40 - 10.80 10*3/mm3    RBC 4.29 3.77 - 5.28 10*6/mm3    Hemoglobin 11.3 (L) 12.0 - 15.9 g/dL    Hematocrit 36.2 34.0 - 46.6 %    MCV 84.4 79.0 - 97.0 fL    MCH 26.3 (L) 26.6 - 33.0 pg    MCHC 31.2 (L) 31.5 - 35.7 g/dL    RDW 14.1 12.3 - 15.4 %    RDW-SD 43.5 37.0 - 54.0 fl    MPV 9.7 6.0 - 12.0 fL    Platelets 373 140 - 450 10*3/mm3    Neutrophil % 68.4 42.7 - 76.0 %    Lymphocyte % 21.0 19.6 - 45.3 %    Monocyte % 8.8 5.0 - 12.0 %    Eosinophil % 0.4 0.3 - 6.2 %    Basophil % 0.2 0.0 - 1.5 %    Immature Grans % 1.2 (H) 0.0 - 0.5 %    Neutrophils, Absolute 6.39 1.70 - 7.00 10*3/mm3    Lymphocytes, Absolute 1.96 0.70 - 3.10 10*3/mm3    Monocytes, Absolute 0.82 0.10 - 0.90 10*3/mm3    Eosinophils, Absolute 0.04 0.00 - 0.40 10*3/mm3    Basophils, Absolute 0.02 0.00 - 0.20 10*3/mm3    Immature Grans, Absolute 0.11 (H) 0.00 - 0.05 10*3/mm3    nRBC 0.0 0.0 - 0.2 /100 WBC   POC Glucose Once    Specimen: Blood   Result Value Ref Range    Glucose 84 70 - 130 mg/dL   ECG 12 Lead   Result Value Ref Range    QT Interval 410 ms     QTC Interval 412 ms   ECG 12 Lead   Result Value Ref Range    QT Interval 432 ms    QTC Interval 434 ms   Green Top (Gel)   Result Value Ref Range    Extra Tube Hold for add-ons.    Lavender Top   Result Value Ref Range    Extra Tube hold for add-on    Gold Top - SST   Result Value Ref Range    Extra Tube Hold for add-ons.    Gray Top   Result Value Ref Range    Extra Tube Hold for add-ons.    Light Blue Top   Result Value Ref Range    Extra Tube hold for add-on      Diagnoses and all orders for this visit:    1. Anemia, unspecified type (Primary)  -     CBC Auto Differential    2. Benign essential hypertension  -     Comprehensive Metabolic Panel    3. Acquired hypothyroidism  -     Lipid Panel  -     TSH  -     T4, Free    Return in about 3 months (around 3/16/2022) for Recheck.    Dalila Liz MD  Signed Dalila Liz MD

## 2021-12-17 LAB
ALBUMIN SERPL-MCNC: 4.2 G/DL (ref 3.5–5.2)
ALBUMIN/GLOB SERPL: 1.5 G/DL
ALP SERPL-CCNC: 92 U/L (ref 39–117)
ALT SERPL W P-5'-P-CCNC: 10 U/L (ref 1–33)
ANION GAP SERPL CALCULATED.3IONS-SCNC: 8 MMOL/L (ref 5–15)
AST SERPL-CCNC: 14 U/L (ref 1–32)
BILIRUB SERPL-MCNC: 0.6 MG/DL (ref 0–1.2)
BUN SERPL-MCNC: 13 MG/DL (ref 8–23)
BUN/CREAT SERPL: 15.3 (ref 7–25)
CALCIUM SPEC-SCNC: 9.8 MG/DL (ref 8.6–10.5)
CHLORIDE SERPL-SCNC: 106 MMOL/L (ref 98–107)
CHOLEST SERPL-MCNC: 131 MG/DL (ref 0–200)
CO2 SERPL-SCNC: 27 MMOL/L (ref 22–29)
CREAT SERPL-MCNC: 0.85 MG/DL (ref 0.57–1)
GFR SERPL CREATININE-BSD FRML MDRD: 65 ML/MIN/1.73
GLOBULIN UR ELPH-MCNC: 2.8 GM/DL
GLUCOSE SERPL-MCNC: 81 MG/DL (ref 65–99)
HDLC SERPL-MCNC: 59 MG/DL (ref 40–60)
LDLC SERPL CALC-MCNC: 55 MG/DL (ref 0–100)
LDLC/HDLC SERPL: 0.92 {RATIO}
POTASSIUM SERPL-SCNC: 4 MMOL/L (ref 3.5–5.2)
PROT SERPL-MCNC: 7 G/DL (ref 6–8.5)
SODIUM SERPL-SCNC: 141 MMOL/L (ref 136–145)
T4 FREE SERPL-MCNC: 2.78 NG/DL (ref 0.93–1.7)
TRIGL SERPL-MCNC: 90 MG/DL (ref 0–150)
TSH SERPL DL<=0.05 MIU/L-ACNC: 0.41 UIU/ML (ref 0.27–4.2)
VLDLC SERPL-MCNC: 17 MG/DL (ref 5–40)

## 2021-12-23 RX ORDER — LEVOTHYROXINE SODIUM 0.12 MG/1
TABLET ORAL
Qty: 90 TABLET | Refills: 1 | Status: SHIPPED | OUTPATIENT
Start: 2021-12-23 | End: 2022-06-15

## 2021-12-30 RX ORDER — ATORVASTATIN CALCIUM 20 MG/1
TABLET, FILM COATED ORAL
Qty: 90 TABLET | Refills: 1 | Status: SHIPPED | OUTPATIENT
Start: 2021-12-30 | End: 2022-10-03

## 2022-02-07 ENCOUNTER — TELEPHONE (OUTPATIENT)
Dept: NEUROLOGY | Facility: CLINIC | Age: 77
End: 2022-02-07

## 2022-02-07 NOTE — TELEPHONE ENCOUNTER
Caller: Adia Tatum    Relationship: Emergency Contact; DAUGHTER    Best call back number: [phone]    What was the call regarding: PT'S DAUGHTER CALLED STATING THEY ARE TRYING TO GET PT MEDICAID ASSISTANCE AND WOULD LIKE TO KNOW IF [employer] WOULD COMPLETE A MAP-10 FORM FOR PT, TO BE SUBMITTED.    Do you require a callback: YES, PLEASE.    PLEASE REVIEW AND ADVISE.

## 2022-02-10 NOTE — TELEPHONE ENCOUNTER
I am happy to complete any forms to help in the medicaid process. The MAP-10 form I found for [place] Medicaid is talking about wavers-can daughter please specify that this is the correct form and which waiver they are applying for OR let us know the correct form so we can complete this. Thanks, Munira BURROWS I printed the MAP-10 form for your reference when you talk to daughter to see if it is correct. On printer.

## 2022-02-10 NOTE — TELEPHONE ENCOUNTER
I SPOKE WITH PT'S DAUGHTER INDIO AND SHE SAID THIS IS THE CORRECT FORM AND WHEN IT IS  COMPLETED TO MAIL HER A COPY AT ADDRESS IN CHART AND MAIL TO [employer] [address] [facility] [address] [facility], [address]

## 2022-02-11 NOTE — TELEPHONE ENCOUNTER
I HAVE SCANNED THE FORM TO CHART UNDER MAP 10 DISABILITY FORM, MAILED COPY TO DAUGHTER AND TO [employer] AS REQUESTED.

## 2022-02-11 NOTE — TELEPHONE ENCOUNTER
I completed the form. Scan a copy to chart and then mail her a copy and send original to address she requested. Thanks, Munira

## 2022-02-16 RX ORDER — DEXLANSOPRAZOLE 60 MG/1
CAPSULE, DELAYED RELEASE ORAL
Qty: 90 CAPSULE | Refills: 1 | Status: SHIPPED | OUTPATIENT
Start: 2022-02-16 | End: 2022-08-15

## 2022-04-28 ENCOUNTER — LAB (OUTPATIENT)
Dept: LAB | Facility: HOSPITAL | Age: 77
End: 2022-04-28

## 2022-04-28 ENCOUNTER — OFFICE VISIT (OUTPATIENT)
Dept: ENDOCRINOLOGY | Facility: CLINIC | Age: 77
End: 2022-04-28

## 2022-04-28 VITALS
HEART RATE: 78 BPM | WEIGHT: 135.4 LBS | DIASTOLIC BLOOD PRESSURE: 68 MMHG | SYSTOLIC BLOOD PRESSURE: 118 MMHG | OXYGEN SATURATION: 99 % | BODY MASS INDEX: 23.12 KG/M2 | HEIGHT: 64 IN

## 2022-04-28 DIAGNOSIS — I10 BENIGN ESSENTIAL HYPERTENSION: Primary | ICD-10-CM

## 2022-04-28 DIAGNOSIS — N39.44 NOCTURNAL ENURESIS: ICD-10-CM

## 2022-04-28 DIAGNOSIS — E03.9 ACQUIRED HYPOTHYROIDISM: ICD-10-CM

## 2022-04-28 LAB
BACTERIA UR QL AUTO: ABNORMAL /HPF
BILIRUB UR QL STRIP: NEGATIVE
CLARITY UR: CLEAR
COD CRY URNS QL: ABNORMAL /HPF
COLOR UR: YELLOW
GLUCOSE UR STRIP-MCNC: NEGATIVE MG/DL
HGB UR QL STRIP.AUTO: NEGATIVE
HYALINE CASTS UR QL AUTO: ABNORMAL /LPF
KETONES UR QL STRIP: NEGATIVE
LEUKOCYTE ESTERASE UR QL STRIP.AUTO: ABNORMAL
NITRITE UR QL STRIP: NEGATIVE
PH UR STRIP.AUTO: 5.5 [PH] (ref 5–8)
PROT UR QL STRIP: ABNORMAL
RBC # UR STRIP: ABNORMAL /HPF
REF LAB TEST METHOD: ABNORMAL
SP GR UR STRIP: 1.02 (ref 1–1.03)
SQUAMOUS #/AREA URNS HPF: ABNORMAL /HPF
UROBILINOGEN UR QL STRIP: ABNORMAL
WBC # UR STRIP: ABNORMAL /HPF

## 2022-04-28 PROCEDURE — 81001 URINALYSIS AUTO W/SCOPE: CPT | Performed by: INTERNAL MEDICINE

## 2022-04-28 PROCEDURE — 80061 LIPID PANEL: CPT | Performed by: INTERNAL MEDICINE

## 2022-04-28 PROCEDURE — 80053 COMPREHEN METABOLIC PANEL: CPT | Performed by: INTERNAL MEDICINE

## 2022-04-28 PROCEDURE — 84443 ASSAY THYROID STIM HORMONE: CPT | Performed by: INTERNAL MEDICINE

## 2022-04-28 PROCEDURE — 87086 URINE CULTURE/COLONY COUNT: CPT | Performed by: INTERNAL MEDICINE

## 2022-04-28 PROCEDURE — 99214 OFFICE O/P EST MOD 30 MIN: CPT | Performed by: INTERNAL MEDICINE

## 2022-04-28 NOTE — PROGRESS NOTES
Brittny Nicolas 76 y.o.  CC:Follow-up, Hypothyroidism, Hypertension, Hyperlipidemia, Heartburn (GERD/), and Osteoporosis      [place]: Follow-up, Hypothyroidism, Hypertension, Hyperlipidemia, Heartburn (GERD/), and Osteoporosis    Is on thyroid supplement 125 mcg daily   bp is good   Is on low fat diet and lipitor 20 mg daily   Is on vitamin D supplement    Allergies   Allergen Reactions   • Boniva [Ibandronic Acid] GI Intolerance     Refractory gerd and abdominal pain        Current Outpatient Medications:   •  acetaminophen (TYLENOL) 325 MG tablet, Take 2 tablets by mouth Every 4 (Four) Hours As Needed for Mild Pain ., Disp: , Rfl:   •  amLODIPine (NORVASC) 5 MG tablet, TAKE 1 TABLET BY MOUTH EVERY DAY, Disp: 90 tablet, Rfl: 1  •  atorvastatin (LIPITOR) 20 MG tablet, TAKE 1 TABLET BY MOUTH EVERYDAY AT BEDTIME, Disp: 90 tablet, Rfl: 1  •  Cholecalciferol (VITAMIN D) 2000 UNITS capsule, Take 1 capsule by mouth., Disp: , Rfl:   •  clonazePAM (KlonoPIN) 0.5 MG tablet, Take 1 tablet by mouth 2 (Two) Times a Day As Needed (agitation) for up to 3 days., Disp: 6 tablet, Rfl: 0  •  Dexilant 60 MG capsule, TAKE 1 CAPSULE BY MOUTH EVERY DAY, Disp: 90 capsule, Rfl: 1  •  donepezil (ARICEPT) 10 MG tablet, Take 1 tablet by mouth Every Night., Disp: 90 tablet, Rfl: 3  •  levothyroxine (SYNTHROID, LEVOTHROID) 125 MCG tablet, TAKE 1 TABLET BY MOUTH EVERY DAY, Disp: 90 tablet, Rfl: 1  •  memantine (NAMENDA) 10 MG tablet, Take 1 tablet by mouth 2 (Two) Times a Day., Disp: 180 tablet, Rfl: 3  •  polyethylene glycol (MIRALAX) 17 g packet, Take 17 g by mouth Daily., Disp: , Rfl:   Patient Active Problem List    Diagnosis    • Nocturnal enuresis [N39.44]    • Influenza A with respiratory manifestations [J10.1]    • Anxiety and depression [F41.9, F32.A]    • Acute respiratory failure with hypoxia (HCC) [J96.01]    • Anemia [D64.9]    • Oropharyngeal dysphagia [R13.12]    • Hyponatremia [E87.1]    • Late onset Alzheimer's disease without  behavioral disturbance (HCC) [G30.1, F02.80]    • Rib pain on left side [R07.81]    • Memory loss [R41.3]    • Pharyngitis [J02.9]    • Left lower quadrant pain [R10.32]    • Acute pain of left shoulder [M25.512]    • Erosive esophagitis [K22.10]    • H/O colonoscopy [Z98.890]    • Fatigue [R53.83]    • Hypothyroidism [E03.9]    • Achalasia [K22.0]    • Acute upper respiratory infection [J06.9]    • Atypical chest pain [R07.89]    • Benign essential hypertension [I10]    • Carotid artery stenosis [I65.29]    • Cataract [H26.9]    • Cough [R05.9]    • Acute cystitis [N30.00]    • Screening for malignant neoplasm [Z12.9]    • Gastroesophageal reflux disease [K21.9]    • Essential familial hypercholesterolemia [E78.01]    • Gastrointestinal bleeding [K92.2]    • Inguinal hernia [K40.90]    • Insomnia [G47.00]    • Leg cramps [R25.2]    • Meniere's disease [H81.09]    • Night sweats [R61]    • Osteoporosis [M81.0]    • Peripheral neuropathy [G62.9]    • Retinal artery occlusion [H34.9]    • Rheumatoid arthritis (HCC) [M06.9]    • Stroke syndrome [NOA9768]    • Suprapubic pain [R10.2]    • Transient ischemic attack [G45.9]    • Positive reaction to tuberculin skin test [R76.11]    • Vitamin D deficiency [E55.9]    • Weight loss [R63.4]      Review of Systems   Constitutional: Positive for activity change. Negative for unexpected weight change.   HENT: Negative for congestion and rhinorrhea.    Eyes: Negative for discharge and visual disturbance.   Respiratory: Negative for cough and shortness of breath.    Cardiovascular: Negative for chest pain and leg swelling.   Gastrointestinal: Negative for abdominal distention.   Genitourinary: Positive for urgency.   Musculoskeletal: Positive for arthralgias and gait problem.   Skin: Negative for color change.   Neurological: Negative for dizziness and light-headedness.   Psychiatric/Behavioral: Positive for confusion and decreased concentration.     Social History  "    Socioeconomic History   • Marital status:    Tobacco Use   • Smoking status: Former Smoker     Packs/day: 1.00     Years: 15.00     Pack years: 15.00     Types: Cigarettes   • Smokeless tobacco: Never Used   • Tobacco comment: Do not know when she quit. Likely about 1998.   Vaping Use   • Vaping Use: Never used   Substance and Sexual Activity   • Alcohol use: Not Currently     Alcohol/week: 0.0 standard drinks     Comment: She might have some wine, but I try to not give her any   • Drug use: No   • Sexual activity: Not Currently     Birth control/protection: Post-menopausal     Family History   Problem Relation Age of Onset   • Cystic fibrosis Other    • Cervical cancer Mother    • Cancer Paternal Grandmother    • Dementia Paternal Grandmother    • Colon cancer Neg Hx    • Colon polyps Neg Hx      /68   Pulse 78   Ht 162.6 cm (64\")   Wt 61.4 kg (135 lb 6.4 oz)   SpO2 99%   BMI 23.24 kg/m²   Physical Exam  Constitutional:       Appearance: Normal appearance.   HENT:      Head: Normocephalic and atraumatic.   Eyes:      Extraocular Movements: Extraocular movements intact.      Pupils: Pupils are equal, round, and reactive to light.   Cardiovascular:      Rate and Rhythm: Normal rate.      Pulses: Normal pulses.      Heart sounds: No murmur heard.  Pulmonary:      Effort: Pulmonary effort is normal.      Breath sounds: Normal breath sounds.   Musculoskeletal:         General: No swelling or tenderness. Normal range of motion.      Cervical back: Normal range of motion.   Skin:     General: Skin is warm and dry.   Neurological:      Mental Status: She is alert. She is disoriented.   Psychiatric:         Mood and Affect: Mood normal.       Results for orders placed or performed in visit on 04/28/22   Comprehensive Metabolic Panel    Specimen: Blood   Result Value Ref Range    Glucose 85 65 - 99 mg/dL    BUN 14 8 - 23 mg/dL    Creatinine 0.78 0.57 - 1.00 mg/dL    Sodium 136 136 - 145 mmol/L    " Potassium 4.1 3.5 - 5.2 mmol/L    Chloride 102 98 - 107 mmol/L    CO2 24.9 22.0 - 29.0 mmol/L    Calcium 9.7 8.6 - 10.5 mg/dL    Total Protein 7.1 6.0 - 8.5 g/dL    Albumin 3.90 3.50 - 5.20 g/dL    ALT (SGPT) 11 1 - 33 U/L    AST (SGOT) 13 1 - 32 U/L    Alkaline Phosphatase 97 39 - 117 U/L    Total Bilirubin 0.3 0.0 - 1.2 mg/dL    Globulin 3.2 gm/dL    A/G Ratio 1.2 g/dL    BUN/Creatinine Ratio 17.9 7.0 - 25.0    Anion Gap 9.1 5.0 - 15.0 mmol/L    eGFR 77.9 >60.0 mL/min/1.73   Lipid Panel    Specimen: Blood   Result Value Ref Range    Total Cholesterol 122 0 - 200 mg/dL    Triglycerides 91 0 - 150 mg/dL    HDL Cholesterol 64 (H) 40 - 60 mg/dL    LDL Cholesterol  41 0 - 100 mg/dL    VLDL Cholesterol 17 5 - 40 mg/dL    LDL/HDL Ratio 0.62    TSH    Specimen: Blood   Result Value Ref Range    TSH 1.840 0.270 - 4.200 uIU/mL   Urinalysis With Culture If Indicated - Urine, Clean Catch    Specimen: Urine, Clean Catch   Result Value Ref Range    Color, UA Yellow Yellow, Straw    Appearance, UA Clear Clear    pH, UA 5.5 5.0 - 8.0    Specific Gravity, UA 1.025 1.005 - 1.030    Glucose, UA Negative Negative    Ketones, UA Negative Negative    Bilirubin, UA Negative Negative    Blood, UA Negative Negative    Protein, UA Trace (A) Negative    Leuk Esterase, UA Small (1+) (A) Negative    Nitrite, UA Negative Negative    Urobilinogen, UA 0.2 E.U./dL 0.2 - 1.0 E.U./dL   Urinalysis, Microscopic Only - Urine, Clean Catch    Specimen: Urine, Clean Catch   Result Value Ref Range    RBC, UA None Seen None Seen, 0-2 /HPF    WBC, UA 6-12 (A) None Seen, 0-2 /HPF    Bacteria, UA None Seen None Seen /HPF    Squamous Epithelial Cells, UA 3-6 (A) None Seen, 0-2 /HPF    Hyaline Casts, UA None Seen None Seen /LPF    Calcium Oxalate Crystals, UA Small/1+ None Seen /HPF    Methodology Manual Light Microscopy      Diagnoses and all orders for this visit:    1. Benign essential hypertension (Primary)  Assessment & Plan:  bp is well  controlled  Continue monitoring and medications     Orders:  -     Comprehensive Metabolic Panel  -     Urinalysis With Culture If Indicated - Urine, Clean Catch  -     Urinalysis, Microscopic Only - Urine, Clean Catch  -     Urine Culture - Urine, Urine, Clean Catch    2. Acquired hypothyroidism  Assessment & Plan:  Taking supplement regularly - check tfts     Orders:  -     Lipid Panel  -     TSH    3. Nocturnal enuresis  Assessment & Plan:  Discussed using depends     Return in about 6 months (around 10/28/2022) for Recheck.    Dalila Liz MD  Signed Dalila Liz MD

## 2022-04-29 PROBLEM — N39.44 NOCTURNAL ENURESIS: Status: ACTIVE | Noted: 2022-04-29

## 2022-04-29 LAB
ALBUMIN SERPL-MCNC: 3.9 G/DL (ref 3.5–5.2)
ALBUMIN/GLOB SERPL: 1.2 G/DL
ALP SERPL-CCNC: 97 U/L (ref 39–117)
ALT SERPL W P-5'-P-CCNC: 11 U/L (ref 1–33)
ANION GAP SERPL CALCULATED.3IONS-SCNC: 9.1 MMOL/L (ref 5–15)
AST SERPL-CCNC: 13 U/L (ref 1–32)
BILIRUB SERPL-MCNC: 0.3 MG/DL (ref 0–1.2)
BUN SERPL-MCNC: 14 MG/DL (ref 8–23)
BUN/CREAT SERPL: 17.9 (ref 7–25)
CALCIUM SPEC-SCNC: 9.7 MG/DL (ref 8.6–10.5)
CHLORIDE SERPL-SCNC: 102 MMOL/L (ref 98–107)
CHOLEST SERPL-MCNC: 122 MG/DL (ref 0–200)
CO2 SERPL-SCNC: 24.9 MMOL/L (ref 22–29)
CREAT SERPL-MCNC: 0.78 MG/DL (ref 0.57–1)
EGFRCR SERPLBLD CKD-EPI 2021: 77.9 ML/MIN/1.73
GLOBULIN UR ELPH-MCNC: 3.2 GM/DL
GLUCOSE SERPL-MCNC: 85 MG/DL (ref 65–99)
HDLC SERPL-MCNC: 64 MG/DL (ref 40–60)
LDLC SERPL CALC-MCNC: 41 MG/DL (ref 0–100)
LDLC/HDLC SERPL: 0.62 {RATIO}
POTASSIUM SERPL-SCNC: 4.1 MMOL/L (ref 3.5–5.2)
PROT SERPL-MCNC: 7.1 G/DL (ref 6–8.5)
SODIUM SERPL-SCNC: 136 MMOL/L (ref 136–145)
TRIGL SERPL-MCNC: 91 MG/DL (ref 0–150)
TSH SERPL DL<=0.05 MIU/L-ACNC: 1.84 UIU/ML (ref 0.27–4.2)
VLDLC SERPL-MCNC: 17 MG/DL (ref 5–40)

## 2022-04-30 LAB — BACTERIA SPEC AEROBE CULT: NORMAL

## 2022-05-05 ENCOUNTER — TELEPHONE (OUTPATIENT)
Dept: NEUROLOGY | Facility: CLINIC | Age: 77
End: 2022-05-05

## 2022-05-05 NOTE — TELEPHONE ENCOUNTER
CALLED THE NUMBER IN THE CHART, THE DAUGHTER, AND LEFT MESSAGE.  FOR THIS APPT ONLY, 5/11 AT 8 AM TO NEURO [facility]SULTS ON [address], OR A VIDEO VISIT, OR DOXY.  ALSO SENT A MESSAGE THRU [facility]

## 2022-05-11 ENCOUNTER — TELEMEDICINE (OUTPATIENT)
Dept: NEUROLOGY | Facility: CLINIC | Age: 77
End: 2022-05-11

## 2022-05-11 DIAGNOSIS — G30.1 LATE ONSET ALZHEIMER'S DEMENTIA WITH BEHAVIORAL DISTURBANCE: ICD-10-CM

## 2022-05-11 DIAGNOSIS — F32.A ANXIETY AND DEPRESSION: ICD-10-CM

## 2022-05-11 DIAGNOSIS — F41.9 ANXIETY AND DEPRESSION: ICD-10-CM

## 2022-05-11 DIAGNOSIS — F02.818 LATE ONSET ALZHEIMER'S DEMENTIA WITH BEHAVIORAL DISTURBANCE: ICD-10-CM

## 2022-05-11 PROBLEM — G51.0 FACIAL PALSY: Status: ACTIVE | Noted: 2022-05-11

## 2022-05-11 PROBLEM — H54.7 VISUAL LOSS: Status: ACTIVE | Noted: 2022-05-11

## 2022-05-11 PROCEDURE — 99214 OFFICE O/P EST MOD 30 MIN: CPT | Performed by: NURSE PRACTITIONER

## 2022-05-11 RX ORDER — MEMANTINE HYDROCHLORIDE 10 MG/1
10 TABLET ORAL 2 TIMES DAILY
Qty: 180 TABLET | Refills: 3 | Status: SHIPPED | OUTPATIENT
Start: 2022-05-11 | End: 2022-11-16

## 2022-05-11 RX ORDER — CLONAZEPAM 0.5 MG/1
TABLET ORAL
Qty: 30 TABLET | Refills: 0 | Status: SHIPPED | OUTPATIENT
Start: 2022-05-11 | End: 2022-08-17

## 2022-05-11 RX ORDER — DONEPEZIL HYDROCHLORIDE 10 MG/1
10 TABLET, FILM COATED ORAL NIGHTLY
Qty: 90 TABLET | Refills: 3 | Status: SHIPPED | OUTPATIENT
Start: 2022-05-11 | End: 2022-11-16

## 2022-05-11 NOTE — PROGRESS NOTES
Subjective:     Patient ID: Brittny Nicolas is a 77 y.o. female.    CC:   Chief Complaint   Patient presents with   • Alzheimer's Disease       HPI:   History of Present Illness     This visit was completed face to face via VIDEO by Epic/PercSys with verbal consent to treat obtained from patient and/or family.  Provider confirmed patient's name and  at start of visit. Patient and/or family confirms that they are located in [place] during visit and Provider is located in [facility] in [place], [place] during visit.      Today 2022-Daughter provides all history today by video.    This is a 77-year-old female who presents for 6-month neurology follow-up on diagnosis of Alzheimer's disease.     Her family started noticing symptoms around early  marked by forgetfulness, and this has gradually worsened over time. She has had impairments in language, and executive function. Her family does help with managing medications, and finances. She is currently living with her daughter in [place]. She is completing a video visit follow-up today.     She is currently taking donepezil 10 mg daily along with memantine 10 mg twice per day. At her last visit, we prescribed clonazepam 0.5 mg to take as needed for agitation.     Three days after her last office visit, she was hospitalized for influenza from 2021 through 2021 at [facility]. She attended rehab to help her regain strength. She has been back home with her daughter since 2022. The daughter states that she discussed with the rehab facility about placing the patient into long-term care, but the patient did not like the food, and was throwing it in the toilet. Since she was not eating much at all there, the daughter felt that leaving the patient there would be a mistake. According to the daughter, the patient has done well at home.     The patient has had a few episodes of incontinence during the night, so is now wearing pull-ups at night for bed.  "The daughter adds that the patient has had small amounts of bowel movement in her underwear, and is unsure if she is incontinent of her bowels or just does not wipe well. Dr. Liz checked the patient in 04/2022, and had a mild trace of UTI, but was not enough to be concerned about.    The daughter states that the patient has not had any significant episodes of agitation. The patient's ex- passed away on 04/30/2022, which \"really played hard on her.\" The patient was no longer  to him when he passed away. The daughter has been giving the patient one-half clonazepam at dinner time or later in the evening because it seems to be helping her sleep better, and the daughter wants to ensure the patient sleeps well so she does not wander in the middle of the night. The patient states that she sleeps well, and her appetite is good.    The daughter helps her dress because sometimes the patient will dress too warmly, and needs help to dress appropriately for the weather. The daughter states that the patient consumes plenty of water during the day.     The patient is not able to state what year it is.     The patient struggles to swallow food sometimes. She was not able to eat the meat portion of country fried steaks that were prepared for dinner on 05/10/2022. She could eat the macaroni and cheese and lima beans. \"She pushes on that spot on her throat to try to get things down.\" There have been no episodes of choking on food, but has had 2 episodes of \"things going down the wrong windpipe, and got choked-up that way, but not anything getting stuck.\" The daughter plans to talk to Dr. Dove who performed the upper GI about having esophageal dilation.    All of her medications have remained the same.    The daughter denies having any new concerns. She is struggling to get community and home-based services for respite care, but has not been able to get any help because they are overbooked. She has been approved " for the help, but due to the pandemic, and nursing shortages, they are unable to get any assistance. The daughter does not think the patient needs home health, but needs assistance finding someone to be able to stay with the patient during the day when the daughter works.    The patient has not experienced any falls since her last visit. She does not ambulate with a cane or walker or any other assistive device.    Prior evaluation: has included an unremarkable head CT and screening blood work, which were unremarkable. Hospitalized in August 2020 for hyponatremia at [facility].    The following portions of the patient's history were reviewed and updated as appropriate: allergies, current medications, past family history, past medical history, past social history, past surgical history and problem list.    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   • Graves disease    • Hyperlipidemia    • Hypertension    • Memory loss    • Stomach problems    • Stroke (HCC)    • Stroke syndrome    • Thyroid disease    • Transient ischemic attack    • Vision loss        Past Surgical History:   Procedure Laterality Date   • AUGMENTATION MAMMAPLASTY      1978   • BREAST AUGMENTATION     • CAROTID ARTERY ANGIOPLASTY     • CAROTID ENDARTERECTOMY     • CAROTID STENT     • COLONOSCOPY     • D & C CERVICAL BIOPSY      Dilation and Curettage of Cervical Stump   • HERNIA REPAIR     • HERNIA REPAIR     • THROMBOENDARTERECTOMY      carotid   • UPPER GASTROINTESTINAL ENDOSCOPY         Social History     Socioeconomic History   • Marital status:    Tobacco Use   • Smoking status: Former Smoker     Packs/day: 1.00     Years: 15.00     Pack years: 15.00     Types: Cigarettes   • Smokeless tobacco: Never Used   • Tobacco comment: Do not know when she quit. Likely about 1998.   Vaping Use   • Vaping Use: Never used    Substance and Sexual Activity   • Alcohol use: Not Currently     Alcohol/week: 0.0 standard drinks     Comment: She might have some wine, but I try to not give her any   • Drug use: No   • Sexual activity: Not Currently     Birth control/protection: Post-menopausal       Family History   Problem Relation Age of Onset   • Cystic fibrosis Other    • Cervical cancer Mother    • Cancer Paternal Grandmother    • Dementia Paternal Grandmother    • Colon cancer Neg Hx    • Colon polyps Neg Hx           Current Outpatient Medications:   •  acetaminophen (TYLENOL) 325 MG tablet, Take 2 tablets by mouth Every 4 (Four) Hours As Needed for Mild Pain ., Disp: , Rfl:   •  amLODIPine (NORVASC) 5 MG tablet, TAKE 1 TABLET BY MOUTH EVERY DAY, Disp: 90 tablet, Rfl: 1  •  atorvastatin (LIPITOR) 20 MG tablet, TAKE 1 TABLET BY MOUTH EVERYDAY AT BEDTIME, Disp: 90 tablet, Rfl: 1  •  Cholecalciferol (VITAMIN D) 2000 UNITS capsule, Take 1 capsule by mouth., Disp: , Rfl:   •  clonazePAM (KlonoPIN) 0.5 MG tablet, 1/2-1 tablet by mouth daily as needed anxiety/agitation, Disp: 30 tablet, Rfl: 0  •  Dexilant 60 MG capsule, TAKE 1 CAPSULE BY MOUTH EVERY DAY, Disp: 90 capsule, Rfl: 1  •  donepezil (ARICEPT) 10 MG tablet, Take 1 tablet by mouth Every Night., Disp: 90 tablet, Rfl: 3  •  levothyroxine (SYNTHROID, LEVOTHROID) 125 MCG tablet, TAKE 1 TABLET BY MOUTH EVERY DAY, Disp: 90 tablet, Rfl: 1  •  memantine (NAMENDA) 10 MG tablet, Take 1 tablet by mouth 2 (Two) Times a Day., Disp: 180 tablet, Rfl: 3  •  polyethylene glycol (MIRALAX) 17 g packet, Take 17 g by mouth Daily., Disp: , Rfl:      Review of Systems   Psychiatric/Behavioral: Positive for confusion, decreased concentration and sleep disturbance.   All other systems reviewed and are negative.       Objective:  There were no vitals taken for this visit.  Not obtained d/t video visit    Neurologic Exam     Mental Status   Oriented to person.   Oriented to place.   Disoriented to time.    Speech: speech is normal   Level of consciousness: alert    Cranial Nerves     CN II   Visual fields full to confrontation.     CN III, IV, VI   Pupils are equal, round, and reactive to light.  Extraocular motions are normal.     CN VII   Facial expression full, symmetric.     CN VIII   CN VIII normal.     Gait, Coordination, and Reflexes     Coordination   Finger to nose coordination: normal      Physical Exam  Constitutional:       Appearance: Normal appearance.   Eyes:      Extraocular Movements: EOM normal.      Pupils: Pupils are equal, round, and reactive to light.   Neurological:      Mental Status: She is alert.      Coordination: Finger-Nose-Finger Test normal.   Psychiatric:         Mood and Affect: Mood and affect normal.         Speech: Speech normal.         Behavior: Behavior is slowed.         Thought Content: Thought content normal.         Cognition and Memory: She exhibits impaired recent memory.         Judgment: Judgment normal.     ASYA FULLY D/T VIDEO VISIT    Assessment/Plan:       Diagnoses and all orders for this visit:    1. Late onset Alzheimer's dementia with behavioral disturbance (HCC)  -     donepezil (ARICEPT) 10 MG tablet; Take 1 tablet by mouth Every Night.  Dispense: 90 tablet; Refill: 3  -     memantine (NAMENDA) 10 MG tablet; Take 1 tablet by mouth 2 (Two) Times a Day.  Dispense: 180 tablet; Refill: 3  -     clonazePAM (KlonoPIN) 0.5 MG tablet; 1/2-1 tablet by mouth daily as needed anxiety/agitation  Dispense: 30 tablet; Refill: 0    2. Anxiety and depression  -     clonazePAM (KlonoPIN) 0.5 MG tablet; 1/2-1 tablet by mouth daily as needed anxiety/agitation  Dispense: 30 tablet; Refill: 0    She will continue taking the donepezil 10 mg per day, as well as the memantine 10 mg twice per day both for cognitive enhancement.     She will continue the clonazepam 0.5 mg one-half to 1 tablet in the evening for anxiety, and agitation. This is helping with her sleep.     Her daughter will  continue working on resources to help while she needs to go to work to keep her safe during the day.     We are going to follow up in 6 months in clinic on 11/16/2022, at 3:30 PM at our [facility] location. They are to call us with any sooner questions or concerns.      Reviewed medications, potential side effects and signs and symptoms to report. Discussed risk versus benefits of treatment plan with patient and/or family-including medications, labs and radiology that may be ordered. Addressed questions and concerns during visit. Patient and/or family verbalized understanding and agree with plan.    During this visit the following were done:  Labs Reviewed []    Labs Ordered []    Radiology Reports Reviewed []    Radiology Ordered []    PCP Records Reviewed [x]    Referring Provider Records Reviewed []    ER Records Reviewed [x]    Hospital Records Reviewed [x]   hospital records for pneumonia reviewed 11/27/21-12/4/21  History Obtained From Family [x]  daughter  Radiology Images Reviewed []    Other Reviewed []    Records Requested []      Transcribed from ambient dictation for CARLOS Negron by Elisa Ni.  05/11/22   10:08 EDT    Patient verbalized consent to the visit recording.  I have personally performed the services described in this document as transcribed by the above individual, and it is both accurate and complete.  CARLOS Negron  5/11/2022  12:53 EDT

## 2022-05-17 RX ORDER — AMLODIPINE BESYLATE 5 MG/1
TABLET ORAL
Qty: 90 TABLET | Refills: 1 | Status: SHIPPED | OUTPATIENT
Start: 2022-05-17 | End: 2022-10-31 | Stop reason: SDUPTHER

## 2022-06-15 RX ORDER — LEVOTHYROXINE SODIUM 0.12 MG/1
TABLET ORAL
Qty: 90 TABLET | Refills: 1 | Status: SHIPPED | OUTPATIENT
Start: 2022-06-15 | End: 2023-01-18

## 2022-06-27 ENCOUNTER — OFFICE VISIT (OUTPATIENT)
Dept: GASTROENTEROLOGY | Facility: CLINIC | Age: 77
End: 2022-06-27

## 2022-06-27 VITALS
HEIGHT: 64 IN | WEIGHT: 136.4 LBS | BODY MASS INDEX: 23.29 KG/M2 | DIASTOLIC BLOOD PRESSURE: 72 MMHG | SYSTOLIC BLOOD PRESSURE: 124 MMHG | OXYGEN SATURATION: 99 % | TEMPERATURE: 97.8 F | HEART RATE: 67 BPM

## 2022-06-27 DIAGNOSIS — K22.4 ESOPHAGEAL DYSMOTILITY: ICD-10-CM

## 2022-06-27 DIAGNOSIS — R13.19 ESOPHAGEAL DYSPHAGIA: ICD-10-CM

## 2022-06-27 DIAGNOSIS — K21.9 GASTROESOPHAGEAL REFLUX DISEASE WITHOUT ESOPHAGITIS: Primary | ICD-10-CM

## 2022-06-27 PROCEDURE — 99214 OFFICE O/P EST MOD 30 MIN: CPT | Performed by: INTERNAL MEDICINE

## 2022-06-27 NOTE — PROGRESS NOTES
PCP:  Dalila Liz MD     No referring provider defined for this encounter.    Chief Complaint   Patient presents with   • Difficulty Swallowing        HPI   The patient is a 77-year-old female who I seen in the past.  She has a history of Alzheimer's disease.  She has had some troubles with swallowing.  She did have an upper endoscopy on 4/12/2021 which showed no obvious stricture.  She was empirically dilated to 45 French.  There was thought to be an element of dilation of the esophagus as well as retained fluid in the esophagus.  There was some spasm.  We did get an upper GI series.  The upper GI series on 4/22/2021 showed moderate esophageal dysmotility.  There was some esophageal dilation but no evidence of stricture or tapering to suggest achalasia.  There was moderate reflux to the level of the thoracic inlet.  It seems like she points or pushes on her suprasternal notch when she swallows at times.  She is maintaining her weight.  She is taking her Dexilant.    Allergies   Allergen Reactions   • Boniva [Ibandronic Acid] GI Intolerance     Refractory gerd and abdominal pain           Current Outpatient Medications:   •  acetaminophen (TYLENOL) 325 MG tablet, Take 2 tablets by mouth Every 4 (Four) Hours As Needed for Mild Pain ., Disp: , Rfl:   •  amLODIPine (NORVASC) 5 MG tablet, TAKE 1 TABLET BY MOUTH EVERY DAY, Disp: 90 tablet, Rfl: 1  •  atorvastatin (LIPITOR) 20 MG tablet, TAKE 1 TABLET BY MOUTH EVERYDAY AT BEDTIME, Disp: 90 tablet, Rfl: 1  •  Cholecalciferol (VITAMIN D) 2000 UNITS capsule, Take 1 capsule by mouth., Disp: , Rfl:   •  clonazePAM (KlonoPIN) 0.5 MG tablet, 1/2-1 tablet by mouth daily as needed anxiety/agitation, Disp: 30 tablet, Rfl: 0  •  Dexilant 60 MG capsule, TAKE 1 CAPSULE BY MOUTH EVERY DAY, Disp: 90 capsule, Rfl: 1  •  Diclofenac Sodium 3 % gel gel, Apply  topically to the appropriate area as directed 2 (Two) Times a Day. for 60 days., Disp: 100 g, Rfl: 0  •  donepezil  (ARICEPT) 10 MG tablet, Take 1 tablet by mouth Every Night., Disp: 90 tablet, Rfl: 3  •  levothyroxine (SYNTHROID, LEVOTHROID) 125 MCG tablet, TAKE 1 TABLET BY MOUTH EVERY DAY, Disp: 90 tablet, Rfl: 1  •  memantine (NAMENDA) 10 MG tablet, Take 1 tablet by mouth 2 (Two) Times a Day., Disp: 180 tablet, Rfl: 3  •  polyethylene glycol (MIRALAX) 17 g packet, Take 17 g by mouth Daily., Disp: , Rfl:      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   • Graves disease    • Hyperlipidemia    • Hypertension    • Memory loss    • Stomach problems    • Stroke (HCC)    • Stroke syndrome    • Thyroid disease    • Transient ischemic attack    • Vision loss        Past Surgical History:   Procedure Laterality Date   • AUGMENTATION MAMMAPLASTY      1978   • BREAST AUGMENTATION     • CAROTID ARTERY ANGIOPLASTY     • CAROTID ENDARTERECTOMY     • CAROTID STENT     • COLONOSCOPY     • D & C CERVICAL BIOPSY      Dilation and Curettage of Cervical Stump   • HERNIA REPAIR     • HERNIA REPAIR     • THROMBOENDARTERECTOMY      carotid   • UPPER GASTROINTESTINAL ENDOSCOPY          Social History     Socioeconomic History   • Marital status:    Tobacco Use   • Smoking status: Former Smoker     Packs/day: 1.00     Years: 15.00     Pack years: 15.00     Types: Cigarettes   • Smokeless tobacco: Never Used   • Tobacco comment: Do not know when she quit. Likely about 1998.   Vaping Use   • Vaping Use: Never used   Substance and Sexual Activity   • Alcohol use: Not Currently     Alcohol/week: 0.0 standard drinks     Comment: She might have some wine, but I try to not give her any   • Drug use: No   • Sexual activity: Not Currently     Birth control/protection: Post-menopausal        Family History   Problem Relation Age of Onset   • Cystic fibrosis Other    • Cervical cancer Mother    • Cancer Paternal Grandmother     • Dementia Paternal Grandmother    • Colon cancer Neg Hx    • Colon polyps Neg Hx         Review of Systems     Vitals:    06/27/22 1344   BP: 124/72   Pulse: 67   Temp: 97.8 °F (36.6 °C)   SpO2: 99%        Physical Exam   General Appearance: Alert, in no acute distress   Head: Normocephalic, without obvious abnormality, atraumatic   Eyes: Lids and lashes normal, conjunctivae and sclerae normal, no icterus, no pallor, corneas clear, PERRLA   Extremities: Moves all extremities well, no edema, no cyanosis, no redness   Skin: No bleeding, bruising or rash   Neurologic: Cranial nerves 2 - 12 grossly intact, no focal deficits         Diagnoses and all orders for this visit:    1. Gastroesophageal reflux disease without esophagitis (Primary)    2. Esophageal dysmotility    3. Esophageal dysphagia    Impressions and plan #1 dysphagia: She certainly has an element of dysmotility which contributes to dysphagia.  She also seems to have poor emptying of the esophagus and somewhat dilated esophagus as well.  I was initially concerned about achalasia, but the radiographs did not seem to suggest that.  She is not losing weight.  She did not respond to dilation.  I am going to go over the x-rays with the radiologist again.  I am not sure an upper endoscopy will be too helpful.  We could look for eosinophilic esophagitis although that would not cause of dilated esophagus and we did not see any features of that when we looked on her last evaluation.  She was not helped with dilation in the past so I doubt that would be too helpful on a repeat.  It is possible that she is refluxing and the sensation she is getting in her neck is related to a spasm and the upper esophageal sphincter region.  It does not appear to be interfering with her life to a large degree.  We are going to try and conservatively treat her if possible but if I see anything with the radiologist of concern we may reevaluate endoscopically.  We will see her back in  follow-up.    Axel Dove MD

## 2022-08-15 DIAGNOSIS — F02.818 LATE ONSET ALZHEIMER'S DEMENTIA WITH BEHAVIORAL DISTURBANCE: ICD-10-CM

## 2022-08-15 DIAGNOSIS — F32.A ANXIETY AND DEPRESSION: ICD-10-CM

## 2022-08-15 DIAGNOSIS — G30.1 LATE ONSET ALZHEIMER'S DEMENTIA WITH BEHAVIORAL DISTURBANCE: ICD-10-CM

## 2022-08-15 DIAGNOSIS — F41.9 ANXIETY AND DEPRESSION: ICD-10-CM

## 2022-08-15 RX ORDER — DEXLANSOPRAZOLE 60 MG/1
CAPSULE, DELAYED RELEASE ORAL
Qty: 90 CAPSULE | Refills: 1 | Status: SHIPPED | OUTPATIENT
Start: 2022-08-15 | End: 2023-02-10

## 2022-08-15 NOTE — TELEPHONE ENCOUNTER
LOV 4-28-22  NOV 10-31-22  
post-procedure radiography performed
post-procedure radiography performed

## 2022-08-16 NOTE — TELEPHONE ENCOUNTER
Rx Refill Note  Requested Prescriptions     Pending Prescriptions Disp Refills   • clonazePAM (KlonoPIN) 0.5 MG tablet [Pharmacy Med Name: CLONAZEPAM 0.5 MG TABLET] 30 tablet 0     Sig: TAKE 1/2-1 TABLET BY MOUTH DAILY AS NEEDED FOR ANXIETY/AGITATION      Last office visit with prescribing clinician: 11/24/2021      Next office visit with prescribing clinician: 11/16/2022            Beth Adams CMA  08/16/22, 07:38 EDT

## 2022-08-17 RX ORDER — CLONAZEPAM 0.5 MG/1
0.5 TABLET ORAL DAILY PRN
Qty: 30 TABLET | Refills: 1 | Status: SHIPPED | OUTPATIENT
Start: 2022-08-17 | End: 2022-10-17 | Stop reason: SDUPTHER

## 2022-08-17 NOTE — TELEPHONE ENCOUNTER
Caller: Adia Tatum    Relationship: Emergency Contact; DAUGHTER    Best call back number: [phone]    Requested Prescriptions:   Requested Prescriptions     Pending Prescriptions Disp Refills   • clonazePAM (KlonoPIN) 0.5 MG tablet [Pharmacy Med Name: CLONAZEPAM 0.5 MG TABLET] 30 tablet 0     Sig: TAKE 1/2-1 TABLET BY MOUTH DAILY AS NEEDED FOR ANXIETY/AGITATION      Pharmacy where request should be sent: [facility]/PHARMACY #[id number] - [place], [place] - [address] [phone] [place] [phone]      Additional details provided by patient: PT IS STILL TAKING MEDICATION AS PRESCRIBED (1 TABLET A DAY). PT HAS BEEN OUT COMPLETELY OUT OF MEDICATION FOR 1 DAY.    Does the patient have less than a 3 day supply:  [x] Yes  [] No    Last office visit with prescribing clinician: 11/24/2021      Next office visit with prescribing clinician: 11/16/2022     PLEASE REVIEW AND ADVISE.    Shana Merida Rep   08/17/22 08:09 EDT

## 2022-08-17 NOTE — TELEPHONE ENCOUNTER
I reviewed ameena which is appropriate. I refilled the medicine for 30 pills with 1 refill until follow up in clinic. Thanks, CARLOS Jon

## 2022-10-03 RX ORDER — ATORVASTATIN CALCIUM 20 MG/1
TABLET, FILM COATED ORAL
Qty: 90 TABLET | Refills: 0 | Status: SHIPPED | OUTPATIENT
Start: 2022-10-03 | End: 2022-10-31 | Stop reason: SDUPTHER

## 2022-10-17 DIAGNOSIS — G30.1 LATE ONSET ALZHEIMER'S DEMENTIA WITH BEHAVIORAL DISTURBANCE: ICD-10-CM

## 2022-10-17 DIAGNOSIS — F41.9 ANXIETY AND DEPRESSION: ICD-10-CM

## 2022-10-17 DIAGNOSIS — F02.818 LATE ONSET ALZHEIMER'S DEMENTIA WITH BEHAVIORAL DISTURBANCE: ICD-10-CM

## 2022-10-17 DIAGNOSIS — F32.A ANXIETY AND DEPRESSION: ICD-10-CM

## 2022-10-17 RX ORDER — CLONAZEPAM 0.5 MG/1
0.5 TABLET ORAL DAILY PRN
Qty: 30 TABLET | Refills: 1 | Status: SHIPPED | OUTPATIENT
Start: 2022-10-17 | End: 2022-11-16 | Stop reason: SDUPTHER

## 2022-10-17 NOTE — TELEPHONE ENCOUNTER
Rx Refill Note  Requested Prescriptions     Pending Prescriptions Disp Refills   • clonazePAM (KlonoPIN) 0.5 MG tablet 30 tablet 1     Sig: Take 1 tablet by mouth Daily As Needed for Anxiety (agitation).      Last office visit with prescribing clinician: 11/24/2021      Next office visit with prescribing clinician: 11/16/2022            Beth Adams CMA  10/17/22, 10:30 EDT

## 2022-10-17 NOTE — TELEPHONE ENCOUNTER
MEDICATION REFILL REQUEST    Caller: Adia Tatum    Relationship: Emergency Contact; DAUGHTER    Best call back number: [phone]    Requested Prescriptions:   Requested Prescriptions     Pending Prescriptions Disp Refills   • clonazePAM (KlonoPIN) 0.5 MG tablet 30 tablet 1     Sig: Take 1 tablet by mouth Daily As Needed for Anxiety (agitation).      Pharmacy where request should be sent: [facility]/PHARMACY #[id number] - [place], [place] - [address] [place] [address] [phone] [place] [phone]      Additional details provided by patient: PT IS COMPLETELY OUT OF THE MEDICATION AT THIS TIME.    Does the patient have less than a 3 day supply:  [x] Yes  [] No    Last office visit with prescribing clinician: 11/24/2021      Next office visit with prescribing clinician: 11/16/2022     PLEASE REVIEW AND ADVISE.    Shana Merida Rep   10/17/22 10:16 EDT

## 2022-10-24 ENCOUNTER — TELEPHONE (OUTPATIENT)
Dept: ENDOCRINOLOGY | Facility: CLINIC | Age: 77
End: 2022-10-24

## 2022-10-24 NOTE — TELEPHONE ENCOUNTER
Pt daughter Adia called states she took pt to [facility] clinic on yesterday they seem to think she has RSV no antibiotic was given to pt. Daughter feel like she needs to be seen sooner and needs an antibiotic. Pt last f/u 04/28/22 pt next f/u 10/31/22. Pt daughter is aware Dr Lzi is out today

## 2022-10-24 NOTE — TELEPHONE ENCOUNTER
Adia states she took her mom to [facility] yesterday for fever and congestion.  She was told she probably had RSV and to monitor symptoms and see PCP if no better.  She states her temp today is greater than 101.  She states she has seen [facility] in the past (June) so she is established with a PCP.  She was advised Dr Liz is out of the office today and she should call [facility] to be seen for evaluation and treatment.  Advised her to call back if unable to get her in and she voiced understanding

## 2022-10-26 ENCOUNTER — HOSPITAL ENCOUNTER (EMERGENCY)
Facility: HOSPITAL | Age: 77
Discharge: HOME | End: 2022-10-26
Attending: EMERGENCY MEDICINE | Admitting: EMERGENCY MEDICINE

## 2022-10-26 ENCOUNTER — APPOINTMENT (OUTPATIENT)
Dept: GENERAL RADIOLOGY | Facility: HOSPITAL | Age: 77
End: 2022-10-26

## 2022-10-26 VITALS
BODY MASS INDEX: 23.92 KG/M2 | WEIGHT: 135 LBS | RESPIRATION RATE: 15 BRPM | HEART RATE: 74 BPM | HEIGHT: 63 IN | TEMPERATURE: 98.3 F | OXYGEN SATURATION: 91 % | SYSTOLIC BLOOD PRESSURE: 148 MMHG | DIASTOLIC BLOOD PRESSURE: 76 MMHG

## 2022-10-26 DIAGNOSIS — R05.1 ACUTE COUGH: ICD-10-CM

## 2022-10-26 DIAGNOSIS — J21.0 RSV (ACUTE BRONCHIOLITIS DUE TO RESPIRATORY SYNCYTIAL VIRUS): Primary | ICD-10-CM

## 2022-10-26 DIAGNOSIS — R50.9 FEVER, UNSPECIFIED FEVER CAUSE: ICD-10-CM

## 2022-10-26 LAB
ALBUMIN SERPL-MCNC: 3.9 G/DL (ref 3.5–5.2)
ALBUMIN/GLOB SERPL: 1.1 G/DL
ALP SERPL-CCNC: 98 U/L (ref 39–117)
ALT SERPL W P-5'-P-CCNC: 13 U/L (ref 1–33)
ANION GAP SERPL CALCULATED.3IONS-SCNC: 9 MMOL/L (ref 5–15)
AST SERPL-CCNC: 15 U/L (ref 1–32)
B PARAPERT DNA SPEC QL NAA+PROBE: NOT DETECTED
B PERT DNA SPEC QL NAA+PROBE: NOT DETECTED
BASOPHILS # BLD AUTO: 0.02 10*3/MM3 (ref 0–0.2)
BASOPHILS NFR BLD AUTO: 0.2 % (ref 0–1.5)
BILIRUB SERPL-MCNC: 0.3 MG/DL (ref 0–1.2)
BUN SERPL-MCNC: 12 MG/DL (ref 8–23)
BUN/CREAT SERPL: 17.1 (ref 7–25)
C PNEUM DNA NPH QL NAA+NON-PROBE: NOT DETECTED
CALCIUM SPEC-SCNC: 9.5 MG/DL (ref 8.6–10.5)
CHLORIDE SERPL-SCNC: 99 MMOL/L (ref 98–107)
CO2 SERPL-SCNC: 26 MMOL/L (ref 22–29)
CREAT SERPL-MCNC: 0.7 MG/DL (ref 0.57–1)
DEPRECATED RDW RBC AUTO: 43.8 FL (ref 37–54)
EGFRCR SERPLBLD CKD-EPI 2021: 88.1 ML/MIN/1.73
EOSINOPHIL # BLD AUTO: 0 10*3/MM3 (ref 0–0.4)
EOSINOPHIL NFR BLD AUTO: 0 % (ref 0.3–6.2)
ERYTHROCYTE [DISTWIDTH] IN BLOOD BY AUTOMATED COUNT: 14 % (ref 12.3–15.4)
FLUAV SUBTYP SPEC NAA+PROBE: NOT DETECTED
FLUBV RNA ISLT QL NAA+PROBE: NOT DETECTED
GLOBULIN UR ELPH-MCNC: 3.6 GM/DL
GLUCOSE SERPL-MCNC: 108 MG/DL (ref 65–99)
HADV DNA SPEC NAA+PROBE: NOT DETECTED
HCOV 229E RNA SPEC QL NAA+PROBE: NOT DETECTED
HCOV HKU1 RNA SPEC QL NAA+PROBE: NOT DETECTED
HCOV NL63 RNA SPEC QL NAA+PROBE: NOT DETECTED
HCOV OC43 RNA SPEC QL NAA+PROBE: NOT DETECTED
HCT VFR BLD AUTO: 40.7 % (ref 34–46.6)
HGB BLD-MCNC: 12.9 G/DL (ref 12–15.9)
HMPV RNA NPH QL NAA+NON-PROBE: NOT DETECTED
HOLD SPECIMEN: NORMAL
HPIV1 RNA ISLT QL NAA+PROBE: NOT DETECTED
HPIV2 RNA SPEC QL NAA+PROBE: NOT DETECTED
HPIV3 RNA NPH QL NAA+PROBE: NOT DETECTED
HPIV4 P GENE NPH QL NAA+PROBE: NOT DETECTED
IMM GRANULOCYTES # BLD AUTO: 0.06 10*3/MM3 (ref 0–0.05)
IMM GRANULOCYTES NFR BLD AUTO: 0.5 % (ref 0–0.5)
LYMPHOCYTES # BLD AUTO: 0.98 10*3/MM3 (ref 0.7–3.1)
LYMPHOCYTES NFR BLD AUTO: 7.6 % (ref 19.6–45.3)
M PNEUMO IGG SER IA-ACNC: NOT DETECTED
MCH RBC QN AUTO: 27.2 PG (ref 26.6–33)
MCHC RBC AUTO-ENTMCNC: 31.7 G/DL (ref 31.5–35.7)
MCV RBC AUTO: 85.7 FL (ref 79–97)
MONOCYTES # BLD AUTO: 0.58 10*3/MM3 (ref 0.1–0.9)
MONOCYTES NFR BLD AUTO: 4.5 % (ref 5–12)
NEUTROPHILS NFR BLD AUTO: 11.29 10*3/MM3 (ref 1.7–7)
NEUTROPHILS NFR BLD AUTO: 87.2 % (ref 42.7–76)
NRBC BLD AUTO-RTO: 0 /100 WBC (ref 0–0.2)
NT-PROBNP SERPL-MCNC: 389.9 PG/ML (ref 0–1800)
PLATELET # BLD AUTO: 287 10*3/MM3 (ref 140–450)
PMV BLD AUTO: 9.2 FL (ref 6–12)
POTASSIUM SERPL-SCNC: 4 MMOL/L (ref 3.5–5.2)
PROT SERPL-MCNC: 7.5 G/DL (ref 6–8.5)
RBC # BLD AUTO: 4.75 10*6/MM3 (ref 3.77–5.28)
RHINOVIRUS RNA SPEC NAA+PROBE: NOT DETECTED
RSV RNA NPH QL NAA+NON-PROBE: DETECTED
SARS-COV-2 RNA NPH QL NAA+NON-PROBE: NOT DETECTED
SODIUM SERPL-SCNC: 134 MMOL/L (ref 136–145)
TROPONIN T SERPL-MCNC: <0.01 NG/ML (ref 0–0.03)
WBC NRBC COR # BLD: 12.93 10*3/MM3 (ref 3.4–10.8)
WHOLE BLOOD HOLD COAG: NORMAL
WHOLE BLOOD HOLD SPECIMEN: NORMAL

## 2022-10-26 PROCEDURE — 80053 COMPREHEN METABOLIC PANEL: CPT | Performed by: EMERGENCY MEDICINE

## 2022-10-26 PROCEDURE — 84484 ASSAY OF TROPONIN QUANT: CPT | Performed by: EMERGENCY MEDICINE

## 2022-10-26 PROCEDURE — 99284 EMERGENCY DEPT VISIT MOD MDM: CPT

## 2022-10-26 PROCEDURE — 85025 COMPLETE CBC W/AUTO DIFF WBC: CPT | Performed by: EMERGENCY MEDICINE

## 2022-10-26 PROCEDURE — 71045 X-RAY EXAM CHEST 1 VIEW: CPT

## 2022-10-26 PROCEDURE — 93005 ELECTROCARDIOGRAM TRACING: CPT | Performed by: EMERGENCY MEDICINE

## 2022-10-26 PROCEDURE — 0202U NFCT DS 22 TRGT SARS-COV-2: CPT | Performed by: EMERGENCY MEDICINE

## 2022-10-26 PROCEDURE — 83880 ASSAY OF NATRIURETIC PEPTIDE: CPT | Performed by: EMERGENCY MEDICINE

## 2022-10-26 RX ORDER — SODIUM CHLORIDE 0.9 % (FLUSH) 0.9 %
10 SYRINGE (ML) INJECTION AS NEEDED
Status: DISCONTINUED | OUTPATIENT
Start: 2022-10-26 | End: 2022-10-26 | Stop reason: HOSPADM

## 2022-10-28 LAB
QT INTERVAL: 364 MS
QTC INTERVAL: 442 MS

## 2022-10-31 ENCOUNTER — OFFICE VISIT (OUTPATIENT)
Dept: ENDOCRINOLOGY | Facility: CLINIC | Age: 77
End: 2022-10-31

## 2022-10-31 VITALS
OXYGEN SATURATION: 97 % | BODY MASS INDEX: 24.13 KG/M2 | HEIGHT: 63 IN | SYSTOLIC BLOOD PRESSURE: 142 MMHG | DIASTOLIC BLOOD PRESSURE: 88 MMHG | WEIGHT: 136.2 LBS | HEART RATE: 76 BPM

## 2022-10-31 DIAGNOSIS — I10 BENIGN ESSENTIAL HYPERTENSION: Primary | ICD-10-CM

## 2022-10-31 DIAGNOSIS — E03.9 ACQUIRED HYPOTHYROIDISM: ICD-10-CM

## 2022-10-31 DIAGNOSIS — E55.9 VITAMIN D DEFICIENCY: ICD-10-CM

## 2022-10-31 PROBLEM — G30.9 ALZHEIMER'S DISEASE, UNSPECIFIED: Status: ACTIVE | Noted: 2022-01-21

## 2022-10-31 PROBLEM — F02.80 ALZHEIMER'S DISEASE, UNSPECIFIED: Status: ACTIVE | Noted: 2022-01-21

## 2022-10-31 PROCEDURE — 99214 OFFICE O/P EST MOD 30 MIN: CPT | Performed by: INTERNAL MEDICINE

## 2022-10-31 RX ORDER — AMLODIPINE BESYLATE 5 MG/1
5 TABLET ORAL DAILY
Qty: 90 TABLET | Refills: 1 | Status: SHIPPED | OUTPATIENT
Start: 2022-10-31

## 2022-10-31 RX ORDER — ATORVASTATIN CALCIUM 20 MG/1
20 TABLET, FILM COATED ORAL
Qty: 90 TABLET | Refills: 1 | Status: SHIPPED | OUTPATIENT
Start: 2022-10-31

## 2022-10-31 NOTE — PROGRESS NOTES
Brittny Nicolas 77 y.o.  [facility]:Follow-up, Hypothyroidism, Hypertension, Hyperlipidemia, Heartburn, Osteoporosis, and Recent diagnosis of RSV      [facility]: Follow-up, Hypothyroidism, Hypertension, Hyperlipidemia, Heartburn, Osteoporosis, and Recent diagnosis of RSV    Still some cough - recent ER visit for RSV- to [facility] ER - treated and released  Cough productive- taking doxycycline and steroids  More problems with evening confusion  bp higher- recheck 138/72  Poor appetite  Aggressive agitation- evenings  Taking klonopin- at times worse    Allergies   Allergen Reactions   • Boniva [Ibandronic Acid] GI Intolerance     Refractory gerd and abdominal pain        Current Outpatient Medications:   •  amLODIPine (NORVASC) 5 MG tablet, Take 1 tablet by mouth Daily., Disp: 90 tablet, Rfl: 1  •  atorvastatin (LIPITOR) 20 MG tablet, Take 1 tablet by mouth every night at bedtime., Disp: 90 tablet, Rfl: 1  •  acetaminophen (TYLENOL) 325 MG tablet, Take 2 tablets by mouth Every 4 (Four) Hours As Needed for Mild Pain ., Disp: , Rfl:   •  Cholecalciferol (VITAMIN D) 2000 UNITS capsule, Take 1 capsule by mouth., Disp: , Rfl:   •  clonazePAM (KlonoPIN) 0.5 MG tablet, Take 1 tablet by mouth Daily As Needed for Anxiety (agitation)., Disp: 30 tablet, Rfl: 1  •  Dexilant 60 MG capsule, TAKE 1 CAPSULE BY MOUTH EVERY DAY, Disp: 90 capsule, Rfl: 1  •  Diclofenac Sodium 3 % gel gel, Apply  topically to the appropriate area as directed 2 (Two) Times a Day. for 60 days., Disp: 100 g, Rfl: 0  •  donepezil (ARICEPT) 10 MG tablet, Take 1 tablet by mouth Every Night., Disp: 90 tablet, Rfl: 3  •  levothyroxine (SYNTHROID, LEVOTHROID) 125 MCG tablet, TAKE 1 TABLET BY MOUTH EVERY DAY, Disp: 90 tablet, Rfl: 1  •  memantine (NAMENDA) 10 MG tablet, Take 1 tablet by mouth 2 (Two) Times a Day., Disp: 180 tablet, Rfl: 3  •  polyethylene glycol (MIRALAX) 17 g packet, Take 17 g by mouth Daily., Disp: , Rfl:   Patient Active Problem List    Diagnosis    • Visual  loss [H54.7]    • Nocturnal enuresis [N39.44]    • Alzheimer's disease, unspecified (HCC) [G30.9, F02.80]    • Influenza A with respiratory manifestations [J10.1]    • Anxiety and depression [F41.9, F32.A]    • Acute respiratory failure with hypoxia (HCC) [J96.01]    • Anemia [D64.9]    • Oropharyngeal dysphagia [R13.12]    • Hyponatremia [E87.1]    • Late onset Alzheimer's dementia with behavioral disturbance (HCC) [G30.1, F02.818]    • Rib pain on left side [R07.81]    • Memory loss [R41.3]    • Pharyngitis [J02.9]    • Left lower quadrant pain [R10.32]    • Acute pain of left shoulder [M25.512]    • Erosive esophagitis [K22.10]    • H/O colonoscopy [Z98.890]    • Fatigue [R53.83]    • Hypothyroidism [E03.9]    • Achalasia [K22.0]    • Acute upper respiratory infection [J06.9]    • Atypical chest pain [R07.89]    • Benign essential hypertension [I10]    • Carotid artery stenosis [I65.29]    • Cataract [H26.9]    • Cough [R05.9]    • Acute cystitis [N30.00]    • Screening for malignant neoplasm [Z12.9]    • Gastroesophageal reflux disease [K21.9]    • Essential familial hypercholesterolemia [E78.01]    • Gastrointestinal bleeding [K92.2]    • Inguinal hernia [K40.90]    • Insomnia [G47.00]    • Leg cramps [R25.2]    • Meniere's disease [H81.09]    • Night sweats [R61]    • Osteoporosis [M81.0]    • Peripheral neuropathy [G62.9]    • Retinal artery occlusion [H34.9]    • Rheumatoid arthritis (HCC) [M06.9]    • Stroke syndrome [END3642]    • Suprapubic pain [R10.2]    • Transient ischemic attack [G45.9]    • Positive reaction to tuberculin skin test [R76.11]    • Vitamin D deficiency [E55.9]    • Weight loss [R63.4]      Review of Systems   Constitutional: Negative for activity change, appetite change and unexpected weight change.   HENT: Negative for congestion and rhinorrhea.    Eyes: Negative for visual disturbance.   Respiratory: Positive for cough. Negative for shortness of breath.    Cardiovascular: Negative  "for palpitations and leg swelling.   Gastrointestinal: Negative for constipation, diarrhea and nausea.   Genitourinary: Negative for hematuria.   Musculoskeletal: Positive for arthralgias. Negative for back pain, gait problem, joint swelling and myalgias.   Skin: Negative for color change, rash and wound.   Allergic/Immunologic: Negative for environmental allergies, food allergies and immunocompromised state.   Neurological: Negative for dizziness, weakness and light-headedness.   Psychiatric/Behavioral: Negative for confusion, decreased concentration, dysphoric mood and sleep disturbance. The patient is nervous/anxious.      Social History     Socioeconomic History   • Marital status:    Tobacco Use   • Smoking status: Former     Packs/day: 1.00     Years: 15.00     Pack years: 15.00     Types: Cigarettes   • Smokeless tobacco: Never   • Tobacco comments:     Do not know when she quit. Likely about 1998.   Vaping Use   • Vaping Use: Never used   Substance and Sexual Activity   • Alcohol use: Not Currently     Alcohol/week: 0.0 standard drinks     Comment: She might have some wine, but I try to not give her any   • Drug use: No   • Sexual activity: Not Currently     Birth control/protection: Post-menopausal     Family History   Problem Relation Age of Onset   • Cystic fibrosis Other    • Cervical cancer Mother    • Cancer Paternal Grandmother    • Dementia Paternal Grandmother    • Colon cancer Neg Hx    • Colon polyps Neg Hx      /88   Pulse 76   Ht 160 cm (63\")   Wt 61.8 kg (136 lb 3.2 oz)   SpO2 97%   BMI 24.13 kg/m²   Physical Exam  Vitals and nursing note reviewed.   Constitutional:       Appearance: Normal appearance. She is well-developed.   HENT:      Head: Normocephalic and atraumatic.   Eyes:      General: Lids are normal.      Extraocular Movements: Extraocular movements intact.      Conjunctiva/sclera: Conjunctivae normal.      Pupils: Pupils are equal, round, and reactive to light. "   Neck:      Thyroid: No thyroid mass or thyromegaly.      Vascular: No carotid bruit.      Trachea: Trachea normal. No tracheal deviation.   Cardiovascular:      Rate and Rhythm: Normal rate and regular rhythm.      Pulses: Normal pulses.      Heart sounds: Normal heart sounds. No murmur heard.    No friction rub. No gallop.   Pulmonary:      Effort: Pulmonary effort is normal. No respiratory distress.      Breath sounds: Wheezing and rhonchi present.   Musculoskeletal:         General: No deformity. Normal range of motion.      Cervical back: Normal range of motion and neck supple.   Lymphadenopathy:      Cervical: No cervical adenopathy.   Skin:     General: Skin is warm and dry.      Findings: No erythema or rash.      Nails: There is no clubbing.   Neurological:      General: No focal deficit present.      Mental Status: She is alert and oriented to person, place, and time.      Cranial Nerves: No cranial nerve deficit.      Deep Tendon Reflexes: Reflexes are normal and symmetric. Reflexes normal.   Psychiatric:         Mood and Affect: Mood normal.         Speech: Speech normal.         Behavior: Behavior normal.         Thought Content: Thought content normal.         Judgment: Judgment normal.       Results for orders placed or performed during the hospital encounter of 10/26/22   Respiratory Panel PCR w/COVID-19(SARS-CoV-2) JATINDER/ITALIA/TRISTA/[facility]/MAD/[facility]DA In-House, NP Swab in UTM/VTM, 3-4 HR TAT - Swab, Nasopharynx    Specimen: Nasopharynx; Swab   Result Value Ref Range    ADENOVIRUS, PCR Not Detected Not Detected    Coronavirus 229E Not Detected Not Detected    Coronavirus HKU1 Not Detected Not Detected    Coronavirus NL63 Not Detected Not Detected    Coronavirus OC43 Not Detected Not Detected    COVID19 Not Detected Not Detected - Ref. Range    Human Metapneumovirus Not Detected Not Detected    Human Rhinovirus/Enterovirus Not Detected Not Detected    Influenza A PCR Not Detected Not Detected    Influenza B  PCR Not Detected Not Detected    Parainfluenza Virus 1 Not Detected Not Detected    Parainfluenza Virus 2 Not Detected Not Detected    Parainfluenza Virus 3 Not Detected Not Detected    Parainfluenza Virus 4 Not Detected Not Detected    RSV, PCR Detected (A) Not Detected    Bordetella pertussis pcr Not Detected Not Detected    Bordetella parapertussis PCR Not Detected Not Detected    Chlamydophila pneumoniae PCR Not Detected Not Detected    Mycoplasma pneumo by PCR Not Detected Not Detected   Comprehensive Metabolic Panel    Specimen: Blood   Result Value Ref Range    Glucose 108 (H) 65 - 99 mg/dL    BUN 12 8 - 23 mg/dL    Creatinine 0.70 0.57 - 1.00 mg/dL    Sodium 134 (L) 136 - 145 mmol/L    Potassium 4.0 3.5 - 5.2 mmol/L    Chloride 99 98 - 107 mmol/L    CO2 26.0 22.0 - 29.0 mmol/L    Calcium 9.5 8.6 - 10.5 mg/dL    Total Protein 7.5 6.0 - 8.5 g/dL    Albumin 3.90 3.50 - 5.20 g/dL    ALT (SGPT) 13 1 - 33 U/L    AST (SGOT) 15 1 - 32 U/L    Alkaline Phosphatase 98 39 - 117 U/L    Total Bilirubin 0.3 0.0 - 1.2 mg/dL    Globulin 3.6 gm/dL    A/G Ratio 1.1 g/dL    BUN/Creatinine Ratio 17.1 7.0 - 25.0    Anion Gap 9.0 5.0 - 15.0 mmol/L    eGFR 88.1 >60.0 mL/min/1.73   BNP    Specimen: Blood   Result Value Ref Range    proBNP 389.9 0.0 - 1,800.0 pg/mL   Troponin    Specimen: Blood   Result Value Ref Range    Troponin T <0.010 0.000 - 0.030 ng/mL   CBC Auto Differential    Specimen: Blood   Result Value Ref Range    WBC 12.93 (H) 3.40 - 10.80 10*3/mm3    RBC 4.75 3.77 - 5.28 10*6/mm3    Hemoglobin 12.9 12.0 - 15.9 g/dL    Hematocrit 40.7 34.0 - 46.6 %    MCV 85.7 79.0 - 97.0 fL    MCH 27.2 26.6 - 33.0 pg    MCHC 31.7 31.5 - 35.7 g/dL    RDW 14.0 12.3 - 15.4 %    RDW-SD 43.8 37.0 - 54.0 fl    MPV 9.2 6.0 - 12.0 fL    Platelets 287 140 - 450 10*3/mm3    Neutrophil % 87.2 (H) 42.7 - 76.0 %    Lymphocyte % 7.6 (L) 19.6 - 45.3 %    Monocyte % 4.5 (L) 5.0 - 12.0 %    Eosinophil % 0.0 (L) 0.3 - 6.2 %    Basophil % 0.2 0.0 - 1.5  %    Immature Grans % 0.5 0.0 - 0.5 %    Neutrophils, Absolute 11.29 (H) 1.70 - 7.00 10*3/mm3    Lymphocytes, Absolute 0.98 0.70 - 3.10 10*3/mm3    Monocytes, Absolute 0.58 0.10 - 0.90 10*3/mm3    Eosinophils, Absolute 0.00 0.00 - 0.40 10*3/mm3    Basophils, Absolute 0.02 0.00 - 0.20 10*3/mm3    Immature Grans, Absolute 0.06 (H) 0.00 - 0.05 10*3/mm3    nRBC 0.0 0.0 - 0.2 /100 WBC   ECG 12 Lead ED Triage Standing Order; SOA   Result Value Ref Range    QT Interval 364 ms    QTC Interval 442 ms   Green Top (Gel)   Result Value Ref Range    Extra Tube Hold for add-ons.    Lavender Top   Result Value Ref Range    Extra Tube hold for add-on    Gold Top - SST   Result Value Ref Range    Extra Tube Hold for add-ons.    Gray Top   Result Value Ref Range    Extra Tube Hold for add-ons.    Light Blue Top   Result Value Ref Range    Extra Tube Hold for add-ons.      Diagnoses and all orders for this visit:    1. Benign essential hypertension (Primary)  Assessment & Plan:  bp is good   Continue monitoring   Recheck 134/62      2. Vitamin D deficiency  Assessment & Plan:  Continue supplement   Update levels nov       3. Acquired hypothyroidism  Assessment & Plan:  Recent normal tsh   ER lab work reviewed  Continue current supplement       Other orders  -     amLODIPine (NORVASC) 5 MG tablet; Take 1 tablet by mouth Daily.  Dispense: 90 tablet; Refill: 1  -     atorvastatin (LIPITOR) 20 MG tablet; Take 1 tablet by mouth every night at bedtime.  Dispense: 90 tablet; Refill: 1  Return in about 6 months (around 4/30/2023) for Recheck.    Dalila Liz MD  Signed Dalila Liz MD

## 2022-11-12 NOTE — ED PROVIDER NOTES
Subjective   History of Present Illness  pt to ED with congestion, cough and fever since last week. pt daughter stated they went to the [facility] clinic monday and was given doxycycline and a steroid monday and told to go to ER if condition worsened. pt daughter stated she has been monitoring her O2 sats at home and noted them to be in the 80s. on assessment pt denies any chest pain or SOB or any pain at all at this time.    Pt is a pleasant 77 year old female who presents with a fever and upper respiratory congestion/cough.  Symptoms have been present for the past one week. She went to the [facility] clinic two days ago and was prescribed a steroid and antibiotic, doxycycline, but her symptoms have persisted.  She denies subjective shortness of breath or chest pain, but has been checking her home pulse oxymetry and it has droped into the 80's a couple times, prompting the patients visit to the ED.    URI  Presenting symptoms: congestion, cough, fatigue, fever and rhinorrhea    Severity:  Moderate  Onset quality:  Gradual  Duration:  1 week  Timing:  Constant  Progression:  Waxing and waning  Chronicity:  New  Relieved by:  Nothing  Worsened by:  Nothing  Ineffective treatments: steroid and antibiotic.      Review of Systems   Constitutional: Positive for fatigue and fever.   HENT: Positive for congestion and rhinorrhea.    Respiratory: Positive for cough. Negative for shortness of breath.    Cardiovascular: Negative for chest pain.   All other systems reviewed and are negative.      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   • Graves disease    • Hyperlipidemia    • Hypertension    • Memory loss    • Stomach problems    • Stroke (HCC)    • Stroke syndrome    • Thyroid disease    • Transient ischemic attack    • Vision loss        Allergies   Allergen Reactions   • Boniva [Ibandronic Acid] GI  Intolerance     Refractory gerd and abdominal pain        Past Surgical History:   Procedure Laterality Date   • AUGMENTATION MAMMAPLASTY      1978   • BREAST AUGMENTATION     • CAROTID ARTERY ANGIOPLASTY     • CAROTID ENDARTERECTOMY     • CAROTID STENT     • COLONOSCOPY     • D & C CERVICAL BIOPSY      Dilation and Curettage of Cervical Stump   • HERNIA REPAIR     • HERNIA REPAIR     • THROMBOENDARTERECTOMY      carotid   • UPPER GASTROINTESTINAL ENDOSCOPY         Family History   Problem Relation Age of Onset   • Cystic fibrosis Other    • Cervical cancer Mother    • Cancer Paternal Grandmother    • Dementia Paternal Grandmother    • Colon cancer Neg Hx    • Colon polyps Neg Hx        Social History     Socioeconomic History   • Marital status:    Tobacco Use   • Smoking status: Former     Packs/day: 1.00     Years: 15.00     Pack years: 15.00     Types: Cigarettes   • Smokeless tobacco: Never   • Tobacco comments:     Do not know when she quit. Likely about 1998.   Vaping Use   • Vaping Use: Never used   Substance and Sexual Activity   • Alcohol use: Not Currently     Alcohol/week: 0.0 standard drinks     Comment: She might have some wine, but I try to not give her any   • Drug use: No   • Sexual activity: Not Currently     Birth control/protection: Post-menopausal           Objective   Physical Exam  Vitals and nursing note reviewed.   Constitutional:       General: She is not in acute distress.     Appearance: She is well-developed.   HENT:      Head: Normocephalic and atraumatic.      Mouth/Throat:      Mouth: Mucous membranes are moist.   Eyes:      Extraocular Movements: Extraocular movements intact.      Pupils: Pupils are equal, round, and reactive to light.   Neck:      Thyroid: No thyromegaly.      Vascular: No JVD.   Cardiovascular:      Rate and Rhythm: Normal rate and regular rhythm.      Heart sounds: Normal heart sounds. No murmur heard.    No friction rub. No gallop.   Pulmonary:       Effort: Pulmonary effort is normal. No respiratory distress.      Breath sounds: Normal breath sounds. No wheezing or rales.   Abdominal:      Palpations: Abdomen is soft.      Tenderness: There is no abdominal tenderness.   Musculoskeletal:         General: Normal range of motion.      Cervical back: Normal range of motion and neck supple.   Lymphadenopathy:      Cervical: No cervical adenopathy.   Skin:     General: Skin is warm and dry.   Neurological:      Mental Status: She is alert and oriented to person, place, and time.         Procedures           ED Course  ED Course as of 11/12/22 0956   Wed Oct 26, 2022   1920 The nurses stated that patient's O2 saturation went down to 85 with ambulation.  The patient stated that she did not feel short of breath while ambulating.  I rechecked the ambulatory O2 sat.  We will walk approximately 5060 feet and the patient was not dyspneic and was a very steady on her feet.  She stated that she felt that her baseline and she strongly preferred discharge as opposed to admission.  With my ambulatory O2 sat test the lowest she dropped was 91%.  I do know if she was walk further with the nurse or if she simply did not have reliable Plath when she was hypoxic.  Nonetheless, the patient has family with her and they will be monitoring her closely.  The patient is also competent and able to notify family if she is feeling worse.  They understand have a low threshold to return to the emergency department if symptoms worsen or other concerns arise. [CP]      ED Course User Index  [CP] John De DO           XR Chest 1 View    Result Date: 10/26/2022  DATE OF EXAM: 10/26/2022 4:03 PM  PROCEDURE: XR CHEST 1 VW-  INDICATIONS: SOA triage protocol  COMPARISON: 11/27/2021  TECHNIQUE: Single radiographic AP view of the chest was obtained.  FINDINGS: Heart shadow is normal in size. Vasculature appears cephalized, and perihilar markings may be a little more prominent than on the prior  comparison exam. Allowing for overlying breast prosthesis shadows, no clearly new focal pulmonary parenchymal disease is seen. No effusion or pneumothorax is appreciated. Mild levoconvex thoracic scoliosis is again noted.      Mildly increased peribronchial thickening and interstitial change compared to prior study. Mild vascular congestion and viral syndrome/bronchitis are considerations. No other new chest disease is seen.  This report was finalized on 10/26/2022 4:19 PM by Dr. Thomas Rodriguez MD.          Latest Reference Range & Units 10/26/22 16:47   Troponin T 0.000 - 0.030 ng/mL <0.010   proBNP 0.0 - 1,800.0 pg/mL 389.9   Glucose 65 - 99 mg/dL 108 (H)   Sodium 136 - 145 mmol/L 134 (L)   Potassium 3.5 - 5.2 mmol/L 4.0   CO2 22.0 - 29.0 mmol/L 26.0   Chloride 98 - 107 mmol/L 99   Anion Gap 5.0 - 15.0 mmol/L 9.0   Creatinine 0.57 - 1.00 mg/dL 0.70   BUN 8 - 23 mg/dL 12   BUN/Creatinine Ratio 7.0 - 25.0  17.1   Calcium 8.6 - 10.5 mg/dL 9.5   eGFR >60.0 mL/min/1.73 88.1   Alkaline Phosphatase 39 - 117 U/L 98   Total Protein 6.0 - 8.5 g/dL 7.5   ALT (SGPT) 1 - 33 U/L 13   AST (SGOT) 1 - 32 U/L 15   Total Bilirubin 0.0 - 1.2 mg/dL 0.3   Albumin 3.50 - 5.20 g/dL 3.90   Globulin gm/dL 3.6   A/G Ratio g/dL 1.1   WBC 3.40 - 10.80 10*3/mm3 12.93 (H)   RBC 3.77 - 5.28 10*6/mm3 4.75   Hemoglobin 12.0 - 15.9 g/dL 12.9   Hematocrit 34.0 - 46.6 % 40.7   RDW 12.3 - 15.4 % 14.0   MCV 79.0 - 97.0 fL 85.7   MCH 26.6 - 33.0 pg 27.2   MCHC 31.5 - 35.7 g/dL 31.7   MPV 6.0 - 12.0 fL 9.2   Platelets 140 - 450 10*3/mm3 287   RDW-SD 37.0 - 54.0 fl 43.8   Neutrophil Rel % 42.7 - 76.0 % 87.2 (H)   Lymphocyte Rel % 19.6 - 45.3 % 7.6 (L)   Monocyte Rel % 5.0 - 12.0 % 4.5 (L)   Eosinophil Rel % 0.3 - 6.2 % 0.0 (L)   Basophil Rel % 0.0 - 1.5 % 0.2   Immature Granulocyte Rel % 0.0 - 0.5 % 0.5   Neutrophils Absolute 1.70 - 7.00 10*3/mm3 11.29 (H)   Lymphocytes Absolute 0.70 - 3.10 10*3/mm3 0.98   Monocytes Absolute 0.10 - 0.90 10*3/mm3 0.58    Eosinophils Absolute 0.00 - 0.40 10*3/mm3 0.00   Basophils Absolute 0.00 - 0.20 10*3/mm3 0.02   Immature Grans, Absolute 0.00 - 0.05 10*3/mm3 0.06 (H)   nRBC 0.0 - 0.2 /100 WBC 0.0   (H): Data is abnormally high  (L): Data is abnormally low                   ADENOVIRUS, PCR Not Detected Not Detected    Coronavirus 229E Not Detected Not Detected    Coronavirus HKU1 Not Detected Not Detected    Coronavirus NL63 Not Detected Not Detected    Coronavirus OC43 Not Detected Not Detected    COVID19 Not Detected - Ref. Range Not Detected    Human Metapneumovirus Not Detected Not Detected    Human Rhinovirus/Enterovirus Not Detected Not Detected    Influenza A PCR Not Detected Not Detected    Influenza B PCR Not Detected Not Detected    Parainfluenza Virus 1 Not Detected Not Detected    Parainfluenza Virus 2 Not Detected Not Detected    Parainfluenza Virus 3 Not Detected Not Detected    Parainfluenza Virus 4 Not Detected Not Detected    RSV, PCR Not Detected Detected Abnormal     Bordetella pertussis pcr Not Detected Not Detected    Bordetella parapertussis PCR Not Detected Not Detected    Chlamydophila pneumoniae PCR Not Detected Not Detected    Mycoplasma pneumo by PCR Not Detected Not Detected                MDM    Final diagnoses:   RSV (acute bronchiolitis due to respiratory syncytial virus)   Acute cough   Fever, unspecified fever cause       ED Disposition  ED Disposition     ED Disposition   Discharge    Condition   Stable    Comment   --           DISCHARGE    Patient discharged in stable condition.    Reviewed implications of results, diagnosis, meds, responsibility to follow up, warning signs and symptoms of possible worsening, potential complications and reasons to return to ER.    Patient/Family voiced understanding of above instructions.    Discussed plan for discharge, as there is no emergent indication for admission.  Pt/family is agreeable and understands need for follow up and possible repeat testing.   Pt/family is aware that discharge does not mean that nothing is wrong but that it indicates no emergency is currently present that requires admission and they must continue care with follow-up as given below or with a physician of their choice.     FOLLOW-UP  Dalila Liz MD  [address] [place] [id number] [phone]    Schedule an appointment as soon as possible for a visit       [facility] Emergency Department  [facility] [address] [phone]    If symptoms worsen         Medication List      No changes were made to your prescriptions during this visit.            John De,   11/12/22 1413

## 2022-11-16 ENCOUNTER — OFFICE VISIT (OUTPATIENT)
Dept: NEUROLOGY | Facility: CLINIC | Age: 77
End: 2022-11-16

## 2022-11-16 VITALS
DIASTOLIC BLOOD PRESSURE: 70 MMHG | OXYGEN SATURATION: 94 % | WEIGHT: 134 LBS | HEIGHT: 63 IN | HEART RATE: 84 BPM | SYSTOLIC BLOOD PRESSURE: 120 MMHG | BODY MASS INDEX: 23.74 KG/M2

## 2022-11-16 DIAGNOSIS — G30.1 LATE ONSET ALZHEIMER'S DEMENTIA WITH BEHAVIORAL DISTURBANCE: Primary | ICD-10-CM

## 2022-11-16 DIAGNOSIS — R13.12 OROPHARYNGEAL DYSPHAGIA: ICD-10-CM

## 2022-11-16 DIAGNOSIS — F32.A ANXIETY AND DEPRESSION: ICD-10-CM

## 2022-11-16 DIAGNOSIS — F41.9 ANXIETY AND DEPRESSION: ICD-10-CM

## 2022-11-16 DIAGNOSIS — F02.818 LATE ONSET ALZHEIMER'S DEMENTIA WITH BEHAVIORAL DISTURBANCE: Primary | ICD-10-CM

## 2022-11-16 PROCEDURE — 99214 OFFICE O/P EST MOD 30 MIN: CPT | Performed by: NURSE PRACTITIONER

## 2022-11-16 RX ORDER — CLONAZEPAM 0.5 MG/1
0.5 TABLET ORAL DAILY PRN
Qty: 30 TABLET | Refills: 0 | Status: SHIPPED | OUTPATIENT
Start: 2022-11-16

## 2022-11-16 RX ORDER — QUETIAPINE FUMARATE 25 MG/1
TABLET, FILM COATED ORAL
Qty: 90 TABLET | Refills: 3 | Status: SHIPPED | OUTPATIENT
Start: 2022-11-16 | End: 2023-03-06 | Stop reason: SDUPTHER

## 2022-11-16 RX ORDER — DONEPEZIL HYDROCHLORIDE 10 MG/1
10 TABLET, FILM COATED ORAL NIGHTLY
Qty: 90 TABLET | Refills: 3 | Status: SHIPPED | OUTPATIENT
Start: 2022-11-16 | End: 2023-11-16

## 2022-11-16 RX ORDER — MEMANTINE HYDROCHLORIDE 10 MG/1
10 TABLET ORAL 2 TIMES DAILY
Qty: 180 TABLET | Refills: 3 | Status: SHIPPED | OUTPATIENT
Start: 2022-11-16

## 2022-11-16 NOTE — PROGRESS NOTES
"Subjective:     Patient ID: Brittny Nicolas is a 77 y.o. female.    CC:   Chief Complaint   Patient presents with   • late onset Alzheimer's disease without behavioral disturban       HPI:   History of Present Illness   Today 11/16/2022- This is a 77-year-old female who presents for 6-month in-person neurology follow-up on Alzheimer's disease with symptoms present since 2019. She has been taking donepezil 10 mg daily along with memantine 10 mg twice daily. We have also at last visit prescribed some clonazepam to use as needed for anxiety and agitation as well as sleep. Her daughter, Adia Tatum, is with her today and has noted hallucinations, delusions, trouble with short term memory, sundown syndrome, sleeping troubles, eating issues, and aggression. She is wondering about changes in medications.    She denies any changes in her health and denies having any surgeries or hospitalizations since her last visit. Her daughter states that she had RSV and was taken to the emergency room, but did not have to get hospitalized. Her daughter reports that she had shortness of breath when she was moving around. She has recovered from that and has not had any additional ER visits.     She states that her memory has been \"pretty good.\" She denies being worried, down, frustrated, or upset. Her daughter states that her current medication is helpful with keeping her continuing on her schedule, but not making any difference with her sundown syndrome. It happens everyday and is more in the evening. Her daughter states that she has not tried the Seroquel. Her daughter states that she is having trouble with loss of appetite. She refused to eat anymore food today. She picked it up and threw it away. Her daughter notes that she is taking 1 tablet of clonazepam everyday. She will fall asleep in the middle of the day. She will try to go to bed at 7:00 PM and then she wakes up at 4:00 AM.     She has trouble with swallowing and her " daughter has to crush her pills. Her daughter states that she has had a swallowing evaluation. She had an esophageal dilation performed by Dr. Dove. Her daughter has been crushing her medications and placing it in applesauce. Her daughter adds that she prefers to eat more soft foods like mac and cheese. She does not want to eat steak. She does not have difficulty with eating and swallowing everyday. Her daughter states that sometimes she can eat everything. Her weight has been steady. Her daughter reports that she does not like the Boost shakes or Ensure because it thickens up the mucus and makes it worse. She states that she likes peanut butter.     She denies any falls since her last visit. Her daughter states she will put her in a wheelchair if they go for a long distance. She does not have any other assistive device. She is still able to go to the bathroom. Her daughter reports that she forgets to tell when she needs to go to the bathroom, so she wears Depends all day. Her daughter adds that she has nighttime incontinence.    Prior evaluation: has included an unremarkable head CT and screening blood work, which were unremarkable. Hospitalized in August 2020 for hyponatremia at [facility].  11/24/2021 labs-ammonia low at 10, tsh 6.5-addressed with pcp, ft4 normal, b12 normal, cbc ok, cmp ok with sodium mildly low at 135.  Ex- passed away 4/30/2022-this was difficult and they were close.  Her family started noticing symptoms around early 2019 marked by forgetfulness, and this has gradually worsened over time. She has had impairments in language, and executive function. Her family does help with managing medications and finances. She is currently living with her daughter in [place].    Tried cognitive rehab with SLP in past and did not find this helpful.    The following portions of the patient's history were reviewed and updated as appropriate: allergies, current medications, past family history, past  medical history, past social history, past surgical history and problem list.    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   • Graves disease    • Hyperlipidemia    • Hypertension    • Memory loss    • Stomach problems    • Stroke (HCC)    • Stroke syndrome    • Thyroid disease    • Transient ischemic attack    • Vision loss        Past Surgical History:   Procedure Laterality Date   • AUGMENTATION MAMMAPLASTY      1978   • BREAST AUGMENTATION     • CAROTID ARTERY ANGIOPLASTY     • CAROTID ENDARTERECTOMY     • CAROTID STENT     • COLONOSCOPY     • D & C CERVICAL BIOPSY      Dilation and Curettage of Cervical Stump   • HERNIA REPAIR     • HERNIA REPAIR     • THROMBOENDARTERECTOMY      carotid   • UPPER GASTROINTESTINAL ENDOSCOPY         Social History     Socioeconomic History   • Marital status:    Tobacco Use   • Smoking status: Former     Packs/day: 1.00     Years: 15.00     Pack years: 15.00     Types: Cigarettes   • Smokeless tobacco: Never   • Tobacco comments:     Do not know when she quit. Likely about 1998.   Vaping Use   • Vaping Use: Never used   Substance and Sexual Activity   • Alcohol use: Not Currently     Alcohol/week: 0.0 standard drinks     Comment: She might have some wine, but I try to not give her any   • Drug use: No   • Sexual activity: Not Currently     Birth control/protection: Post-menopausal       Family History   Problem Relation Age of Onset   • Cystic fibrosis Other    • Cervical cancer Mother    • Cancer Paternal Grandmother    • Dementia Paternal Grandmother    • Colon cancer Neg Hx    • Colon polyps Neg Hx           Current Outpatient Medications:   •  acetaminophen (TYLENOL) 325 MG tablet, Take 2 tablets by mouth Every 4 (Four) Hours As Needed for Mild Pain ., Disp: , Rfl:   •  amLODIPine (NORVASC) 5 MG tablet, Take 1 tablet by mouth Daily., Disp:  90 tablet, Rfl: 1  •  atorvastatin (LIPITOR) 20 MG tablet, Take 1 tablet by mouth every night at bedtime., Disp: 90 tablet, Rfl: 1  •  Cholecalciferol (VITAMIN D) 2000 UNITS capsule, Take 1 capsule by mouth., Disp: , Rfl:   •  clonazePAM (KlonoPIN) 0.5 MG tablet, Take 1 tablet by mouth Daily As Needed for Anxiety (agitation)., Disp: 30 tablet, Rfl: 0  •  Dexilant 60 MG capsule, TAKE 1 CAPSULE BY MOUTH EVERY DAY, Disp: 90 capsule, Rfl: 1  •  Diclofenac Sodium 3 % gel gel, Apply  topically to the appropriate area as directed 2 (Two) Times a Day. for 60 days., Disp: 100 g, Rfl: 0  •  donepezil (ARICEPT) 10 MG tablet, Take 1 tablet by mouth Every Night., Disp: 90 tablet, Rfl: 3  •  levothyroxine (SYNTHROID, LEVOTHROID) 125 MCG tablet, TAKE 1 TABLET BY MOUTH EVERY DAY, Disp: 90 tablet, Rfl: 1  •  memantine (NAMENDA) 10 MG tablet, Take 1 tablet by mouth 2 (Two) Times a Day., Disp: 180 tablet, Rfl: 3  •  polyethylene glycol (MIRALAX) 17 g packet, Take 17 g by mouth Daily., Disp: , Rfl:   •  QUEtiapine (SEROquel) 25 MG tablet, 1/2 tablet nightly for 2 weeks then increase to 1 tablet nightly, Disp: 90 tablet, Rfl: 3     Review of Systems   Constitutional: Negative for chills, fatigue, fever and unexpected weight change.   HENT: Negative for ear pain, hearing loss, nosebleeds, rhinorrhea and sore throat.    Eyes: Negative for photophobia, pain, discharge, itching and visual disturbance.   Respiratory: Negative for cough, chest tightness, shortness of breath and wheezing.    Cardiovascular: Negative for chest pain, palpitations and leg swelling.   Gastrointestinal: Negative for abdominal pain, blood in stool, constipation, diarrhea, nausea and vomiting.   Genitourinary: Negative for dysuria, frequency, hematuria and urgency.        Urinary incontinence at times   Musculoskeletal: Positive for gait problem. Negative for arthralgias, back pain, joint swelling, myalgias, neck pain and neck stiffness.   Skin: Negative for rash and  "wound.   Allergic/Immunologic: Negative for environmental allergies and food allergies.   Neurological: Negative for dizziness, tremors, seizures, syncope, speech difficulty, weakness, light-headedness, numbness and headaches.   Hematological: Negative for adenopathy. Does not bruise/bleed easily.   Psychiatric/Behavioral: Positive for agitation, behavioral problems, confusion, decreased concentration, dysphoric mood, hallucinations and sleep disturbance. Negative for suicidal ideas. The patient is nervous/anxious.    All other systems reviewed and are negative.       Objective:  /70   Pulse 84   Ht 160 cm (63\")   Wt 60.8 kg (134 lb)   SpO2 94%   BMI 23.74 kg/m²     Neurologic Exam     Mental Status   Oriented to person.   Disoriented to place.   Disoriented to time.   Registration: recalls 3 of 3 objects. Recall of objects at 5 minutes: 0.   Attention: decreased. Concentration: decreased.   Speech: speech is normal   Level of consciousness: alert  Knowledge: poor.   Abnormal comprehension.     Cranial Nerves   Cranial nerves II through XII intact.     Motor Exam   Muscle bulk: normal  Overall muscle tone: normal    Strength   Strength 5/5 throughout.     Gait, Coordination, and Reflexes     Gait  Gait: (minimal shuffle, no ataxia, more fluid gait today, no assistive device)    Coordination   Finger to nose coordination: normal    Tremor   Resting tremor: absent  Intention tremor: present (minimal bue fine kinetic hand tremor)  Action tremor: absent    Reflexes   Reflexes 2+ except as noted.   Right : 2+  Left : 2+      Physical Exam  Constitutional:       Appearance: Normal appearance.   Neurological:      Mental Status: She is alert.      Cranial Nerves: Cranial nerves 2-12 are intact.      Motor: Motor strength is normal.      Coordination: Finger-Nose-Finger Test normal.   Psychiatric:         Mood and Affect: Affect is blunt.         Speech: Speech normal.         Behavior: Behavior is " slowed.         Thought Content: Thought content is delusional (reported at times by daughter).         Cognition and Memory: Cognition is impaired. Memory is impaired.         Judgment: Judgment is inappropriate.       MMSE today is 12/30 with 0/3 for word recall.   Previous MMSE in person on 11/24/2021 was 16/30.    Assessment/Plan:       Diagnoses and all orders for this visit:    1. Late onset Alzheimer's dementia with behavioral disturbance (HCC) (Primary)  -     donepezil (ARICEPT) 10 MG tablet; Take 1 tablet by mouth Every Night.  Dispense: 90 tablet; Refill: 3  -     memantine (NAMENDA) 10 MG tablet; Take 1 tablet by mouth 2 (Two) Times a Day.  Dispense: 180 tablet; Refill: 3  -     QUEtiapine (SEROquel) 25 MG tablet; 1/2 tablet nightly for 2 weeks then increase to 1 tablet nightly  Dispense: 90 tablet; Refill: 3  -     clonazePAM (KlonoPIN) 0.5 MG tablet; Take 1 tablet by mouth Daily As Needed for Anxiety (agitation).  Dispense: 30 tablet; Refill: 0    2. Oropharyngeal dysphagia  Comments:  continue with crushing medications, soft diet, followed by GI, s/p esophageal dilation, aspiration precautions    3. Anxiety and depression  -     clonazePAM (KlonoPIN) 0.5 MG tablet; Take 1 tablet by mouth Daily As Needed for Anxiety (agitation).  Dispense: 30 tablet; Refill: 0         She is with her daughter who provides the majority of the history. Mrs. Nicolas herself is really not aware of her memory difficulties, but her daughter does note sundowning, hallucinations, delusions, confusion, and loss of appetite. She has been following with GI and has had swallowing studies. They are now crushing her medications and placing it in applesauce. She is able to eat soft foods. We are going to wean her off the clonazepam, which has really not been making a difference. She is going to give her clonazepam 0.5 mg a half a tablet daily at bedtime for 2 weeks and then a half a tablet every other day at bedtime for 2 weeks and  then discontinue. We are going to add in the Seroquel low dose 25 mg a half a tablet nightly for 2 weeks and then 1 tablet nightly and continue. Her daughter is aware that if this is not helping enough with the sleep, agitation, and sundowner as well as appetite, they can call for an increase in dosing or twice-daily dosing, although most of her symptoms are in the evening and through the night. They will continue the memantine and donepezil. They will follow up in 6 months in clinic for reevaluation or sooner if needed. We did discuss that she has moderate to severe cognitive impairment at this point and we are seeing expected progression of the Alzheimer's.    Reviewed medications, potential side effects and signs and symptoms to report. Discussed risk versus benefits of treatment plan with patient and/or family-including medications, labs and radiology that may be ordered. Addressed questions and concerns during visit. Patient and/or family verbalized understanding and agree with plan.    AS THE PROVIDER, I PERSONALLY WORE PPE DURING ENTIRE FACE TO FACE ENCOUNTER IN CLINIC WITH THE PATIENT. PATIENT ALSO WORE PPE DURING ENTIRE FACE TO FACE ENCOUNTER EXCEPT FOR A MAX OF 30 SECONDS DURING NEUROLOGICAL EVALUATION OF CRANIAL NERVES AND THEN MASK WAS PLACED BACK OVER PATIENT FACE FOR REMAINDER OF VISIT. I WASHED MY HANDS BEFORE AND AFTER VISIT.    During this visit the following were done:  Labs Reviewed []    Labs Ordered []    Radiology Reports Reviewed []    Radiology Ordered []    PCP Records Reviewed [x]    Referring Provider Records Reviewed []    ER Records Reviewed []    Hospital Records Reviewed []    History Obtained From Family [x]  daughter  Radiology Images Reviewed []    Other Reviewed []    Records Requested []      Transcribed from ambient dictation for CARLOS Negron by Alexis Lizama.  11/16/22   18:00 EST    Patient or patient representative verbalized consent to the visit recording.  I  have personally performed the services described in this document as transcribed by the above individual, and it is both accurate and complete.  Munira Bloom, CARLOS  11/17/2022  08:28 EST

## 2023-01-18 RX ORDER — LEVOTHYROXINE SODIUM 0.12 MG/1
TABLET ORAL
Qty: 90 TABLET | Refills: 0 | Status: SHIPPED | OUTPATIENT
Start: 2023-01-18

## 2023-02-10 RX ORDER — DEXLANSOPRAZOLE 60 MG/1
CAPSULE, DELAYED RELEASE ORAL
Qty: 90 CAPSULE | Refills: 1 | Status: SHIPPED | OUTPATIENT
Start: 2023-02-10

## 2023-03-01 ENCOUNTER — OFFICE VISIT (OUTPATIENT)
Dept: ENDOCRINOLOGY | Facility: CLINIC | Age: 78
End: 2023-03-01
Payer: MEDICARE

## 2023-03-01 VITALS
HEIGHT: 63 IN | HEART RATE: 72 BPM | WEIGHT: 135 LBS | SYSTOLIC BLOOD PRESSURE: 130 MMHG | DIASTOLIC BLOOD PRESSURE: 78 MMHG | BODY MASS INDEX: 23.92 KG/M2

## 2023-03-01 DIAGNOSIS — E78.01 ESSENTIAL FAMILIAL HYPERCHOLESTEROLEMIA: ICD-10-CM

## 2023-03-01 DIAGNOSIS — E55.9 VITAMIN D DEFICIENCY: ICD-10-CM

## 2023-03-01 DIAGNOSIS — E03.9 ACQUIRED HYPOTHYROIDISM: ICD-10-CM

## 2023-03-01 DIAGNOSIS — I10 BENIGN ESSENTIAL HYPERTENSION: Primary | ICD-10-CM

## 2023-03-01 LAB
25(OH)D3 SERPL-MCNC: 42.2 NG/ML (ref 30–100)
ALBUMIN SERPL-MCNC: 4.2 G/DL (ref 3.5–5.2)
ALBUMIN/GLOB SERPL: 1.4 G/DL
ALP SERPL-CCNC: 117 U/L (ref 39–117)
ALT SERPL W P-5'-P-CCNC: 9 U/L (ref 1–33)
ANION GAP SERPL CALCULATED.3IONS-SCNC: 9.2 MMOL/L (ref 5–15)
AST SERPL-CCNC: 18 U/L (ref 1–32)
BASOPHILS # BLD AUTO: 0.04 10*3/MM3 (ref 0–0.2)
BASOPHILS NFR BLD AUTO: 0.4 % (ref 0–1.5)
BILIRUB SERPL-MCNC: 0.6 MG/DL (ref 0–1.2)
BUN SERPL-MCNC: 10 MG/DL (ref 8–23)
BUN/CREAT SERPL: 12.3 (ref 7–25)
CALCIUM SPEC-SCNC: 9.7 MG/DL (ref 8.6–10.5)
CHLORIDE SERPL-SCNC: 102 MMOL/L (ref 98–107)
CHOLEST SERPL-MCNC: 121 MG/DL (ref 0–200)
CO2 SERPL-SCNC: 27.8 MMOL/L (ref 22–29)
CREAT SERPL-MCNC: 0.81 MG/DL (ref 0.57–1)
DEPRECATED RDW RBC AUTO: 40.5 FL (ref 37–54)
EGFRCR SERPLBLD CKD-EPI 2021: 73.9 ML/MIN/1.73
EOSINOPHIL # BLD AUTO: 0.14 10*3/MM3 (ref 0–0.4)
EOSINOPHIL NFR BLD AUTO: 1.5 % (ref 0.3–6.2)
ERYTHROCYTE [DISTWIDTH] IN BLOOD BY AUTOMATED COUNT: 13.1 % (ref 12.3–15.4)
GLOBULIN UR ELPH-MCNC: 3.1 GM/DL
GLUCOSE SERPL-MCNC: 79 MG/DL (ref 65–99)
HCT VFR BLD AUTO: 40.1 % (ref 34–46.6)
HDLC SERPL-MCNC: 65 MG/DL (ref 40–60)
HGB BLD-MCNC: 12.8 G/DL (ref 12–15.9)
IMM GRANULOCYTES # BLD AUTO: 0.03 10*3/MM3 (ref 0–0.05)
IMM GRANULOCYTES NFR BLD AUTO: 0.3 % (ref 0–0.5)
LDLC SERPL CALC-MCNC: 40 MG/DL (ref 0–100)
LDLC/HDLC SERPL: 0.6 {RATIO}
LYMPHOCYTES # BLD AUTO: 1.51 10*3/MM3 (ref 0.7–3.1)
LYMPHOCYTES NFR BLD AUTO: 15.8 % (ref 19.6–45.3)
MCH RBC QN AUTO: 27.2 PG (ref 26.6–33)
MCHC RBC AUTO-ENTMCNC: 31.9 G/DL (ref 31.5–35.7)
MCV RBC AUTO: 85.3 FL (ref 79–97)
MONOCYTES # BLD AUTO: 0.59 10*3/MM3 (ref 0.1–0.9)
MONOCYTES NFR BLD AUTO: 6.2 % (ref 5–12)
NEUTROPHILS NFR BLD AUTO: 7.23 10*3/MM3 (ref 1.7–7)
NEUTROPHILS NFR BLD AUTO: 75.8 % (ref 42.7–76)
NRBC BLD AUTO-RTO: 0 /100 WBC (ref 0–0.2)
PLATELET # BLD AUTO: 261 10*3/MM3 (ref 140–450)
PMV BLD AUTO: 10.3 FL (ref 6–12)
POTASSIUM SERPL-SCNC: 4.1 MMOL/L (ref 3.5–5.2)
PROT SERPL-MCNC: 7.3 G/DL (ref 6–8.5)
RBC # BLD AUTO: 4.7 10*6/MM3 (ref 3.77–5.28)
SODIUM SERPL-SCNC: 139 MMOL/L (ref 136–145)
T4 FREE SERPL-MCNC: 1.57 NG/DL (ref 0.93–1.7)
TRIGL SERPL-MCNC: 84 MG/DL (ref 0–150)
TSH SERPL DL<=0.05 MIU/L-ACNC: 3.18 UIU/ML (ref 0.27–4.2)
VLDLC SERPL-MCNC: 16 MG/DL (ref 5–40)
WBC NRBC COR # BLD: 9.54 10*3/MM3 (ref 3.4–10.8)

## 2023-03-01 PROCEDURE — 80053 COMPREHEN METABOLIC PANEL: CPT | Performed by: INTERNAL MEDICINE

## 2023-03-01 PROCEDURE — 85025 COMPLETE CBC W/AUTO DIFF WBC: CPT | Performed by: INTERNAL MEDICINE

## 2023-03-01 PROCEDURE — 99214 OFFICE O/P EST MOD 30 MIN: CPT | Performed by: INTERNAL MEDICINE

## 2023-03-01 PROCEDURE — 84439 ASSAY OF FREE THYROXINE: CPT | Performed by: INTERNAL MEDICINE

## 2023-03-01 PROCEDURE — 82306 VITAMIN D 25 HYDROXY: CPT | Performed by: INTERNAL MEDICINE

## 2023-03-01 PROCEDURE — 80061 LIPID PANEL: CPT | Performed by: INTERNAL MEDICINE

## 2023-03-01 PROCEDURE — 84443 ASSAY THYROID STIM HORMONE: CPT | Performed by: INTERNAL MEDICINE

## 2023-03-01 NOTE — PROGRESS NOTES
Brittny Nicolas 77 y.o.  CC: Hypertension, Follow-up (/), Hyperlipidemia, and Hypothyroidism      [place]: Hypertension, Follow-up (/), Hyperlipidemia, and Hypothyroidism    bp is good  Is on low fat diet - taking lipitor 20 mg daily   Is taking synthroid 125 mcg daily   Has [facility]- accompanied by caregiver  Weight is stable    Allergies   Allergen Reactions   • Boniva [Ibandronic Acid] GI Intolerance     Refractory gerd and abdominal pain        Current Outpatient Medications:   •  acetaminophen (TYLENOL) 325 MG tablet, Take 2 tablets by mouth Every 4 (Four) Hours As Needed for Mild Pain ., Disp: , Rfl:   •  amLODIPine (NORVASC) 5 MG tablet, Take 1 tablet by mouth Daily., Disp: 90 tablet, Rfl: 1  •  atorvastatin (LIPITOR) 20 MG tablet, Take 1 tablet by mouth every night at bedtime., Disp: 90 tablet, Rfl: 1  •  Cholecalciferol (VITAMIN D) 2000 UNITS capsule, Take 1 capsule by mouth., Disp: , Rfl:   •  clonazePAM (KlonoPIN) 0.5 MG tablet, Take 1 tablet by mouth Daily As Needed for Anxiety (agitation)., Disp: 30 tablet, Rfl: 0  •  dexlansoprazole (DEXILANT) 60 MG capsule, TAKE 1 CAPSULE BY MOUTH EVERY DAY, Disp: 90 capsule, Rfl: 1  •  Diclofenac Sodium 3 % gel gel, Apply  topically to the appropriate area as directed 2 (Two) Times a Day. for 60 days., Disp: 100 g, Rfl: 0  •  donepezil (ARICEPT) 10 MG tablet, Take 1 tablet by mouth Every Night., Disp: 90 tablet, Rfl: 3  •  levothyroxine (SYNTHROID, LEVOTHROID) 125 MCG tablet, TAKE 1 TABLET BY MOUTH EVERY DAY, Disp: 90 tablet, Rfl: 0  •  memantine (NAMENDA) 10 MG tablet, Take 1 tablet by mouth 2 (Two) Times a Day., Disp: 180 tablet, Rfl: 3  •  polyethylene glycol (MIRALAX) 17 g packet, Take 17 g by mouth Daily., Disp: , Rfl:   •  QUEtiapine (SEROquel) 25 MG tablet, 1/2 tablet nightly for 2 weeks then increase to 1 tablet nightly, Disp: 90 tablet, Rfl: 3  Patient Active Problem List    Diagnosis    • Visual loss [H54.7]    • Nocturnal enuresis [N39.44]    • Alzheimer's  disease, unspecified (HCC) [G30.9, F02.80]    • Influenza A with respiratory manifestations [J10.1]    • Anxiety and depression [F41.9, F32.A]    • Acute respiratory failure with hypoxia (HCC) [J96.01]    • Anemia [D64.9]    • Oropharyngeal dysphagia [R13.12]    • Hyponatremia [E87.1]    • Late onset Alzheimer's dementia with behavioral disturbance (HCC) [G30.1, F02.818]    • Rib pain on left side [R07.81]    • Memory loss [R41.3]    • Pharyngitis [J02.9]    • Left lower quadrant pain [R10.32]    • Acute pain of left shoulder [M25.512]    • Erosive esophagitis [K22.10]    • H/O colonoscopy [Z98.890]    • Fatigue [R53.83]    • Hypothyroidism [E03.9]    • Achalasia [K22.0]    • Acute upper respiratory infection [J06.9]    • Atypical chest pain [R07.89]    • Benign essential hypertension [I10]    • Carotid artery stenosis [I65.29]    • Cataract [H26.9]    • Cough [R05.9]    • Acute cystitis [N30.00]    • Screening for malignant neoplasm [Z12.9]    • Gastroesophageal reflux disease [K21.9]    • Essential familial hypercholesterolemia [E78.01]    • Gastrointestinal bleeding [K92.2]    • Inguinal hernia [K40.90]    • Insomnia [G47.00]    • Leg cramps [R25.2]    • Meniere's disease [H81.09]    • Night sweats [R61]    • Osteoporosis [M81.0]    • Peripheral neuropathy [G62.9]    • Retinal artery occlusion [H34.9]    • Rheumatoid arthritis (HCC) [M06.9]    • Stroke syndrome [EYV6726]    • Suprapubic pain [R10.2]    • Transient ischemic attack [G45.9]    • Positive reaction to tuberculin skin test [R76.11]    • Vitamin D deficiency [E55.9]    • Weight loss [R63.4]      Review of Systems   Constitutional: Negative for activity change, appetite change and unexpected weight change.   HENT: Negative for congestion and rhinorrhea.    Eyes: Negative for visual disturbance.   Respiratory: Negative for cough and shortness of breath.    Cardiovascular: Negative for palpitations and leg swelling.   Gastrointestinal: Negative for  "constipation, diarrhea and nausea.   Genitourinary: Negative for hematuria.   Musculoskeletal: Negative for arthralgias, back pain, gait problem, joint swelling and myalgias.   Skin: Negative for color change, rash and wound.   Allergic/Immunologic: Negative for environmental allergies, food allergies and immunocompromised state.   Neurological: Negative for dizziness, weakness and light-headedness.   Psychiatric/Behavioral: Positive for confusion. Negative for decreased concentration, dysphoric mood and sleep disturbance. The patient is not nervous/anxious.      Social History     Socioeconomic History   • Marital status:    Tobacco Use   • Smoking status: Former     Packs/day: 1.00     Years: 15.00     Pack years: 15.00     Types: Cigarettes   • Smokeless tobacco: Never   • Tobacco comments:     Do not know when she quit. Likely about 1998.   Vaping Use   • Vaping Use: Never used   Substance and Sexual Activity   • Alcohol use: Not Currently     Alcohol/week: 0.0 standard drinks     Comment: She might have some wine, but I try to not give her any   • Drug use: No   • Sexual activity: Not Currently     Birth control/protection: Post-menopausal     Family History   Problem Relation Age of Onset   • Cystic fibrosis Other    • Cervical cancer Mother    • Cancer Paternal Grandmother    • Dementia Paternal Grandmother    • Colon cancer Neg Hx    • Colon polyps Neg Hx      /78   Pulse 72   Ht 160 cm (63\")   Wt 61.2 kg (135 lb)   BMI 23.91 kg/m²   Physical Exam  Vitals and nursing note reviewed.   Constitutional:       Appearance: Normal appearance. She is well-developed.   HENT:      Head: Normocephalic and atraumatic.   Eyes:      General: Lids are normal.      Extraocular Movements: Extraocular movements intact.      Conjunctiva/sclera: Conjunctivae normal.      Pupils: Pupils are equal, round, and reactive to light.   Neck:      Thyroid: No thyroid mass or thyromegaly.      Vascular: No carotid " bruit.      Trachea: Trachea normal. No tracheal deviation.   Cardiovascular:      Rate and Rhythm: Normal rate and regular rhythm.      Pulses: Normal pulses.      Heart sounds: Normal heart sounds. No murmur heard.    No friction rub. No gallop.   Pulmonary:      Effort: Pulmonary effort is normal. No respiratory distress.      Breath sounds: Normal breath sounds. No wheezing.   Musculoskeletal:         General: No deformity. Normal range of motion.      Cervical back: Normal range of motion and neck supple.   Lymphadenopathy:      Cervical: No cervical adenopathy.   Skin:     General: Skin is warm and dry.      Findings: No erythema or rash.      Nails: There is no clubbing.   Neurological:      General: No focal deficit present.      Mental Status: She is alert and oriented to person, place, and time.      Cranial Nerves: No cranial nerve deficit.      Deep Tendon Reflexes: Reflexes are normal and symmetric. Reflexes normal.   Psychiatric:         Mood and Affect: Mood normal.         Speech: Speech normal.         Behavior: Behavior normal.         Thought Content: Thought content normal.         Judgment: Judgment normal.       Results for orders placed or performed during the hospital encounter of 10/26/22   Respiratory Panel PCR w/COVID-19(SARS-CoV-2) [facility]A/[facility] In-House, NP Swab in UTM/VTM, 3-4 HR TAT - Swab, Nasopharynx    Specimen: Nasopharynx; Swab   Result Value Ref Range    ADENOVIRUS, PCR Not Detected Not Detected    Coronavirus 229E Not Detected Not Detected    Coronavirus HKU1 Not Detected Not Detected    Coronavirus NL63 Not Detected Not Detected    Coronavirus OC43 Not Detected Not Detected    COVID19 Not Detected Not Detected - Ref. Range    Human Metapneumovirus Not Detected Not Detected    Human Rhinovirus/Enterovirus Not Detected Not Detected    Influenza A PCR Not Detected Not Detected    Influenza B PCR Not Detected Not Detected    Parainfluenza Virus 1 Not Detected Not  Detected    Parainfluenza Virus 2 Not Detected Not Detected    Parainfluenza Virus 3 Not Detected Not Detected    Parainfluenza Virus 4 Not Detected Not Detected    RSV, PCR Detected (A) Not Detected    Bordetella pertussis pcr Not Detected Not Detected    Bordetella parapertussis PCR Not Detected Not Detected    Chlamydophila pneumoniae PCR Not Detected Not Detected    Mycoplasma pneumo by PCR Not Detected Not Detected   Comprehensive Metabolic Panel    Specimen: Blood   Result Value Ref Range    Glucose 108 (H) 65 - 99 mg/dL    BUN 12 8 - 23 mg/dL    Creatinine 0.70 0.57 - 1.00 mg/dL    Sodium 134 (L) 136 - 145 mmol/L    Potassium 4.0 3.5 - 5.2 mmol/L    Chloride 99 98 - 107 mmol/L    CO2 26.0 22.0 - 29.0 mmol/L    Calcium 9.5 8.6 - 10.5 mg/dL    Total Protein 7.5 6.0 - 8.5 g/dL    Albumin 3.90 3.50 - 5.20 g/dL    ALT (SGPT) 13 1 - 33 U/L    AST (SGOT) 15 1 - 32 U/L    Alkaline Phosphatase 98 39 - 117 U/L    Total Bilirubin 0.3 0.0 - 1.2 mg/dL    Globulin 3.6 gm/dL    A/G Ratio 1.1 g/dL    BUN/Creatinine Ratio 17.1 7.0 - 25.0    Anion Gap 9.0 5.0 - 15.0 mmol/L    eGFR 88.1 >60.0 mL/min/1.73   BNP    Specimen: Blood   Result Value Ref Range    proBNP 389.9 0.0 - 1,800.0 pg/mL   Troponin    Specimen: Blood   Result Value Ref Range    Troponin T <0.010 0.000 - 0.030 ng/mL   CBC Auto Differential    Specimen: Blood   Result Value Ref Range    WBC 12.93 (H) 3.40 - 10.80 10*3/mm3    RBC 4.75 3.77 - 5.28 10*6/mm3    Hemoglobin 12.9 12.0 - 15.9 g/dL    Hematocrit 40.7 34.0 - 46.6 %    MCV 85.7 79.0 - 97.0 fL    MCH 27.2 26.6 - 33.0 pg    MCHC 31.7 31.5 - 35.7 g/dL    RDW 14.0 12.3 - 15.4 %    RDW-SD 43.8 37.0 - 54.0 fl    MPV 9.2 6.0 - 12.0 fL    Platelets 287 140 - 450 10*3/mm3    Neutrophil % 87.2 (H) 42.7 - 76.0 %    Lymphocyte % 7.6 (L) 19.6 - 45.3 %    Monocyte % 4.5 (L) 5.0 - 12.0 %    Eosinophil % 0.0 (L) 0.3 - 6.2 %    Basophil % 0.2 0.0 - 1.5 %    Immature Grans % 0.5 0.0 - 0.5 %    Neutrophils, Absolute 11.29  (H) 1.70 - 7.00 10*3/mm3    Lymphocytes, Absolute 0.98 0.70 - 3.10 10*3/mm3    Monocytes, Absolute 0.58 0.10 - 0.90 10*3/mm3    Eosinophils, Absolute 0.00 0.00 - 0.40 10*3/mm3    Basophils, Absolute 0.02 0.00 - 0.20 10*3/mm3    Immature Grans, Absolute 0.06 (H) 0.00 - 0.05 10*3/mm3    nRBC 0.0 0.0 - 0.2 /100 WBC   ECG 12 Lead ED Triage Standing Order; SOA   Result Value Ref Range    QT Interval 364 ms    QTC Interval 442 ms   Green Top (Gel)   Result Value Ref Range    Extra Tube Hold for add-ons.    Lavender Top   Result Value Ref Range    Extra Tube hold for add-on    Gold Top - SST   Result Value Ref Range    Extra Tube Hold for add-ons.    Gray Top   Result Value Ref Range    Extra Tube Hold for add-ons.    Light Blue Top   Result Value Ref Range    Extra Tube Hold for add-ons.      Diagnoses and all orders for this visit:    1. Benign essential hypertension (Primary)  Assessment & Plan:  bp is good   Continue norvasc 5 mg daily     Orders:  -     Comprehensive Metabolic Panel; Future  -     CBC Auto Differential; Future  -     Comprehensive Metabolic Panel  -     CBC Auto Differential    2. Essential familial hypercholesterolemia  Assessment & Plan:  Taking lipitor 20 mg daily   Check flp     Orders:  -     Lipid Panel; Future  -     Lipid Panel    3. Acquired hypothyroidism  Assessment & Plan:  Update tfts- taking synthroid 125 mcg daily     Orders:  -     TSH; Future  -     T4, Free; Future  -     TSH  -     T4, Free    4. Vitamin D deficiency  -     Vitamin D,25-Hydroxy; Future  -     Vitamin D,25-Hydroxy  Return in about 6 months (around 9/1/2023) for Recheck.    Dalila Liz MD  Signed Dalila Liz MD

## 2023-03-06 ENCOUNTER — TELEPHONE (OUTPATIENT)
Dept: NEUROLOGY | Facility: CLINIC | Age: 78
End: 2023-03-06
Payer: MEDICARE

## 2023-03-06 DIAGNOSIS — F02.818 LATE ONSET ALZHEIMER'S DEMENTIA WITH BEHAVIORAL DISTURBANCE: ICD-10-CM

## 2023-03-06 DIAGNOSIS — G30.1 LATE ONSET ALZHEIMER'S DEMENTIA WITH BEHAVIORAL DISTURBANCE: ICD-10-CM

## 2023-03-06 RX ORDER — QUETIAPINE FUMARATE 25 MG/1
50 TABLET, FILM COATED ORAL NIGHTLY
Qty: 180 TABLET | Refills: 1 | Status: SHIPPED | OUTPATIENT
Start: 2023-03-06

## 2023-03-06 NOTE — TELEPHONE ENCOUNTER
PT'S DAUGHTER INDIO CALDERON CALLED AND WANTED TO KNOW IF SHE COULD INCREASE THE PT'S SERQUEL.  PT CURRENTLY TAKING 25 MG NIGHTLY. CORRECT PHARMACY IN CHART

## 2023-03-06 NOTE — TELEPHONE ENCOUNTER
Yes, the seroquel can be increased to 50mg at bedtime. This would be 2 of the 25mg pills she is currently prescribed. I can send in a new prescription with the updated dosing. Thanks, Munira

## 2023-03-06 NOTE — TELEPHONE ENCOUNTER
I CALLED AND LEFT DETAILED VOICE MESSAGE, NAME ON VM,  LETTING PT KNOW SHE COULD INCREASE THE DOSE OF SERQUEL TO 2 25 MG TABLETS NIGHTLY

## 2023-04-24 RX ORDER — LEVOTHYROXINE SODIUM 0.12 MG/1
TABLET ORAL
Qty: 90 TABLET | Refills: 1 | Status: SHIPPED | OUTPATIENT
Start: 2023-04-24

## 2023-04-24 NOTE — TELEPHONE ENCOUNTER
Rx Refill Note    Requested Prescriptions     Pending Prescriptions Disp Refills   • levothyroxine (SYNTHROID, LEVOTHROID) 125 MCG tablet [Pharmacy Med Name: LEVOTHYROXINE 125 MCG TABLET] 90 tablet 0     Sig: TAKE 1 TABLET BY MOUTH EVERY DAY        Last office visit with prescribing clinician: 3/1/2023     Last telemedicine visit with prescribing clinician: 8/31/2023    Next office visit with prescribing clinician: 8/31/2023   {    Casandra Sommer MA  04/24/23, 10:16 EDT

## 2023-04-26 ENCOUNTER — TRANSCRIBE ORDERS (OUTPATIENT)
Dept: ADMINISTRATIVE | Facility: HOSPITAL | Age: 78
End: 2023-04-26
Payer: MEDICARE

## 2023-04-26 DIAGNOSIS — E07.9 THYROID MASS: Primary | ICD-10-CM

## 2023-05-07 ENCOUNTER — HOSPITAL ENCOUNTER (INPATIENT)
Facility: HOSPITAL | Age: 78
LOS: 4 days | Discharge: HOME | End: 2023-05-11
Attending: EMERGENCY MEDICINE | Admitting: HOSPITALIST
Payer: MEDICARE

## 2023-05-07 ENCOUNTER — APPOINTMENT (OUTPATIENT)
Dept: GENERAL RADIOLOGY | Facility: HOSPITAL | Age: 78
End: 2023-05-07
Payer: MEDICARE

## 2023-05-07 ENCOUNTER — APPOINTMENT (OUTPATIENT)
Dept: CT IMAGING | Facility: HOSPITAL | Age: 78
End: 2023-05-07
Payer: MEDICARE

## 2023-05-07 DIAGNOSIS — Z78.9 IMPAIRED MOBILITY AND ADLS: ICD-10-CM

## 2023-05-07 DIAGNOSIS — R53.83 OTHER FATIGUE: ICD-10-CM

## 2023-05-07 DIAGNOSIS — Z74.09 IMPAIRED MOBILITY AND ADLS: ICD-10-CM

## 2023-05-07 DIAGNOSIS — R13.10 DYSPHAGIA, UNSPECIFIED TYPE: ICD-10-CM

## 2023-05-07 DIAGNOSIS — R63.8 DECREASED ORAL INTAKE: ICD-10-CM

## 2023-05-07 DIAGNOSIS — G30.9 ALZHEIMER'S DISEASE, UNSPECIFIED: ICD-10-CM

## 2023-05-07 DIAGNOSIS — R93.89 ABNORMAL CT OF THE CHEST: ICD-10-CM

## 2023-05-07 DIAGNOSIS — J18.9 PNEUMONIA OF BOTH LUNGS DUE TO INFECTIOUS ORGANISM, UNSPECIFIED PART OF LUNG: ICD-10-CM

## 2023-05-07 DIAGNOSIS — J18.9 PNEUMONIA OF RIGHT LOWER LOBE DUE TO INFECTIOUS ORGANISM: ICD-10-CM

## 2023-05-07 DIAGNOSIS — J96.01 ACUTE RESPIRATORY FAILURE WITH HYPOXIA: Primary | ICD-10-CM

## 2023-05-07 DIAGNOSIS — F02.80 ALZHEIMER'S DISEASE, UNSPECIFIED: ICD-10-CM

## 2023-05-07 LAB
ALBUMIN SERPL-MCNC: 3.5 G/DL (ref 3.5–5.2)
ALBUMIN/GLOB SERPL: 0.8 G/DL
ALP SERPL-CCNC: 115 U/L (ref 39–117)
ALT SERPL W P-5'-P-CCNC: 8 U/L (ref 1–33)
ANION GAP SERPL CALCULATED.3IONS-SCNC: 11 MMOL/L (ref 5–15)
AST SERPL-CCNC: 13 U/L (ref 1–32)
BACTERIA UR QL AUTO: ABNORMAL /HPF
BASOPHILS # BLD AUTO: 0.02 10*3/MM3 (ref 0–0.2)
BASOPHILS NFR BLD AUTO: 0.2 % (ref 0–1.5)
BILIRUB SERPL-MCNC: 0.5 MG/DL (ref 0–1.2)
BILIRUB UR QL STRIP: NEGATIVE
BUN SERPL-MCNC: 14 MG/DL (ref 8–23)
BUN/CREAT SERPL: 19.7 (ref 7–25)
CALCIUM SPEC-SCNC: 9.8 MG/DL (ref 8.6–10.5)
CHLORIDE SERPL-SCNC: 103 MMOL/L (ref 98–107)
CLARITY UR: ABNORMAL
CO2 SERPL-SCNC: 25 MMOL/L (ref 22–29)
COLOR UR: YELLOW
CREAT SERPL-MCNC: 0.71 MG/DL (ref 0.57–1)
D-LACTATE SERPL-SCNC: 1.5 MMOL/L (ref 0.5–2)
DEPRECATED RDW RBC AUTO: 42.5 FL (ref 37–54)
EGFRCR SERPLBLD CKD-EPI 2021: 86.6 ML/MIN/1.73
EOSINOPHIL # BLD AUTO: 0.01 10*3/MM3 (ref 0–0.4)
EOSINOPHIL NFR BLD AUTO: 0.1 % (ref 0.3–6.2)
ERYTHROCYTE [DISTWIDTH] IN BLOOD BY AUTOMATED COUNT: 13.2 % (ref 12.3–15.4)
FLUAV RNA RESP QL NAA+PROBE: NOT DETECTED
FLUBV RNA RESP QL NAA+PROBE: NOT DETECTED
GLOBULIN UR ELPH-MCNC: 4.2 GM/DL
GLUCOSE SERPL-MCNC: 108 MG/DL (ref 65–99)
GLUCOSE UR STRIP-MCNC: NEGATIVE MG/DL
HCT VFR BLD AUTO: 41.9 % (ref 34–46.6)
HGB BLD-MCNC: 13 G/DL (ref 12–15.9)
HGB UR QL STRIP.AUTO: NEGATIVE
HOLD SPECIMEN: NORMAL
HYALINE CASTS UR QL AUTO: ABNORMAL /LPF
IMM GRANULOCYTES # BLD AUTO: 0.06 10*3/MM3 (ref 0–0.05)
IMM GRANULOCYTES NFR BLD AUTO: 0.5 % (ref 0–0.5)
KETONES UR QL STRIP: NEGATIVE
LEUKOCYTE ESTERASE UR QL STRIP.AUTO: NEGATIVE
LYMPHOCYTES # BLD AUTO: 0.61 10*3/MM3 (ref 0.7–3.1)
LYMPHOCYTES NFR BLD AUTO: 4.8 % (ref 19.6–45.3)
MCH RBC QN AUTO: 27.1 PG (ref 26.6–33)
MCHC RBC AUTO-ENTMCNC: 31 G/DL (ref 31.5–35.7)
MCV RBC AUTO: 87.5 FL (ref 79–97)
MONOCYTES # BLD AUTO: 1.09 10*3/MM3 (ref 0.1–0.9)
MONOCYTES NFR BLD AUTO: 8.5 % (ref 5–12)
MUCOUS THREADS URNS QL MICRO: ABNORMAL /HPF
NEUTROPHILS NFR BLD AUTO: 10.96 10*3/MM3 (ref 1.7–7)
NEUTROPHILS NFR BLD AUTO: 85.9 % (ref 42.7–76)
NITRITE UR QL STRIP: NEGATIVE
NRBC BLD AUTO-RTO: 0 /100 WBC (ref 0–0.2)
NT-PROBNP SERPL-MCNC: 222.9 PG/ML (ref 0–1800)
PH UR STRIP.AUTO: 5.5 [PH] (ref 5–8)
PLATELET # BLD AUTO: 342 10*3/MM3 (ref 140–450)
PMV BLD AUTO: 9.3 FL (ref 6–12)
POTASSIUM SERPL-SCNC: 3.6 MMOL/L (ref 3.5–5.2)
PROCALCITONIN SERPL-MCNC: 1.45 NG/ML (ref 0–0.25)
PROT SERPL-MCNC: 7.7 G/DL (ref 6–8.5)
PROT UR QL STRIP: ABNORMAL
QT INTERVAL: 358 MS
QTC INTERVAL: 418 MS
RBC # BLD AUTO: 4.79 10*6/MM3 (ref 3.77–5.28)
RBC # UR STRIP: ABNORMAL /HPF
REF LAB TEST METHOD: ABNORMAL
SARS-COV-2 RNA RESP QL NAA+PROBE: NOT DETECTED
SODIUM SERPL-SCNC: 139 MMOL/L (ref 136–145)
SP GR UR STRIP: 1.07 (ref 1–1.03)
SQUAMOUS #/AREA URNS HPF: ABNORMAL /HPF
TROPONIN T SERPL HS-MCNC: 9 NG/L
TSH SERPL DL<=0.05 MIU/L-ACNC: 1.21 UIU/ML (ref 0.27–4.2)
UROBILINOGEN UR QL STRIP: ABNORMAL
WBC # UR STRIP: ABNORMAL /HPF
WBC NRBC COR # BLD: 12.75 10*3/MM3 (ref 3.4–10.8)
WHOLE BLOOD HOLD COAG: NORMAL
WHOLE BLOOD HOLD SPECIMEN: NORMAL

## 2023-05-07 PROCEDURE — 83605 ASSAY OF LACTIC ACID: CPT | Performed by: EMERGENCY MEDICINE

## 2023-05-07 PROCEDURE — 81001 URINALYSIS AUTO W/SCOPE: CPT | Performed by: INTERNAL MEDICINE

## 2023-05-07 PROCEDURE — 99223 1ST HOSP IP/OBS HIGH 75: CPT | Performed by: INTERNAL MEDICINE

## 2023-05-07 PROCEDURE — 99222 1ST HOSP IP/OBS MODERATE 55: CPT | Performed by: NURSE PRACTITIONER

## 2023-05-07 PROCEDURE — 85025 COMPLETE CBC W/AUTO DIFF WBC: CPT | Performed by: EMERGENCY MEDICINE

## 2023-05-07 PROCEDURE — 25010000002 PIPERACILLIN SOD-TAZOBACTAM PER 1 G: Performed by: INTERNAL MEDICINE

## 2023-05-07 PROCEDURE — 84484 ASSAY OF TROPONIN QUANT: CPT | Performed by: EMERGENCY MEDICINE

## 2023-05-07 PROCEDURE — 87636 SARSCOV2 & INF A&B AMP PRB: CPT | Performed by: EMERGENCY MEDICINE

## 2023-05-07 PROCEDURE — 94761 N-INVAS EAR/PLS OXIMETRY MLT: CPT

## 2023-05-07 PROCEDURE — 84443 ASSAY THYROID STIM HORMONE: CPT | Performed by: INTERNAL MEDICINE

## 2023-05-07 PROCEDURE — 71045 X-RAY EXAM CHEST 1 VIEW: CPT

## 2023-05-07 PROCEDURE — 87040 BLOOD CULTURE FOR BACTERIA: CPT | Performed by: EMERGENCY MEDICINE

## 2023-05-07 PROCEDURE — 94640 AIRWAY INHALATION TREATMENT: CPT

## 2023-05-07 PROCEDURE — 25010000002 AZITHROMYCIN PER 500 MG: Performed by: EMERGENCY MEDICINE

## 2023-05-07 PROCEDURE — 93005 ELECTROCARDIOGRAM TRACING: CPT

## 2023-05-07 PROCEDURE — 25010000002 CEFTRIAXONE PER 250 MG: Performed by: EMERGENCY MEDICINE

## 2023-05-07 PROCEDURE — 36415 COLL VENOUS BLD VENIPUNCTURE: CPT

## 2023-05-07 PROCEDURE — 71260 CT THORAX DX C+: CPT

## 2023-05-07 PROCEDURE — 94799 UNLISTED PULMONARY SVC/PX: CPT

## 2023-05-07 PROCEDURE — 83880 ASSAY OF NATRIURETIC PEPTIDE: CPT | Performed by: EMERGENCY MEDICINE

## 2023-05-07 PROCEDURE — 25510000001 IOPAMIDOL 61 % SOLUTION: Performed by: EMERGENCY MEDICINE

## 2023-05-07 PROCEDURE — 84145 PROCALCITONIN (PCT): CPT | Performed by: INTERNAL MEDICINE

## 2023-05-07 PROCEDURE — 99285 EMERGENCY DEPT VISIT HI MDM: CPT

## 2023-05-07 PROCEDURE — 80053 COMPREHEN METABOLIC PANEL: CPT | Performed by: EMERGENCY MEDICINE

## 2023-05-07 PROCEDURE — 93005 ELECTROCARDIOGRAM TRACING: CPT | Performed by: EMERGENCY MEDICINE

## 2023-05-07 PROCEDURE — 0 POTASSIUM CHLORIDE 10 MEQ/100ML SOLUTION: Performed by: INTERNAL MEDICINE

## 2023-05-07 RX ORDER — DONEPEZIL HYDROCHLORIDE 10 MG/1
10 TABLET, FILM COATED ORAL NIGHTLY
Status: DISCONTINUED | OUTPATIENT
Start: 2023-05-07 | End: 2023-05-11 | Stop reason: HOSPADM

## 2023-05-07 RX ORDER — PREDNISONE 10 MG/1
10 TABLET ORAL 2 TIMES DAILY
COMMUNITY
End: 2023-05-11 | Stop reason: HOSPADM

## 2023-05-07 RX ORDER — POLYETHYLENE GLYCOL 3350 17 G/17G
17 POWDER, FOR SOLUTION ORAL DAILY PRN
Status: DISCONTINUED | OUTPATIENT
Start: 2023-05-07 | End: 2023-05-11 | Stop reason: HOSPADM

## 2023-05-07 RX ORDER — AMLODIPINE BESYLATE 5 MG/1
5 TABLET ORAL DAILY
Status: DISCONTINUED | OUTPATIENT
Start: 2023-05-08 | End: 2023-05-11 | Stop reason: HOSPADM

## 2023-05-07 RX ORDER — AMOXICILLIN 250 MG
2 CAPSULE ORAL 2 TIMES DAILY
Status: DISCONTINUED | OUTPATIENT
Start: 2023-05-07 | End: 2023-05-11 | Stop reason: HOSPADM

## 2023-05-07 RX ORDER — SODIUM CHLORIDE 0.9 % (FLUSH) 0.9 %
10 SYRINGE (ML) INJECTION EVERY 12 HOURS SCHEDULED
Status: DISCONTINUED | OUTPATIENT
Start: 2023-05-07 | End: 2023-05-11 | Stop reason: HOSPADM

## 2023-05-07 RX ORDER — SODIUM CHLORIDE 0.9 % (FLUSH) 0.9 %
10 SYRINGE (ML) INJECTION AS NEEDED
Status: DISCONTINUED | OUTPATIENT
Start: 2023-05-07 | End: 2023-05-11 | Stop reason: HOSPADM

## 2023-05-07 RX ORDER — PANTOPRAZOLE SODIUM 40 MG/10ML
40 INJECTION, POWDER, LYOPHILIZED, FOR SOLUTION INTRAVENOUS
Status: DISCONTINUED | OUTPATIENT
Start: 2023-05-08 | End: 2023-05-10

## 2023-05-07 RX ORDER — LEVOTHYROXINE SODIUM 0.12 MG/1
125 TABLET ORAL DAILY
Status: DISCONTINUED | OUTPATIENT
Start: 2023-05-08 | End: 2023-05-11 | Stop reason: HOSPADM

## 2023-05-07 RX ORDER — SODIUM CHLORIDE 9 MG/ML
40 INJECTION, SOLUTION INTRAVENOUS AS NEEDED
Status: DISCONTINUED | OUTPATIENT
Start: 2023-05-07 | End: 2023-05-11 | Stop reason: HOSPADM

## 2023-05-07 RX ORDER — BISACODYL 5 MG/1
5 TABLET, DELAYED RELEASE ORAL DAILY PRN
Status: DISCONTINUED | OUTPATIENT
Start: 2023-05-07 | End: 2023-05-11 | Stop reason: HOSPADM

## 2023-05-07 RX ORDER — SODIUM CHLORIDE 9 MG/ML
100 INJECTION, SOLUTION INTRAVENOUS CONTINUOUS
Status: ACTIVE | OUTPATIENT
Start: 2023-05-07 | End: 2023-05-08

## 2023-05-07 RX ORDER — ALBUTEROL SULFATE 2.5 MG/3ML
2.5 SOLUTION RESPIRATORY (INHALATION) ONCE
Status: COMPLETED | OUTPATIENT
Start: 2023-05-07 | End: 2023-05-07

## 2023-05-07 RX ORDER — QUETIAPINE FUMARATE 25 MG/1
50 TABLET, FILM COATED ORAL NIGHTLY PRN
Status: DISCONTINUED | OUTPATIENT
Start: 2023-05-07 | End: 2023-05-11 | Stop reason: HOSPADM

## 2023-05-07 RX ORDER — NITROFURANTOIN 25; 75 MG/1; MG/1
100 CAPSULE ORAL 2 TIMES DAILY
COMMUNITY
End: 2023-05-11 | Stop reason: HOSPADM

## 2023-05-07 RX ORDER — MEMANTINE HYDROCHLORIDE 10 MG/1
10 TABLET ORAL 2 TIMES DAILY
Status: DISCONTINUED | OUTPATIENT
Start: 2023-05-07 | End: 2023-05-11 | Stop reason: HOSPADM

## 2023-05-07 RX ORDER — POTASSIUM CHLORIDE 7.45 MG/ML
10 INJECTION INTRAVENOUS
Status: COMPLETED | OUTPATIENT
Start: 2023-05-07 | End: 2023-05-07

## 2023-05-07 RX ORDER — ONDANSETRON 2 MG/ML
4 INJECTION INTRAMUSCULAR; INTRAVENOUS EVERY 6 HOURS PRN
Status: DISCONTINUED | OUTPATIENT
Start: 2023-05-07 | End: 2023-05-11 | Stop reason: HOSPADM

## 2023-05-07 RX ORDER — ACETAMINOPHEN 325 MG/1
650 TABLET ORAL ONCE
Status: COMPLETED | OUTPATIENT
Start: 2023-05-07 | End: 2023-05-07

## 2023-05-07 RX ORDER — BISACODYL 10 MG
10 SUPPOSITORY, RECTAL RECTAL DAILY PRN
Status: DISCONTINUED | OUTPATIENT
Start: 2023-05-07 | End: 2023-05-11 | Stop reason: HOSPADM

## 2023-05-07 RX ORDER — ACETAMINOPHEN 325 MG/1
650 TABLET ORAL EVERY 4 HOURS PRN
Status: DISCONTINUED | OUTPATIENT
Start: 2023-05-07 | End: 2023-05-11 | Stop reason: HOSPADM

## 2023-05-07 RX ADMIN — ACETAMINOPHEN 325MG 650 MG: 325 TABLET ORAL at 13:29

## 2023-05-07 RX ADMIN — POTASSIUM CHLORIDE 10 MEQ: 7.46 INJECTION, SOLUTION INTRAVENOUS at 19:51

## 2023-05-07 RX ADMIN — SODIUM CHLORIDE 100 ML/HR: 9 INJECTION, SOLUTION INTRAVENOUS at 17:19

## 2023-05-07 RX ADMIN — IOPAMIDOL 75 ML: 612 INJECTION, SOLUTION INTRAVENOUS at 14:01

## 2023-05-07 RX ADMIN — SODIUM CHLORIDE 1 G: 900 INJECTION INTRAVENOUS at 13:32

## 2023-05-07 RX ADMIN — POTASSIUM CHLORIDE 10 MEQ: 7.46 INJECTION, SOLUTION INTRAVENOUS at 17:32

## 2023-05-07 RX ADMIN — TAZOBACTAM SODIUM AND PIPERACILLIN SODIUM 3.38 G: 375; 3 INJECTION, SOLUTION INTRAVENOUS at 17:19

## 2023-05-07 RX ADMIN — ALBUTEROL SULFATE 2.5 MG: 2.5 SOLUTION RESPIRATORY (INHALATION) at 13:18

## 2023-05-07 RX ADMIN — POTASSIUM CHLORIDE 10 MEQ: 7.46 INJECTION, SOLUTION INTRAVENOUS at 18:39

## 2023-05-07 RX ADMIN — POTASSIUM CHLORIDE 10 MEQ: 7.46 INJECTION, SOLUTION INTRAVENOUS at 21:07

## 2023-05-07 RX ADMIN — AZITHROMYCIN 500 MG: 500 INJECTION, POWDER, LYOPHILIZED, FOR SOLUTION INTRAVENOUS at 14:11

## 2023-05-07 NOTE — Clinical Note
Level of Care: Telemetry [5]   Diagnosis: Acute respiratory failure with hypoxia [[id number]]   Admitting Physician: DAMIR RIVER [[id number]]   Attending Physician: DAMIR RIVER [[id number]]

## 2023-05-07 NOTE — ED PROVIDER NOTES
Subjective   History of Present Illness  Mrs. Nicolas is brought by her daughter with lethargy.  She tells me she has had a cough for about a week.  She had fever to 101 degrees yesterday.  She complains of headache.  Her daughter tells me that she has not wanted to get out of bed and has quit eating.  They went to the [facility] clinic 2 days ago.  Urinalysis was positive and she was started on Macrobid.  She has had that for just over 48 hours now.  She had a chest x-ray at the time which her daughter tells me showed hiatal hernia and air outside the lung in her chest cavity.  She is normally not on oxygen.  She lives with her daughter who brings her here.  She has history of dementia.        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   • Graves disease    • Hyperlipidemia    • Hypertension    • Memory loss    • Stomach problems    • Stroke    • Stroke syndrome    • Thyroid disease    • Transient ischemic attack    • Vision loss        Allergies   Allergen Reactions   • Boniva [Ibandronic Acid] GI Intolerance     Refractory gerd and abdominal pain        Past Surgical History:   Procedure Laterality Date   • AUGMENTATION MAMMAPLASTY      1978   • BREAST AUGMENTATION     • CAROTID ARTERY ANGIOPLASTY     • CAROTID ENDARTERECTOMY     • CAROTID STENT     • COLONOSCOPY     • D & C CERVICAL BIOPSY      Dilation and Curettage of Cervical Stump   • HERNIA REPAIR     • HERNIA REPAIR     • THROMBOENDARTERECTOMY      carotid   • UPPER GASTROINTESTINAL ENDOSCOPY         Family History   Problem Relation Age of Onset   • Cystic fibrosis Other    • Cervical cancer Mother    • Cancer Paternal Grandmother    • Dementia Paternal Grandmother    • Colon cancer Neg Hx    • Colon polyps Neg Hx        Social History     Socioeconomic History   • Marital status:    Tobacco Use   • Smoking  status: Former     Packs/day: 1.00     Years: 15.00     Pack years: 15.00     Types: Cigarettes   • Smokeless tobacco: Never   • Tobacco comments:     Do not know when she quit. Likely about 1998.   Vaping Use   • Vaping Use: Never used   Substance and Sexual Activity   • Alcohol use: Not Currently     Alcohol/week: 0.0 standard drinks     Comment: She might have some wine, but I try to not give her any   • Drug use: No   • Sexual activity: Not Currently     Birth control/protection: Post-menopausal           Objective   Physical Exam  Vitals and nursing note reviewed.   Constitutional:       General: She is not in acute distress.     Appearance: Normal appearance.      Comments: She is sitting up in bed with her eyes closed.  She does answer a few questions.  She sits mostly with her eyes closed allowing her daughter to provide history   HENT:      Head: Normocephalic and atraumatic.      Nose: Nose normal. No congestion or rhinorrhea.   Eyes:      General: No scleral icterus.     Conjunctiva/sclera: Conjunctivae normal.   Neck:      Comments: No JVD   Cardiovascular:      Rate and Rhythm: Normal rate and regular rhythm.      Heart sounds: No murmur heard.    No friction rub.   Pulmonary:      Effort: Pulmonary effort is normal.      Breath sounds: Decreased breath sounds, wheezing and rhonchi present. No rales.      Comments: Frequent wet rattly cough  Abdominal:      General: Bowel sounds are normal.      Palpations: Abdomen is soft.      Tenderness: There is no abdominal tenderness. There is no guarding or rebound.   Musculoskeletal:         General: No tenderness.      Cervical back: Normal range of motion and neck supple.      Right lower leg: No edema.      Left lower leg: No edema.   Skin:     General: Skin is warm and dry.      Coloration: Skin is not pale.      Findings: No erythema.   Neurological:      General: No focal deficit present.      Mental Status: She is alert.      Motor: No weakness.       Coordination: Coordination normal.   Psychiatric:         Mood and Affect: Mood normal.         Behavior: Behavior normal.         Thought Content: Thought content normal.         Procedures           ED Course  ED Course as of 05/07/23 1437   Sun May 07, 2023   1244 Have reviewed records.  We do not have records from the [facility], her chest x-ray from 2 days ago is not in our system.  Will repeat that.  She will require admission.  Have reviewed her chest x-ray which shows right lower lobe pneumonia.  I am awaiting final read from radiology. [DT]   1308 Radiology has reviewed her x-ray.  They note paratracheal area which they feel is probably from her esophagus.  Will obtain CT chest. [DT]   1342 I spoke with Mrs. Nicolas and her daughter about findings and plan for CT scan and admission. [DT]   1346 I spoke with her daughter about CT scan.  She tells me that Dr. Dove has diagnosed her with achalasia. [DT]      ED Course User Index  [DT] Ellis Galeano MD                                           Medical Decision Making  Please see emergency department course.  I had multiple repeat exams.  I obtained history from old records as well as her daughter.  Gave IV medications and reviewed and ordered numerous labs.  Reviewed chest x-ray and ultimately CAT scan of her chest.  Admitted her to the hospital.    Acute respiratory failure with hypoxia: complicated acute illness or injury that poses a threat to life or bodily functions  Pneumonia of right lower lobe due to infectious organism: complicated acute illness or injury that poses a threat to life or bodily functions  Amount and/or Complexity of Data Reviewed  Labs: ordered. Decision-making details documented in ED Course.  Radiology: ordered. Decision-making details documented in ED Course.  ECG/medicine tests: ordered.      Risk  Prescription drug management.  Decision regarding hospitalization.          Final diagnoses:   Acute respiratory failure with  hypoxia   Pneumonia of right lower lobe due to infectious organism       ED Disposition  ED Disposition     ED Disposition   Decision to Admit    Condition   --    Comment   --             No follow-up provider specified.       Medication List      No changes were made to your prescriptions during this visit.          Ellis Galeano MD  05/07/23 6238

## 2023-05-07 NOTE — H&P
[facility] Medicine Services  HISTORY AND PHYSICAL    Patient Name: Brittny Nicolas  : 1945  MRN: [id number]  Primary Care Physician: Dalila Liz MD  Date of admission: 2023      Subjective   Subjective     Chief Complaint:  Cough    HPI:  Brittny Nicolas is a 77 y.o. female with history of significant dementia, hypertension who lives with her daughter as primary caregiver.  Daughter was concerned that she had been having increased cough and lethargy over the last few days.  She was brought in to see PCP who diagnosed her with UTI about 4 days ago.  She was given Macrobid.  No urine yet from the ER.    She underwent CT chest that was concerning for dilated esophagus and significant aspiration.  Daughter had checked her oxygen level with home pulse ox and noted it to be in the 80s.      Review of Systems   Unable to obtain due to lethargy, mental status  Review of systems as above per caregiver    Personal History     Past Medical History:   Diagnosis Date   • Acid reflux disease    • Arthritis    • Carotid artery occlusion    • Dementia    • Depression    • Facial palsy 2022   • Graves disease    • Hyperlipidemia    • Hypertension    • Memory loss    • Stomach problems    • Stroke    • Stroke syndrome    • Thyroid disease    • Transient ischemic attack    • Vision loss              Past Surgical History:   Procedure Laterality Date   • AUGMENTATION MAMMAPLASTY         • BREAST AUGMENTATION     • CAROTID ARTERY ANGIOPLASTY     • CAROTID ENDARTERECTOMY     • CAROTID STENT     • COLONOSCOPY     • D & C CERVICAL BIOPSY      Dilation and Curettage of Cervical Stump   • HERNIA REPAIR     • HERNIA REPAIR     • THROMBOENDARTERECTOMY      carotid   • UPPER GASTROINTESTINAL ENDOSCOPY         Family History: family history includes Cancer in her paternal grandmother; Cervical cancer in her mother; Cystic fibrosis in an other family member; Dementia in her paternal  grandmother.     Social History:  reports that she has quit smoking. Her smoking use included cigarettes. She has a 15.00 pack-year smoking history. She has never used smokeless tobacco. She reports that she does not currently use alcohol. She reports that she does not use drugs.  Social History     Social History Narrative   • Not on file       Medications:  Available home medication information reviewed.  Medications Prior to Admission   Medication Sig Dispense Refill Last Dose   • levothyroxine (SYNTHROID, LEVOTHROID) 125 MCG tablet TAKE 1 TABLET BY MOUTH EVERY DAY 90 tablet 1    • acetaminophen (TYLENOL) 325 MG tablet Take 2 tablets by mouth Every 4 (Four) Hours As Needed for Mild Pain .      • amLODIPine (NORVASC) 5 MG tablet Take 1 tablet by mouth Daily. 90 tablet 1    • atorvastatin (LIPITOR) 20 MG tablet Take 1 tablet by mouth every night at bedtime. 90 tablet 1    • Cholecalciferol (VITAMIN D) 2000 UNITS capsule Take 1 capsule by mouth.      • clonazePAM (KlonoPIN) 0.5 MG tablet Take 1 tablet by mouth Daily As Needed for Anxiety (agitation). 30 tablet 0    • dexlansoprazole (DEXILANT) 60 MG capsule TAKE 1 CAPSULE BY MOUTH EVERY DAY 90 capsule 1    • Diclofenac Sodium 3 % gel gel Apply  topically to the appropriate area as directed 2 (Two) Times a Day. for 60 days. 100 g 0    • donepezil (ARICEPT) 10 MG tablet Take 1 tablet by mouth Every Night. 90 tablet 3    • memantine (NAMENDA) 10 MG tablet Take 1 tablet by mouth 2 (Two) Times a Day. 180 tablet 3    • nitrofurantoin, macrocrystal-monohydrate, (MACROBID) 100 MG capsule Take 1 capsule by mouth 2 (Two) Times a Day.      • polyethylene glycol (MIRALAX) 17 g packet Take 17 g by mouth Daily.      • PREDNISONE PO Take  by mouth.      • QUEtiapine (SEROquel) 25 MG tablet Take 2 tablets by mouth Every Night. 180 tablet 1        Allergies   Allergen Reactions   • Boniva [Ibandronic Acid] GI Intolerance     Refractory gerd and abdominal pain        Objective    Objective     Vital Signs:   Temp:  [97.8 °F (36.6 °C)] 97.8 °F (36.6 °C)  Heart Rate:  [] 85  Resp:  [20-24] 20  BP: (143-175)/(80-86) 149/80  Flow (L/min):  [2-3.5] 3.5       Physical Exam  Constitutional:       General: She is not in acute distress.     Comments: Lethargic, difficult to arouse.  Daughter states this has been going on for the last 2 to 3 days   HENT:      Head: Normocephalic and atraumatic.   Cardiovascular:      Rate and Rhythm: Normal rate and regular rhythm.   Pulmonary:      Comments: Coarse breath sounds right, difficult to hear due to patient sleeping and not able to take deep breaths  Abdominal:      Palpations: Abdomen is soft.      Tenderness: There is no abdominal tenderness.   Musculoskeletal:         General: Swelling present.   Skin:     General: Skin is warm and dry.   Neurological:      Mental Status: She is disoriented.          Result Review:  I have personally reviewed the results from the time of this admission to 5/7/2023 15:31 EDT and agree with these findings:  [x]  Laboratory list / accordion  []  Microbiology  []  Radiology  []  EKG/Telemetry   []  Cardiology/Vascular   []  Pathology  [x]  Old records  []  Other:  Most notable findings include:       LAB RESULTS:      Lab 05/07/23  1255   WBC 12.75*   HEMOGLOBIN 13.0   HEMATOCRIT 41.9   PLATELETS 342   NEUTROS ABS 10.96*   IMMATURE GRANS (ABS) 0.06*   LYMPHS ABS 0.61*   MONOS ABS 1.09*   EOS ABS 0.01   MCV 87.5   LACTATE 1.5         Lab 05/07/23  1255   SODIUM 139   POTASSIUM 3.6   CHLORIDE 103   CO2 25.0   ANION GAP 11.0   BUN 14   CREATININE 0.71   EGFR 86.6   GLUCOSE 108*   CALCIUM 9.8         Lab 05/07/23  1255   TOTAL PROTEIN 7.7   ALBUMIN 3.5   GLOBULIN 4.2   ALT (SGPT) 8   AST (SGOT) 13   BILIRUBIN 0.5   ALK PHOS 115         Lab 05/07/23  1255   PROBNP 222.9   HSTROP T 9                     Microbiology Results (last 10 days)     Procedure Component Value - Date/Time    COVID PRE-OP / PRE-PROCEDURE SCREENING  ORDER (NO ISOLATION) - Swab, Nasopharynx [[id number]]  (Normal) Collected: 05/07/23 1207    Lab Status: Final result Specimen: Swab from Nasopharynx Updated: 05/07/23 1308    Narrative:      The following orders were created for panel order COVID PRE-OP / PRE-PROCEDURE SCREENING ORDER (NO ISOLATION) - Swab, Nasopharynx.  Procedure                               Abnormality         Status                     ---------                               -----------         ------                     COVID-19 and FLU A/B PCR...[[id number]]  Normal              Final result                 Please view results for these tests on the individual orders.    COVID-19 and FLU A/B PCR - Swab, Nasopharynx [[id number]]  (Normal) Collected: 05/07/23 1207    Lab Status: Final result Specimen: Swab from Nasopharynx Updated: 05/07/23 1308     COVID19 Not Detected     Influenza A PCR Not Detected     Influenza B PCR Not Detected    Narrative:      Fact sheet for providers: https://www.fda.gov/media/262919/download    Fact sheet for patients: https://www.fda.gov/media/104850/download    Test performed by PCR.          CT Chest With Contrast Diagnostic    Result Date: 5/7/2023  CT CHEST W CONTRAST DIAGNOSTIC Date of Exam: 5/7/2023 1:52 PM EDT Indication: cough, fever, paratracheal air on cxr. Comparison: Concurrent chest radiograph Technique: Axial CT images were obtained of the chest after the uneventful intravenous administration of 75 in the Isovue-300.  Reconstructed coronal and sagittal images were also obtained. Automated exposure control and iterative construction methods were used. Findings: Air shadow noted in the mediastinum corresponds to the markedly dilated esophagus, which contains extensive air and food, becoming markedly narrowed at the GE junction. Maximal diameter of the proximal esophagus is approximately 5 cm. More distally, the esophagus measures up to 4 cm transverse luminal diameter before becoming markedly narrowed at  the GE junction. This pattern suggests achalasia. No inflammatory change or mass is identified. GE junction segment appears very tight/narrowed. Included portion of the proximal stomach appears normal below the level of the cardia. No inflammatory change mass or adenopathy is seen in this general region. Elsewhere, no evidence of pulmonary embolic disease is seen. Thoracic aorta shows no evidence of aneurysm or dissection. No pericardial or pleural effusion or mediastinal adenopathy is seen. There is extensive multifocal bilateral airspace disease, greater in the right lower lobe and medial left lower lobe than elsewhere, suspicious for pneumonia or, given patient's esophageal findings, aspiration. No pneumothorax or edema is seen. Included images of the upper abdomen show mild fatty liver change. Spleen does not appear to be enlarged. Incidental note is made of bilateral breast prostheses.     Impression: Impression: 1. Marked distention of the entire esophagus down to the level of the GE junction where it is very narrow. No visible inflammation or mass. Findings suggest achalasia. 2. Extensive multifocal pulmonary airspace disease, consistent with bilateral pneumonia, which may be due to aspiration. Electronically Signed: Thomas Rodriguez  5/7/2023 2:16 PM EDT  Workstation ID: [id number]    XR Chest 1 View    Result Date: 5/7/2023  XR CHEST 1 VW Date of Exam: 5/7/2023 12:26 PM EDT Indication: SOA triage protocol Comparison: 10/26/2022 Findings: There is a small to moderate left pleural effusion Small right pleural effusion. There is generalized coarsening of the interstitial pattern which may be due to edema or atypical infection pattern. Pulmonary vascularity appears unchanged. Heart size appears within normal limits. Air lucency at the right paratracheal region may be due to esophageal air. No pneumothorax is seen.     Impression: Impression: 1. Small right and small to moderate left pleural effusions. 2. Diffuse  interstitial prominence may be due to edema or atypical infection pattern or may be due to chronic fibrosis. Electronically Signed: Justino Love  5/7/2023 12:56 PM EDT  Workstation ID: [id number]          Assessment & Plan   Assessment & Plan     Active Hospital Problems    Diagnosis  POA   • **Acute respiratory failure with hypoxia [J96.01]  Yes     Dyspnea secondary to right-sided PNA, suspect aspiration pneumonia  Leukocytosis    -Start Zosyn  -N.p.o.  -Will need speech eval    Concern for esophageal stricture  -Previously dilated few years ago per daughter at bedside  -GI consult for possible dilation to improve oral intake      Severe dementia  -Family aggressive care, but would at least like improved quality of life if able to rate oral intake better  -Continue home meds    Recent UTI  -Was on outpatient Macrobid  -Recheck UA    Hypothyroid  -Check TSH      DVT prophylaxis: SCDs      CODE STATUS: DNR  Code Status and Medical Interventions:   Ordered at: 05/07/23 1507     Medical Intervention Limits:    NO intubation (DNI)     Code Status (Patient has no pulse and is not breathing):    No CPR (Do Not Attempt to Resuscitate)     Medical Interventions (Patient has pulse or is breathing):    Limited Support       Expected Discharge   Expected Discharge Date: 5/11/2023; Expected Discharge Time:      Kelly Matt DO  05/07/23

## 2023-05-07 NOTE — CONSULTS
[facility]    Referring Provider: Dr. Matt    PCP: Dalila Liz MD    Reason for Consultation: abnormal CT chest, fatigue, decreased oral intake    Chief complaint:  Per her daughter, fatigue, decreased oral intake, concern for pneumonia    History of present illness:    Brittny Nicolas is a 77 y.o. female With history of Alzheimer's, reflux, arthritis, carotid artery occlusion, depression, facial palsy, Graves' disease, hyperlipidemia, hypertension, stroke who was brought to the ED by her daughter due to fatigue,  cough,  did not want to get out of bed, decreased intake possibly due to fatigue.  She was prescribed Macrobid for urinary tract infection 2 days ago at the [facility].  She had chest x-ray 3 days ago with recommendation to have CT chest due to abnormal chest x-ray.  She is admitted due to acute respiratory failure with hypoxia, pneumonia of right lower lobe due to infectious organism.  He daughter Adia Tatum provides the history for the patient    5/7/2023 chest x-ray revealed small right and small to moderate left pleural effusions, diffuse interstitial prominence that may be due to edema or atypical infection pattern that may be due to chronic fibrosis, air lucency at the right paratracheal region may be due to esophageal air, no pneumothorax seen    CT of the chest revealed air shadow in the mediastinum corresponds to the markedly dilated esophagus that contains extensive air and food becoming markedly narrowed at the GE junction, maximal diameter of the proximal esophagus is 5 cm, more distally the esophagus measures up to 4 cm transverse luminal diameter before becoming markedly narrowed the GE junction pattern concerning for achalasia, no inflammatory change or mass identified, GE junction segment appears tight/narrow; extensive multifocal bilateral airspace disease greater in the right lower lobe and medial left lower lobe than elsewhere suspicious for  pneumonia or given patient's esophageal findings, aspiration.    Review of medical record reveals patient has seen Juwan Virgil CARLOS Reveles and Dr. Dove as an outpatient. She had EGD 4/12/2021 per Dr. Dove without obvious stricture, empirically dilated to 45 Hungarian, thought to be an element of dilatation of the esophagus as well as retained fluid in the esophagus, some spasm.  Upper GI series 4/22/2021 revealed moderate esophageal dysmotility with some esophageal dilation but without evidence of stricture or tapering to suggest achalasia; moderate reflux to the level of the thoracic inlet.  Review of documentation reveals compensatory mechanism of pushing on suprasternal notch when she swallows at times.  She was on Dexilant last June.  Outpatient follow-up was planned but she did not return for follow up.     Allergies:  Boniva [ibandronic acid]    Scheduled Meds:  [START ON 5/8/2023] amLODIPine, 5 mg, Oral, Daily  donepezil, 10 mg, Oral, Nightly  levothyroxine, 125 mcg, Oral, Daily  memantine, 10 mg, Oral, BID  [START ON 5/8/2023] pantoprazole, 40 mg, Intravenous, Q AM  piperacillin-tazobactam, 3.375 g, Intravenous, Once  piperacillin-tazobactam, 3.375 g, Intravenous, Q8H  senna-docusate sodium, 2 tablet, Oral, BID  sodium chloride, 10 mL, Intravenous, Q12H         Infusions:  sodium chloride, 100 mL/hr        PRN Meds:  •  acetaminophen  •  senna-docusate sodium **AND** polyethylene glycol **AND** bisacodyl **AND** bisacodyl  •  Calcium Replacement - Follow Nurse / BPA Driven Protocol  •  Magnesium Standard Dose Replacement - Follow Nurse / BPA Driven Protocol  •  ondansetron  •  Phosphorus Replacement - Follow Nurse / BPA Driven Protocol  •  Potassium Replacement - Follow Nurse / BPA Driven Protocol  •  QUEtiapine  •  sodium chloride  •  sodium chloride  •  sodium chloride    Home Meds:  Medications Prior to Admission   Medication Sig Dispense Refill Last Dose   • acetaminophen (TYLENOL) 325 MG tablet Take 2  tablets by mouth Every 4 (Four) Hours As Needed for Mild Pain .   5/7/2023   • amLODIPine (NORVASC) 5 MG tablet Take 1 tablet by mouth Daily. 90 tablet 1 5/7/2023   • atorvastatin (LIPITOR) 20 MG tablet Take 1 tablet by mouth every night at bedtime. 90 tablet 1 Past Week   • Cholecalciferol (VITAMIN D) 2000 UNITS capsule Take 1 capsule by mouth.   Past Week   • dexlansoprazole (DEXILANT) 60 MG capsule TAKE 1 CAPSULE BY MOUTH EVERY DAY 90 capsule 1 5/6/2023   • donepezil (ARICEPT) 10 MG tablet Take 1 tablet by mouth Every Night. 90 tablet 3 5/6/2023   • levothyroxine (SYNTHROID, LEVOTHROID) 125 MCG tablet TAKE 1 TABLET BY MOUTH EVERY DAY 90 tablet 1 5/7/2023   • memantine (NAMENDA) 10 MG tablet Take 1 tablet by mouth 2 (Two) Times a Day. 180 tablet 3 5/7/2023   • nitrofurantoin, macrocrystal-monohydrate, (MACROBID) 100 MG capsule Take 1 capsule by mouth 2 (Two) Times a Day.   5/7/2023   • polyethylene glycol (MIRALAX) 17 g packet Take 17 g by mouth Daily.   5/6/2023   • PREDNISONE PO Take  by mouth.   5/7/2023   • QUEtiapine (SEROquel) 25 MG tablet Take 2 tablets by mouth Every Night. 180 tablet 1 5/6/2023   • clonazePAM (KlonoPIN) 0.5 MG tablet Take 1 tablet by mouth Daily As Needed for Anxiety (agitation). (Patient not taking: Reported on 5/7/2023) 30 tablet 0 Not Taking   • Diclofenac Sodium 3 % gel gel Apply  topically to the appropriate area as directed 2 (Two) Times a Day. for 60 days. (Patient not taking: Reported on 5/7/2023) 100 g 0 Not Taking       ROS: Review of Systems   Reason unable to perform ROS: patient provided limited information.       PAST MED HX:  Past Medical History:   Diagnosis Date   • Acid reflux disease    • Arthritis    • Carotid artery occlusion    • Dementia    • Depression    • 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   • Graves disease    • Hyperlipidemia    • Hypertension    • Memory loss    • Stomach problems    • Stroke    • Stroke syndrome    • Thyroid disease    • Transient ischemic attack    •  "Vision loss        PAST SURG HX:  Past Surgical History:   Procedure Laterality Date   • AUGMENTATION MAMMAPLASTY      1978   • BREAST AUGMENTATION     • CAROTID ARTERY ANGIOPLASTY     • CAROTID ENDARTERECTOMY     • CAROTID STENT     • COLONOSCOPY     • D & C CERVICAL BIOPSY      Dilation and Curettage of Cervical Stump   • HERNIA REPAIR     • HERNIA REPAIR     • THROMBOENDARTERECTOMY      carotid   • UPPER GASTROINTESTINAL ENDOSCOPY         FAM HX:  Family History   Problem Relation Age of Onset   • Cystic fibrosis Other    • Cervical cancer Mother    • Cancer Paternal Grandmother    • Dementia Paternal Grandmother    • Colon cancer Neg Hx    • Colon polyps Neg Hx        SOC HX:  Social History     Socioeconomic History   • Marital status:    Tobacco Use   • Smoking status: Former     Packs/day: 1.00     Years: 15.00     Pack years: 15.00     Types: Cigarettes   • Smokeless tobacco: Never   • Tobacco comments:     Do not know when she quit. Likely about 1998.   Vaping Use   • Vaping Use: Never used   Substance and Sexual Activity   • Alcohol use: Not Currently     Alcohol/week: 0.0 standard drinks     Comment: She might have some wine, but I try to not give her any   • Drug use: No   • Sexual activity: Not Currently     Birth control/protection: Post-menopausal       PHYSICAL EXAM  /80   Pulse 85   Temp 97.8 °F (36.6 °C) (Oral)   Resp 20   Ht 160 cm (63\")   Wt 61.2 kg (135 lb)   SpO2 91%   BMI 23.91 kg/m²   Wt Readings from Last 3 Encounters:   05/07/23 61.2 kg (135 lb)   03/01/23 61.2 kg (135 lb)   11/16/22 60.8 kg (134 lb)   ,body mass index is 23.91 kg/m².  Physical Exam  Constitutional:       Appearance: She is ill-appearing.      Comments: Resting in bed, appears comfortable, eyes closed most of the time   HENT:      Head: Normocephalic and atraumatic. No contusion.      Right Ear: External ear normal.      Left Ear: External ear normal.   Eyes:      General: Lids are normal.         Right " eye: No discharge.         Left eye: No discharge.   Neck:      Trachea: Trachea normal.      Comments: No visible mass  No visible adenopathy  Cardiovascular:      Rate and Rhythm: Normal rate.   Pulmonary:      Effort: No respiratory distress.      Comments: Symmetrical expansion  On 3 liters of oxygen via NC  Abdominal:      General: Bowel sounds are normal.      Palpations: Abdomen is soft. There is no mass.      Tenderness: There is no abdominal tenderness.   Musculoskeletal:      Comments: Symmetrical movement of upper extremities     Skin:     General: Skin is warm and dry.      Coloration: Skin is not jaundiced.   Neurological:      Motor: Weakness present.   Psychiatric:         Mood and Affect: Affect is flat.      Comments: Limited communication, stated her name     Results Review:   I reviewed the patient's new clinical results.    Lab Results   Component Value Date    WBC 12.75 (H) 05/07/2023    HGB 13.0 05/07/2023    HGB 12.8 03/01/2023    HGB 12.9 10/26/2022    HCT 41.9 05/07/2023    MCV 87.5 05/07/2023     05/07/2023       No results found for: INR    Lab Results   Component Value Date    GLUCOSE 108 (H) 05/07/2023    BUN 14 05/07/2023    CREATININE 0.71 05/07/2023    EGFRIFNONA 65 12/16/2021    BCR 19.7 05/07/2023     05/07/2023    K 3.6 05/07/2023    CO2 25.0 05/07/2023    CALCIUM 9.8 05/07/2023    ALBUMIN 3.5 05/07/2023    ALKPHOS 115 05/07/2023    BILITOT 0.5 05/07/2023    ALT 8 05/07/2023    AST 13 05/07/2023       CT Chest With Contrast Diagnostic    Result Date: 5/7/2023  CT CHEST W CONTRAST DIAGNOSTIC Date of Exam: 5/7/2023 1:52 PM EDT Indication: cough, fever, paratracheal air on cxr. Comparison: Concurrent chest radiograph Technique: Axial CT images were obtained of the chest after the uneventful intravenous administration of 75 in the Isovue-300.  Reconstructed coronal and sagittal images were also obtained. Automated exposure control and iterative construction methods were  used. Findings: Air shadow noted in the mediastinum corresponds to the markedly dilated esophagus, which contains extensive air and food, becoming markedly narrowed at the GE junction. Maximal diameter of the proximal esophagus is approximately 5 cm. More distally, the esophagus measures up to 4 cm transverse luminal diameter before becoming markedly narrowed at the GE junction. This pattern suggests achalasia. No inflammatory change or mass is identified. GE junction segment appears very tight/narrowed. Included portion of the proximal stomach appears normal below the level of the cardia. No inflammatory change mass or adenopathy is seen in this general region. Elsewhere, no evidence of pulmonary embolic disease is seen. Thoracic aorta shows no evidence of aneurysm or dissection. No pericardial or pleural effusion or mediastinal adenopathy is seen. There is extensive multifocal bilateral airspace disease, greater in the right lower lobe and medial left lower lobe than elsewhere, suspicious for pneumonia or, given patient's esophageal findings, aspiration. No pneumothorax or edema is seen. Included images of the upper abdomen show mild fatty liver change. Spleen does not appear to be enlarged. Incidental note is made of bilateral breast prostheses.     Impression: 1. Marked distention of the entire esophagus down to the level of the GE junction where it is very narrow. No visible inflammation or mass. Findings suggest achalasia. 2. Extensive multifocal pulmonary airspace disease, consistent with bilateral pneumonia, which may be due to aspiration. Electronically Signed: Thomas Rodriguez  5/7/2023 2:16 PM EDT  Workstation ID: [id number]    XR Chest 1 View    Result Date: 5/7/2023  XR CHEST 1 VW Date of Exam: 5/7/2023 12:26 PM EDT Indication: SOA triage protocol Comparison: 10/26/2022 Findings: There is a small to moderate left pleural effusion Small right pleural effusion. There is generalized coarsening of the interstitial  pattern which may be due to edema or atypical infection pattern. Pulmonary vascularity appears unchanged. Heart size appears within normal limits. Air lucency at the right paratracheal region may be due to esophageal air. No pneumothorax is seen.     Impression: 1. Small right and small to moderate left pleural effusions. 2. Diffuse interstitial prominence may be due to edema or atypical infection pattern or may be due to chronic fibrosis. Electronically Signed: Justinolisa Love  5/7/2023 12:56 PM EDT  Workstation ID: [id number]      COVID19   Date Value Ref Range Status   05/07/2023 Not Detected Not Detected - Ref. Range Final         ASSESSMENTS/PLANS  Brittny Nicolas is a 77 y.o. female with Fatigue, decreased oral intake, acute respiratory failure with hypoxia (93% on 3 L of oxygen via nasal cannula at time of evaluation) pneumonia with possible suspicion for aspiration pneumonia.  CT of the chest with markedly dilated esophagus with air and food, findings concerning for achalasia, tight appearance of GE junction. She has history of esophageal dysmotility and reflux on prior imaging.     After discussion with Dr. Brunner and patient's daughter, if no further decline in respiratory status or other issue throughout tonight and into tomorrow morning, plan for EGD with likely intubation for additional evaluation, possible dilation for the patient to potentially tolerate liquid diet.  Her daughter does not seem interested in feeding tube placement at this time.      Pantoprazole daily has been ordered    I discussed the patient's findings and my recommendations with patient and family    CARLOS Monique  05/07/23  16:12 EDT

## 2023-05-08 ENCOUNTER — ANESTHESIA EVENT (OUTPATIENT)
Dept: GASTROENTEROLOGY | Facility: HOSPITAL | Age: 78
End: 2023-05-08
Payer: MEDICARE

## 2023-05-08 ENCOUNTER — ANESTHESIA (OUTPATIENT)
Dept: GASTROENTEROLOGY | Facility: HOSPITAL | Age: 78
End: 2023-05-08
Payer: MEDICARE

## 2023-05-08 PROBLEM — R09.02 HYPOXIA: Status: ACTIVE | Noted: 2021-11-27

## 2023-05-08 PROBLEM — J18.9 BILATERAL PNEUMONIA: Status: ACTIVE | Noted: 2023-05-08

## 2023-05-08 LAB
ANION GAP SERPL CALCULATED.3IONS-SCNC: 9 MMOL/L (ref 5–15)
BASOPHILS # BLD AUTO: 0.01 10*3/MM3 (ref 0–0.2)
BASOPHILS NFR BLD AUTO: 0.1 % (ref 0–1.5)
BUN SERPL-MCNC: 13 MG/DL (ref 8–23)
BUN/CREAT SERPL: 21.7 (ref 7–25)
CALCIUM SPEC-SCNC: 8.7 MG/DL (ref 8.6–10.5)
CHLORIDE SERPL-SCNC: 106 MMOL/L (ref 98–107)
CO2 SERPL-SCNC: 23 MMOL/L (ref 22–29)
CREAT SERPL-MCNC: 0.6 MG/DL (ref 0.57–1)
DEPRECATED RDW RBC AUTO: 43.3 FL (ref 37–54)
EGFRCR SERPLBLD CKD-EPI 2021: 90.9 ML/MIN/1.73
EOSINOPHIL # BLD AUTO: 0 10*3/MM3 (ref 0–0.4)
EOSINOPHIL NFR BLD AUTO: 0 % (ref 0.3–6.2)
ERYTHROCYTE [DISTWIDTH] IN BLOOD BY AUTOMATED COUNT: 13.3 % (ref 12.3–15.4)
GLUCOSE SERPL-MCNC: 95 MG/DL (ref 65–99)
HCT VFR BLD AUTO: 34.1 % (ref 34–46.6)
HGB BLD-MCNC: 10.4 G/DL (ref 12–15.9)
IMM GRANULOCYTES # BLD AUTO: 0.06 10*3/MM3 (ref 0–0.05)
IMM GRANULOCYTES NFR BLD AUTO: 0.5 % (ref 0–0.5)
LYMPHOCYTES # BLD AUTO: 1.2 10*3/MM3 (ref 0.7–3.1)
LYMPHOCYTES NFR BLD AUTO: 9.8 % (ref 19.6–45.3)
MCH RBC QN AUTO: 26.6 PG (ref 26.6–33)
MCHC RBC AUTO-ENTMCNC: 30.5 G/DL (ref 31.5–35.7)
MCV RBC AUTO: 87.2 FL (ref 79–97)
MONOCYTES # BLD AUTO: 0.9 10*3/MM3 (ref 0.1–0.9)
MONOCYTES NFR BLD AUTO: 7.4 % (ref 5–12)
NEUTROPHILS NFR BLD AUTO: 10.03 10*3/MM3 (ref 1.7–7)
NEUTROPHILS NFR BLD AUTO: 82.2 % (ref 42.7–76)
NRBC BLD AUTO-RTO: 0 /100 WBC (ref 0–0.2)
PLATELET # BLD AUTO: 296 10*3/MM3 (ref 140–450)
PMV BLD AUTO: 9.3 FL (ref 6–12)
POTASSIUM SERPL-SCNC: 4.5 MMOL/L (ref 3.5–5.2)
RBC # BLD AUTO: 3.91 10*6/MM3 (ref 3.77–5.28)
SODIUM SERPL-SCNC: 138 MMOL/L (ref 136–145)
WBC NRBC COR # BLD: 12.2 10*3/MM3 (ref 3.4–10.8)

## 2023-05-08 PROCEDURE — 0D758ZZ DILATION OF ESOPHAGUS, VIA NATURAL OR ARTIFICIAL OPENING ENDOSCOPIC: ICD-10-PCS | Performed by: INTERNAL MEDICINE

## 2023-05-08 PROCEDURE — 99232 SBSQ HOSP IP/OBS MODERATE 35: CPT | Performed by: HOSPITALIST

## 2023-05-08 PROCEDURE — 97161 PT EVAL LOW COMPLEX 20 MIN: CPT

## 2023-05-08 PROCEDURE — 25010000002 PIPERACILLIN SOD-TAZOBACTAM PER 1 G: Performed by: INTERNAL MEDICINE

## 2023-05-08 PROCEDURE — 25010000002 ONDANSETRON PER 1 MG: Performed by: INTERNAL MEDICINE

## 2023-05-08 PROCEDURE — 92610 EVALUATE SWALLOWING FUNCTION: CPT

## 2023-05-08 PROCEDURE — 25010000002 PROPOFOL 10 MG/ML EMULSION: Performed by: NURSE ANESTHETIST, CERTIFIED REGISTERED

## 2023-05-08 PROCEDURE — 85025 COMPLETE CBC W/AUTO DIFF WBC: CPT | Performed by: INTERNAL MEDICINE

## 2023-05-08 PROCEDURE — 25010000002 SUCCINYLCHOLINE PER 20 MG: Performed by: NURSE ANESTHETIST, CERTIFIED REGISTERED

## 2023-05-08 PROCEDURE — 25010000002 DEXAMETHASONE PER 1 MG: Performed by: NURSE ANESTHETIST, CERTIFIED REGISTERED

## 2023-05-08 PROCEDURE — 80048 BASIC METABOLIC PNL TOTAL CA: CPT | Performed by: INTERNAL MEDICINE

## 2023-05-08 PROCEDURE — 25010000002 FENTANYL CITRATE (PF) 50 MCG/ML SOLUTION: Performed by: NURSE ANESTHETIST, CERTIFIED REGISTERED

## 2023-05-08 PROCEDURE — 43249 ESOPH EGD DILATION <30 MM: CPT | Performed by: INTERNAL MEDICINE

## 2023-05-08 PROCEDURE — C1726 CATH, BAL DIL, NON-VASCULAR: HCPCS | Performed by: INTERNAL MEDICINE

## 2023-05-08 RX ORDER — EPHEDRINE SULFATE 50 MG/ML
INJECTION INTRAVENOUS AS NEEDED
Status: DISCONTINUED | OUTPATIENT
Start: 2023-05-08 | End: 2023-05-08 | Stop reason: SURG

## 2023-05-08 RX ORDER — PROPOFOL 10 MG/ML
VIAL (ML) INTRAVENOUS AS NEEDED
Status: DISCONTINUED | OUTPATIENT
Start: 2023-05-08 | End: 2023-05-08 | Stop reason: SURG

## 2023-05-08 RX ORDER — HYDROMORPHONE HYDROCHLORIDE 1 MG/ML
0.5 INJECTION, SOLUTION INTRAMUSCULAR; INTRAVENOUS; SUBCUTANEOUS
Status: DISCONTINUED | OUTPATIENT
Start: 2023-05-08 | End: 2023-05-08 | Stop reason: HOSPADM

## 2023-05-08 RX ORDER — FENTANYL CITRATE 50 UG/ML
50 INJECTION, SOLUTION INTRAMUSCULAR; INTRAVENOUS
Status: DISCONTINUED | OUTPATIENT
Start: 2023-05-08 | End: 2023-05-08 | Stop reason: HOSPADM

## 2023-05-08 RX ORDER — LIDOCAINE HYDROCHLORIDE 10 MG/ML
INJECTION, SOLUTION EPIDURAL; INFILTRATION; INTRACAUDAL; PERINEURAL AS NEEDED
Status: DISCONTINUED | OUTPATIENT
Start: 2023-05-08 | End: 2023-05-08 | Stop reason: SURG

## 2023-05-08 RX ORDER — SUCCINYLCHOLINE CHLORIDE 20 MG/ML
INJECTION INTRAMUSCULAR; INTRAVENOUS AS NEEDED
Status: DISCONTINUED | OUTPATIENT
Start: 2023-05-08 | End: 2023-05-08 | Stop reason: SURG

## 2023-05-08 RX ORDER — DEXAMETHASONE SODIUM PHOSPHATE 4 MG/ML
INJECTION, SOLUTION INTRA-ARTICULAR; INTRALESIONAL; INTRAMUSCULAR; INTRAVENOUS; SOFT TISSUE AS NEEDED
Status: DISCONTINUED | OUTPATIENT
Start: 2023-05-08 | End: 2023-05-08 | Stop reason: SURG

## 2023-05-08 RX ADMIN — ONDANSETRON 4 MG: 2 INJECTION INTRAMUSCULAR; INTRAVENOUS at 08:47

## 2023-05-08 RX ADMIN — TAZOBACTAM SODIUM AND PIPERACILLIN SODIUM 3.38 G: 375; 3 INJECTION, SOLUTION INTRAVENOUS at 23:55

## 2023-05-08 RX ADMIN — FENTANYL CITRATE 100 MCG: 50 INJECTION, SOLUTION INTRAMUSCULAR; INTRAVENOUS at 08:43

## 2023-05-08 RX ADMIN — MEMANTINE 10 MG: 10 TABLET ORAL at 21:07

## 2023-05-08 RX ADMIN — SENNOSIDES AND DOCUSATE SODIUM 2 TABLET: 50; 8.6 TABLET ORAL at 10:00

## 2023-05-08 RX ADMIN — TAZOBACTAM SODIUM AND PIPERACILLIN SODIUM 3.38 G: 375; 3 INJECTION, SOLUTION INTRAVENOUS at 17:24

## 2023-05-08 RX ADMIN — Medication 10 ML: at 21:07

## 2023-05-08 RX ADMIN — EPHEDRINE SULFATE 15 MG: 50 INJECTION INTRAVENOUS at 08:49

## 2023-05-08 RX ADMIN — SENNOSIDES AND DOCUSATE SODIUM 2 TABLET: 50; 8.6 TABLET ORAL at 21:07

## 2023-05-08 RX ADMIN — AMLODIPINE BESYLATE 5 MG: 5 TABLET ORAL at 10:00

## 2023-05-08 RX ADMIN — MEMANTINE 10 MG: 10 TABLET ORAL at 10:00

## 2023-05-08 RX ADMIN — LEVOTHYROXINE SODIUM 125 MCG: 0.12 TABLET ORAL at 10:00

## 2023-05-08 RX ADMIN — DEXAMETHASONE SODIUM PHOSPHATE 4 MG: 4 INJECTION, SOLUTION INTRAMUSCULAR; INTRAVENOUS at 08:47

## 2023-05-08 RX ADMIN — SODIUM CHLORIDE 100 ML/HR: 9 INJECTION, SOLUTION INTRAVENOUS at 04:01

## 2023-05-08 RX ADMIN — LIDOCAINE HYDROCHLORIDE 50 MG: 10 INJECTION, SOLUTION EPIDURAL; INFILTRATION; INTRACAUDAL; PERINEURAL at 08:43

## 2023-05-08 RX ADMIN — SUCCINYLCHOLINE CHLORIDE 2 MG: 20 INJECTION, SOLUTION INTRAMUSCULAR; INTRAVENOUS at 08:43

## 2023-05-08 RX ADMIN — PANTOPRAZOLE SODIUM 40 MG: 40 INJECTION, POWDER, LYOPHILIZED, FOR SOLUTION INTRAVENOUS at 04:01

## 2023-05-08 RX ADMIN — TAZOBACTAM SODIUM AND PIPERACILLIN SODIUM 3.38 G: 375; 3 INJECTION, SOLUTION INTRAVENOUS at 10:00

## 2023-05-08 RX ADMIN — EPHEDRINE SULFATE 15 MG: 50 INJECTION INTRAVENOUS at 08:51

## 2023-05-08 RX ADMIN — PROPOFOL 100 MG: 10 INJECTION, EMULSION INTRAVENOUS at 08:43

## 2023-05-08 RX ADMIN — DONEPEZIL HYDROCHLORIDE 10 MG: 10 TABLET, FILM COATED ORAL at 21:07

## 2023-05-08 RX ADMIN — TAZOBACTAM SODIUM AND PIPERACILLIN SODIUM 3.38 G: 375; 3 INJECTION, SOLUTION INTRAVENOUS at 00:38

## 2023-05-08 NOTE — PAYOR COMM NOTE
"Oliverio Nicolas (78 y.o. Female)     [id number]    Socorro Reza, YASMINE  Utilization Review  Gzaob-[phone]  Nck-[phone]      Date of Birth   1945    Social Security Number       Address   [address] [id number]    Home Phone   [phone]    MRN   [id number]       Hoahaoism   Oriental orthodox    Marital Status                               Admission Date   5/7/23    Admission Type   Emergency    Admitting Provider   Yenni Reyes MD    Attending Provider   Yenni Reyes MD    Department, Room/Bed   [address], S587/1       Discharge Date       Discharge Disposition       Discharge Destination                               Attending Provider: Yenni Reyes MD    Allergies: Boniva [Ibandronic Acid]    Isolation: None   Infection: None   Code Status: No CPR    Ht: 160 cm (63\")   Wt: 59.5 kg (131 lb 3.2 oz)    Admission Cmt: None   Principal Problem: Acute respiratory failure with hypoxia [J96.01]                 Active Insurance as of 5/7/2023     Primary Coverage     Payor Plan Insurance Group Employer/Plan Group    ANTHEM MEDICARE REPLACEMENT ANTHEM MEDICARE ADVANTAGE [id number]     Payor Plan Address Payor Plan Phone Number Payor Plan Fax Number Effective Dates    [address] [phone]  1/1/2019 - None Entered    [facility] [id number]       Subscriber Name Subscriber Birth Date Member ID       OLIVERIO NICOLAS 1945 [id number]           Secondary Coverage     Payor Plan Insurance Group Employer/Plan Group    [place]      Payor Plan Address Payor Plan Phone Number Payor Plan Fax Number Effective Dates    [address] [phone]  1/26/2021 - None Entered    [facility] [id number]       Subscriber Name Subscriber Birth Date Member ID       OLIVERIO NICOLAS 1945 [id number]                 Emergency Contacts      (Rel.) Home Phone Work Phone Mobile Phone    Adia Tatum (Daughter) [phone] -- " [phone]               History & Physical      Kelly Matt, DO at 23 1510              [facility] Medicine Services  HISTORY AND PHYSICAL    Patient Name: Brittny Nicolas  : 1945  MRN: [id number]  Primary Care Physician: Dalila Liz MD  Date of admission: 2023      Subjective    Subjective     Chief Complaint:  Cough    HPI:  Brittny Nicolas is a 77 y.o. female with history of significant dementia, hypertension who lives with her daughter as primary caregiver.  Daughter was concerned that she had been having increased cough and lethargy over the last few days.  She was brought in to see PCP who diagnosed her with UTI about 4 days ago.  She was given Macrobid.  No urine yet from the ER.    She underwent CT chest that was concerning for dilated esophagus and significant aspiration.  Daughter had checked her oxygen level with home pulse ox and noted it to be in the 80s.      Review of Systems   Unable to obtain due to lethargy, mental status  Review of systems as above per caregiver    Personal History     Past Medical History:   Diagnosis Date   • Acid reflux disease    • Arthritis    • Carotid artery occlusion    • Dementia    • Depression    • Facial palsy 2022   • Graves disease    • Hyperlipidemia    • Hypertension    • Memory loss    • Stomach problems    • Stroke    • Stroke syndrome    • Thyroid disease    • Transient ischemic attack    • Vision loss              Past Surgical History:   Procedure Laterality Date   • AUGMENTATION MAMMAPLASTY         • BREAST AUGMENTATION     • CAROTID ARTERY ANGIOPLASTY     • CAROTID ENDARTERECTOMY     • CAROTID STENT     • COLONOSCOPY     • D & C CERVICAL BIOPSY      Dilation and Curettage of Cervical Stump   • HERNIA REPAIR     • HERNIA REPAIR     • THROMBOENDARTERECTOMY      carotid   • UPPER GASTROINTESTINAL ENDOSCOPY         Family History: family history includes Cancer in her paternal  grandmother; Cervical cancer in her mother; Cystic fibrosis in an other family member; Dementia in her paternal grandmother.     Social History:  reports that she has quit smoking. Her smoking use included cigarettes. She has a 15.00 pack-year smoking history. She has never used smokeless tobacco. She reports that she does not currently use alcohol. She reports that she does not use drugs.  Social History     Social History Narrative   • Not on file       Medications:  Available home medication information reviewed.  Medications Prior to Admission   Medication Sig Dispense Refill Last Dose   • levothyroxine (SYNTHROID, LEVOTHROID) 125 MCG tablet TAKE 1 TABLET BY MOUTH EVERY DAY 90 tablet 1    • acetaminophen (TYLENOL) 325 MG tablet Take 2 tablets by mouth Every 4 (Four) Hours As Needed for Mild Pain .      • amLODIPine (NORVASC) 5 MG tablet Take 1 tablet by mouth Daily. 90 tablet 1    • atorvastatin (LIPITOR) 20 MG tablet Take 1 tablet by mouth every night at bedtime. 90 tablet 1    • Cholecalciferol (VITAMIN D) 2000 UNITS capsule Take 1 capsule by mouth.      • clonazePAM (KlonoPIN) 0.5 MG tablet Take 1 tablet by mouth Daily As Needed for Anxiety (agitation). 30 tablet 0    • dexlansoprazole (DEXILANT) 60 MG capsule TAKE 1 CAPSULE BY MOUTH EVERY DAY 90 capsule 1    • Diclofenac Sodium 3 % gel gel Apply  topically to the appropriate area as directed 2 (Two) Times a Day. for 60 days. 100 g 0    • donepezil (ARICEPT) 10 MG tablet Take 1 tablet by mouth Every Night. 90 tablet 3    • memantine (NAMENDA) 10 MG tablet Take 1 tablet by mouth 2 (Two) Times a Day. 180 tablet 3    • nitrofurantoin, macrocrystal-monohydrate, (MACROBID) 100 MG capsule Take 1 capsule by mouth 2 (Two) Times a Day.      • polyethylene glycol (MIRALAX) 17 g packet Take 17 g by mouth Daily.      • PREDNISONE PO Take  by mouth.      • QUEtiapine (SEROquel) 25 MG tablet Take 2 tablets by mouth Every Night. 180 tablet 1        Allergies   Allergen  Reactions   • Boniva [Ibandronic Acid] GI Intolerance     Refractory gerd and abdominal pain        Objective    Objective     Vital Signs:   Temp:  [97.8 °F (36.6 °C)] 97.8 °F (36.6 °C)  Heart Rate:  [] 85  Resp:  [20-24] 20  BP: (143-175)/(80-86) 149/80  Flow (L/min):  [2-3.5] 3.5       Physical Exam  Constitutional:       General: She is not in acute distress.     Comments: Lethargic, difficult to arouse.  Daughter states this has been going on for the last 2 to 3 days   HENT:      Head: Normocephalic and atraumatic.   Cardiovascular:      Rate and Rhythm: Normal rate and regular rhythm.   Pulmonary:      Comments: Coarse breath sounds right, difficult to hear due to patient sleeping and not able to take deep breaths  Abdominal:      Palpations: Abdomen is soft.      Tenderness: There is no abdominal tenderness.   Musculoskeletal:         General: Swelling present.   Skin:     General: Skin is warm and dry.   Neurological:      Mental Status: She is disoriented.          Result Review:  I have personally reviewed the results from the time of this admission to 5/7/2023 15:31 EDT and agree with these findings:  [x]  Laboratory list / accordion  []  Microbiology  []  Radiology  []  EKG/Telemetry   []  Cardiology/Vascular   []  Pathology  [x]  Old records  []  Other:  Most notable findings include:       LAB RESULTS:      Lab 05/07/23  1255   WBC 12.75*   HEMOGLOBIN 13.0   HEMATOCRIT 41.9   PLATELETS 342   NEUTROS ABS 10.96*   IMMATURE GRANS (ABS) 0.06*   LYMPHS ABS 0.61*   MONOS ABS 1.09*   EOS ABS 0.01   MCV 87.5   LACTATE 1.5         Lab 05/07/23  1255   SODIUM 139   POTASSIUM 3.6   CHLORIDE 103   CO2 25.0   ANION GAP 11.0   BUN 14   CREATININE 0.71   EGFR 86.6   GLUCOSE 108*   CALCIUM 9.8         Lab 05/07/23  1255   TOTAL PROTEIN 7.7   ALBUMIN 3.5   GLOBULIN 4.2   ALT (SGPT) 8   AST (SGOT) 13   BILIRUBIN 0.5   ALK PHOS 115         Lab 05/07/23  1255   PROBNP 222.9   HSTROP T 9                      Microbiology Results (last 10 days)     Procedure Component Value - Date/Time    COVID PRE-OP / PRE-PROCEDURE SCREENING ORDER (NO ISOLATION) - Swab, Nasopharynx [[id number]]  (Normal) Collected: 05/07/23 1207    Lab Status: Final result Specimen: Swab from Nasopharynx Updated: 05/07/23 1308    Narrative:      The following orders were created for panel order COVID PRE-OP / PRE-PROCEDURE SCREENING ORDER (NO ISOLATION) - Swab, Nasopharynx.  Procedure                               Abnormality         Status                     ---------                               -----------         ------                     COVID-19 and FLU A/B PCR...[[id number]]  Normal              Final result                 Please view results for these tests on the individual orders.    COVID-19 and FLU A/B PCR - Swab, Nasopharynx [[id number]]  (Normal) Collected: 05/07/23 1207    Lab Status: Final result Specimen: Swab from Nasopharynx Updated: 05/07/23 1308     COVID19 Not Detected     Influenza A PCR Not Detected     Influenza B PCR Not Detected    Narrative:      Fact sheet for providers: https://www.fda.gov/media/657568/download    Fact sheet for patients: https://www.fda.gov/media/256890/download    Test performed by PCR.          CT Chest With Contrast Diagnostic    Result Date: 5/7/2023  CT CHEST W CONTRAST DIAGNOSTIC Date of Exam: 5/7/2023 1:52 PM EDT Indication: cough, fever, paratracheal air on cxr. Comparison: Concurrent chest radiograph Technique: Axial CT images were obtained of the chest after the uneventful intravenous administration of 75 in the Isovue-300.  Reconstructed coronal and sagittal images were also obtained. Automated exposure control and iterative construction methods were used. Findings: Air shadow noted in the mediastinum corresponds to the markedly dilated esophagus, which contains extensive air and food, becoming markedly narrowed at the GE junction. Maximal diameter of the proximal esophagus is  approximately 5 cm. More distally, the esophagus measures up to 4 cm transverse luminal diameter before becoming markedly narrowed at the GE junction. This pattern suggests achalasia. No inflammatory change or mass is identified. GE junction segment appears very tight/narrowed. Included portion of the proximal stomach appears normal below the level of the cardia. No inflammatory change mass or adenopathy is seen in this general region. Elsewhere, no evidence of pulmonary embolic disease is seen. Thoracic aorta shows no evidence of aneurysm or dissection. No pericardial or pleural effusion or mediastinal adenopathy is seen. There is extensive multifocal bilateral airspace disease, greater in the right lower lobe and medial left lower lobe than elsewhere, suspicious for pneumonia or, given patient's esophageal findings, aspiration. No pneumothorax or edema is seen. Included images of the upper abdomen show mild fatty liver change. Spleen does not appear to be enlarged. Incidental note is made of bilateral breast prostheses.     Impression: Impression: 1. Marked distention of the entire esophagus down to the level of the GE junction where it is very narrow. No visible inflammation or mass. Findings suggest achalasia. 2. Extensive multifocal pulmonary airspace disease, consistent with bilateral pneumonia, which may be due to aspiration. Electronically Signed: Thomas Rodriguez  5/7/2023 2:16 PM EDT  Workstation ID: [id number]    XR Chest 1 View    Result Date: 5/7/2023  XR CHEST 1 VW Date of Exam: 5/7/2023 12:26 PM EDT Indication: SOA triage protocol Comparison: 10/26/2022 Findings: There is a small to moderate left pleural effusion Small right pleural effusion. There is generalized coarsening of the interstitial pattern which may be due to edema or atypical infection pattern. Pulmonary vascularity appears unchanged. Heart size appears within normal limits. Air lucency at the right paratracheal region may be due to esophageal  air. No pneumothorax is seen.     Impression: Impression: 1. Small right and small to moderate left pleural effusions. 2. Diffuse interstitial prominence may be due to edema or atypical infection pattern or may be due to chronic fibrosis. Electronically Signed: Justino Love  5/7/2023 12:56 PM EDT  Workstation ID: [id number]          Assessment & Plan   Assessment & Plan     Active Hospital Problems    Diagnosis  POA   • **Acute respiratory failure with hypoxia [J96.01]  Yes     Dyspnea secondary to right-sided PNA, suspect aspiration pneumonia  Leukocytosis    -Start Zosyn  -N.p.o.  -Will need speech eval    Concern for esophageal stricture  -Previously dilated few years ago per daughter at bedside  -GI consult for possible dilation to improve oral intake      Severe dementia  -Family aggressive care, but would at least like improved quality of life if able to rate oral intake better  -Continue home meds    Recent UTI  -Was on outpatient Macrobid  -Recheck UA    Hypothyroid  -Check TSH      DVT prophylaxis: SCDs      CODE STATUS: DNR  Code Status and Medical Interventions:   Ordered at: 05/07/23 1507     Medical Intervention Limits:    NO intubation (DNI)     Code Status (Patient has no pulse and is not breathing):    No CPR (Do Not Attempt to Resuscitate)     Medical Interventions (Patient has pulse or is breathing):    Limited Support       Expected Discharge   Expected Discharge Date: 5/11/2023; Expected Discharge Time:      Kelly Matt DO  05/07/23    Electronically signed by Kelly Matt DO at 05/07/23 1531          Emergency Department Notes      Ellis Galeano MD at 05/07/23 1252          Subjective   History of Present Illness  Mrs. Nicolas is brought by her daughter with lethargy.  She tells me she has had a cough for about a week.  She had fever to 101 degrees yesterday.  She complains of headache.  Her daughter tells me that she has not wanted to get out of bed and has quit eating.   They went to the [facility] clinic 2 days ago.  Urinalysis was positive and she was started on Macrobid.  She has had that for just over 48 hours now.  She had a chest x-ray at the time which her daughter tells me showed hiatal hernia and air outside the lung in her chest cavity.  She is normally not on oxygen.  She lives with her daughter who brings her here.  She has history of dementia.        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   • Graves disease    • Hyperlipidemia    • Hypertension    • Memory loss    • Stomach problems    • Stroke    • Stroke syndrome    • Thyroid disease    • Transient ischemic attack    • Vision loss        Allergies   Allergen Reactions   • Boniva [Ibandronic Acid] GI Intolerance     Refractory gerd and abdominal pain        Past Surgical History:   Procedure Laterality Date   • AUGMENTATION MAMMAPLASTY      1978   • BREAST AUGMENTATION     • CAROTID ARTERY ANGIOPLASTY     • CAROTID ENDARTERECTOMY     • CAROTID STENT     • COLONOSCOPY     • D & C CERVICAL BIOPSY      Dilation and Curettage of Cervical Stump   • HERNIA REPAIR     • HERNIA REPAIR     • THROMBOENDARTERECTOMY      carotid   • UPPER GASTROINTESTINAL ENDOSCOPY         Family History   Problem Relation Age of Onset   • Cystic fibrosis Other    • Cervical cancer Mother    • Cancer Paternal Grandmother    • Dementia Paternal Grandmother    • Colon cancer Neg Hx    • Colon polyps Neg Hx        Social History     Socioeconomic History   • Marital status:    Tobacco Use   • Smoking status: Former     Packs/day: 1.00     Years: 15.00     Pack years: 15.00     Types: Cigarettes   • Smokeless tobacco: Never   • Tobacco comments:     Do not know when she quit. Likely about 1998.   Vaping Use   • Vaping Use: Never used   Substance and Sexual Activity   • Alcohol use: Not Currently      Alcohol/week: 0.0 standard drinks     Comment: She might have some wine, but I try to not give her any   • Drug use: No   • Sexual activity: Not Currently     Birth control/protection: Post-menopausal           Objective   Physical Exam  Vitals and nursing note reviewed.   Constitutional:       General: She is not in acute distress.     Appearance: Normal appearance.      Comments: She is sitting up in bed with her eyes closed.  She does answer a few questions.  She sits mostly with her eyes closed allowing her daughter to provide history   HENT:      Head: Normocephalic and atraumatic.      Nose: Nose normal. No congestion or rhinorrhea.   Eyes:      General: No scleral icterus.     Conjunctiva/sclera: Conjunctivae normal.   Neck:      Comments: No JVD   Cardiovascular:      Rate and Rhythm: Normal rate and regular rhythm.      Heart sounds: No murmur heard.    No friction rub.   Pulmonary:      Effort: Pulmonary effort is normal.      Breath sounds: Decreased breath sounds, wheezing and rhonchi present. No rales.      Comments: Frequent wet rattly cough  Abdominal:      General: Bowel sounds are normal.      Palpations: Abdomen is soft.      Tenderness: There is no abdominal tenderness. There is no guarding or rebound.   Musculoskeletal:         General: No tenderness.      Cervical back: Normal range of motion and neck supple.      Right lower leg: No edema.      Left lower leg: No edema.   Skin:     General: Skin is warm and dry.      Coloration: Skin is not pale.      Findings: No erythema.   Neurological:      General: No focal deficit present.      Mental Status: She is alert.      Motor: No weakness.      Coordination: Coordination normal.   Psychiatric:         Mood and Affect: Mood normal.         Behavior: Behavior normal.         Thought Content: Thought content normal.         Procedures          ED Course  ED Course as of 05/07/23 1437   Sun May 07, 2023   1244 Have reviewed records.  We do not have  records from the [facility], her chest x-ray from 2 days ago is not in our system.  Will repeat that.  She will require admission.  Have reviewed her chest x-ray which shows right lower lobe pneumonia.  I am awaiting final read from radiology. [DT]   1308 Radiology has reviewed her x-ray.  They note paratracheal area which they feel is probably from her esophagus.  Will obtain CT chest. [DT]   1342 I spoke with Mrs. Nicolas and her daughter about findings and plan for CT scan and admission. [DT]   1346 I spoke with her daughter about CT scan.  She tells me that Dr. Dove has diagnosed her with achalasia. [DT]      ED Course User Index  [DT] Ellis Galeano MD                                           Medical Decision Making  Please see emergency department course.  I had multiple repeat exams.  I obtained history from old records as well as her daughter.  Gave IV medications and reviewed and ordered numerous labs.  Reviewed chest x-ray and ultimately CAT scan of her chest.  Admitted her to the hospital.    Acute respiratory failure with hypoxia: complicated acute illness or injury that poses a threat to life or bodily functions  Pneumonia of right lower lobe due to infectious organism: complicated acute illness or injury that poses a threat to life or bodily functions  Amount and/or Complexity of Data Reviewed  Labs: ordered. Decision-making details documented in ED Course.  Radiology: ordered. Decision-making details documented in ED Course.  ECG/medicine tests: ordered.      Risk  Prescription drug management.  Decision regarding hospitalization.          Final diagnoses:   Acute respiratory failure with hypoxia   Pneumonia of right lower lobe due to infectious organism       ED Disposition  ED Disposition     ED Disposition   Decision to Admit    Condition   --    Comment   --             No follow-up provider specified.       Medication List      No changes were made to your prescriptions during this  visit.          Ellis Galeano MD  05/07/23 1437      Electronically signed by Ellis Galeano MD at 05/07/23 1437       Vital Signs (last day)     Date/Time Temp Temp src Pulse Resp BP Patient Position SpO2    05/08/23 1100 97.7 (36.5) Oral 69 16 119/59 Lying --    05/08/23 0920 -- -- 83 -- 117/53 -- 92    05/08/23 0915 -- -- 82 -- 123/57 -- 93    05/08/23 0910 -- -- 92 -- 121/66 -- 93    05/08/23 0908 97.8 (36.6) Temporal 94 18 138/67 Lying 92    05/08/23 0751 97.7 (36.5) Temporal 64 18 143/75 Lying 94    05/08/23 0700 -- -- 61 -- -- -- --    05/08/23 0329 98.8 (37.1) Oral 70 20 104/58 Lying 93    05/08/23 0200 -- -- 57 18 -- -- 95    05/08/23 0100 -- -- 54 -- -- -- 96    05/08/23 0038 -- -- 46 -- -- -- 95    05/07/23 2330 98.4 (36.9) Oral 53 20 110/52 Lying 96    05/07/23 1950 98.8 (37.1) Oral 79 20 148/81 Lying --    05/07/23 1700 -- -- 75 -- -- -- --    05/07/23 1500 98.2 (36.8) Oral 81 20 141/73 -- 92    05/07/23 14:42:24 -- -- 85 20 149/80 -- 91    05/07/23 1318 -- -- 79 20 -- -- 94    05/07/23 1240 -- -- 78 -- 167/81 -- 94    05/07/23 1230 -- -- 81 -- 175/82 -- 90    05/07/23 1145 -- -- -- -- -- -- 92    05/07/23 1138 97.8 (36.6) Oral 103 24 143/86 Sitting 88          Oxygen Therapy (last day)     Date/Time SpO2 Device (Oxygen Therapy) Flow (L/min) Oxygen Concentration (%) ETCO2 (mmHg)    05/08/23 1248 -- nasal cannula 3 -- --    05/08/23 1000 -- nasal cannula 3 -- --    05/08/23 0920 92 nasal cannula 3 -- --    05/08/23 0915 93 nasal cannula 3 -- --    05/08/23 0910 93 nasal cannula 4 -- --    05/08/23 0908 92 nasal cannula 4 -- --    05/08/23 0751 94 nasal cannula 3 -- --    05/08/23 0400 -- nasal cannula 3 -- --    05/08/23 0329 93 nasal cannula 3 -- --    05/08/23 0200 95 nasal cannula 3 -- --    05/08/23 0100 96 nasal cannula 3 -- --    05/08/23 0038 95 nasal cannula 3 -- --    05/07/23 2330 96 -- -- -- --    05/07/23 2210 -- nasal cannula 3 -- --    05/07/23 1950 -- nasal cannula 3 -- --    05/07/23  1500 92 nasal cannula 3 -- --    05/07/23 14:42:24 91 nasal cannula 3.5 -- --    05/07/23 1318 94 nasal cannula 2 -- --    05/07/23 1240 94 -- -- -- --    05/07/23 1230 90 -- -- -- --    05/07/23 1145 92 nasal cannula 2 -- --    05/07/23 1138 88 -- -- -- --          Lines, Drains & Airways     Active LDAs     Name Placement date Placement time Site Days    Peripheral IV 05/07/23 1255 Left Antecubital 05/07/23  1255  Antecubital  1    External Urinary Catheter 05/08/23  0000  --  less than 1                  Current Facility-Administered Medications   Medication Dose Route Frequency Provider Last Rate Last Admin   • acetaminophen (TYLENOL) tablet 650 mg  650 mg Oral Q4H PRN Kelly Matt DO       • amLODIPine (NORVASC) tablet 5 mg  5 mg Oral Daily Kelly Matt DO   5 mg at 05/08/23 1000   • sennosides-docusate (PERICOLACE) 8.6-50 MG per tablet 2 tablet  2 tablet Oral BID Kelly Matt DO   2 tablet at 05/08/23 1000    And   • polyethylene glycol (MIRALAX) packet 17 g  17 g Oral Daily PRN Kelly Matt DO        And   • bisacodyl (DULCOLAX) EC tablet 5 mg  5 mg Oral Daily PRN Kelly Matt DO        And   • bisacodyl (DULCOLAX) suppository 10 mg  10 mg Rectal Daily PRN Kelly Matt DO       • Calcium Replacement - Follow Nurse / BPA Driven Protocol   Does not apply PRN Kelly Matt DO       • donepezil (ARICEPT) tablet 10 mg  10 mg Oral Nightly Kelly Matt DO       • levothyroxine (SYNTHROID, LEVOTHROID) tablet 125 mcg  125 mcg Oral Daily Kelly Matt DO   125 mcg at 05/08/23 1000   • Magnesium Standard Dose Replacement - Follow Nurse / BPA Driven Protocol   Does not apply PRN Kelly Matt DO       • memantine (NAMENDA) tablet 10 mg  10 mg Oral BID Kelly Matt DO   10 mg at 05/08/23 1000   • ondansetron (ZOFRAN) injection 4 mg  4 mg Intravenous Q6H PRN Kelly Matt DO   4 mg at  05/08/23 0847   • pantoprazole (PROTONIX) injection 40 mg  40 mg Intravenous Q AM Kelly Matt DO   40 mg at 05/08/23 0401   • Phosphorus Replacement - Follow Nurse / BPA Driven Protocol   Does not apply PRN Kelly Matt DO       • piperacillin-tazobactam (ZOSYN) 3.375 g in iso-osmotic dextrose 50 ml (premix)  3.375 g Intravenous Q8H Kelly Matt DO   3.375 g at 05/08/23 1000   • Potassium Replacement - Follow Nurse / BPA Driven Protocol   Does not apply PRN Kelly Matt DO       • QUEtiapine (SEROquel) tablet 50 mg  50 mg Oral Nightly PRN Kelly Matt DO       • sodium chloride 0.9 % flush 10 mL  10 mL Intravenous PRN Ellis Galeano MD       • sodium chloride 0.9 % flush 10 mL  10 mL Intravenous Q12H Kelly Matt DO       • sodium chloride 0.9 % flush 10 mL  10 mL Intravenous PRN Kelly Matt DO       • sodium chloride 0.9 % infusion 40 mL  40 mL Intravenous PRN Kelly Matt DO           Lab Results (last 24 hours)     Procedure Component Value Units Date/Time    Blood Culture - Blood, Arm, Left [[id number]]  (Normal) Collected: 05/07/23 1313    Specimen: Blood from Arm, Left Updated: 05/08/23 1330     Blood Culture No growth at 24 hours    Blood Culture - Blood, Arm, Right [[id number]]  (Normal) Collected: 05/07/23 1313    Specimen: Blood from Arm, Right Updated: 05/08/23 1330     Blood Culture No growth at 24 hours    Basic Metabolic Panel [[id number]]  (Normal) Collected: 05/08/23 0317    Specimen: Blood Updated: 05/08/23 0444     Glucose 95 mg/dL      BUN 13 mg/dL      Creatinine 0.60 mg/dL      Sodium 138 mmol/L      Potassium 4.5 mmol/L      Chloride 106 mmol/L      CO2 23.0 mmol/L      Calcium 8.7 mg/dL      BUN/Creatinine Ratio 21.7     Anion Gap 9.0 mmol/L      eGFR 90.9 mL/min/1.73     Narrative:      GFR Normal >60  Chronic Kidney Disease <60  Kidney Failure <15    The GFR formula is only valid for adults with  stable renal function between ages 18 and 70.    CBC Auto Differential [[id number]]  (Abnormal) Collected: 05/08/23 0317    Specimen: Blood Updated: 05/08/23 0415     WBC 12.20 10*3/mm3      RBC 3.91 10*6/mm3      Hemoglobin 10.4 g/dL      Hematocrit 34.1 %      MCV 87.2 fL      MCH 26.6 pg      MCHC 30.5 g/dL      RDW 13.3 %      RDW-SD 43.3 fl      MPV 9.3 fL      Platelets 296 10*3/mm3      Neutrophil % 82.2 %      Lymphocyte % 9.8 %      Monocyte % 7.4 %      Eosinophil % 0.0 %      Basophil % 0.1 %      Immature Grans % 0.5 %      Neutrophils, Absolute 10.03 10*3/mm3      Lymphocytes, Absolute 1.20 10*3/mm3      Monocytes, Absolute 0.90 10*3/mm3      Eosinophils, Absolute 0.00 10*3/mm3      Basophils, Absolute 0.01 10*3/mm3      Immature Grans, Absolute 0.06 10*3/mm3      nRBC 0.0 /100 WBC     [place] Draw [[id number]] Collected: 05/07/23 1255    Specimen: Blood Updated: 05/07/23 1700    Narrative:      The following orders were created for panel order [place] Draw.  Procedure                               Abnormality         Status                     ---------                               -----------         ------                     Green Top (Gel)[[id number]]                                  Final result               Lavender Top[[id number]]                                     Final result               Gold Top - SST[[id number]]                                   Final result               Krishna Top[[id number]]                                         Final result               Light Blue Top[[id number]]                                   Final result                 Please view results for these tests on the individual orders.    Gray Top [[id number]] Collected: 05/07/23 1255    Specimen: Blood Updated: 05/07/23 1700     Extra Tube Hold for add-ons.     Comment: Auto resulted.       Urinalysis With Culture If Indicated - Straight Cath [[id number]]  (Abnormal) Collected: 05/07/23 1605    Specimen: Urine from  "Straight Cath Updated: 05/07/23 1635     Color, UA Yellow     Appearance, UA Turbid     pH, UA 5.5     Specific Gravity, UA 1.070     Glucose, UA Negative     Ketones, UA Negative     Bilirubin, UA Negative     Blood, UA Negative     Protein, UA 30 mg/dL (1+)     Leuk Esterase, UA Negative     Nitrite, UA Negative     Urobilinogen, UA 1.0 E.U./dL    Narrative:      In absence of clinical symptoms, the presence of pyuria, bacteria, and/or nitrites on the urinalysis result does not correlate with infection.    Urinalysis, Microscopic Only - Straight Cath [[id number]]  (Abnormal) Collected: 05/07/23 1605    Specimen: Urine from Straight Cath Updated: 05/07/23 1635     RBC, UA 0-2 /HPF      WBC, UA 3-5 /HPF      Comment: Urine culture not indicated.        Bacteria, UA None Seen /HPF      Squamous Epithelial Cells, UA 3-6 /HPF      Hyaline Casts, UA 7-12 /LPF      Mucus, UA Trace /HPF      Methodology Manual Light Microscopy    Procalcitonin [[id number]]  (Abnormal) Collected: 05/07/23 1255    Specimen: Blood Updated: 05/07/23 1557     Procalcitonin 1.45 ng/mL     Narrative:      As a Marker for Sepsis (Non-Neonates):    1. <0.5 ng/mL represents a low risk of severe sepsis and/or septic shock.  2. >2 ng/mL represents a high risk of severe sepsis and/or septic shock.    As a Marker for Lower Respiratory Tract Infections that require antibiotic therapy:    PCT on Admission    Antibiotic Therapy       6-12 Hrs later    >0.5                Strongly Recommended  >0.25 - <0.5        Recommended   0.1 - 0.25          Discouraged              Remeasure/reassess PCT  <0.1                Strongly Discouraged     Remeasure/reassess PCT    As 28 day mortality risk marker: \"Change in Procalcitonin Result\" (>80% or <=80%) if Day 0 (or Day 1) and Day 4 values are available. Refer to http://www.[facility]-pct-calculator.com    Change in PCT <=80%  A decrease of PCT levels below or equal to 80% defines a positive change in PCT test result " representing a higher risk for 28-day all-cause mortality of patients diagnosed with severe sepsis for septic shock.    Change in PCT >80%  A decrease of PCT levels of more than 80% defines a negative change in PCT result representing a lower risk for 28-day all-cause mortality of patients diagnosed with severe sepsis or septic shock.       TSH [[id number]]  (Normal) Collected: 05/07/23 1255    Specimen: Blood Updated: 05/07/23 1557     TSH 1.210 uIU/mL     Lavender Top [[id number]] Collected: 05/07/23 1255    Specimen: Blood Updated: 05/07/23 1400     Extra Tube hold for add-on     Comment: Auto resulted       Light Blue Top [[id number]] Collected: 05/07/23 1255    Specimen: Blood Updated: 05/07/23 1400     Extra Tube Hold for add-ons.     Comment: Auto resulted       Green Top (Gel) [[id number]] Collected: 05/07/23 1255    Specimen: Blood Updated: 05/07/23 1400     Extra Tube Hold for add-ons.     Comment: Auto resulted.       Gold Top - SST [[id number]] Collected: 05/07/23 1255    Specimen: Blood Updated: 05/07/23 1400     Extra Tube Hold for add-ons.     Comment: Auto resulted.           Imaging Results (Last 24 Hours)     Procedure Component Value Units Date/Time    CT Chest With Contrast Diagnostic [[id number]] Collected: 05/07/23 1407     Updated: 05/07/23 1420    Narrative:      CT CHEST W CONTRAST DIAGNOSTIC    Date of Exam: 5/7/2023 1:52 PM EDT    Indication: cough, fever, paratracheal air on cxr.    Comparison: Concurrent chest radiograph    Technique: Axial CT images were obtained of the chest after the uneventful intravenous administration of 75 in the Isovue-300.  Reconstructed coronal and sagittal images were also obtained. Automated exposure control and iterative construction methods   were used.      Findings:  Air shadow noted in the mediastinum corresponds to the markedly dilated esophagus, which contains extensive air and food, becoming markedly narrowed at the GE junction. Maximal diameter of the  proximal esophagus is approximately 5 cm. More distally, the   esophagus measures up to 4 cm transverse luminal diameter before becoming markedly narrowed at the GE junction. This pattern suggests achalasia. No inflammatory change or mass is identified. GE junction segment appears very tight/narrowed. Included   portion of the proximal stomach appears normal below the level of the cardia. No inflammatory change mass or adenopathy is seen in this general region.    Elsewhere, no evidence of pulmonary embolic disease is seen. Thoracic aorta shows no evidence of aneurysm or dissection. No pericardial or pleural effusion or mediastinal adenopathy is seen. There is extensive multifocal bilateral airspace disease,   greater in the right lower lobe and medial left lower lobe than elsewhere, suspicious for pneumonia or, given patient's esophageal findings, aspiration. No pneumothorax or edema is seen. Included images of the upper abdomen show mild fatty liver change.   Spleen does not appear to be enlarged. Incidental note is made of bilateral breast prostheses.          Impression:      Impression:    1. Marked distention of the entire esophagus down to the level of the GE junction where it is very narrow. No visible inflammation or mass. Findings suggest achalasia.    2. Extensive multifocal pulmonary airspace disease, consistent with bilateral pneumonia, which may be due to aspiration.    Electronically Signed: Thomas Rodriguez    5/7/2023 2:16 PM EDT    Workstation ID: [id number]        ECG/EMG Results (last 24 hours)     Procedure Component Value Units Date/Time    ECG 12 Lead ED Triage Standing Order; SOA [[id number]] Collected: 05/07/23 1156     Updated: 05/07/23 1440     QT Interval 358 ms      QTC Interval 418 ms     Narrative:      Test Reason : ED Triage Standing Order~  Blood Pressure :   */*   mmHG  Vent. Rate :  82 BPM     Atrial Rate :  82 BPM     P-R Int : 172 ms          QRS Dur :  78 ms      QT Int : 358 ms        P-R-T Axes :  53  -3  23 degrees     QTc Int : 418 ms    Normal sinus rhythm  Normal ECG  When compared with ECG of 26-OCT-2022 17:44,  Sinus rhythm has replaced Atrial fibrillation  Inverted T waves have replaced nonspecific T wave abnormality in Inferior leads  Confirmed by JULIANNE DONATO MD ([id number]) on 5/7/2023 2:40:31 PM    Referred By: ED MD           Confirmed By: JULIANNE DONATO MD          Physician Progress Notes (last 24 hours)  Notes from 05/07/23 1342 through 05/08/23 1342   No notes of this type exist for this encounter.            Consult Notes (last 24 hours)      Michelle Humphries APRN at 05/07/23 1612     Attestation signed by Brunner, Mark I, MD at 05/07/23 1723    I have reviewed this documentation and agree.                  [facility] Gastroenterology Consult    Referring Provider: Dr. Matt    PCP: Dalila Liz MD    Reason for Consultation: abnormal CT chest, fatigue, decreased oral intake    Chief complaint:  Per her daughter, fatigue, decreased oral intake, concern for pneumonia    History of present illness:    Brittny Nicolas is a 77 y.o. female With history of Alzheimer's, reflux, arthritis, carotid artery occlusion, depression, facial palsy, Graves' disease, hyperlipidemia, hypertension, stroke who was brought to the ED by her daughter due to fatigue,  cough,  did not want to get out of bed, decreased intake possibly due to fatigue.  She was prescribed Macrobid for urinary tract infection 2 days ago at the [facility].  She had chest x-ray 3 days ago with recommendation to have CT chest due to abnormal chest x-ray.  She is admitted due to acute respiratory failure with hypoxia, pneumonia of right lower lobe due to infectious organism.  He daughter Adia Tatum provides the history for the patient    5/7/2023 chest x-ray revealed small right and small to moderate left pleural effusions, diffuse interstitial prominence that may be due to edema or atypical infection pattern that may be  due to chronic fibrosis, air lucency at the right paratracheal region may be due to esophageal air, no pneumothorax seen    CT of the chest revealed air shadow in the mediastinum corresponds to the markedly dilated esophagus that contains extensive air and food becoming markedly narrowed at the GE junction, maximal diameter of the proximal esophagus is 5 cm, more distally the esophagus measures up to 4 cm transverse luminal diameter before becoming markedly narrowed the GE junction pattern concerning for achalasia, no inflammatory change or mass identified, GE junction segment appears tight/narrow; extensive multifocal bilateral airspace disease greater in the right lower lobe and medial left lower lobe than elsewhere suspicious for pneumonia or given patient's esophageal findings, aspiration.    Review of medical record reveals patient has seen CARLOS Verdugo and Dr. Dove as an outpatient. She had EGD 4/12/2021 per Dr. Dove without obvious stricture, empirically dilated to 45 [place], thought to be an element of dilatation of the esophagus as well as retained fluid in the esophagus, some spasm.  Upper GI series 4/22/2021 revealed moderate esophageal dysmotility with some esophageal dilation but without evidence of stricture or tapering to suggest achalasia; moderate reflux to the level of the thoracic inlet.  Review of documentation reveals compensatory mechanism of pushing on suprasternal notch when she swallows at times.  She was on Dexilant last June.  Outpatient follow-up was planned but she did not return for follow up.     Allergies:  Boniva [ibandronic acid]    Scheduled Meds:  [START ON 5/8/2023] amLODIPine, 5 mg, Oral, Daily  donepezil, 10 mg, Oral, Nightly  levothyroxine, 125 mcg, Oral, Daily  memantine, 10 mg, Oral, BID  [START ON 5/8/2023] pantoprazole, 40 mg, Intravenous, Q AM  piperacillin-tazobactam, 3.375 g, Intravenous, Once  piperacillin-tazobactam, 3.375 g, Intravenous,  Q8H  senna-docusate sodium, 2 tablet, Oral, BID  sodium chloride, 10 mL, Intravenous, Q12H         Infusions:  sodium chloride, 100 mL/hr        PRN Meds:  •  acetaminophen  •  senna-docusate sodium **AND** polyethylene glycol **AND** bisacodyl **AND** bisacodyl  •  Calcium Replacement - Follow Nurse / BPA Driven Protocol  •  Magnesium Standard Dose Replacement - Follow Nurse / BPA Driven Protocol  •  ondansetron  •  Phosphorus Replacement - Follow Nurse / BPA Driven Protocol  •  Potassium Replacement - Follow Nurse / BPA Driven Protocol  •  QUEtiapine  •  sodium chloride  •  sodium chloride  •  sodium chloride    Home Meds:  Medications Prior to Admission   Medication Sig Dispense Refill Last Dose   • acetaminophen (TYLENOL) 325 MG tablet Take 2 tablets by mouth Every 4 (Four) Hours As Needed for Mild Pain .   5/7/2023   • amLODIPine (NORVASC) 5 MG tablet Take 1 tablet by mouth Daily. 90 tablet 1 5/7/2023   • atorvastatin (LIPITOR) 20 MG tablet Take 1 tablet by mouth every night at bedtime. 90 tablet 1 Past Week   • Cholecalciferol (VITAMIN D) 2000 UNITS capsule Take 1 capsule by mouth.   Past Week   • dexlansoprazole (DEXILANT) 60 MG capsule TAKE 1 CAPSULE BY MOUTH EVERY DAY 90 capsule 1 5/6/2023   • donepezil (ARICEPT) 10 MG tablet Take 1 tablet by mouth Every Night. 90 tablet 3 5/6/2023   • levothyroxine (SYNTHROID, LEVOTHROID) 125 MCG tablet TAKE 1 TABLET BY MOUTH EVERY DAY 90 tablet 1 5/7/2023   • memantine (NAMENDA) 10 MG tablet Take 1 tablet by mouth 2 (Two) Times a Day. 180 tablet 3 5/7/2023   • nitrofurantoin, macrocrystal-monohydrate, (MACROBID) 100 MG capsule Take 1 capsule by mouth 2 (Two) Times a Day.   5/7/2023   • polyethylene glycol (MIRALAX) 17 g packet Take 17 g by mouth Daily.   5/6/2023   • PREDNISONE PO Take  by mouth.   5/7/2023   • QUEtiapine (SEROquel) 25 MG tablet Take 2 tablets by mouth Every Night. 180 tablet 1 5/6/2023   • clonazePAM (KlonoPIN) 0.5 MG tablet Take 1 tablet by mouth Daily  As Needed for Anxiety (agitation). (Patient not taking: Reported on 5/7/2023) 30 tablet 0 Not Taking   • Diclofenac Sodium 3 % gel gel Apply  topically to the appropriate area as directed 2 (Two) Times a Day. for 60 days. (Patient not taking: Reported on 5/7/2023) 100 g 0 Not Taking       ROS: Review of Systems   Reason unable to perform ROS: patient provided limited information.       PAST MED HX:  Past Medical History:   Diagnosis Date   • Acid reflux disease    • Arthritis    • Carotid artery occlusion    • Dementia    • Depression    • 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   • Graves disease    • Hyperlipidemia    • Hypertension    • Memory loss    • Stomach problems    • Stroke    • Stroke syndrome    • Thyroid disease    • Transient ischemic attack    • Vision loss        PAST SURG HX:  Past Surgical History:   Procedure Laterality Date   • AUGMENTATION MAMMAPLASTY      1978   • BREAST AUGMENTATION     • CAROTID ARTERY ANGIOPLASTY     • CAROTID ENDARTERECTOMY     • CAROTID STENT     • COLONOSCOPY     • D & C CERVICAL BIOPSY      Dilation and Curettage of Cervical Stump   • HERNIA REPAIR     • HERNIA REPAIR     • THROMBOENDARTERECTOMY      carotid   • UPPER GASTROINTESTINAL ENDOSCOPY         FAM HX:  Family History   Problem Relation Age of Onset   • Cystic fibrosis Other    • Cervical cancer Mother    • Cancer Paternal Grandmother    • Dementia Paternal Grandmother    • Colon cancer Neg Hx    • Colon polyps Neg Hx        SOC HX:  Social History     Socioeconomic History   • Marital status:    Tobacco Use   • Smoking status: Former     Packs/day: 1.00     Years: 15.00     Pack years: 15.00     Types: Cigarettes   • Smokeless tobacco: Never   • Tobacco comments:     Do not know when she quit. Likely about 1998.   Vaping Use   • Vaping Use: Never used   Substance and Sexual Activity   • Alcohol use: Not Currently     Alcohol/week: 0.0 standard drinks     Comment: She might have some wine, but I try to not give her any  "  • Drug use: No   • Sexual activity: Not Currently     Birth control/protection: Post-menopausal       PHYSICAL EXAM  /80   Pulse 85   Temp 97.8 °F (36.6 °C) (Oral)   Resp 20   Ht 160 cm (63\")   Wt 61.2 kg (135 lb)   SpO2 91%   BMI 23.91 kg/m²   Wt Readings from Last 3 Encounters:   05/07/23 61.2 kg (135 lb)   03/01/23 61.2 kg (135 lb)   11/16/22 60.8 kg (134 lb)   ,body mass index is 23.91 kg/m².  Physical Exam  Constitutional:       Appearance: She is ill-appearing.      Comments: Resting in bed, appears comfortable, eyes closed most of the time   HENT:      Head: Normocephalic and atraumatic. No contusion.      Right Ear: External ear normal.      Left Ear: External ear normal.   Eyes:      General: Lids are normal.         Right eye: No discharge.         Left eye: No discharge.   Neck:      Trachea: Trachea normal.      Comments: No visible mass  No visible adenopathy  Cardiovascular:      Rate and Rhythm: Normal rate.   Pulmonary:      Effort: No respiratory distress.      Comments: Symmetrical expansion  On 3 liters of oxygen via NC  Abdominal:      General: Bowel sounds are normal.      Palpations: Abdomen is soft. There is no mass.      Tenderness: There is no abdominal tenderness.   Musculoskeletal:      Comments: Symmetrical movement of upper extremities     Skin:     General: Skin is warm and dry.      Coloration: Skin is not jaundiced.   Neurological:      Motor: Weakness present.   Psychiatric:         Mood and Affect: Affect is flat.      Comments: Limited communication, stated her name     Results Review:   I reviewed the patient's new clinical results.    Lab Results   Component Value Date    WBC 12.75 (H) 05/07/2023    HGB 13.0 05/07/2023    HGB 12.8 03/01/2023    HGB 12.9 10/26/2022    HCT 41.9 05/07/2023    MCV 87.5 05/07/2023     05/07/2023       No results found for: INR    Lab Results   Component Value Date    GLUCOSE 108 (H) 05/07/2023    BUN 14 05/07/2023    CREATININE " 0.71 05/07/2023    EGFRIFNONA 65 12/16/2021    BCR 19.7 05/07/2023     05/07/2023    K 3.6 05/07/2023    CO2 25.0 05/07/2023    CALCIUM 9.8 05/07/2023    ALBUMIN 3.5 05/07/2023    ALKPHOS 115 05/07/2023    BILITOT 0.5 05/07/2023    ALT 8 05/07/2023    AST 13 05/07/2023       CT Chest With Contrast Diagnostic    Result Date: 5/7/2023  CT CHEST W CONTRAST DIAGNOSTIC Date of Exam: 5/7/2023 1:52 PM EDT Indication: cough, fever, paratracheal air on cxr. Comparison: Concurrent chest radiograph Technique: Axial CT images were obtained of the chest after the uneventful intravenous administration of 75 in the Isovue-300.  Reconstructed coronal and sagittal images were also obtained. Automated exposure control and iterative construction methods were used. Findings: Air shadow noted in the mediastinum corresponds to the markedly dilated esophagus, which contains extensive air and food, becoming markedly narrowed at the GE junction. Maximal diameter of the proximal esophagus is approximately 5 cm. More distally, the esophagus measures up to 4 cm transverse luminal diameter before becoming markedly narrowed at the GE junction. This pattern suggests achalasia. No inflammatory change or mass is identified. GE junction segment appears very tight/narrowed. Included portion of the proximal stomach appears normal below the level of the cardia. No inflammatory change mass or adenopathy is seen in this general region. Elsewhere, no evidence of pulmonary embolic disease is seen. Thoracic aorta shows no evidence of aneurysm or dissection. No pericardial or pleural effusion or mediastinal adenopathy is seen. There is extensive multifocal bilateral airspace disease, greater in the right lower lobe and medial left lower lobe than elsewhere, suspicious for pneumonia or, given patient's esophageal findings, aspiration. No pneumothorax or edema is seen. Included images of the upper abdomen show mild fatty liver change. Spleen does not  appear to be enlarged. Incidental note is made of bilateral breast prostheses.     Impression: 1. Marked distention of the entire esophagus down to the level of the GE junction where it is very narrow. No visible inflammation or mass. Findings suggest achalasia. 2. Extensive multifocal pulmonary airspace disease, consistent with bilateral pneumonia, which may be due to aspiration. Electronically Signed: Thomas Rodriguez  5/7/2023 2:16 PM EDT  Workstation ID: [id number]    XR Chest 1 View    Result Date: 5/7/2023  XR CHEST 1 VW Date of Exam: 5/7/2023 12:26 PM EDT Indication: SOA triage protocol Comparison: 10/26/2022 Findings: There is a small to moderate left pleural effusion Small right pleural effusion. There is generalized coarsening of the interstitial pattern which may be due to edema or atypical infection pattern. Pulmonary vascularity appears unchanged. Heart size appears within normal limits. Air lucency at the right paratracheal region may be due to esophageal air. No pneumothorax is seen.     Impression: 1. Small right and small to moderate left pleural effusions. 2. Diffuse interstitial prominence may be due to edema or atypical infection pattern or may be due to chronic fibrosis. Electronically Signed: Justino Love  5/7/2023 12:56 PM EDT  Workstation ID: [id number]      COVID19   Date Value Ref Range Status   05/07/2023 Not Detected Not Detected - Ref. Range Final         ASSESSMENTS/PLANS  Brittny Nicolas is a 77 y.o. female with Fatigue, decreased oral intake, acute respiratory failure with hypoxia (93% on 3 L of oxygen via nasal cannula at time of evaluation) pneumonia with possible suspicion for aspiration pneumonia.  CT of the chest with markedly dilated esophagus with air and food, findings concerning for achalasia, tight appearance of GE junction. She has history of esophageal dysmotility and reflux on prior imaging.     After discussion with Dr. Brunner and patient's daughter, if no further decline in  respiratory status or other issue throughout tonight and into tomorrow morning, plan for EGD with likely intubation for additional evaluation, possible dilation for the patient to potentially tolerate liquid diet.  Her daughter does not seem interested in feeding tube placement at this time.      Pantoprazole daily has been ordered    I discussed the patient's findings and my recommendations with patient and family    CARLOS Monique  05/07/23  16:12 EDT        Electronically signed by Brunner, Mark I, MD at 05/07/23 9025

## 2023-05-08 NOTE — ANESTHESIA PROCEDURE NOTES
Airway  Urgency: elective    Date/Time: 5/8/2023 8:43 AM  Airway not difficult    General Information and Staff    Patient location during procedure: OR  CRNA/CAA: Justino Prasad CRNA    Indications and Patient Condition  Indications for airway management: airway protection    Preoxygenated: yes  MILS not maintained throughout  Mask difficulty assessment: 1 - vent by mask    Final Airway Details  Final airway type: endotracheal airway      Successful airway: ETT  Cuffed: yes   Successful intubation technique: direct laryngoscopy and RSI  Facilitating devices/methods: intubating stylet  Endotracheal tube insertion site: oral  Blade: Heidi  Blade size: 3  ETT size (mm): 7.0  Cormack-Lehane Classification: grade I - full view of glottis  Placement verified by: chest auscultation and capnometry   Measured from: lips  ETT/EBT  to lips (cm): 20  Number of attempts at approach: 1  Assessment: lips, teeth, and gum same as pre-op and atraumatic intubation    Additional Comments  Negative epigastric sounds, Breath sound equal bilaterally with symmetric chest rise and fall

## 2023-05-08 NOTE — CASE MANAGEMENT/SOCIAL WORK
Discharge Planning Assessment  [facility]     Patient Name: Brittny Nicolas  MRN: [id number]  Today's Date: 5/8/2023    Admit Date: 5/7/2023    Plan: home   Discharge Needs Assessment     Row Name 05/08/23 1015       Living Environment    People in Home child(mane), adult    Name(s) of People in Home daughterAdia    Current Living Arrangements home    Primary Care Provided by child(mane);other (see comments)  caregiver from [facility]       Transition Planning    Patient/Family Anticipates Transition to home with family       Discharge Needs Assessment    Readmission Within the Last 30 Days no previous admission in last 30 days    Equipment Currently Used at Home walker, rolling;wheelchair;shower chair    Concerns to be Addressed discharge planning;basic needs               Discharge Plan     Row Name 05/08/23 1016       Plan    Plan home    Patient/Family in Agreement with Plan yes    Plan Comments I spoke with this patient's daughter, Adia, on the phone. She states that this patient lives with her in [facility]. The daughter is the primary caregiver but there is another caregiver who comes to the house from [facility] 40 hrs/wk. She is independent with ambulating, but needs assistance with all other activities of daily living. She has a rolling walker and wheelchair but does not use them, but she does use a shower bench. PT and OT have been consulted. Her daughter anticipates that she will return home with her upon discharge. The daughter can transport. Case management will continue to follow her plan of care and assist with any discharge planning needs.    Final Discharge Disposition Code 01 - home or self-care    Row Name 05/08/23 1014       Plan    Plan home    Patient/Family in Agreement with Plan yes    Final Discharge Disposition Code 01 - home or self-care              Continued Care and Services - Admitted Since 5/7/2023    Coordination has not been started for this encounter.       Expected  Discharge Date and Time     Expected Discharge Date Expected Discharge Time    May 11, 2023          Demographic Summary     Row Name 05/08/23 1014       General Information    General Information Comments I confirmed that Dalila Liz is Ms Angelica's PCp and she has [place] [employer] and [place] Medicaid for insurance               Functional Status     Row Name 05/08/23 1015       Functional Status, IADL    Medications completely dependent    Meal Preparation completely dependent    Housekeeping completely dependent    Laundry completely dependent    Shopping completely dependent               Psychosocial    No documentation.                Abuse/Neglect    No documentation.                Legal    No documentation.                Substance Abuse    No documentation.                Patient Forms    No documentation.                   Klaudia Castro RN

## 2023-05-08 NOTE — PLAN OF CARE
Goal Outcome Evaluation:    SLP evaluation completed. Will address diet tolerance. Please see note for further details and recommendations.

## 2023-05-08 NOTE — ANESTHESIA PREPROCEDURE EVALUATION
Anesthesia Evaluation     Patient summary reviewed and Nursing notes reviewed   no history of anesthetic complications:  NPO Solid Status: > 8 hours  NPO Liquid Status: > 8 hours           Airway   Mallampati: II  TM distance: >3 FB  Neck ROM: full  No difficulty expected  Dental      Pulmonary - normal exam   (+) recent URI,   Cardiovascular - normal exam    (+) hypertension, CAD,  carotid artery disease      Neuro/Psych  (+) TIA, CVA, numbness, psychiatric history, dementia,    GI/Hepatic/Renal/Endo    (+)  GERD, PUD, GI bleeding , thyroid problem     Musculoskeletal     Abdominal    Substance History      OB/GYN          Other   arthritis,                      Anesthesia Plan    ASA 3     general     intravenous induction     Anesthetic plan, risks, benefits, and alternatives have been provided, discussed and informed consent has been obtained with: patient.        CODE STATUS:    Medical Intervention Limits: NO intubation (DNI)  Code Status (Patient has no pulse and is not breathing): No CPR (Do Not Attempt to Resuscitate)  Medical Interventions (Patient has pulse or is breathing): Limited Support

## 2023-05-08 NOTE — THERAPY EVALUATION
Acute Care - Speech Language Pathology   Swallow Initial Evaluation [facility]     Clinical Swallow Evaluation       Patient Name: Brittny Nicolas  : 1945  MRN: [id number]  Today's Date: 2023               Admit Date: 2023    Visit Dx:     ICD-10-CM ICD-9-CM   1. Acute respiratory failure with hypoxia  J96.01 518.81   2. Pneumonia of right lower lobe due to infectious organism  J18.9 486   3. Abnormal CT of the chest  R93.89 793.2   4. Decreased oral intake  R63.8 783.9   5. Other fatigue  R53.83 780.79   6. Dysphagia, unspecified type  R13.10 787.20     Patient Active Problem List   Diagnosis   • Achalasia   • Acute upper respiratory infection   • Atypical chest pain   • Benign essential hypertension   • Carotid artery stenosis   • Cataract   • Cough   • Acute cystitis   • Screening for malignant neoplasm   • Gastroesophageal reflux disease   • Essential familial hypercholesterolemia   • Gastrointestinal bleeding   • Inguinal hernia   • Insomnia   • Leg cramps   • Meniere's disease   • Night sweats   • Osteoporosis   • Peripheral neuropathy   • Retinal artery occlusion   • Rheumatoid arthritis (HCC)   • Stroke syndrome   • Suprapubic pain   • Transient ischemic attack   • Positive reaction to tuberculin skin test   • Vitamin D deficiency   • Weight loss   • H/O colonoscopy   • Fatigue   • Hypothyroidism   • Erosive esophagitis   • Left lower quadrant pain   • Acute pain of left shoulder   • Pharyngitis   • Memory loss   • Rib pain on left side   • Hyponatremia   • Late onset Alzheimer's dementia with behavioral disturbance   • Oropharyngeal dysphagia   • Influenza A with respiratory manifestations   • Anxiety and depression   • Acute respiratory failure with hypoxia   • Anemia   • Nocturnal enuresis   • Visual loss   • Alzheimer's disease, unspecified   • Abnormal CT of the chest   • Decreased oral intake     Past Medical History:   Diagnosis Date   • Acid reflux disease    • Alzheimer's dementia     • Arthritis     rhuematoid   • Carotid artery occlusion    • Coronary artery disease    • Dementia    • Depression    • 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   • Graves disease    • Hyperlipidemia    • Hypertension    • Memory loss    • Stomach problems    • Stroke    • Stroke syndrome    • Thyroid disease    • Transient ischemic attack    • Vision loss      Past Surgical History:   Procedure Laterality Date   • AUGMENTATION MAMMAPLASTY      1978   • BREAST AUGMENTATION     • CAROTID ARTERY ANGIOPLASTY     • CAROTID ENDARTERECTOMY     • CAROTID STENT     • COLONOSCOPY     • D & C CERVICAL BIOPSY      Dilation and Curettage of Cervical Stump   • HERNIA REPAIR     • HERNIA REPAIR     • THROMBOENDARTERECTOMY      carotid   • UPPER GASTROINTESTINAL ENDOSCOPY         SLP Recommendation and Plan  SLP Swallowing Diagnosis: functional oral phase, R/O pharyngeal dysphagia, other (see comments) (no suspected pharyngeal dysphagia) (05/08/23 1315)  SLP Diet Recommendation: mechanical ground textures, thin liquids, other (see comments) (per MD order) (05/08/23 1315)  Recommended Precautions and Strategies: general aspiration precautions (05/08/23 1315)  SLP Rec. for Method of Medication Administration: meds whole, with thin liquids, with puree, as tolerated (05/08/23 1315)     Monitor for Signs of Aspiration: notify SLP if any concerns (05/08/23 1315)  Recommended Diagnostics: other (see comments) (diet tolerance) (05/08/23 1315)     Anticipated Discharge Disposition (SLP): home with assist (05/08/23 1315)  Rehab Potential/Prognosis, Swallowing: good, to achieve stated therapy goals (05/08/23 1315)  Therapy Frequency (Swallow): PRN (05/08/23 1315)  Predicted Duration Therapy Intervention (Days): 3 days (05/08/23 1315)                                               SWALLOW EVALUATION (last 72 hours)     SLP Adult Swallow Evaluation     Row Name 05/08/23 1315                   Rehab Evaluation    Document Type evaluation  -CH        Subjective  Information no complaints  -CH        Patient Observations alert;cooperative;agree to therapy  -        Patient/Family/Caregiver Comments/Observations none present  -CH        Patient Effort good  -CH        Symptoms Noted During/After Treatment none  -CH           General Information    Patient Profile Reviewed yes  -CH        Pertinent History Of Current Problem cough, fatigue, reflux, abnormal CT chest. Sm R and mod L pleural effusions. S/p esophageal dilation this am.  -        Current Method of Nutrition mechanical ground textures;thin liquids;other (see comments)  per MD order following GI procedure.  -CH        Prior Level of Function-Communication WFL  -CH        Prior Level of Function-Swallowing unknown  -        Plans/Goals Discussed with patient;agreed upon  -        Barriers to Rehab none identified  -        Patient's Goals for Discharge patient did not state  -           Pain    Additional Documentation Pain Scale: FACES Pre/Post-Treatment (Group)  -           Pain Scale: FACES Pre/Post-Treatment    Pain: FACES Scale, Pretreatment 0-->no hurt  -CH        Posttreatment Pain Rating 0-->no hurt  -CH           Oral Motor Structure and Function    Dentition Assessment natural, present and adequate  -CH        Secretion Management WNL/WFL  -CH        Mucosal Quality moist, healthy  -CH        Volitional Swallow WFL  -           Oral Musculature and Cranial Nerve Assessment    Oral Motor General Assessment WFL  -           General Eating/Swallowing Observations    Respiratory Support Currently in Use nasal cannula  -        O2 Liters 3L  -CH        Eating/Swallowing Skills self-fed;fed by SLP;appropriate self-feeding skills observed  -        Positioning During Eating upright 90 degree;upright in bed  -        Utensils Used spoon;cup;straw  -        Consistencies Trialed ice chips;thin liquids;pureed;soft to chew textures;regular textures  -        Pre SpO2 (%) 98  -CH        Post  SpO2 (%) 98  -           Respiratory    Respiratory Status WFL  -CH           Clinical Swallow Eval    Oral Prep Phase WFL  -CH        Oral Transit WFL  -CH        Oral Residue WFL  -CH        Pharyngeal Phase no overt signs/symptoms of pharyngeal impairment  -        Esophageal Phase unremarkable  -        Clinical Swallow Evaluation Summary No overt clinical s/s of aspiraiton or difficulty observed with any consistency or presentation style  -           Swallowing Quality of Life Assessment    Education and counseling provided Signs of aspiration;Risks of aspiration;Oral care recommendations and rationale  -           SLP Evaluation Clinical Impression    SLP Swallowing Diagnosis functional oral phase;R/O pharyngeal dysphagia;other (see comments)  no suspected pharyngeal dysphagia  -        Functional Impact risk of aspiration/pneumonia  -        Rehab Potential/Prognosis, Swallowing good, to achieve stated therapy goals  -           Recommendations    Therapy Frequency (Swallow) PRN  -        Predicted Duration Therapy Intervention (Days) 3 days  -        SLP Diet Recommendation mechanical ground textures;thin liquids;other (see comments)  per MD order  -        Recommended Diagnostics other (see comments)  diet tolerance  -        Recommended Precautions and Strategies general aspiration precautions  -        Oral Care Recommendations Oral Care BID/PRN  -        SLP Rec. for Method of Medication Administration meds whole;with thin liquids;with puree;as tolerated  -        Monitor for Signs of Aspiration notify SLP if any concerns  -        Anticipated Discharge Disposition (SLP) home with assist  -              User Key  (r) = Recorded By, (t) = Taken By, (c) = Cosigned By    Initials Name Effective Dates    Joann Rider, MS CCC-SLP 06/16/21 -                 EDUCATION  The patient has been educated in the following areas:   Dysphagia (Swallowing Impairment) Oral  Care/Hydration Modified Diet Instruction.        SLP GOALS     Row Name 05/08/23 1315             (LTG) Patient will demonstrate functional swallow for    Diet Texture (Demonstrate functional swallow) regular textures  -CH      Liquid viscosity (Demonstrate functional swallow) thin liquids  -CH      [place] (Demonstrate functional swallow) independently (over 90% accuracy)  -CH      Time Frame (Demonstrate functional swallow) by discharge  -CH         (STG) Patient will tolerate trials of    Consistencies Trialed (Tolerate trials) soft to chew (whole) textures;thin liquids  -CH      Desired Outcome (Tolerate trials) without signs/symptoms of aspiration;with adequate oral prep/transit/clearance  -CH      [place] (Tolerate trials) with minimal cues (75-90% accuracy)  -CH      Time Frame (Tolerate trials) by discharge  -CH            User Key  (r) = Recorded By, (t) = Taken By, (c) = Cosigned By    Initials Name Provider Type    Joann Rider MS CCC-SLP Speech and Language Pathologist                   Time Calculation:    Time Calculation- SLP     Row Name 05/08/23 1356             Time Calculation- SLP    SLP Start Time 1315  -CH      SLP Received On 05/08/23  -CH         Untimed Charges    SLP Eval/Re-eval  ST Eval Oral Pharyng Swallow - 22067  -CH      [id number]-GN Eval Oral Pharyng Swallow Minutes 40  -CH         Total Minutes    Untimed Charges Total Minutes 40  -CH       Total Minutes 40  -CH            User Key  (r) = Recorded By, (t) = Taken By, (c) = Cosigned By    Initials Name Provider Type    Joann Rider MS CCC-SLP Speech and Language Pathologist                Therapy Charges for Today     Code Description Service Date Service Provider Modifiers Qty    [id number]  ST EVAL ORAL PHARYNG SWALLOW 3 5/8/2023 Joann Naylor MS CCC-SLP GN 1               Joann Naylor MS CCC-DESMOND  5/8/2023

## 2023-05-08 NOTE — ANESTHESIA POSTPROCEDURE EVALUATION
Patient: Brittny Nicolas    Procedure Summary     Date: 05/08/23 Room / [facility]:  [facility]    Anesthesia Start: 0837 Anesthesia Stop: 0910    Procedure: ESOPHAGOGASTRODUODENOSCOPY with possible intubation Diagnosis:       Abnormal CT of the chest      Decreased oral intake      Other fatigue      (Abnormal CT of the chest [R93.89])      (Decreased oral intake [R63.8])      (Other fatigue [R53.83])    Surgeons: Marito Solis MD Provider: Andrei Cheatham MD    Anesthesia Type: general ASA Status: 3          Anesthesia Type: general    Vitals  No vitals data found for the desired time range.          Post Anesthesia Care and Evaluation    Patient location during evaluation: PACU  Patient participation: complete - patient participated  Level of consciousness: awake and responsive to verbal stimuli  Pain score: 2  Pain management: adequate    Airway patency: patent  Anesthetic complications: No anesthetic complications    Cardiovascular status: acceptable  Respiratory status: acceptable  Hydration status: acceptable    Comments: Pt awake and responsive. SV. VSS. Report to RN. Patient Vitals in the past 24 hrs:  05/08/23 0751, BP:143/75, Temp:97.7 °F (36.5 °C), Temp src:Temporal, Pulse:64, Resp:18, SpO2:94 %  05/08/23 0700, Pulse:61  05/08/23 0329, BP:104/58, Temp:98.8 °F (37.1 °C), Temp src:Oral, Pulse:70, Resp:20, SpO2:93 %  05/08/23 0200, Pulse:57, Resp:18, SpO2:95 %  05/08/23 0100, Pulse:54, SpO2:96 %  05/08/23 0038, Pulse:(!) 46, SpO2:95 %  05/07/23 2330, BP:110/52, Temp:98.4 °F (36.9 °C), Temp src:Oral, Pulse:53, Resp:20, SpO2:96 %  05/07/23 1950, BP:148/81, Temp:98.8 °F (37.1 °C), Temp src:Oral, Pulse:79, Resp:20  05/07/23 1700, Pulse:75  05/07/23 1500, BP:141/73, Temp:98.2 °F (36.8 °C), Temp src:Oral, Pulse:81, Resp:20, SpO2:92 %, Weight:59.5 kg (131 lb 3.2 oz)  05/07/23 1442, BP:149/80, Pulse:85, Resp:20, SpO2:91 %  05/07/23 1318, Pulse:79, Resp:20, SpO2:94 %  05/07/23 1240,  "BP:167/81, Pulse:78, SpO2:94 %  05/07/23 1230, BP:175/82, Pulse:81, SpO2:90 %  05/07/23 1145, SpO2:92 %  05/07/23 1138, BP:143/86, Temp:97.8 °F (36.6 °C), Temp src:Oral, Pulse:103, Resp:24, SpO2:(!) 88 %, Height:160 cm (63\"), Weight:61.2 kg (135 lb)  133/78. p 72. r 16. t 98.1                  "

## 2023-05-08 NOTE — PLAN OF CARE
Goal Outcome Evaluation:  Plan of Care Reviewed With: patient          This RN assumed care for this pt around 0100. VSS. Confused, very pleasant, daughter at bedside. 3 L n/c. Slept well. NPO, consent signed. No c/o pain or nausea. Turned throughout shift. Pt only urinated 100 mL. Bladder scanned around 0400, only 237 mL in bladder and pt did not feel pressure or need to urinate.    Progress: no change     Problem: Adult Inpatient Plan of Care  Goal: Plan of Care Review  Outcome: Ongoing, Progressing  Flowsheets (Taken 5/8/2023 0441)  Progress: no change  Plan of Care Reviewed With: patient  Goal: Patient-Specific Goal (Individualized)  Outcome: Ongoing, Progressing  Goal: Absence of Hospital-Acquired Illness or Injury  Outcome: Ongoing, Progressing  Intervention: Identify and Manage Fall Risk  Recent Flowsheet Documentation  Taken 5/8/2023 0400 by Cassia Galicia RN  Safety Promotion/Fall Prevention: activity supervised  Taken 5/8/2023 0200 by Cassia Galicia RN  Safety Promotion/Fall Prevention:   activity supervised   assistive device/personal items within reach   clutter free environment maintained  Taken 5/8/2023 0100 by Cassia Galicia RN  Safety Promotion/Fall Prevention:   activity supervised   assistive device/personal items within reach   clutter free environment maintained  Intervention: Prevent Skin Injury  Recent Flowsheet Documentation  Taken 5/8/2023 0400 by Cassia Galicia RN  Body Position: sitting up in bed  Skin Protection:   adhesive use limited   skin-to-device areas padded   skin-to-skin areas padded   tubing/devices free from skin contact  Taken 5/8/2023 0346 by Cassia Galicia RN  Body Position:   turned   sitting up in bed  Taken 5/8/2023 0200 by Cassia Galicia RN  Body Position:   turned   left   side-lying  Skin Protection:   adhesive use limited   skin-to-device areas padded   skin-to-skin areas padded   tubing/devices free from skin contact  Taken 5/8/2023 0100 by  Cassia Galicia, RN  Body Position:   position changed independently   weight shifting  Skin Protection:   adhesive use limited   skin-to-skin areas padded   skin-to-device areas padded   transparent dressing maintained   tubing/devices free from skin contact  Intervention: Prevent and Manage VTE (Venous Thromboembolism) Risk  Recent Flowsheet Documentation  Taken 5/8/2023 0400 by Cassia Galicia, RN  Activity Management: activity encouraged  Taken 5/8/2023 0346 by Cassia Galicia, RN  Activity Management: activity encouraged  Taken 5/8/2023 0200 by Cassia Galicia, RN  Activity Management: activity encouraged  Taken 5/8/2023 0100 by Cassia Galicia, RN  Activity Management: activity encouraged  Goal: Optimal Comfort and Wellbeing  Outcome: Ongoing, Progressing  Intervention: Provide Person-Centered Care  Recent Flowsheet Documentation  Taken 5/8/2023 0100 by Cassia Galicia, RN  Trust Relationship/Rapport: care explained  Goal: Readiness for Transition of Care  Outcome: Ongoing, Progressing

## 2023-05-08 NOTE — PLAN OF CARE
Goal Outcome Evaluation:  Plan of Care Reviewed With: patient           Outcome Evaluation: Pt. presents below baseline function w/generalized weakness and balance deficits affecting her ability to safely participate in functional mobility. She performed bed mobility and transfers with stand by assist. She ambulated 150' w/front wheeled walker, contact guard assist. Activity limited by fatigue. Pt. would benefit from IPPT to address stated deficits. Recommend home with 24/7 assist and home health PT upon discharge.

## 2023-05-08 NOTE — POST-PROCEDURE NOTE
EGD  Dilated esophagus.  LES wide open.  Dilated to 20 mm with balloon with mucosal rent post dilation.   It does not appear to be achalasia    REC;  Observe after today's dilation

## 2023-05-08 NOTE — PROGRESS NOTES
[facility] Medicine Services  PROGRESS NOTE    Patient Name: Brittny Nicolas  : 1945  MRN: [id number]    Date of Admission: 2023  Primary Care Physician: Dalila Liz MD    Subjective   Subjective     CC:  F/U cough    HPI:  Patient seen this morning after EGD. No complaints.     ROS:  Gen-no fevers, no chills  CV-no chest pain, no palpitations  Resp-+cough, no dyspnea  GI-no N/V/D, no abd pain      Objective   Objective     Vital Signs:   Temp:  [97.7 °F (36.5 °C)-98.8 °F (37.1 °C)] 97.7 °F (36.5 °C)  Heart Rate:  [46-94] 80  Resp:  [16-20] 16  BP: (104-148)/(52-81) 119/59  Flow (L/min):  [3-4] 3     Physical Exam:  Gen-no acute distress  HENT-NCAT, mucous membranes moist  CV-RRR, S1 S2 normal, no m/r/g  Resp-coarse breath sounds bilaterally, no wheezes, nonlabored on 3 liters  Abd-soft, NT, ND, +BS  Ext-no edema  Neuro-awake, alert, answers simple questions, no focal deficits  Skin-no rashes  Psych-appropriate mood      Results Reviewed:  LAB RESULTS:      Lab 23  0317 23  1255   WBC 12.20* 12.75*   HEMOGLOBIN 10.4* 13.0   HEMATOCRIT 34.1 41.9   PLATELETS 296 342   NEUTROS ABS 10.03* 10.96*   IMMATURE GRANS (ABS) 0.06* 0.06*   LYMPHS ABS 1.20 0.61*   MONOS ABS 0.90 1.09*   EOS ABS 0.00 0.01   MCV 87.2 87.5   PROCALCITONIN  --  1.45*   LACTATE  --  1.5         Lab 23  0317 23  1255   SODIUM 138 139   POTASSIUM 4.5 3.6   CHLORIDE 106 103   CO2 23.0 25.0   ANION GAP 9.0 11.0   BUN 13 14   CREATININE 0.60 0.71   EGFR 90.9 86.6   GLUCOSE 95 108*   CALCIUM 8.7 9.8   TSH  --  1.210         Lab 23  1255   TOTAL PROTEIN 7.7   ALBUMIN 3.5   GLOBULIN 4.2   ALT (SGPT) 8   AST (SGOT) 13   BILIRUBIN 0.5   ALK PHOS 115         Lab 23  1255   PROBNP 222.9   HSTROP T 9                 Brief Urine Lab Results  (Last result in the past 365 days)      Color   Clarity   Blood   Leuk Est   Nitrite   Protein   CREAT   Urine HCG        23 1605  Yellow   Turbid   Negative   Negative   Negative   30 mg/dL (1+)                 Microbiology Results Abnormal     Procedure Component Value - Date/Time    Blood Culture - Blood, Arm, Left [[id number]]  (Normal) Collected: 05/07/23 1313    Lab Status: Preliminary result Specimen: Blood from Arm, Left Updated: 05/08/23 1330     Blood Culture No growth at 24 hours    Blood Culture - Blood, Arm, Right [[id number]]  (Normal) Collected: 05/07/23 1313    Lab Status: Preliminary result Specimen: Blood from Arm, Right Updated: 05/08/23 1330     Blood Culture No growth at 24 hours    COVID PRE-OP / PRE-PROCEDURE SCREENING ORDER (NO ISOLATION) - Swab, Nasopharynx [[id number]]  (Normal) Collected: 05/07/23 1207    Lab Status: Final result Specimen: Swab from Nasopharynx Updated: 05/07/23 1308    Narrative:      The following orders were created for panel order COVID PRE-OP / PRE-PROCEDURE SCREENING ORDER (NO ISOLATION) - Swab, Nasopharynx.  Procedure                               Abnormality         Status                     ---------                               -----------         ------                     COVID-19 and FLU A/B PCR...[[id number]]  Normal              Final result                 Please view results for these tests on the individual orders.    COVID-19 and FLU A/B PCR - Swab, Nasopharynx [[id number]]  (Normal) Collected: 05/07/23 1207    Lab Status: Final result Specimen: Swab from Nasopharynx Updated: 05/07/23 1308     COVID19 Not Detected     Influenza A PCR Not Detected     Influenza B PCR Not Detected    Narrative:      Fact sheet for providers: https://www.fda.gov/media/554743/download    Fact sheet for patients: https://www.fda.gov/media/607216/download    Test performed by PCR.          CT Chest With Contrast Diagnostic    Result Date: 5/7/2023  CT CHEST W CONTRAST DIAGNOSTIC Date of Exam: 5/7/2023 1:52 PM EDT Indication: cough, fever, paratracheal air on cxr. Comparison: Concurrent chest radiograph  Technique: Axial CT images were obtained of the chest after the uneventful intravenous administration of 75 in the Isovue-300.  Reconstructed coronal and sagittal images were also obtained. Automated exposure control and iterative construction methods were used. Findings: Air shadow noted in the mediastinum corresponds to the markedly dilated esophagus, which contains extensive air and food, becoming markedly narrowed at the GE junction. Maximal diameter of the proximal esophagus is approximately 5 cm. More distally, the esophagus measures up to 4 cm transverse luminal diameter before becoming markedly narrowed at the GE junction. This pattern suggests achalasia. No inflammatory change or mass is identified. GE junction segment appears very tight/narrowed. Included portion of the proximal stomach appears normal below the level of the cardia. No inflammatory change mass or adenopathy is seen in this general region. Elsewhere, no evidence of pulmonary embolic disease is seen. Thoracic aorta shows no evidence of aneurysm or dissection. No pericardial or pleural effusion or mediastinal adenopathy is seen. There is extensive multifocal bilateral airspace disease, greater in the right lower lobe and medial left lower lobe than elsewhere, suspicious for pneumonia or, given patient's esophageal findings, aspiration. No pneumothorax or edema is seen. Included images of the upper abdomen show mild fatty liver change. Spleen does not appear to be enlarged. Incidental note is made of bilateral breast prostheses.     Impression: Impression: 1. Marked distention of the entire esophagus down to the level of the GE junction where it is very narrow. No visible inflammation or mass. Findings suggest achalasia. 2. Extensive multifocal pulmonary airspace disease, consistent with bilateral pneumonia, which may be due to aspiration. Electronically Signed: Thomas Rodriguez  5/7/2023 2:16 PM EDT  Workstation ID: [id number]    XR Chest 1  View    Result Date: 5/7/2023  XR CHEST 1 VW Date of Exam: 5/7/2023 12:26 PM EDT Indication: SOA triage protocol Comparison: 10/26/2022 Findings: There is a small to moderate left pleural effusion Small right pleural effusion. There is generalized coarsening of the interstitial pattern which may be due to edema or atypical infection pattern. Pulmonary vascularity appears unchanged. Heart size appears within normal limits. Air lucency at the right paratracheal region may be due to esophageal air. No pneumothorax is seen.     Impression: Impression: 1. Small right and small to moderate left pleural effusions. 2. Diffuse interstitial prominence may be due to edema or atypical infection pattern or may be due to chronic fibrosis. Electronically Signed: Justinolisa Love  5/7/2023 12:56 PM EDT  Workstation ID: [id number]          Current medications:  Scheduled Meds:amLODIPine, 5 mg, Oral, Daily  donepezil, 10 mg, Oral, Nightly  levothyroxine, 125 mcg, Oral, Daily  memantine, 10 mg, Oral, BID  pantoprazole, 40 mg, Intravenous, Q AM  piperacillin-tazobactam, 3.375 g, Intravenous, Q8H  senna-docusate sodium, 2 tablet, Oral, BID  sodium chloride, 10 mL, Intravenous, Q12H      Continuous Infusions:   PRN Meds:.•  acetaminophen  •  senna-docusate sodium **AND** polyethylene glycol **AND** bisacodyl **AND** bisacodyl  •  Calcium Replacement - Follow Nurse / BPA Driven Protocol  •  Magnesium Standard Dose Replacement - Follow Nurse / BPA Driven Protocol  •  ondansetron  •  Phosphorus Replacement - Follow Nurse / BPA Driven Protocol  •  Potassium Replacement - Follow Nurse / BPA Driven Protocol  •  QUEtiapine  •  sodium chloride  •  sodium chloride  •  sodium chloride    Assessment & Plan   Assessment & Plan     Active Hospital Problems    Diagnosis  POA   • **Bilateral pneumonia secondary to aspiration [J18.9]  Yes   • Abnormal CT of the chest [R93.89]  Unknown   • Decreased oral intake [R63.8]  Unknown   • Alzheimer's disease,  unspecified [G30.9, F02.80]  Yes   • Hypoxia [R09.02]  Yes   • Fatigue [R53.83]  Unknown   • Hypothyroidism [E03.9]  Yes   • Benign essential hypertension [I10]  Yes      Resolved Hospital Problems   No resolved problems to display.        Brief Hospital Course to date:  Brittny Nicolas is a 78 y.o. female with hx of dementia, hypothyroidism, and HTN presents due to cough and lethargy. Pulse ox at home showed O2 sats in the 80's. CT chest showed extensive multifocal bilateral airspace disease consistent with B/L pneumonia, possibly due to aspiration; also showed marked distension of esophagus.     *All problems are new to me today.    Bilateral pneumonia, suspect aspiration  Hypoxia  --O2 sats 88% on RA  --CT chest with bilateral pneumonia, suspect aspiration related  --Continue Zosyn  --F/U blood cultures  --Currently on 3 liters, wean as tolerated    Esophageal dysmotility  GERD  --s/p EGD today 5/8/23: showed dilated esophagus but wide open LES, does not appear to be achalasia; dilated to 20 mm  --Observe following esophageal dilation  --Continue PPI    Hypothyroidism  --Continue Synthroid    HTN  --Continue Norvasc    Dementia  --Continue Aricept, Namenda    Expected Discharge Location and Transportation: PT   Expected Discharge   Expected Discharge Date: 5/11/2023; Expected Discharge Time:      DVT prophylaxis:  Mechanical DVT prophylaxis orders are present.     AM-PAC 6 Clicks Score (PT): 17 (05/08/23 1602)    CODE STATUS:   Code Status and Medical Interventions:   Ordered at: 05/07/23 1507     Medical Intervention Limits:    NO intubation (DNI)     Code Status (Patient has no pulse and is not breathing):    No CPR (Do Not Attempt to Resuscitate)     Medical Interventions (Patient has pulse or is breathing):    Limited Support       Yenni Reyes MD  05/08/23

## 2023-05-08 NOTE — THERAPY EVALUATION
Patient Name: Brittny Nicolas  : 1945    MRN: [id number]                              Today's Date: 2023       Admit Date: 2023    Visit Dx:     ICD-10-CM ICD-9-CM   1. Acute respiratory failure with hypoxia  J96.01 518.81   2. Pneumonia of right lower lobe due to infectious organism  J18.9 486   3. Abnormal CT of the chest  R93.89 793.2   4. Decreased oral intake  R63.8 783.9   5. Other fatigue  R53.83 780.79   6. Dysphagia, unspecified type  R13.10 787.20     Patient Active Problem List   Diagnosis   • Achalasia   • Acute upper respiratory infection   • Atypical chest pain   • Benign essential hypertension   • Carotid artery stenosis   • Cataract   • Cough   • Acute cystitis   • Screening for malignant neoplasm   • Gastroesophageal reflux disease   • Essential familial hypercholesterolemia   • Gastrointestinal bleeding   • Inguinal hernia   • Insomnia   • Leg cramps   • Meniere's disease   • Night sweats   • Osteoporosis   • Peripheral neuropathy   • Retinal artery occlusion   • Rheumatoid arthritis (HCC)   • Stroke syndrome   • Suprapubic pain   • Transient ischemic attack   • Positive reaction to tuberculin skin test   • Vitamin D deficiency   • Weight loss   • H/O colonoscopy   • Fatigue   • Hypothyroidism   • Erosive esophagitis   • Left lower quadrant pain   • Acute pain of left shoulder   • Pharyngitis   • Memory loss   • Rib pain on left side   • Hyponatremia   • Late onset Alzheimer's dementia with behavioral disturbance   • Oropharyngeal dysphagia   • Influenza A with respiratory manifestations   • Anxiety and depression   • Acute respiratory failure with hypoxia   • Anemia   • Nocturnal enuresis   • Visual loss   • Alzheimer's disease, unspecified   • Abnormal CT of the chest   • Decreased oral intake     Past Medical History:   Diagnosis Date   • Acid reflux disease    • Alzheimer's dementia    • Arthritis     rhuematoid   • Carotid artery occlusion    • Coronary artery disease    •  Dementia    • Depression    • 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   • Graves disease    • Hyperlipidemia    • Hypertension    • Memory loss    • Stomach problems    • Stroke    • Stroke syndrome    • Thyroid disease    • Transient ischemic attack    • Vision loss      Past Surgical History:   Procedure Laterality Date   • AUGMENTATION MAMMAPLASTY      1978   • BREAST AUGMENTATION     • CAROTID ARTERY ANGIOPLASTY     • CAROTID ENDARTERECTOMY     • CAROTID STENT     • COLONOSCOPY     • D & C CERVICAL BIOPSY      Dilation and Curettage of Cervical Stump   • HERNIA REPAIR     • HERNIA REPAIR     • THROMBOENDARTERECTOMY      carotid   • UPPER GASTROINTESTINAL ENDOSCOPY        General Information     Row Name 05/08/23 1552          Physical Therapy Time and Intention    Document Type evaluation  -     Mode of Treatment physical therapy  -     Row Name 05/08/23 1552          General Information    Patient Profile Reviewed yes  -     Prior Level of Function independent:;all household mobility;gait;transfer;bed mobility;min assist:;ADL's  pt limited historian; per chart review, pt. ambulatory independently and requires assist for ADLs  -     Existing Precautions/Restrictions fall;other (see comments)  confusion  -     Barriers to Rehab medically complex;cognitive status  -     Row Name 05/08/23 1552          Living Environment    People in Home child(mane), adult  -     Row Name 05/08/23 1552          Home Main Entrance    Number of Stairs, Main Entrance --  needs further clarification  -     Row Name 05/08/23 1552          Stairs Within Home, Primary    Stairs, Within Home, Primary needs further clarification, pt. limited historian  -     Row Name 05/08/23 1552          Cognition    Orientation Status (Cognition) oriented to;person;disoriented to;place;situation;time;other (see comments)  pt. attempts to answer each question then shakes head and unable to respond; she states she's 50 years old today  -     Row Name  05/08/23 1552          Safety Issues, Functional Mobility    Safety Issues Affecting Function (Mobility) awareness of need for assistance;impulsivity;insight into deficits/self-awareness;judgment;positioning of assistive device;problem-solving;safety precaution awareness;safety precautions follow-through/compliance;sequencing abilities  -SS     Impairments Affecting Function (Mobility) balance;endurance/activity tolerance;postural/trunk control;range of motion (ROM);strength  -SS           User Key  (r) = Recorded By, (t) = Taken By, (c) = Cosigned By    Initials Name Provider Type    SS Yady Zamora, PT Physical Therapist               Mobility     Row Name 05/08/23 1555          Bed Mobility    Bed Mobility scooting/bridging;supine-sit  -SS     Scooting/Bridging [place] (Bed Mobility) standby assist;verbal cues  -SS     Supine-Sit [place] (Bed Mobility) standby assist;verbal cues  -SS     Assistive Device (Bed Mobility) head of bed elevated;bed rails  -     Comment, (Bed Mobility) VC for sequencing  -SS     Row Name 05/08/23 1555          Sit-Stand Transfer    Sit-Stand [place] (Transfers) standby assist;verbal cues  -SS     Assistive Device (Sit-Stand Transfers) walker, front-wheeled  -SS     Comment, (Sit-Stand Transfer) VC for sequencing  -SS     Row Name 05/08/23 1555          Gait/Stairs (Locomotion)    [place] Level (Gait) contact guard;verbal cues  -SS     Assistive Device (Gait) walker, front-wheeled;other (see comments)  none  -SS     Distance in Feet (Gait) 150  -SS     Deviations/Abnormal Patterns (Gait) bilateral deviations;[place] decreased;gait speed decreased;stride length decreased  -     Bilateral Gait Deviations forward flexed posture;heel strike decreased  -SS     Comment, (Gait/Stairs) Pt. ambulated with a step through gait pattern. Pt. demonstrates min R veering w/AD. Trialed amb w/o AD and pt. demontrates mild instability and reaching for objects to hold onto. VC for  safety recommendations. Activity limited by fatigue  -           User Key  (r) = Recorded By, (t) = Taken By, (c) = Cosigned By    Initials Name Provider Type     Yady Zamora PT Physical Therapist               Obj/Interventions     Row Name 05/08/23 1557          Range of Motion Comprehensive    General Range of Motion bilateral lower extremity ROM WFL  -     Row Name 05/08/23 1557          Strength Comprehensive (MMT)    Comment, General Manual Muscle Testing (MMT) Assessment BLE gross 4/5  -     Row Name 05/08/23 1557          Balance    Balance Assessment sitting static balance;sitting dynamic balance;sit to stand dynamic balance;standing dynamic balance;standing static balance  -SS     Static Sitting Balance standby assist  -     Dynamic Sitting Balance standby assist  -SS     Position, Sitting Balance unsupported;sitting edge of bed  -     Sit to Stand Dynamic Balance standby assist  -     Static Standing Balance contact guard  -     Dynamic Standing Balance contact guard  -SS     Position/Device Used, Standing Balance supported;walker, front-wheeled  -     Balance Interventions sitting;standing;sit to stand;supported;dynamic;static  -     Row Name 05/08/23 1557          Sensory Assessment (Somatosensory)    Sensory Assessment (Somatosensory) LE sensation intact  -           User Key  (r) = Recorded By, (t) = Taken By, (c) = Cosigned By    Initials Name Provider Type     Yady Zamora PT Physical Therapist               Goals/Plan     Row Name 05/08/23 1601          Bed Mobility Goal 1 (PT)    Activity/Assistive Device (Bed Mobility Goal 1, PT) bed mobility activities, all  -     [place] Level/Cues Needed (Bed Mobility Goal 1, PT) modified independence  -     Time Frame (Bed Mobility Goal 1, PT) long term goal (LTG);10 days  -     Row Name 05/08/23 1601          Transfer Goal 1 (PT)    Activity/Assistive Device (Transfer Goal 1, PT)  sit-to-stand/stand-to-sit;bed-to-chair/chair-to-bed  -     [place] Level/Cues Needed (Transfer Goal 1, PT) modified independence  -SS     Time Frame (Transfer Goal 1, PT) long term goal (LTG);10 days  -     Row Name 05/08/23 1601          Gait Training Goal 1 (PT)    Activity/Assistive Device (Gait Training Goal 1, PT) gait (walking locomotion);assistive device use;walker, rolling  AD pending progress  -SS     [place] Level (Gait Training Goal 1, PT) modified independence  -SS     Distance (Gait Training Goal 1, PT) 300  -SS     Time Frame (Gait Training Goal 1, PT) long term goal (LTG);10 days  -     Row Name 05/08/23 1601          Therapy Assessment/Plan (PT)    Planned Therapy Interventions (PT) balance training;bed mobility training;gait training;home exercise program;neuromuscular re-education;patient/family education;ROM (range of motion);postural re-education;stair training;strengthening;stretching;transfer training  -           User Key  (r) = Recorded By, (t) = Taken By, (c) = Cosigned By    Initials Name Provider Type     Yady Zamora, PT Physical Therapist               Clinical Impression     Row Name 05/08/23 9607          Pain    Pretreatment Pain Rating 0/10 - no pain  -     Posttreatment Pain Rating 0/10 - no pain  -     Pre/Posttreatment Pain Comment pt. denies pain but states she doesn't feel right and looks at L arm - just prior to arrival pt. self removed IV from E  -     Pain Intervention(s) Repositioned;Ambulation/increased activity;Elevated  -     Additional Documentation Pain Scale: Numbers Pre/Post-Treatment (Group)  -     Row Name 05/08/23 9856          Plan of Care Review    Plan of Care Reviewed With patient  -SS     Outcome Evaluation Pt. presents below baseline function w/generalized weakness and balance deficits affecting her ability to safely participate in functional mobility. She performed bed mobility and transfers with stand by assist. She  ambulated 150' w/front wheeled walker, contact guard assist. Activity limited by fatigue. Pt. would benefit from IPPT to address stated deficits. Recommend home with 24/7 assist and home health PT upon discharge.  -     Row Name 05/08/23 3746          Therapy Assessment/Plan (PT)    Rehab Potential (PT) good, to achieve stated therapy goals  -     Criteria for Skilled Interventions Met (PT) yes;meets criteria;skilled treatment is necessary  -     Therapy Frequency (PT) daily  -     Row Name 05/08/23 5617          Vital Signs    Pre Systolic BP Rehab --  HANANE RN cleared  -     Row Name 05/08/23 1552          Positioning and Restraints    Pre-Treatment Position in bed  -SS     Post Treatment Position chair  -SS     In Chair notified nsg;reclined;call light within reach;encouraged to call for assist;exit alarm on;with nsg;waffle cushion;legs elevated  -           User Key  (r) = Recorded By, (t) = Taken By, (c) = Cosigned By    Initials Name Provider Type    Yady Juarez PT Physical Therapist               Outcome Measures     Row Name 05/08/23 1602          How much help from another person do you currently need...    Turning from your back to your side while in flat bed without using bedrails? 3  -SS     Moving from lying on back to sitting on the side of a flat bed without bedrails? 3  -SS     Moving to and from a bed to a chair (including a wheelchair)? 3  -SS     Standing up from a chair using your arms (e.g., wheelchair, bedside chair)? 3  -SS     Climbing 3-5 steps with a railing? 2  -SS     To walk in hospital room? 3  -SS     AM-PAC 6 Clicks Score (PT) 17  -SS     Highest level of mobility 5 --> Static standing  -     Row Name 05/08/23 1602          Functional Assessment    Outcome Measure Options AM-PAC 6 Clicks Basic Mobility (PT)  -           User Key  (r) = Recorded By, (t) = Taken By, (c) = Cosigned By    Initials Name Provider Type    Yady Juarez PT Physical Therapist                              Physical Therapy Education     Title: PT OT SLP Therapies (In Progress)     Topic: Physical Therapy (In Progress)     Point: Mobility training (In Progress)     Learning Progress Summary           Patient Payton, E, NL,NR by  at 5/8/2023 1602    Comment: Educated pt. safety/technique w/bed mobility, transfers, ambulation, PT POC                   Point: Home exercise program (Not Started)     Learner Progress:  Not documented in this visit.          Point: Body mechanics (In Progress)     Learning Progress Summary           Patient Daydayer, E, NL,NR by  at 5/8/2023 1602    Comment: Educated pt. safety/technique w/bed mobility, transfers, ambulation, PT POC                   Point: Precautions (In Progress)     Learning Progress Summary           Patient Eager, E, NL,NR by  at 5/8/2023 1602    Comment: Educated pt. safety/technique w/bed mobility, transfers, ambulation, PT POC                               User Key     Initials Effective Dates Name Provider Type Discipline     06/01/21 -  Yady Zamora, PT Physical Therapist PT              PT Recommendation and Plan  Planned Therapy Interventions (PT): balance training, bed mobility training, gait training, home exercise program, neuromuscular re-education, patient/family education, ROM (range of motion), postural re-education, stair training, strengthening, stretching, transfer training  Plan of Care Reviewed With: patient  Outcome Evaluation: Pt. presents below baseline function w/generalized weakness and balance deficits affecting her ability to safely participate in functional mobility. She performed bed mobility and transfers with stand by assist. She ambulated 150' w/front wheeled walker, contact guard assist. Activity limited by fatigue. Pt. would benefit from IPPT to address stated deficits. Recommend home with 24/7 assist and home health PT upon discharge.     Time Calculation:    PT Charges     Row Name 05/08/23 7379              Time Calculation    Start Time 1536  -SS      Stop Time 1548  -SS      Time Calculation (min) 12 min  -SS      PT Received On 05/08/23  -SS      PT Goal Re-Cert Due Date 05/18/23  -SS         Time Calculation- PT    Total Timed Code Minutes- PT 12 minute(s)  -SS         Untimed Charges    PT Eval/Re-eval Minutes 50  -SS         Total Minutes    Untimed Charges Total Minutes 50  -SS       Total Minutes 50  -SS            User Key  (r) = Recorded By, (t) = Taken By, (c) = Cosigned By    Initials Name Provider Type     Yady Zamora, PT Physical Therapist              Therapy Charges for Today     Code Description Service Date Service Provider Modifiers Qty    66604772306 HC PT THER SUPP EA 15 MIN 5/8/2023 Yady Zamora, PT GP 3    71263984637 HC PT EVAL LOW COMPLEXITY 4 5/8/2023 Yady Zamora, PT GP 1          PT G-Codes  Outcome Measure Options: AM-PAC 6 Clicks Basic Mobility (PT)  AM-PAC 6 Clicks Score (PT): 17  PT Discharge Summary  Anticipated Discharge Disposition (PT): home with 24/7 care, home with home health    Yady Zamora PT  5/8/2023

## 2023-05-09 LAB
ANION GAP SERPL CALCULATED.3IONS-SCNC: 10 MMOL/L (ref 5–15)
BUN SERPL-MCNC: 14 MG/DL (ref 8–23)
BUN/CREAT SERPL: 25.9 (ref 7–25)
CALCIUM SPEC-SCNC: 9.2 MG/DL (ref 8.6–10.5)
CHLORIDE SERPL-SCNC: 104 MMOL/L (ref 98–107)
CO2 SERPL-SCNC: 23 MMOL/L (ref 22–29)
CREAT SERPL-MCNC: 0.54 MG/DL (ref 0.57–1)
DEPRECATED RDW RBC AUTO: 42.1 FL (ref 37–54)
EGFRCR SERPLBLD CKD-EPI 2021: 93.2 ML/MIN/1.73
ERYTHROCYTE [DISTWIDTH] IN BLOOD BY AUTOMATED COUNT: 13.4 % (ref 12.3–15.4)
GLUCOSE SERPL-MCNC: 81 MG/DL (ref 65–99)
HCT VFR BLD AUTO: 34.1 % (ref 34–46.6)
HGB BLD-MCNC: 10.7 G/DL (ref 12–15.9)
MCH RBC QN AUTO: 26.8 PG (ref 26.6–33)
MCHC RBC AUTO-ENTMCNC: 31.4 G/DL (ref 31.5–35.7)
MCV RBC AUTO: 85.5 FL (ref 79–97)
PLATELET # BLD AUTO: 338 10*3/MM3 (ref 140–450)
PMV BLD AUTO: 9.1 FL (ref 6–12)
POTASSIUM SERPL-SCNC: 3.8 MMOL/L (ref 3.5–5.2)
RBC # BLD AUTO: 3.99 10*6/MM3 (ref 3.77–5.28)
SODIUM SERPL-SCNC: 137 MMOL/L (ref 136–145)
WBC NRBC COR # BLD: 12.11 10*3/MM3 (ref 3.4–10.8)

## 2023-05-09 PROCEDURE — 99232 SBSQ HOSP IP/OBS MODERATE 35: CPT | Performed by: HOSPITALIST

## 2023-05-09 PROCEDURE — 25010000002 PIPERACILLIN SOD-TAZOBACTAM PER 1 G: Performed by: INTERNAL MEDICINE

## 2023-05-09 PROCEDURE — 85027 COMPLETE CBC AUTOMATED: CPT | Performed by: HOSPITALIST

## 2023-05-09 PROCEDURE — 99232 SBSQ HOSP IP/OBS MODERATE 35: CPT | Performed by: NURSE PRACTITIONER

## 2023-05-09 PROCEDURE — 80048 BASIC METABOLIC PNL TOTAL CA: CPT | Performed by: HOSPITALIST

## 2023-05-09 RX ADMIN — MEMANTINE 10 MG: 10 TABLET ORAL at 20:56

## 2023-05-09 RX ADMIN — Medication 10 ML: at 20:55

## 2023-05-09 RX ADMIN — TAZOBACTAM SODIUM AND PIPERACILLIN SODIUM 3.38 G: 375; 3 INJECTION, SOLUTION INTRAVENOUS at 23:28

## 2023-05-09 RX ADMIN — PANTOPRAZOLE SODIUM 40 MG: 40 INJECTION, POWDER, LYOPHILIZED, FOR SOLUTION INTRAVENOUS at 06:16

## 2023-05-09 RX ADMIN — LEVOTHYROXINE SODIUM 125 MCG: 0.12 TABLET ORAL at 06:16

## 2023-05-09 RX ADMIN — SENNOSIDES AND DOCUSATE SODIUM 2 TABLET: 50; 8.6 TABLET ORAL at 20:56

## 2023-05-09 RX ADMIN — AMLODIPINE BESYLATE 5 MG: 5 TABLET ORAL at 08:09

## 2023-05-09 RX ADMIN — TAZOBACTAM SODIUM AND PIPERACILLIN SODIUM 3.38 G: 375; 3 INJECTION, SOLUTION INTRAVENOUS at 15:12

## 2023-05-09 RX ADMIN — DONEPEZIL HYDROCHLORIDE 10 MG: 10 TABLET, FILM COATED ORAL at 20:56

## 2023-05-09 RX ADMIN — QUETIAPINE FUMARATE 50 MG: 25 TABLET ORAL at 21:08

## 2023-05-09 RX ADMIN — MEMANTINE 10 MG: 10 TABLET ORAL at 08:09

## 2023-05-09 RX ADMIN — TAZOBACTAM SODIUM AND PIPERACILLIN SODIUM 3.38 G: 375; 3 INJECTION, SOLUTION INTRAVENOUS at 08:09

## 2023-05-09 NOTE — PROGRESS NOTES
"  GI Daily Progress Note  Subjective:    Chief Complaint: Follow-up dysphagia    Patient resting in bed no acute distress.  Notes she is doing well following EGD and dilation yesterday.  No new or worsening GI complaints; tolerating diet.  Denies pain.    Objective:    /75 (BP Location: Right arm, Patient Position: Lying)   Pulse 62   Temp 97.8 °F (36.6 °C) (Oral)   Resp 16   Ht 160 cm (63\")   Wt 59.5 kg (131 lb 3.2 oz)   SpO2 97%   BMI 23.24 kg/m²     Physical Exam  Vitals and nursing note reviewed.   Constitutional:       General: She is not in acute distress.     Appearance: Normal appearance. She is normal weight. She is not ill-appearing or toxic-appearing.   HENT:      Head: Normocephalic and atraumatic.      Mouth/Throat:      Mouth: Mucous membranes are moist.      Pharynx: Oropharynx is clear. No oropharyngeal exudate.   Eyes:      General: No scleral icterus.     Extraocular Movements: Extraocular movements intact.      Conjunctiva/sclera: Conjunctivae normal.      Pupils: Pupils are equal, round, and reactive to light.   Cardiovascular:      Rate and Rhythm: Normal rate and regular rhythm.      Pulses: Normal pulses.      Heart sounds: Normal heart sounds.   Pulmonary:      Effort: Pulmonary effort is normal. No respiratory distress.      Breath sounds: Normal breath sounds.   Abdominal:      General: Abdomen is flat. Bowel sounds are normal. There is no distension.      Palpations: Abdomen is soft. There is no mass.      Tenderness: There is no abdominal tenderness. There is no guarding or rebound.      Hernia: No hernia is present.   Skin:     General: Skin is warm and dry.      Capillary Refill: Capillary refill takes less than 2 seconds.      Coloration: Skin is not jaundiced or pale.   Neurological:      General: No focal deficit present.      Mental Status: She is alert.         Lab  I have personally reviewed most recent cardiac tracings, lab results and radiology images and " interpretations and agree with findings.    Lab Results   Component Value Date    WBC 12.11 (H) 05/09/2023    HGB 10.7 (L) 05/09/2023    HGB 10.4 (L) 05/08/2023    HGB 13.0 05/07/2023    MCV 85.5 05/09/2023     05/09/2023       Lab Results   Component Value Date    GLUCOSE 81 05/09/2023    BUN 14 05/09/2023    CREATININE 0.54 (L) 05/09/2023    EGFRIFNONA 65 12/16/2021    BCR 25.9 (H) 05/09/2023     05/09/2023    K 3.8 05/09/2023    CO2 23.0 05/09/2023    CALCIUM 9.2 05/09/2023    ALBUMIN 3.5 05/07/2023    ALKPHOS 115 05/07/2023    BILITOT 0.5 05/07/2023    ALT 8 05/07/2023    AST 13 05/07/2023       Assessment:    1.  Esophageal dysphagia, s/p EGD with dilation  2.  History of esophageal dysmotility  3.  GERD    Plan:  Patient doing well following EGD with dilation.  Tolerating diet.  No further reports of aspiration per patient recollection.  >>> Continue to monitor response to dilation  >>> For dysmotility   -Warm liquids with meals   -May use peppermint altoids prior to meals   -Avoid cold food products  >>> Continue PPI  >>> May need repeat dilation in future; instructed to monitor her symptoms and notify office if she has recurrence of dysphagia-like symptoms.    At this time, GI will sign off.  Please call with questions.    CARLOS Peraza  05/09/23  14:38 EDT

## 2023-05-09 NOTE — PROGRESS NOTES
[facility] Medicine Services  PROGRESS NOTE    Patient Name: Brittny Nicolas  : 1945  MRN: [id number]    Date of Admission: 2023  Primary Care Physician: Dalila Liz MD    Subjective   Subjective     CC:  F/U cough    HPI:  Patient seen this morning. No complaints, eating and drinking seems to be going alright following esophageal dilation yesterday. Remains on 3 liters. Denies cough and SOA.    ROS:  Gen-no fevers, no chills  CV-no chest pain, no palpitations  Resp-no cough, no dyspnea  GI-no N/V/D, no abd pain      Objective   Objective     Vital Signs:   Temp:  [96.9 °F (36.1 °C)-97.8 °F (36.6 °C)] 97.8 °F (36.6 °C)  Heart Rate:  [61-81] 62  Resp:  [16-18] 16  BP: (115-154)/(71-76) 139/75  Flow (L/min):  [2-3] 2     Physical Exam:  Gen-no acute distress  HENT-NCAT, mucous membranes moist  CV-RRR, S1 S2 normal, no m/r/g  Resp-improved breath sounds bilaterally, much more clear today, no wheezes, nonlabored on 3 liters  Abd-soft, NT, ND, +BS  Ext-no edema  Neuro-awake, alert, answers simple questions, no focal deficits  Skin-no rashes  Psych-appropriate mood      Results Reviewed:  LAB RESULTS:      Lab 23  1255   WBC 12.11* 12.20* 12.75*   HEMOGLOBIN 10.7* 10.4* 13.0   HEMATOCRIT 34.1 34.1 41.9   PLATELETS 338 296 342   NEUTROS ABS  --  10.03* 10.96*   IMMATURE GRANS (ABS)  --  0.06* 0.06*   LYMPHS ABS  --  1.20 0.61*   MONOS ABS  --  0.90 1.09*   EOS ABS  --  0.00 0.01   MCV 85.5 87.2 87.5   PROCALCITONIN  --   --  1.45*   LACTATE  --   --  1.5         Lab 23  1255   SODIUM 137 138 139   POTASSIUM 3.8 4.5 3.6   CHLORIDE 104 106 103   CO2 23.0 23.0 25.0   ANION GAP 10.0 9.0 11.0   BUN 14 13 14   CREATININE 0.54* 0.60 0.71   EGFR 93.2 90.9 86.6   GLUCOSE 81 95 108*   CALCIUM 9.2 8.7 9.8   TSH  --   --  1.210         Lab 23  1255   TOTAL PROTEIN 7.7   ALBUMIN 3.5   GLOBULIN 4.2   ALT (SGPT)  Ok for order for Video Swallow Study?   8   AST (SGOT) 13   BILIRUBIN 0.5   ALK PHOS 115         Lab 05/07/23  1255   PROBNP 222.9   HSTROP T 9                 Brief Urine Lab Results  (Last result in the past 365 days)      Color   Clarity   Blood   Leuk Est   Nitrite   Protein   CREAT   Urine HCG        05/07/23 1605 Yellow   Turbid   Negative   Negative   Negative   30 mg/dL (1+)                 Microbiology Results Abnormal     Procedure Component Value - Date/Time    Blood Culture - Blood, Arm, Right [[id number]]  (Normal) Collected: 05/07/23 1313    Lab Status: Preliminary result Specimen: Blood from Arm, Right Updated: 05/09/23 1330     Blood Culture No growth at 2 days    Blood Culture - Blood, Arm, Left [[id number]]  (Normal) Collected: 05/07/23 1313    Lab Status: Preliminary result Specimen: Blood from Arm, Left Updated: 05/09/23 1330     Blood Culture No growth at 2 days    COVID PRE-OP / PRE-PROCEDURE SCREENING ORDER (NO ISOLATION) - Swab, Nasopharynx [[id number]]  (Normal) Collected: 05/07/23 1207    Lab Status: Final result Specimen: Swab from Nasopharynx Updated: 05/07/23 1308    Narrative:      The following orders were created for panel order COVID PRE-OP / PRE-PROCEDURE SCREENING ORDER (NO ISOLATION) - Swab, Nasopharynx.  Procedure                               Abnormality         Status                     ---------                               -----------         ------                     COVID-19 and FLU A/B PCR...[[id number]]  Normal              Final result                 Please view results for these tests on the individual orders.    COVID-19 and FLU A/B PCR - Swab, Nasopharynx [[id number]]  (Normal) Collected: 05/07/23 1207    Lab Status: Final result Specimen: Swab from Nasopharynx Updated: 05/07/23 1308     COVID19 Not Detected     Influenza A PCR Not Detected     Influenza B PCR Not Detected    Narrative:      Fact sheet for providers: https://www.fda.gov/media/234742/download    Fact sheet for patients:  https://www.fda.gov/media/477497/download    Test performed by PCR.          No radiology results from the last 24 hrs        Current medications:  Scheduled Meds:amLODIPine, 5 mg, Oral, Daily  donepezil, 10 mg, Oral, Nightly  levothyroxine, 125 mcg, Oral, Daily  memantine, 10 mg, Oral, BID  pantoprazole, 40 mg, Intravenous, Q AM  piperacillin-tazobactam, 3.375 g, Intravenous, Q8H  senna-docusate sodium, 2 tablet, Oral, BID  sodium chloride, 10 mL, Intravenous, Q12H      Continuous Infusions:   PRN Meds:.•  acetaminophen  •  senna-docusate sodium **AND** polyethylene glycol **AND** bisacodyl **AND** bisacodyl  •  Calcium Replacement - Follow Nurse / BPA Driven Protocol  •  Magnesium Standard Dose Replacement - Follow Nurse / BPA Driven Protocol  •  ondansetron  •  Phosphorus Replacement - Follow Nurse / BPA Driven Protocol  •  Potassium Replacement - Follow Nurse / BPA Driven Protocol  •  QUEtiapine  •  sodium chloride  •  sodium chloride  •  sodium chloride    Assessment & Plan   Assessment & Plan     Active Hospital Problems    Diagnosis  POA   • **Bilateral pneumonia secondary to aspiration [J18.9]  Yes   • Abnormal CT of the chest [R93.89]  Unknown   • Decreased oral intake [R63.8]  Unknown   • Alzheimer's disease, unspecified [G30.9, F02.80]  Yes   • Hypoxia [R09.02]  Yes   • Fatigue [R53.83]  Unknown   • Hypothyroidism [E03.9]  Yes   • Benign essential hypertension [I10]  Yes      Resolved Hospital Problems   No resolved problems to display.        Brief Hospital Course to date:  Brittny Nicolas is a 78 y.o. female with hx of dementia, hypothyroidism, and HTN presents due to cough and lethargy. Pulse ox at home showed O2 sats in the 80's. CT chest showed extensive multifocal bilateral airspace disease consistent with B/L pneumonia, possibly due to aspiration; also showed marked distension of esophagus.     Bilateral pneumonia, suspect aspiration  Hypoxia  --O2 sats 88% on RA  --CT chest with bilateral  pneumonia, suspect aspiration related  --Continue Zosyn  --Blood cultures NGTD  --Currently on 3 liters, wean as tolerated  --Add IS today    Esophageal dysmotility  GERD  --s/p EGD 5/8/23: showed dilated esophagus but wide open LES, does not appear to be achalasia; dilated to 20 mm  --Observe following esophageal dilation  --Continue PPI  --Patient/family to call [facility] if she has recurrent symptoms as she may require repeat dilation in the future    Hypothyroidism  --Continue Synthroid    HTN  --Continue Norvasc    Dementia  --Continue Aricept, Namenda    Expected Discharge Location and Transportation: [facility]   Expected Discharge 1-2 days, need to wean oxygen   Expected Discharge Date: 5/11/2023; Expected Discharge Time:      DVT prophylaxis:  Mechanical DVT prophylaxis orders are present.     AM-PAC 6 Clicks Score (PT): 17 (05/08/23 1602)    CODE STATUS:   Code Status and Medical Interventions:   Ordered at: 05/07/23 1507     Medical Intervention Limits:    NO intubation (DNI)     Code Status (Patient has no pulse and is not breathing):    No CPR (Do Not Attempt to Resuscitate)     Medical Interventions (Patient has pulse or is breathing):    Limited Support       Yenni Reyes MD  05/09/23

## 2023-05-09 NOTE — PLAN OF CARE
Goal Outcome Evaluation:      Nursing staff tried to wean o2 but pt did not tolerate and we had to turn it back up to 3L by nasal cannula. Will continue to try to wean to see how she does.

## 2023-05-10 ENCOUNTER — TELEPHONE (OUTPATIENT)
Dept: ENDOCRINOLOGY | Facility: CLINIC | Age: 78
End: 2023-05-10
Payer: MEDICARE

## 2023-05-10 LAB
ANION GAP SERPL CALCULATED.3IONS-SCNC: 9 MMOL/L (ref 5–15)
BUN SERPL-MCNC: 9 MG/DL (ref 8–23)
BUN/CREAT SERPL: 13 (ref 7–25)
CALCIUM SPEC-SCNC: 8.7 MG/DL (ref 8.6–10.5)
CHLORIDE SERPL-SCNC: 102 MMOL/L (ref 98–107)
CO2 SERPL-SCNC: 26 MMOL/L (ref 22–29)
CREAT SERPL-MCNC: 0.69 MG/DL (ref 0.57–1)
DEPRECATED RDW RBC AUTO: 43.6 FL (ref 37–54)
EGFRCR SERPLBLD CKD-EPI 2021: 87.9 ML/MIN/1.73
ERYTHROCYTE [DISTWIDTH] IN BLOOD BY AUTOMATED COUNT: 13.6 % (ref 12.3–15.4)
GLUCOSE SERPL-MCNC: 65 MG/DL (ref 65–99)
HCT VFR BLD AUTO: 36.7 % (ref 34–46.6)
HGB BLD-MCNC: 11.3 G/DL (ref 12–15.9)
MCH RBC QN AUTO: 26.9 PG (ref 26.6–33)
MCHC RBC AUTO-ENTMCNC: 30.8 G/DL (ref 31.5–35.7)
MCV RBC AUTO: 87.4 FL (ref 79–97)
PLATELET # BLD AUTO: 360 10*3/MM3 (ref 140–450)
PMV BLD AUTO: 9.2 FL (ref 6–12)
POTASSIUM SERPL-SCNC: 3.8 MMOL/L (ref 3.5–5.2)
RBC # BLD AUTO: 4.2 10*6/MM3 (ref 3.77–5.28)
SODIUM SERPL-SCNC: 137 MMOL/L (ref 136–145)
WBC NRBC COR # BLD: 13.72 10*3/MM3 (ref 3.4–10.8)

## 2023-05-10 PROCEDURE — 85027 COMPLETE CBC AUTOMATED: CPT | Performed by: HOSPITALIST

## 2023-05-10 PROCEDURE — 97530 THERAPEUTIC ACTIVITIES: CPT

## 2023-05-10 PROCEDURE — 97116 GAIT TRAINING THERAPY: CPT

## 2023-05-10 PROCEDURE — 92526 ORAL FUNCTION THERAPY: CPT

## 2023-05-10 PROCEDURE — 80048 BASIC METABOLIC PNL TOTAL CA: CPT | Performed by: HOSPITALIST

## 2023-05-10 PROCEDURE — 99232 SBSQ HOSP IP/OBS MODERATE 35: CPT | Performed by: HOSPITALIST

## 2023-05-10 RX ORDER — PANTOPRAZOLE SODIUM 40 MG/1
40 TABLET, DELAYED RELEASE ORAL
Status: DISCONTINUED | OUTPATIENT
Start: 2023-05-11 | End: 2023-05-11 | Stop reason: HOSPADM

## 2023-05-10 RX ORDER — AMOXICILLIN AND CLAVULANATE POTASSIUM 875; 125 MG/1; MG/1
1 TABLET, FILM COATED ORAL EVERY 12 HOURS SCHEDULED
Status: DISCONTINUED | OUTPATIENT
Start: 2023-05-10 | End: 2023-05-11 | Stop reason: HOSPADM

## 2023-05-10 RX ORDER — ENALAPRILAT 2.5 MG/2ML
1.25 INJECTION INTRAVENOUS ONCE
Status: COMPLETED | OUTPATIENT
Start: 2023-05-10 | End: 2023-05-10

## 2023-05-10 RX ADMIN — DONEPEZIL HYDROCHLORIDE 10 MG: 10 TABLET, FILM COATED ORAL at 20:10

## 2023-05-10 RX ADMIN — AMLODIPINE BESYLATE 5 MG: 5 TABLET ORAL at 08:42

## 2023-05-10 RX ADMIN — AMOXICILLIN AND CLAVULANATE POTASSIUM 1 TABLET: 875; 125 TABLET, FILM COATED ORAL at 20:10

## 2023-05-10 RX ADMIN — LEVOTHYROXINE SODIUM 125 MCG: 0.12 TABLET ORAL at 06:20

## 2023-05-10 RX ADMIN — MEMANTINE 10 MG: 10 TABLET ORAL at 08:43

## 2023-05-10 RX ADMIN — Medication 10 ML: at 20:09

## 2023-05-10 RX ADMIN — SENNOSIDES AND DOCUSATE SODIUM 2 TABLET: 50; 8.6 TABLET ORAL at 20:10

## 2023-05-10 RX ADMIN — PANTOPRAZOLE SODIUM 40 MG: 40 INJECTION, POWDER, LYOPHILIZED, FOR SOLUTION INTRAVENOUS at 06:20

## 2023-05-10 RX ADMIN — ENALAPRILAT 1.25 MG: 1.25 INJECTION INTRAVENOUS at 00:39

## 2023-05-10 RX ADMIN — AMOXICILLIN AND CLAVULANATE POTASSIUM 1 TABLET: 875; 125 TABLET, FILM COATED ORAL at 08:43

## 2023-05-10 RX ADMIN — MEMANTINE 10 MG: 10 TABLET ORAL at 20:10

## 2023-05-10 NOTE — THERAPY TREATMENT NOTE
Acute Care - Speech Language Pathology   Swallow Treatment Note [facility]     Patient Name: Brittny Nicolas  : 1945  MRN: [id number]  Today's Date: 5/10/2023               Admit Date: 2023    Visit Dx:     ICD-10-CM ICD-9-CM   1. Acute respiratory failure with hypoxia  J96.01 518.81   2. Pneumonia of right lower lobe due to infectious organism  J18.9 486   3. Abnormal CT of the chest  R93.89 793.2   4. Decreased oral intake  R63.8 783.9   5. Other fatigue  R53.83 780.79   6. Dysphagia, unspecified type  R13.10 787.20     Patient Active Problem List   Diagnosis   • Achalasia   • Acute upper respiratory infection   • Atypical chest pain   • Benign essential hypertension   • Carotid artery stenosis   • Cataract   • Cough   • Acute cystitis   • Screening for malignant neoplasm   • Gastroesophageal reflux disease   • Essential familial hypercholesterolemia   • Gastrointestinal bleeding   • Inguinal hernia   • Insomnia   • Leg cramps   • Meniere's disease   • Night sweats   • Osteoporosis   • Peripheral neuropathy   • Retinal artery occlusion   • Rheumatoid arthritis (HCC)   • Stroke syndrome   • Suprapubic pain   • Transient ischemic attack   • Positive reaction to tuberculin skin test   • Vitamin D deficiency   • Weight loss   • H/O colonoscopy   • Fatigue   • Hypothyroidism   • Erosive esophagitis   • Left lower quadrant pain   • Acute pain of left shoulder   • Pharyngitis   • Memory loss   • Rib pain on left side   • Hyponatremia   • Late onset Alzheimer's dementia with behavioral disturbance   • Oropharyngeal dysphagia   • Influenza A with respiratory manifestations   • Anxiety and depression   • Hypoxia   • Anemia   • Nocturnal enuresis   • Visual loss   • Alzheimer's disease, unspecified   • Abnormal CT of the chest   • Decreased oral intake   • Bilateral pneumonia secondary to aspiration     Past Medical History:   Diagnosis Date   • Acid reflux disease    • Alzheimer's dementia    • Arthritis      rhuematoid   • Carotid artery occlusion    • Coronary artery disease    • Dementia    • Depression    • 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   • Graves disease    • Hyperlipidemia    • Hypertension    • Memory loss    • Stomach problems    • Stroke    • Stroke syndrome    • Thyroid disease    • Transient ischemic attack    • Vision loss      Past Surgical History:   Procedure Laterality Date   • AUGMENTATION MAMMAPLASTY      1978   • BREAST AUGMENTATION     • CAROTID ARTERY ANGIOPLASTY     • CAROTID ENDARTERECTOMY     • CAROTID STENT     • COLONOSCOPY     • D & C CERVICAL BIOPSY      Dilation and Curettage of Cervical Stump   • ENDOSCOPY N/A 5/8/2023    Procedure: ESOPHAGOGASTRODUODENOSCOPY;  Surgeon: Marito Solis MD;  [facility] ENDOSCOPY;  Service: Gastroenterology;  Laterality: N/A;  esophageal dilation done with 18-20mm ballon    • HERNIA REPAIR     • HERNIA REPAIR     • THROMBOENDARTERECTOMY      carotid   • UPPER GASTROINTESTINAL ENDOSCOPY         SLP Recommendation and Plan     SLP Diet Recommendation: mechanical ground textures, thin liquids, other (see comments) (per MD order) (05/10/23 1610)  Recommended Precautions and Strategies: general aspiration precautions, reflux precautions (05/10/23 1610)  SLP Rec. for Method of Medication Administration: meds crushed, with puree, as tolerated (05/10/23 1610)     Monitor for Signs of Aspiration: notify SLP if any concerns (05/10/23 1610)  Recommended Diagnostics: VFSS (MBS), other (see comments) (5/11) (05/10/23 1610)     Anticipated Discharge Disposition (SLP): home with assist (05/10/23 1610)        Predicted Duration Therapy Intervention (Days): until discharge (05/10/23 1610)        Daily Summary of Progress (SLP): progress toward functional goals as expected (05/10/23 1610)               Treatment Assessment (SLP): no clinical signs of, pharyngeal dysphagia (05/10/23 1610)  Treatment Assessment Comments (SLP): Pt seen for diet tolerance. Per dtr, pt w/ minimal  PO intake and intermittent regurgitation of solids that appears to be worsening. No overt s/s of aspiration/pharyngeal dysphagia. Adequate mastication/ clearance w/ regular solid trial & no significant residue. Pt noted w/ belching following thin liquid trials & report globus sensation w/ regular solid trials. Ok to cont current diet, plan for MBS w/ ltd 5/11 to further evalulate swallow fx. (05/10/23 1610)  Plan for Continued Treatment (SLP): continue treatment per plan of care (05/10/23 1610)                SWALLOW EVALUATION (last 72 hours)     SLP Adult Swallow Evaluation     Row Name 05/10/23 1610 05/08/23 1310                Rehab Evaluation    Document Type therapy note (daily note)  - evaluation  -       Subjective Information no complaints  - no complaints  -       Patient Observations cooperative;lethargic  - alert;cooperative;agree to therapy  -       Patient/Family/Caregiver Comments/Observations Daughter present  - none present  -       Patient Effort good  - good  -       Symptoms Noted During/After Treatment none  - none  -          General Information    Patient Profile Reviewed -- yes  -       Pertinent History Of Current Problem -- cough, fatigue, reflux, abnormal CT chest. Sm R and mod L pleural effusions. S/p esophageal dilation this am.  -       Current Method of Nutrition -- mechanical ground textures;thin liquids;other (see comments)  per MD order following GI procedure.  -       Prior Level of Function-Communication -- WFL  -       Prior Level of Function-Swallowing -- unknown  -       Plans/Goals Discussed with -- patient;agreed upon  -       Barriers to Rehab -- none identified  -       Patient's Goals for Discharge -- patient did not state  -          Pain    Additional Documentation Pain Scale: FACES Pre/Post-Treatment (Group)  - Pain Scale: FACES Pre/Post-Treatment (Group)  -          Pain Scale: FACES Pre/Post-Treatment    Pain: FACES Scale,  Pretreatment 0-->no hurt  -MH 0-->no hurt  -       Posttreatment Pain Rating 0-->no hurt  -MH 0-->no hurt  -CH          Oral Motor Structure and Function    Dentition Assessment -- natural, present and adequate  -       Secretion Management -- WNL/WFL  -       Mucosal Quality -- moist, healthy  -       Volitional Swallow -- WFL  -          Oral Musculature and Cranial Nerve Assessment    Oral Motor General Assessment -- WFL  -          General Eating/Swallowing Observations    Respiratory Support Currently in Use -- nasal cannula  -       O2 Liters -- 3L  -       Eating/Swallowing Skills -- self-fed;fed by SLP;appropriate self-feeding skills observed  -       Positioning During Eating -- upright 90 degree;upright in bed  -       Utensils Used -- spoon;cup;straw  -       Consistencies Trialed -- ice chips;thin liquids;pureed;soft to chew textures;regular textures  -       Pre SpO2 (%) -- 98  -CH       Post SpO2 (%) -- 98  -          Respiratory    Respiratory Status -- WFL  -          Clinical Swallow Eval    Oral Prep Phase -- L  -       Oral Transit -- L  -       Oral Residue -- WFL  -       Pharyngeal Phase -- no overt signs/symptoms of pharyngeal impairment  -       Esophageal Phase -- unremarkable  -       Clinical Swallow Evaluation Summary -- No overt clinical s/s of aspiraiton or difficulty observed with any consistency or presentation style  -          Swallowing Quality of Life Assessment    Education and counseling provided -- Signs of aspiration;Risks of aspiration;Oral care recommendations and rationale  -          SLP Evaluation Clinical Impression    SLP Swallowing Diagnosis -- functional oral phase;R/O pharyngeal dysphagia;other (see comments)  no suspected pharyngeal dysphagia  -       Functional Impact -- risk of aspiration/pneumonia  -       Rehab Potential/Prognosis, Swallowing -- good, to achieve stated therapy goals  -          SLP Treatment  Clinical Impressions    Treatment Assessment (SLP) no clinical signs of;pharyngeal dysphagia  - --       Treatment Assessment Comments (SLP) Pt seen for diet tolerance. Per dtr, pt w/ minimal PO intake and intermittent regurgitation of solids that appears to be worsening. No overt s/s of aspiration/pharyngeal dysphagia. Adequate mastication/ clearance w/ regular solid trial & no significant residue. Pt noted w/ belching following thin liquid trials & report globus sensation w/ regular solid trials. Ok to cont current diet, plan for MBS w/ ltd 5/11 to further evalulate swallow fx.  - --       Daily Summary of Progress (SLP) progress toward functional goals as expected  - --       Plan for Continued Treatment (SLP) continue treatment per plan of care  - --       Care Plan Review evaluation/treatment results reviewed  - --       Care Plan Review, Other Participant(s) daughter  - --          Recommendations    Therapy Frequency (Swallow) -- PRN  -       Predicted Duration Therapy Intervention (Days) until discharge  - 3 days  -       SLP Diet Recommendation mechanical ground textures;thin liquids;other (see comments)  per MD order  - mechanical ground textures;thin liquids;other (see comments)  per MD order  -       Recommended Diagnostics VFSS ([facility]);other (see comments)  5/11  - other (see comments)  diet tolerance  -       Recommended Precautions and Strategies general aspiration precautions;reflux precautions  - general aspiration precautions  -       Oral Care Recommendations Oral Care BID/PRN  - Oral Care BID/PRN  -       SLP Rec. for Method of Medication Administration meds crushed;with puree;as tolerated  - meds whole;with thin liquids;with puree;as tolerated  -       Monitor for Signs of Aspiration notify SLP if any concerns  - notify SLP if any concerns  -       Anticipated Discharge Disposition (SLP) home with assist  - home with assist  -             User Key  (r)  = Recorded By, (t) = Taken By, (c) = Cosigned By    Initials Name Effective Dates    Joann Rider MS CCC-SLP 06/16/21 -     Jo-Ann Acevedo MS, RAHAT-SLP 06/22/22 -                 EDUCATION  The patient has been educated in the following areas:   Dysphagia (Swallowing Impairment).        SLP GOALS     Row Name 05/10/23 1610 05/08/23 1315          (LTG) Patient will demonstrate functional swallow for    Diet Texture (Demonstrate functional swallow) regular textures  - regular textures  -     Liquid viscosity (Demonstrate functional swallow) thin liquids  - thin liquids  -     [place] (Demonstrate functional swallow) independently (over 90% accuracy)  - independently (over 90% accuracy)  -     Time Frame (Demonstrate functional swallow) by discharge  - by discharge  -     Progress/Outcomes (Demonstrate functional swallow) continuing progress toward goal  - --     Comment (Demonstrate functional swallow) No overt s/s of aspiration w/ thin liquid or regular solid trials.  - --        (STG) Patient will tolerate trials of    Consistencies Trialed (Tolerate trials) mechanical ground textures;thin liquids  - soft to chew (whole) textures;thin liquids  -     Desired Outcome (Tolerate trials) without signs/symptoms of aspiration;with adequate oral prep/transit/clearance  - without signs/symptoms of aspiration;with adequate oral prep/transit/clearance  -     [place] (Tolerate trials) with minimal cues (75-90% accuracy)  - with minimal cues (75-90% accuracy)  -     Time Frame (Tolerate trials) by discharge  - by discharge  -     Progress/Outcomes (Tolerate trials) continuing progress toward goal  - --           User Key  (r) = Recorded By, (t) = Taken By, (c) = Cosigned By    Initials Name Provider Type    Jonan Rider MS CCC-SLP Speech and Language Pathologist    Jo-Ann Acevedo MS, CFDIMA-SLP Speech and Language Pathologist                   Time  Calculation:    Time Calculation- SLP     Row Name 05/10/23 1640             Time Calculation- SLP    SLP Start Time 1610  -MH      SLP Received On 05/10/23  -         Untimed Charges    [id number] Treatment Swallow Minutes 45  -MH         Total Minutes    Untimed Charges Total Minutes 45  -MH       Total Minutes 45  -MH            User Key  (r) = Recorded By, (t) = Taken By, (c) = Cosigned By    Initials Name Provider Type     Jo-Ann Zhang MS, CFY-SLP Speech and Language Pathologist                Therapy Charges for Today     Code Description Service Date Service Provider Modifiers Qty    [id number]  ST TREATMENT SWALLOW 3 5/10/2023 Jo-Ann Zhang MS, CFY-SLP GN 1               Jo-Ann Zhang MS, RAHAT-SLP  5/10/2023

## 2023-05-10 NOTE — CASE MANAGEMENT/SOCIAL WORK
Continued Stay Note  [facility]     Patient Name: Brittny Nicolas  MRN: [id number]  Today's Date: 5/10/2023    Admit Date: 5/7/2023    Plan: home with home health   Discharge Plan     Row Name 05/10/23 1053       Plan    Plan home with home health    Patient/Family in Agreement with Plan yes    Plan Comments I spoke with the daughter of the patient at the bedside. CM stated that PT has recommended that she go home with home health PT and the daughter asked CM to make a referral to [facility] [place] PT, which I did and they have approved her for Home Health. The patient is trying to wean off oxygen, but may need home oxygen referral made. Daughter would like referral made to [facility], if needed. The daughter can transport her home. CM will continue to follow.    Final Discharge Disposition Code 06 - home with home health care               Discharge Codes    No documentation.               Expected Discharge Date and Time     Expected Discharge Date Expected Discharge Time    May 11, 2023             Klaudia Castro RN

## 2023-05-10 NOTE — PROGRESS NOTES
Met with patients son (patient sleeping) to discuss home care and he is agreeable to [facility]. PCP is Dr Dalila Liz and message left regarding following for home health. Mariusz XAVIER([facility])Hospital Liaison

## 2023-05-10 NOTE — PLAN OF CARE
Goal Outcome Evaluation:   SLP treatment completed. Will continue to address dysphagia. Please see note for further details and recommendations.

## 2023-05-10 NOTE — PLAN OF CARE
Goal Outcome Evaluation:  Plan of Care Reviewed With: patient, family        Progress: no change  Outcome Evaluation: Pt continues to present with generalized weakness and decreased endurance with activity. Pt ambulated 50' + 50' with CGA and RW for support. Pt required one seated rest break d/t light headedness, improved with time. Increased time required for paulina care at end of session as well. Continue to progress per pt tolerance.

## 2023-05-10 NOTE — PROGRESS NOTES
[facility] Medicine Services  PROGRESS NOTE    Patient Name: Brittny Nicolas  : 1945  MRN: [id number]    Date of Admission: 2023  Primary Care Physician: Dalila Liz MD    Subjective   Subjective     CC:  F/U cough    HPI:  Patient seen this morning. Daughter at bedside. Patient still sleepy this morning. Remains on 2 liters. Has not used incentive spirometer yet.     ROS:  Gen-no fevers, no chills  CV-no chest pain, no palpitations  Resp-no cough, no dyspnea  GI-no N/V/D, no abd pain      Objective   Objective     Vital Signs:   Temp:  [96.9 °F (36.1 °C)-98.3 °F (36.8 °C)] 96.9 °F (36.1 °C)  Heart Rate:  [52-74] 52  Resp:  [18] 18  BP: (136-185)/() 142/71  Flow (L/min):  [2-3] 2     Physical Exam:  Gen-no acute distress  HENT-NCAT, mucous membranes moist  CV-RRR, S1 S2 normal, no m/r/g  Resp-overall CTAB, no wheezes, nonlabored on 2 liters  Abd-soft, NT, ND, +BS  Ext-no edema  Neuro-still a little sleepy today, answers simple questions, no focal deficits  Skin-no rashes  Psych-appropriate mood      Results Reviewed:  LAB RESULTS:      Lab 05/10/23  0353 23  0519 23  0317 23  1255   WBC 13.72* 12.11* 12.20* 12.75*   HEMOGLOBIN 11.3* 10.7* 10.4* 13.0   HEMATOCRIT 36.7 34.1 34.1 41.9   PLATELETS 360 338 296 342   NEUTROS ABS  --   --  10.03* 10.96*   IMMATURE GRANS (ABS)  --   --  0.06* 0.06*   LYMPHS ABS  --   --  1.20 0.61*   MONOS ABS  --   --  0.90 1.09*   EOS ABS  --   --  0.00 0.01   MCV 87.4 85.5 87.2 87.5   PROCALCITONIN  --   --   --  1.45*   LACTATE  --   --   --  1.5         Lab 05/10/23  0353 23  0519 23  0317 23  1255   SODIUM 137 137 138 139   POTASSIUM 3.8 3.8 4.5 3.6   CHLORIDE 102 104 106 103   CO2 26.0 23.0 23.0 25.0   ANION GAP 9.0 10.0 9.0 11.0   BUN 9 14 13 14   CREATININE 0.69 0.54* 0.60 0.71   EGFR 87.9 93.2 90.9 86.6   GLUCOSE 65 81 95 108*   CALCIUM 8.7 9.2 8.7 9.8   TSH  --   --   --  1.210         Lab  05/07/23  1255   TOTAL PROTEIN 7.7   ALBUMIN 3.5   GLOBULIN 4.2   ALT (SGPT) 8   AST (SGOT) 13   BILIRUBIN 0.5   ALK PHOS 115         Lab 05/07/23  1255   PROBNP 222.9   HSTROP T 9                 Brief Urine Lab Results  (Last result in the past 365 days)      Color   Clarity   Blood   Leuk Est   Nitrite   Protein   CREAT   Urine HCG        05/07/23 1605 Yellow   Turbid   Negative   Negative   Negative   30 mg/dL (1+)                 Microbiology Results Abnormal     Procedure Component Value - Date/Time    Blood Culture - Blood, Arm, Left [[id number]]  (Normal) Collected: 05/07/23 1313    Lab Status: Preliminary result Specimen: Blood from Arm, Left Updated: 05/10/23 1330     Blood Culture No growth at 3 days    Blood Culture - Blood, Arm, Right [[id number]]  (Normal) Collected: 05/07/23 1313    Lab Status: Preliminary result Specimen: Blood from Arm, Right Updated: 05/10/23 1330     Blood Culture No growth at 3 days    COVID PRE-OP / PRE-PROCEDURE SCREENING ORDER (NO ISOLATION) - Swab, Nasopharynx [[id number]]  (Normal) Collected: 05/07/23 1207    Lab Status: Final result Specimen: Swab from Nasopharynx Updated: 05/07/23 1308    Narrative:      The following orders were created for panel order COVID PRE-OP / PRE-PROCEDURE SCREENING ORDER (NO ISOLATION) - Swab, Nasopharynx.  Procedure                               Abnormality         Status                     ---------                               -----------         ------                     COVID-19 and FLU A/B PCR...[[id number]]  Normal              Final result                 Please view results for these tests on the individual orders.    COVID-19 and FLU A/B PCR - Swab, Nasopharynx [[id number]]  (Normal) Collected: 05/07/23 1207    Lab Status: Final result Specimen: Swab from Nasopharynx Updated: 05/07/23 1308     COVID19 Not Detected     Influenza A PCR Not Detected     Influenza B PCR Not Detected    Narrative:      Fact sheet for providers:  https://www.fda.gov/media/779891/download    Fact sheet for patients: https://www.fda.gov/media/613002/download    Test performed by PCR.          No radiology results from the last 24 hrs        Current medications:  Scheduled Meds:amLODIPine, 5 mg, Oral, Daily  amoxicillin-clavulanate, 1 tablet, Oral, Q12H  donepezil, 10 mg, Oral, Nightly  levothyroxine, 125 mcg, Oral, Daily  memantine, 10 mg, Oral, BID  [START ON 5/11/2023] pantoprazole, 40 mg, Oral, Daily With Breakfast  senna-docusate sodium, 2 tablet, Oral, BID  sodium chloride, 10 mL, Intravenous, Q12H      Continuous Infusions:   PRN Meds:.•  acetaminophen  •  senna-docusate sodium **AND** polyethylene glycol **AND** bisacodyl **AND** bisacodyl  •  Calcium Replacement - Follow Nurse / BPA Driven Protocol  •  Magnesium Standard Dose Replacement - Follow Nurse / BPA Driven Protocol  •  ondansetron  •  Phosphorus Replacement - Follow Nurse / BPA Driven Protocol  •  Potassium Replacement - Follow Nurse / BPA Driven Protocol  •  QUEtiapine  •  sodium chloride  •  sodium chloride  •  sodium chloride    Assessment & Plan   Assessment & Plan     Active Hospital Problems    Diagnosis  POA   • **Bilateral pneumonia secondary to aspiration [J18.9]  Yes   • Abnormal CT of the chest [R93.89]  Unknown   • Decreased oral intake [R63.8]  Unknown   • Alzheimer's disease, unspecified [G30.9, F02.80]  Yes   • Hypoxia [R09.02]  Yes   • Fatigue [R53.83]  Unknown   • Hypothyroidism [E03.9]  Yes   • Benign essential hypertension [I10]  Yes      Resolved Hospital Problems   No resolved problems to display.        Brief Hospital Course to date:  Brittny Nicolas is a 78 y.o. female with hx of dementia, hypothyroidism, and HTN presents due to cough and lethargy. Pulse ox at home showed O2 sats in the 80's. CT chest showed extensive multifocal bilateral airspace disease consistent with B/L pneumonia, possibly due to aspiration; also showed marked distension of esophagus.     Bilateral  pneumonia, suspect aspiration  Hypoxia  --O2 sats 88% on RA  --CT chest with bilateral pneumonia, suspect aspiration related  --s/p IV Zosyn--switched to Augmentin today  --Blood cultures NGTD  --Currently on 2 liters, wean as tolerated  --Add IS today - discussed with patient/family and nurse to be aggressive with this today; may ultimately need short term home oxygen at discharge, CM notified    Esophageal dysmotility  GERD  --s/p EGD 5/8/23: showed dilated esophagus but wide open LES, does not appear to be achalasia; dilated to 20 mm  --Observe following esophageal dilation, seems to be doing well  --Continue PPI  --Patient/family to [place] [facility] [place] if she has recurrent symptoms as she may require repeat dilation in the future    Hypothyroidism  --Continue Synthroid    HTN  --Continue Norvasc    Dementia  --Continue Aricept, Namenda    Expected Discharge Location and Transportation: [facility]   Expected Discharge   Expected Discharge Date: 5/11/2023; Expected Discharge Time:      DVT prophylaxis:  Mechanical DVT prophylaxis orders are present.     AM-PAC 6 Clicks Score (PT): 17 (05/08/23 1602)    CODE STATUS:   Code Status and Medical Interventions:   Ordered at: 05/07/23 1507     Medical Intervention Limits:    NO intubation (DNI)     Code Status (Patient has no pulse and is not breathing):    No CPR (Do Not Attempt to Resuscitate)     Medical Interventions (Patient has pulse or is breathing):    Limited Support       Yenni Reyes MD  05/10/23

## 2023-05-10 NOTE — TELEPHONE ENCOUNTER
HOME HEALTH CALLED STATING WAS ADMITTED TO  HOSPITAL FOR BILATERAL PNEUMONIA. SHE STATED THE PT WAS RECOMMENDED TO HAVE HOME HEALTH.  HOME HEALTH REP CALLED TO SEE IF DR KIM WOULD ADD REFERRAL FOR HOME HEALTH.

## 2023-05-10 NOTE — THERAPY TREATMENT NOTE
Patient Name: Brittny Nicolas  : 1945    MRN: [id number]                              Today's Date: 5/10/2023       Admit Date: 2023    Visit Dx:     ICD-10-CM ICD-9-CM   1. Acute respiratory failure with hypoxia  J96.01 518.81   2. Pneumonia of right lower lobe due to infectious organism  J18.9 486   3. Abnormal CT of the chest  R93.89 793.2   4. Decreased oral intake  R63.8 783.9   5. Other fatigue  R53.83 780.79   6. Dysphagia, unspecified type  R13.10 787.20     Patient Active Problem List   Diagnosis   • Achalasia   • Acute upper respiratory infection   • Atypical chest pain   • Benign essential hypertension   • Carotid artery stenosis   • Cataract   • Cough   • Acute cystitis   • Screening for malignant neoplasm   • Gastroesophageal reflux disease   • Essential familial hypercholesterolemia   • Gastrointestinal bleeding   • Inguinal hernia   • Insomnia   • Leg cramps   • Meniere's disease   • Night sweats   • Osteoporosis   • Peripheral neuropathy   • Retinal artery occlusion   • Rheumatoid arthritis (HCC)   • Stroke syndrome   • Suprapubic pain   • Transient ischemic attack   • Positive reaction to tuberculin skin test   • Vitamin D deficiency   • Weight loss   • H/O colonoscopy   • Fatigue   • Hypothyroidism   • Erosive esophagitis   • Left lower quadrant pain   • Acute pain of left shoulder   • Pharyngitis   • Memory loss   • Rib pain on left side   • Hyponatremia   • Late onset Alzheimer's dementia with behavioral disturbance   • Oropharyngeal dysphagia   • Influenza A with respiratory manifestations   • Anxiety and depression   • Hypoxia   • Anemia   • Nocturnal enuresis   • Visual loss   • Alzheimer's disease, unspecified   • Abnormal CT of the chest   • Decreased oral intake   • Bilateral pneumonia secondary to aspiration     Past Medical History:   Diagnosis Date   • Acid reflux disease    • Alzheimer's dementia    • Arthritis     rhuematoid   • Carotid artery occlusion    • Coronary artery  disease    • Dementia    • Depression    • 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   • Graves disease    • Hyperlipidemia    • Hypertension    • Memory loss    • Stomach problems    • Stroke    • Stroke syndrome    • Thyroid disease    • Transient ischemic attack    • Vision loss      Past Surgical History:   Procedure Laterality Date   • AUGMENTATION MAMMAPLASTY      1978   • BREAST AUGMENTATION     • CAROTID ARTERY ANGIOPLASTY     • CAROTID ENDARTERECTOMY     • CAROTID STENT     • COLONOSCOPY     • D & C CERVICAL BIOPSY      Dilation and Curettage of Cervical Stump   • ENDOSCOPY N/A 5/8/2023    Procedure: ESOPHAGOGASTRODUODENOSCOPY;  Surgeon: Marito Slois MD;  Location: [facility] ENDOSCOPY;  Service: Gastroenterology;  Laterality: N/A;  esophageal dilation done with 18-20mm ballon    • HERNIA REPAIR     • HERNIA REPAIR     • THROMBOENDARTERECTOMY      carotid   • UPPER GASTROINTESTINAL ENDOSCOPY        General Information     Row Name 05/10/23 1554          Physical Therapy Time and Intention    Document Type therapy note (daily note)  -AE     Mode of Treatment physical therapy  -AE     Row Name 05/10/23 1554          General Information    Patient Profile Reviewed yes  -AE     Existing Precautions/Restrictions fall  -AE     Barriers to Rehab medically complex;cognitive status  -AE     Row Name 05/10/23 1554          Cognition    Orientation Status (Cognition) oriented to;person;place;disoriented to;time;verbal cues/prompts needed for orientation  -AE     Row Name 05/10/23 1554          Safety Issues, Functional Mobility    Safety Issues Affecting Function (Mobility) awareness of need for assistance;insight into deficits/self-awareness;safety precaution awareness;safety precautions follow-through/compliance;sequencing abilities  -AE     Impairments Affecting Function (Mobility) balance;endurance/activity tolerance;postural/trunk control;range of motion (ROM);strength  -AE           User Key  (r) = Recorded By, (t) = Taken By,  "(c) = Cosigned By    Initials Name Provider Type    AE Christos Montes, PT Physical Therapist               Mobility     Row Name 05/10/23 1550          Bed Mobility    Bed Mobility supine-sit;sit-supine  -AE     Supine-Sit [place] (Bed Mobility) contact guard;1 person assist  -AE     Sit-Supine [place] (Bed Mobility) minimum assist (75% patient effort);1 person assist;verbal cues  -AE     Assistive Device (Bed Mobility) bed rails;head of bed elevated  -AE     Comment, (Bed Mobility) VCs for hand placement and sequencing. Pt required increased assistance to advance BLE into bed.  -AE     Row Name 05/10/23 1550          Transfers    Comment, (Transfers) VCs for hand placement and sequencing. Pt attempts STS without RW in front of her, requires cues to improve safety awareness and reduce risk of falls by placing RW in front of pt prior to standing.  -AE     Row Name 05/10/23 1558          Sit-Stand Transfer    Sit-Stand [place] (Transfers) contact guard;1 person assist;verbal cues  -AE     Assistive Device (Sit-Stand Transfers) walker, front-wheeled  -AE     Row Name 05/10/23 2330          Gait/Stairs (Locomotion)    [place] Level (Gait) contact guard;1 person assist;verbal cues  -AE     Assistive Device (Gait) walker, front-wheeled  -AE     Distance in Feet (Gait) 100  -AE     Deviations/Abnormal Patterns (Gait) bilateral deviations;lake decreased;gait speed decreased;stride length decreased  -AE     Bilateral Gait Deviations forward flexed posture;heel strike decreased  -AE     Comment, (Gait/Stairs) Pt demo step to gait pattern with short, shuffled steps. Pt reports increased \"shakiness\" and light headedness during ambulation, requiring seated rest break to improve. Pt able to ambulate back to room after seated rest break. Increased time required for all mobility. Further distance limited by light headedness.  -AE           User Key  (r) = Recorded By, (t) = Taken By, (c) = Cosigned By    " Initials Name Provider Type    AE Christos Montes, TERE Physical Therapist               Obj/Interventions     Row Name 05/10/23 1601          Balance    Balance Assessment sitting static balance;sitting dynamic balance;sit to stand dynamic balance;standing static balance;standing dynamic balance  -AE     Static Sitting Balance standby assist  -AE     Dynamic Sitting Balance contact guard  -AE     Position, Sitting Balance unsupported;sitting edge of bed  -AE     Sit to Stand Dynamic Balance contact guard;1-person assist;verbal cues  -AE     Static Standing Balance contact guard  -AE     Dynamic Standing Balance contact guard  -AE     Position/Device Used, Standing Balance supported;walker, front-wheeled  -AE           User Key  (r) = Recorded By, (t) = Taken By, (c) = Cosigned By    Initials Name Provider Type    AE Christos Montes, PT Physical Therapist               Goals/Plan    No documentation.                Clinical Impression     Row Name 05/10/23 1602          Pain    Pretreatment Pain Rating 0/10 - no pain  -AE     Posttreatment Pain Rating 0/10 - no pain  -AE     Row Name 05/10/23 1602          Plan of Care Review    Plan of Care Reviewed With patient;family  -AE     Progress no change  -AE     Outcome Evaluation Pt continues to present with generalized weakness and decreased endurance with activity. Pt ambulated 50' + 50' with CGA and RW for support. Pt required one seated rest break d/t light headedness, improved with time. Increased time required for paulina care at end of session as well. Continue to progress per pt tolerance.  -AE     Row Name 05/10/23 1602          Vital Signs    Pre Systolic BP Rehab 142  -AE     Pre Treatment Diastolic BP 71  -AE     Pretreatment Heart Rate (beats/min) 72  -AE     Posttreatment Heart Rate (beats/min) 74  -AE     Pre SpO2 (%) 93  -AE     O2 Delivery Pre Treatment nasal cannula  -AE     O2 Delivery Intra Treatment nasal cannula  -AE     Post SpO2 (%) 93  -AE     O2  Delivery Post Treatment nasal cannula  -AE     Pre Patient Position Supine  -AE     Intra Patient Position Standing  -AE     Post Patient Position Supine  -AE     Row Name 05/10/23 1602          Positioning and Restraints    Pre-Treatment Position in bed  -AE     Post Treatment Position bed  -AE     In Bed notified nsg;fowlers;call light within reach;encouraged to call for assist;with family/caregiver;side rails up x2;legs elevated  pt family member reports she turned off bed alarm while in pt room; notified RN  -AE           User Key  (r) = Recorded By, (t) = Taken By, (c) = Cosigned By    Initials Name Provider Type    AE Christos Montes PT Physical Therapist               Outcome Measures     Row Name 05/10/23 1607          How much help from another person do you currently need...    Turning from your back to your side while in flat bed without using bedrails? 3  -AE     Moving from lying on back to sitting on the side of a flat bed without bedrails? 3  -AE     Moving to and from a bed to a chair (including a wheelchair)? 3  -AE     Standing up from a chair using your arms (e.g., wheelchair, bedside chair)? 3  -AE     Climbing 3-5 steps with a railing? 2  -AE     To walk in hospital room? 3  -AE     AM-PAC 6 Clicks Score (PT) 17  -AE     Highest level of mobility 5 --> Static standing  -AE     Row Name 05/10/23 1607          Functional Assessment    Outcome Measure Options AM-PAC 6 Clicks Basic Mobility (PT)  -AE           User Key  (r) = Recorded By, (t) = Taken By, (c) = Cosigned By    Initials Name Provider Type    Christos Street PT Physical Therapist                             Physical Therapy Education     Title: PT OT SLP Therapies (In Progress)     Topic: Physical Therapy (In Progress)     Point: Mobility training (In Progress)     Learning Progress Summary           Patient Acceptance, E, NR by AE at 5/10/2023 1508    Eager, E, NL,NR by SS at 5/8/2023 1602    Comment: Educated pt.  safety/technique w/bed mobility, transfers, ambulation, PT POC                   Point: Home exercise program (Not Started)     Learner Progress:  Not documented in this visit.          Point: Body mechanics (In Progress)     Learning Progress Summary           Patient Acceptance, E, NR by AE at 5/10/2023 1508    Eager, E, NL,NR by SS at 5/8/2023 1602    Comment: Educated pt. safety/technique w/bed mobility, transfers, ambulation, PT POC                   Point: Precautions (In Progress)     Learning Progress Summary           Patient Acceptance, E, NR by AE at 5/10/2023 1508    Eager, E, NL,NR by SS at 5/8/2023 1602    Comment: Educated pt. safety/technique w/bed mobility, transfers, ambulation, PT POC                               User Key     Initials Effective Dates Name Provider Type Discipline    SS 06/01/21 -  Yady Zamora, PT Physical Therapist PT    AE 09/21/21 -  Christos Montes, PT Physical Therapist PT              PT Recommendation and Plan     Plan of Care Reviewed With: patient, family  Progress: no change  Outcome Evaluation: Pt continues to present with generalized weakness and decreased endurance with activity. Pt ambulated 50' + 50' with CGA and RW for support. Pt required one seated rest break d/t light headedness, improved with time. Increased time required for paulina care at end of session as well. Continue to progress per pt tolerance.     Time Calculation:    PT Charges     Row Name 05/10/23 1608             Time Calculation    Start Time 1508  -AE      PT Received On 05/10/23  -AE      PT Goal Re-Cert Due Date 05/18/23  -AE         Time Calculation- PT    Total Timed Code Minutes- PT 38 minute(s)  -AE         Timed Charges    30209 - Gait Training Minutes  10  -AE      64553 - PT Therapeutic Activity Minutes 28  -AE         Total Minutes    Timed Charges Total Minutes 38  -AE       Total Minutes 38  -AE            User Key  (r) = Recorded By, (t) = Taken By, (c) = Cosigned By    Initials  Name Provider Type    AE Christos Montes, PT Physical Therapist              Therapy Charges for Today     Code Description Service Date Service Provider Modifiers Qty    [id number] HC GAIT TRAINING EA 15 MIN 5/10/2023 Christos Montes, PT GP 1    18941747258 HC PT THERAPEUTIC ACT EA 15 MIN 5/10/2023 Christos Montes, PT GP 2          PT G-Codes  Outcome Measure Options: AM-PAC 6 Clicks Basic Mobility (PT)  AM-PAC 6 Clicks Score (PT): 17  PT Discharge Summary  Anticipated Discharge Disposition (PT): home with 24/7 care, home with home health    Christos Montes, TERE  5/10/2023

## 2023-05-11 ENCOUNTER — APPOINTMENT (OUTPATIENT)
Dept: GENERAL RADIOLOGY | Facility: HOSPITAL | Age: 78
End: 2023-05-11
Payer: MEDICARE

## 2023-05-11 ENCOUNTER — READMISSION MANAGEMENT (OUTPATIENT)
Dept: CALL CENTER | Facility: HOSPITAL | Age: 78
End: 2023-05-11
Payer: MEDICARE

## 2023-05-11 ENCOUNTER — HOME HEALTH ADMISSION (OUTPATIENT)
Dept: HOME HEALTH SERVICES | Facility: HOME HEALTHCARE | Age: 78
End: 2023-05-11
Payer: COMMERCIAL

## 2023-05-11 VITALS
HEART RATE: 74 BPM | DIASTOLIC BLOOD PRESSURE: 75 MMHG | OXYGEN SATURATION: 91 % | RESPIRATION RATE: 18 BRPM | SYSTOLIC BLOOD PRESSURE: 151 MMHG | WEIGHT: 131.2 LBS | TEMPERATURE: 98.1 F | BODY MASS INDEX: 23.25 KG/M2 | HEIGHT: 63 IN

## 2023-05-11 PROBLEM — R63.8 DECREASED ORAL INTAKE: Status: RESOLVED | Noted: 2023-05-07 | Resolved: 2023-05-11

## 2023-05-11 PROBLEM — R93.89 ABNORMAL CT OF THE CHEST: Status: RESOLVED | Noted: 2023-05-07 | Resolved: 2023-05-11

## 2023-05-11 PROBLEM — R09.02 HYPOXIA: Status: RESOLVED | Noted: 2021-11-27 | Resolved: 2023-05-11

## 2023-05-11 PROBLEM — J18.9 BILATERAL PNEUMONIA: Status: RESOLVED | Noted: 2023-05-08 | Resolved: 2023-05-11

## 2023-05-11 LAB
ANION GAP SERPL CALCULATED.3IONS-SCNC: 9 MMOL/L (ref 5–15)
BUN SERPL-MCNC: 10 MG/DL (ref 8–23)
BUN/CREAT SERPL: 16.1 (ref 7–25)
CALCIUM SPEC-SCNC: 9 MG/DL (ref 8.6–10.5)
CHLORIDE SERPL-SCNC: 101 MMOL/L (ref 98–107)
CO2 SERPL-SCNC: 25 MMOL/L (ref 22–29)
CREAT SERPL-MCNC: 0.62 MG/DL (ref 0.57–1)
DEPRECATED RDW RBC AUTO: 45.4 FL (ref 37–54)
EGFRCR SERPLBLD CKD-EPI 2021: 90.2 ML/MIN/1.73
ERYTHROCYTE [DISTWIDTH] IN BLOOD BY AUTOMATED COUNT: 13.8 % (ref 12.3–15.4)
GLUCOSE SERPL-MCNC: 80 MG/DL (ref 65–99)
HCT VFR BLD AUTO: 37.5 % (ref 34–46.6)
HGB BLD-MCNC: 11.4 G/DL (ref 12–15.9)
MCH RBC QN AUTO: 27.3 PG (ref 26.6–33)
MCHC RBC AUTO-ENTMCNC: 30.4 G/DL (ref 31.5–35.7)
MCV RBC AUTO: 89.9 FL (ref 79–97)
PLATELET # BLD AUTO: 351 10*3/MM3 (ref 140–450)
PMV BLD AUTO: 8.8 FL (ref 6–12)
POTASSIUM SERPL-SCNC: 3.6 MMOL/L (ref 3.5–5.2)
POTASSIUM SERPL-SCNC: 4.4 MMOL/L (ref 3.5–5.2)
RBC # BLD AUTO: 4.17 10*6/MM3 (ref 3.77–5.28)
SODIUM SERPL-SCNC: 135 MMOL/L (ref 136–145)
WBC NRBC COR # BLD: 12.62 10*3/MM3 (ref 3.4–10.8)

## 2023-05-11 PROCEDURE — 74230 X-RAY XM SWLNG FUNCJ C+: CPT

## 2023-05-11 PROCEDURE — 99239 HOSP IP/OBS DSCHRG MGMT >30: CPT | Performed by: HOSPITALIST

## 2023-05-11 PROCEDURE — 92611 MOTION FLUOROSCOPY/SWALLOW: CPT

## 2023-05-11 PROCEDURE — 74240 X-RAY XM UPR GI TRC 1CNTRST: CPT

## 2023-05-11 PROCEDURE — 85027 COMPLETE CBC AUTOMATED: CPT | Performed by: HOSPITALIST

## 2023-05-11 PROCEDURE — 80048 BASIC METABOLIC PNL TOTAL CA: CPT | Performed by: HOSPITALIST

## 2023-05-11 PROCEDURE — 84132 ASSAY OF SERUM POTASSIUM: CPT | Performed by: HOSPITALIST

## 2023-05-11 RX ORDER — POTASSIUM CHLORIDE 750 MG/1
40 CAPSULE, EXTENDED RELEASE ORAL EVERY 4 HOURS
Status: COMPLETED | OUTPATIENT
Start: 2023-05-11 | End: 2023-05-11

## 2023-05-11 RX ORDER — PANTOPRAZOLE SODIUM 40 MG/1
40 TABLET, DELAYED RELEASE ORAL
Qty: 30 TABLET | Refills: 0 | Status: SHIPPED | OUTPATIENT
Start: 2023-05-11

## 2023-05-11 RX ORDER — AMOXICILLIN AND CLAVULANATE POTASSIUM 875; 125 MG/1; MG/1
1 TABLET, FILM COATED ORAL EVERY 12 HOURS SCHEDULED
Qty: 6 TABLET | Refills: 0 | Status: SHIPPED | OUTPATIENT
Start: 2023-05-11 | End: 2023-05-14

## 2023-05-11 RX ADMIN — MEMANTINE 10 MG: 10 TABLET ORAL at 09:14

## 2023-05-11 RX ADMIN — SENNOSIDES AND DOCUSATE SODIUM 2 TABLET: 50; 8.6 TABLET ORAL at 09:15

## 2023-05-11 RX ADMIN — PANTOPRAZOLE SODIUM 40 MG: 40 TABLET, DELAYED RELEASE ORAL at 09:15

## 2023-05-11 RX ADMIN — BARIUM SULFATE 20 ML: 400 PASTE ORAL at 15:17

## 2023-05-11 RX ADMIN — LEVOTHYROXINE SODIUM 125 MCG: 0.12 TABLET ORAL at 05:58

## 2023-05-11 RX ADMIN — POTASSIUM CHLORIDE 40 MEQ: 750 CAPSULE, EXTENDED RELEASE ORAL at 09:14

## 2023-05-11 RX ADMIN — POTASSIUM CHLORIDE 40 MEQ: 750 CAPSULE, EXTENDED RELEASE ORAL at 05:58

## 2023-05-11 RX ADMIN — AMLODIPINE BESYLATE 5 MG: 5 TABLET ORAL at 09:15

## 2023-05-11 RX ADMIN — BARIUM SULFATE 100 ML: 0.81 POWDER, FOR SUSPENSION ORAL at 15:17

## 2023-05-11 RX ADMIN — AMOXICILLIN AND CLAVULANATE POTASSIUM 1 TABLET: 875; 125 TABLET, FILM COATED ORAL at 09:14

## 2023-05-11 RX ADMIN — Medication 10 ML: at 09:14

## 2023-05-11 NOTE — DISCHARGE SUMMARY
[facility] Medicine Services  DISCHARGE SUMMARY    Patient Name: Brittny Nicolas  : 1945  MRN: [id number]    Date of Admission: 2023 12:25 PM  Date of Discharge:  23  Primary Care Physician: Dalila Liz MD    Consults     Date and Time Order Name Status Description    2023 12:34 AM Inpatient Gastroenterology Consult Completed           Hospital Course     Presenting Problem:   Acute respiratory failure with hypoxia [J96.01]    Active Hospital Problems    Diagnosis  POA   • Alzheimer's disease, unspecified [G30.9, F02.80]  Yes   • Fatigue [R53.83]  Unknown   • Hypothyroidism [E03.9]  Yes   • Benign essential hypertension [I10]  Yes      Resolved Hospital Problems    Diagnosis Date Resolved POA   • **Bilateral pneumonia secondary to aspiration [J18.9] 2023 Yes   • Abnormal CT of the chest [R93.89] 2023 Unknown   • Decreased oral intake [R63.8] 2023 Unknown   • Hypoxia [R09.02] 2023 Yes          Hospital Course:  Brittny Nicolas is a 78 y.o. female with hx of dementia, hypothyroidism, and HTN presents due to cough and lethargy. Pulse ox at home showed O2 sats in the 80's. CT chest showed extensive multifocal bilateral airspace disease consistent with B/L pneumonia, possibly due to aspiration; also showed marked distension of esophagus.      Bilateral pneumonia, suspect aspiration  Hypoxia  --O2 sats 88% on RA on arrival  --CT chest with bilateral pneumonia, suspect aspiration related  --s/p IV Zosyn--switched to Augmentin 5/10/23, will continue at discharge to complete total 7 days therapy  --Blood cultures negative  --Currently on 1-2 liters, last RA saturation 87%--will need home O2 arranged  --Continue IS     Esophageal dysmotility  Dysphgia  GERD  --s/p EGD 23: showed dilated esophagus but wide open LES, does not appear to be achalasia; dilated to 20 mm  --Observe following esophageal dilation, seems to be doing  well  --Continue PPI--daughter requested changing her Dexilant, will trial Protonix at discharge  --Patient/family to call [facility] GI office if she has recurrent symptoms as she may require repeat dilation in the future  --SLP following, continue mechanical ground, thin liquids; MBS ordered for this morning prior to discharge as well     Hypothyroidism  --Continue Synthroid     HTN  --Continue Norvasc     Dementia  --Continue Aricept, Namenda  --Patient more somnolent this morning, slept most of the day yesterday; discussed overall prognosis for dementia, she will have good days and bad days; I think getting her back home to familiar surroundings and routine will help tremendously, daughter reassured      Discharge Follow Up Recommendations for outpatient labs/diagnostics:  F/U with PCP in 1 week  F/U with [facility] as needed based on symptoms    Day of Discharge     HPI:   Seen this morning, daughter at bedside. Patient slept most of day yesterday. She is still very sleepy this morning. She has no specific complaints.    Review of Systems  Gen-no fevers, no chills  CV-no chest pain, no palpitations  Resp-no cough, no dyspnea  GI-no N/V/D, no abd pain      Vital Signs:   Temp:  [97.3 °F (36.3 °C)-98.6 °F (37 °C)] 98.1 °F (36.7 °C)  Heart Rate:  [52-76] 66  Resp:  [16-18] 18  BP: (138-161)/(71-83) 151/75  Flow (L/min):  [1.5-2] 1.5      Physical Exam:  Gen-no acute distress  HENT-NCAT, mucous membranes moist  CV-RRR, S1 S2 normal, no m/r/g  Resp-CTAB, no wheezes or rales, nonlabored  Abd-soft, NT, ND, +BS  Ext-no edema  Neuro-does fall asleep intermittently during our conversation but able to wake up and answer questions appropriately, no focal deficits  Skin-no rashes  Psych-appropriate mood      Pertinent  and/or Most Recent Results     LAB RESULTS:      Lab 05/11/23  0433 05/10/23  0353 05/09/23  0519 05/08/23  0317 05/07/23  1255   WBC 12.62* 13.72* 12.11* 12.20* 12.75*   HEMOGLOBIN 11.4* 11.3* 10.7* 10.4* 13.0    HEMATOCRIT 37.5 36.7 34.1 34.1 41.9   PLATELETS 351 360 338 296 342   NEUTROS ABS  --   --   --  10.03* 10.96*   IMMATURE GRANS (ABS)  --   --   --  0.06* 0.06*   LYMPHS ABS  --   --   --  1.20 0.61*   MONOS ABS  --   --   --  0.90 1.09*   EOS ABS  --   --   --  0.00 0.01   MCV 89.9 87.4 85.5 87.2 87.5   PROCALCITONIN  --   --   --   --  1.45*   LACTATE  --   --   --   --  1.5         Lab 05/11/23  0433 05/10/23  0353 05/09/23  0519 05/08/23  0317 05/07/23  1255   SODIUM 135* 137 137 138 139   POTASSIUM 3.6 3.8 3.8 4.5 3.6   CHLORIDE 101 102 104 106 103   CO2 25.0 26.0 23.0 23.0 25.0   ANION GAP 9.0 9.0 10.0 9.0 11.0   BUN 10 9 14 13 14   CREATININE 0.62 0.69 0.54* 0.60 0.71   EGFR 90.2 87.9 93.2 90.9 86.6   GLUCOSE 80 65 81 95 108*   CALCIUM 9.0 8.7 9.2 8.7 9.8   TSH  --   --   --   --  1.210         Lab 05/07/23  1255   TOTAL PROTEIN 7.7   ALBUMIN 3.5   GLOBULIN 4.2   ALT (SGPT) 8   AST (SGOT) 13   BILIRUBIN 0.5   ALK PHOS 115         Lab 05/07/23  1255   PROBNP 222.9   HSTROP T 9                 Brief Urine Lab Results  (Last result in the past 365 days)      Color   Clarity   Blood   Leuk Est   Nitrite   Protein   CREAT   Urine HCG        05/07/23 1605 Yellow   Turbid   Negative   Negative   Negative   30 mg/dL (1+)               Microbiology Results (last 10 days)     Procedure Component Value - Date/Time    Blood Culture - Blood, Arm, Left [[id number]]  (Normal) Collected: 05/07/23 1313    Lab Status: Preliminary result Specimen: Blood from Arm, Left Updated: 05/10/23 1330     Blood Culture No growth at 3 days    Blood Culture - Blood, Arm, Right [[id number]]  (Normal) Collected: 05/07/23 1313    Lab Status: Preliminary result Specimen: Blood from Arm, Right Updated: 05/10/23 1330     Blood Culture No growth at 3 days    COVID PRE-OP / PRE-PROCEDURE SCREENING ORDER (NO ISOLATION) - Swab, Nasopharynx [[id number]]  (Normal) Collected: 05/07/23 1207    Lab Status: Final result Specimen: Swab from Nasopharynx  Updated: 05/07/23 3824    Narrative:      The following orders were created for panel order COVID PRE-OP / PRE-PROCEDURE SCREENING ORDER (NO ISOLATION) - Swab, Nasopharynx.  Procedure                               Abnormality         Status                     ---------                               -----------         ------                     COVID-19 and FLU A/B PCR...[[id number]]  Normal              Final result                 Please view results for these tests on the individual orders.    COVID-19 and FLU A/B PCR - Swab, Nasopharynx [[id number]]  (Normal) Collected: 05/07/23 1207    Lab Status: Final result Specimen: Swab from Nasopharynx Updated: 05/07/23 1308     COVID19 Not Detected     Influenza A PCR Not Detected     Influenza B PCR Not Detected    Narrative:      Fact sheet for providers: https://www.fda.gov/media/355156/download    Fact sheet for patients: https://www.fda.gov/media/477766/download    Test performed by PCR.          CT Chest With Contrast Diagnostic    Result Date: 5/7/2023  CT CHEST W CONTRAST DIAGNOSTIC Date of Exam: 5/7/2023 1:52 PM EDT Indication: cough, fever, paratracheal air on cxr. Comparison: Concurrent chest radiograph Technique: Axial CT images were obtained of the chest after the uneventful intravenous administration of 75 in the Isovue-300.  Reconstructed coronal and sagittal images were also obtained. Automated exposure control and iterative construction methods were used. Findings: Air shadow noted in the mediastinum corresponds to the markedly dilated esophagus, which contains extensive air and food, becoming markedly narrowed at the GE junction. Maximal diameter of the proximal esophagus is approximately 5 cm. More distally, the esophagus measures up to 4 cm transverse luminal diameter before becoming markedly narrowed at the GE junction. This pattern suggests achalasia. No inflammatory change or mass is identified. GE junction segment appears very tight/narrowed.  Included portion of the proximal stomach appears normal below the level of the cardia. No inflammatory change mass or adenopathy is seen in this general region. Elsewhere, no evidence of pulmonary embolic disease is seen. Thoracic aorta shows no evidence of aneurysm or dissection. No pericardial or pleural effusion or mediastinal adenopathy is seen. There is extensive multifocal bilateral airspace disease, greater in the right lower lobe and medial left lower lobe than elsewhere, suspicious for pneumonia or, given patient's esophageal findings, aspiration. No pneumothorax or edema is seen. Included images of the upper abdomen show mild fatty liver change. Spleen does not appear to be enlarged. Incidental note is made of bilateral breast prostheses.     Impression: 1. Marked distention of the entire esophagus down to the level of the GE junction where it is very narrow. No visible inflammation or mass. Findings suggest achalasia. 2. Extensive multifocal pulmonary airspace disease, consistent with bilateral pneumonia, which may be due to aspiration. Electronically Signed: Thomas Rodriguez  5/7/2023 2:16 PM EDT  Workstation ID: [id number]    XR Chest 1 View    Result Date: 5/7/2023  XR CHEST 1 VW Date of Exam: 5/7/2023 12:26 PM EDT Indication: SOA triage protocol Comparison: 10/26/2022 Findings: There is a small to moderate left pleural effusion Small right pleural effusion. There is generalized coarsening of the interstitial pattern which may be due to edema or atypical infection pattern. Pulmonary vascularity appears unchanged. Heart size appears within normal limits. Air lucency at the right paratracheal region may be due to esophageal air. No pneumothorax is seen.     Impression: 1. Small right and small to moderate left pleural effusions. 2. Diffuse interstitial prominence may be due to edema or atypical infection pattern or may be due to chronic fibrosis. Electronically Signed: Justino Love  5/7/2023 12:56 PM EDT   Workstation ID: [id number]      Pending Labs     Order Current Status    Blood Culture - Blood, Arm, Left Preliminary result    Blood Culture - Blood, Arm, Right Preliminary result        Discharge Details        Discharge Medications      New Medications      Instructions Start Date   amoxicillin-clavulanate 875-125 MG per tablet  Commonly known as: AUGMENTIN   1 tablet, Oral, Every 12 Hours Scheduled      pantoprazole 40 MG EC tablet  Commonly known as: PROTONIX   40 mg, Oral, Daily With Breakfast         Continue These Medications      Instructions Start Date   acetaminophen 325 MG tablet  Commonly known as: TYLENOL   650 mg, Oral, Every 4 Hours PRN      amLODIPine 5 MG tablet  Commonly known as: NORVASC   5 mg, Oral, Daily      atorvastatin 20 MG tablet  Commonly known as: LIPITOR   20 mg, Oral, Every Night at Bedtime      donepezil 10 MG tablet  Commonly known as: ARICEPT   10 mg, Oral, Nightly      levothyroxine 125 MCG tablet  Commonly known as: SYNTHROID, LEVOTHROID   TAKE 1 TABLET BY MOUTH EVERY DAY      memantine 10 MG tablet  Commonly known as: NAMENDA   10 mg, Oral, 2 Times Daily      polyethylene glycol 17 g packet  Commonly known as: MIRALAX   17 g, Oral, Daily      QUEtiapine 25 MG tablet  Commonly known as: SEROquel   50 mg, Oral, Nightly      Vitamin D 50 MCG (2000 UT) capsule   1 capsule, Oral, Daily         Stop These Medications    dexlansoprazole 60 MG capsule  Commonly known as: DEXILANT     nitrofurantoin (macrocrystal-monohydrate) 100 MG capsule  Commonly known as: MACROBID     predniSONE 10 MG tablet  Commonly known as: DELTASONE            Allergies   Allergen Reactions   • Boniva [Ibandronic Acid] GI Intolerance     Refractory gerd and abdominal pain          Discharge Disposition:  Home or Self Care    Diet:  Hospital:  Diet Order   Procedures   • Diet: Regular/House Diet, Gastrointestinal Diets; Fiber-Restricted; Texture: Mechanical Ground (NDD 2); Fluid Consistency: Thin (IDDSI 0)        Activity:  Activity Instructions     Activity as Tolerated               CODE STATUS:    Code Status and Medical Interventions:   Ordered at: 05/07/23 1507     Medical Intervention Limits:    NO intubation (DNI)     Code Status (Patient has no pulse and is not breathing):    No CPR (Do Not Attempt to Resuscitate)     Medical Interventions (Patient has pulse or is breathing):    Limited Support       Future Appointments   Date Time Provider Department Center   5/11/2023  4:00 PM [facility] 1 BH [facility] SO [facility]   7/12/2023 10:00 AM Munira Bloom APRN [facility] N CN LX2 [facility]   8/31/2023  2:15 PM Dalila Liz MD [facility] END BM [facility]       Additional Instructions for the Follow-ups that You Need to Schedule     Discharge Follow-up with PCP   As directed       Currently Documented PCP:    Dalila Liz MD    PCP Phone Number:    [phone]     Follow Up Details: 1 week         Discharge Follow-up with Specified Provider: [facility] as needed, call if recurrent symptoms of difficulty swallowing so they can arrange for outpatiend endoscopy for repeat esophageal dilation   As directed      To: [facility] as needed, call if recurrent symptoms of difficulty swallowing so they can arrange for outpatiend endoscopy for repeat esophageal dilation                     Yenni Reyes MD  05/11/23      Time Spent on Discharge:  I spent  40 minutes on this discharge activity which included: face-to-face encounter with the patient, reviewing the data in the system, coordination of the care with the nursing staff as well as consultants, documentation, and entering orders.

## 2023-05-11 NOTE — CASE MANAGEMENT/SOCIAL WORK
Case Management Discharge Note      Final Note: Case mgt f/u. Plan is home today with [facility]  for PT/OT and skilled nursing services. O2 sats acceptable range off O2. Plan is hme today with daughter. No other d/c needs identified         Selected Continued Care - Admitted Since 5/7/2023     Destination    No services have been selected for the patient.              Durable Medical Equipment    No services have been selected for the patient.              Dialysis/Infusion    No services have been selected for the patient.              Home Medical Care Coordination complete.    Service Provider Selected Services Address Phone Fax Patient Preferred    [facility] ,  [facility] [address] [place] [address] [phone] --          Therapy    No services have been selected for the patient.              Community Resources    No services have been selected for the patient.              Community & DME    No services have been selected for the patient.                       Final Discharge Disposition Code: 06 - home with home health care

## 2023-05-11 NOTE — MBS/VFSS/FEES
Acute Care - Speech Language Pathology   Swallow Initial Evaluation  [place]   Modified Barium Swallow Study (MBS)     Patient Name: Brittny Nicolas  : 1945  MRN: [id number]  Today's Date: 2023               Admit Date: 2023    Visit Dx:     ICD-10-CM ICD-9-CM   1. Acute respiratory failure with hypoxia  J96.01 518.81   2. Pneumonia of right lower lobe due to infectious organism  J18.9 486   3. Abnormal CT of the chest  R93.89 793.2   4. Decreased oral intake  R63.8 783.9   5. Other fatigue  R53.83 780.79   6. Dysphagia, unspecified type  R13.10 787.20   7. Pneumonia of both lungs due to infectious organism, unspecified part of lung  J18.9 483.8   8. Alzheimer's disease, unspecified  G30.9 331.0    F02.80    9. Impaired mobility and ADLs  Z74.09 V49.89    Z78.9      Patient Active Problem List   Diagnosis   • Achalasia   • Acute upper respiratory infection   • Atypical chest pain   • Benign essential hypertension   • Carotid artery stenosis   • Cataract   • Cough   • Acute cystitis   • Screening for malignant neoplasm   • Gastroesophageal reflux disease   • Essential familial hypercholesterolemia   • Gastrointestinal bleeding   • Inguinal hernia   • Insomnia   • Leg cramps   • Meniere's disease   • Night sweats   • Osteoporosis   • Peripheral neuropathy   • Retinal artery occlusion   • Rheumatoid arthritis (HCC)   • Stroke syndrome   • Suprapubic pain   • Transient ischemic attack   • Positive reaction to tuberculin skin test   • Vitamin D deficiency   • Weight loss   • H/O colonoscopy   • Fatigue   • Hypothyroidism   • Erosive esophagitis   • Left lower quadrant pain   • Acute pain of left shoulder   • Pharyngitis   • Memory loss   • Rib pain on left side   • Hyponatremia   • Late onset Alzheimer's dementia with behavioral disturbance   • Oropharyngeal dysphagia   • Influenza A with respiratory manifestations   • Anxiety and depression   • Anemia   • Nocturnal enuresis   • Visual loss   •  Alzheimer's disease, unspecified     Past Medical History:   Diagnosis Date   • Acid reflux disease    • Alzheimer's dementia    • Arthritis     rhuematoid   • Carotid artery occlusion    • Coronary artery disease    • Dementia    • Depression    • 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   • Graves disease    • Hyperlipidemia    • Hypertension    • Memory loss    • Stomach problems    • Stroke    • Stroke syndrome    • Thyroid disease    • Transient ischemic attack    • Vision loss      Past Surgical History:   Procedure Laterality Date   • AUGMENTATION MAMMAPLASTY      1978   • BREAST AUGMENTATION     • CAROTID ARTERY ANGIOPLASTY     • CAROTID ENDARTERECTOMY     • CAROTID STENT     • COLONOSCOPY     • D & C CERVICAL BIOPSY      Dilation and Curettage of Cervical Stump   • ENDOSCOPY N/A 5/8/2023    Procedure: ESOPHAGOGASTRODUODENOSCOPY;  Surgeon: Marito Solis MD;  Location: [facility];  Service: Gastroenterology;  Laterality: N/A;  esophageal dilation done with 18-20mm ballon    • HERNIA REPAIR     • HERNIA REPAIR     • THROMBOENDARTERECTOMY      carotid   • UPPER GASTROINTESTINAL ENDOSCOPY         SLP Recommendation and Plan  SLP Swallowing Diagnosis: functional oral phase, functional pharyngeal phase, esophageal dysphagia (05/11/23 1000)  SLP Diet Recommendation: mechanical ground textures, thin liquids (05/11/23 1000)  Recommended Precautions and Strategies: general aspiration precautions, reflux precautions (05/11/23 1000)  SLP Rec. for Method of Medication Administration: meds whole, meds crushed, with puree (05/11/23 1000)           Swallow Criteria for Skilled Therapeutic Interventions Met: no problems identified which require skilled intervention (05/11/23 1000)  Anticipated Discharge Disposition (SLP): home with assist (05/11/23 1000)     Therapy Frequency (Swallow): evaluation only (05/11/23 1000)  Predicted Duration Therapy Intervention (Days): until discharge (05/11/23 1000)                                         Plan of Care Reviewed With: patient, daughter      SWALLOW EVALUATION (last 72 hours)     SLP Adult Swallow Evaluation     Row Name 05/11/23 1000 05/10/23 1610 05/08/23 1315             Rehab Evaluation    Document Type evaluation  - therapy note (daily note)  - evaluation  -      Subjective Information no complaints  - no complaints  - no complaints  -      Patient Observations alert;cooperative  - cooperative;lethargic  - alert;cooperative;agree to therapy  -      Patient/Family/Caregiver Comments/Observations Dtr present  - Daughter present  - none present  -      Patient Effort -- good  - good  -      Symptoms Noted During/After Treatment -- none  - none  -         General Information    Patient Profile Reviewed -- -- yes  -      Pertinent History Of Current Problem -- -- cough, fatigue, reflux, abnormal CT chest.  R and mod L pleural effusions. S/p esophageal dilation this am.  -      Current Method of Nutrition mechanical ground textures;thin liquids  - -- mechanical ground textures;thin liquids;other (see comments)  per MD order following GI procedure.  -      Prior Level of Function-Communication -- -- WFL  -      Prior Level of Function-Swallowing -- -- unknown  -      Plans/Goals Discussed with patient and family;agreed upon  - -- patient;agreed upon  -      Barriers to Rehab previous functional deficit  - -- none identified  -      Patient's Goals for Discharge patient did not state  - -- patient did not state  -      Family Goals for Discharge patient able to eat/drink without coughing/choking;other (see comments)  and improved intake  - -- --         Pain    Additional Documentation -- Pain Scale: FACES Pre/Post-Treatment (Group)  - Pain Scale: FACES Pre/Post-Treatment (Group)  -         Pain Scale: FACES Pre/Post-Treatment    Pain: FACES Scale, Pretreatment 0-->no hurt  - 0-->no hurt  - 0-->no hurt  -      Posttreatment Pain  Rating 0-->no hurt  -SM 0-->no hurt  -MH 0-->no hurt  -CH         Oral Motor Structure and Function    Dentition Assessment -- -- natural, present and adequate  -CH      Secretion Management -- -- WNL/WFL  -CH      Mucosal Quality -- -- moist, healthy  -CH      Volitional Swallow -- -- WFL  -CH         Oral Musculature and Cranial Nerve Assessment    Oral Motor General Assessment -- -- WFL  -CH         General Eating/Swallowing Observations    Respiratory Support Currently in Use -- -- nasal cannula  -      O2 Liters -- -- 3L  -CH      Eating/Swallowing Skills -- -- self-fed;fed by SLP;appropriate self-feeding skills observed  -      Positioning During Eating -- -- upright 90 degree;upright in bed  -      Utensils Used -- -- spoon;cup;straw  -CH      Consistencies Trialed -- -- ice chips;thin liquids;pureed;soft to chew textures;regular textures  -      Pre SpO2 (%) -- -- 98  -CH      Post SpO2 (%) -- -- 98  -CH         Respiratory    Respiratory Status -- -- WFL  -CH         Clinical Swallow Eval    Oral Prep Phase -- -- WFL  -CH      Oral Transit -- -- WFL  -CH      Oral Residue -- -- WFL  -CH      Pharyngeal Phase -- -- no overt signs/symptoms of pharyngeal impairment  -      Esophageal Phase -- -- unremarkable  -      Clinical Swallow Evaluation Summary -- -- No overt clinical s/s of aspiraiton or difficulty observed with any consistency or presentation style  -         MBS/VFSS    Utensils Used spoon;cup;straw  -SM -- --      Consistencies Trialed thin liquids;pureed;regular textures  -SM -- --         MBS/VFSS Interpretation    Oral Prep Phase WFL  mild prolonged though adequate  -SM -- --      Oral Transit Phase WFL  -SM -- --         Initiation of Pharyngeal Swallow    Pharyngeal Phase functional pharyngeal phase of swallowing  -SM -- --         Esophageal Phase    Esophageal Phase esophageal retention;minimal to no esophageal clearance;see radiology report for further details;other (see  comments)  - -- --      Esophageal Phase, Comment dilated esophagus. Radiology PA reported narrowing of the G-E junction, with appearance of achalasia (though LES observed to open during recent EGD).  - -- --         Swallowing Quality of Life Assessment    Education and counseling provided -- -- Signs of aspiration;Risks of aspiration;Oral care recommendations and rationale  -         SLP Evaluation Clinical Impression    SLP Swallowing Diagnosis functional oral phase;functional pharyngeal phase;esophageal dysphagia  - -- functional oral phase;R/O pharyngeal dysphagia;other (see comments)  no suspected pharyngeal dysphagia  -      Functional Impact risk of aspiration/pneumonia  - -- risk of aspiration/pneumonia  -      Rehab Potential/Prognosis, Swallowing -- -- good, to achieve stated therapy goals  -      Swallow Criteria for Skilled Therapeutic Interventions Met no problems identified which require skilled intervention  - -- --         SLP Treatment Clinical Impressions    Treatment Assessment (SLP) -- no clinical signs of;pharyngeal dysphagia  - --      Treatment Assessment Comments (SLP) -- Pt seen for diet tolerance. Per dtr, pt w/ minimal PO intake and intermittent regurgitation of solids that appears to be worsening. No overt s/s of aspiration/pharyngeal dysphagia. Adequate mastication/ clearance w/ regular solid trial & no significant residue. Pt noted w/ belching following thin liquid trials & report globus sensation w/ regular solid trials. Ok to cont current diet, plan for MBS w/ ltd 5/11 to further evalulate swallow fx.  - --      Daily Summary of Progress (SLP) -- progress toward functional goals as expected  - --      Plan for Continued Treatment (SLP) -- continue treatment per plan of care  - --      Care Plan Review -- evaluation/treatment results reviewed  - --      Care Plan Review, Other Participant(s) -- daughter  - --         Recommendations    Therapy Frequency  (Swallow) evaluation only  - -- PRN  -      Predicted Duration Therapy Intervention (Days) until discharge  - until discharge  - 3 days  -      SLP Diet Recommendation mechanical ground textures;thin liquids  - mechanical ground textures;thin liquids;other (see comments)  per MD order  - mechanical ground textures;thin liquids;other (see comments)  per MD order  -      Recommended Diagnostics -- VFSS (MBS);other (see comments)  5/11  - other (see comments)  diet tolerance  -      Recommended Precautions and Strategies general aspiration precautions;reflux precautions  - general aspiration precautions;reflux precautions  - general aspiration precautions  -      Oral Care Recommendations Oral Care BID/PRN;Toothbrush  - Oral Care BID/PRN  - Oral Care BID/PRN  -      SLP Rec. for Method of Medication Administration meds whole;meds crushed;with puree  - meds crushed;with puree;as tolerated  - meds whole;with thin liquids;with puree;as tolerated  -      Monitor for Signs of Aspiration -- notify SLP if any concerns  - notify SLP if any concerns  -      Anticipated Discharge Disposition (SLP) home with assist  - home with assist  - home with assist  -            User Key  (r) = Recorded By, (t) = Taken By, (c) = Cosigned By    Initials Name Effective Dates    Adrienne Vargas MS CCC-SLP 02/03/23 -      Joann Naylor, MS CCC-SLP 06/16/21 -      Jo-Ann Zhang MS, [facility]-SLP 06/22/22 -                 EDUCATION  The patient has been educated in the following areas:   Dysphagia (Swallowing Impairment) Modified Diet Instruction.              Time Calculation:    Time Calculation- SLP     Row Name 05/11/23 1139             Time Calculation- SLP    SLP Start Time 1000  -      SLP Received On 05/11/23  -            User Key  (r) = Recorded By, (t) = Taken By, (c) = Cosigned By    Initials Name Provider Type    Adrienne Vargas MS CCC-SLP Speech and  Language Pathologist                Therapy Charges for Today     Code Description Service Date Service Provider Modifiers Qty    06347643029 HC ST MOTION FLUORO EVAL SWALLOW 4 5/11/2023 Adrienne Villanueva, MS CCC-SLP GN 1               Adrienne Villanueva, MS CCC-SLP  5/11/2023

## 2023-05-11 NOTE — PLAN OF CARE
Goal Outcome Evaluation:  Plan of Care Reviewed With: patient, daughter            SLP evaluation with MBS completed. Oropharyngeal swallow WFL. No aspiration or significant residue. Esophageal phase - Radiology PA reports dilated esophagus and significantly reduced opening of the G-E junction, as appearance with achalasia (though note LES observed to open on recent EGD). No further SLP needs. Will sign-off. Please see note for further details and recommendations.

## 2023-05-11 NOTE — OUTREACH NOTE
Prep Survey    Flowsheet Row Responses   Nondenominational facility patient discharged from? [facility]   Is LACE score < 7 ? No   Eligibility [facility]   Date of Admission 05/07/23   Date of Discharge 05/11/23   Discharge Disposition Home-Health Care Svc   Discharge diagnosis Bilateral pneumonia secondary to aspiration, hypoxia, Alzheimer's disease   Does the patient have one of the following disease processes/diagnoses(primary or secondary)? Pneumonia   Does the patient have Home health ordered? Yes   What is the Home health agency?  [facility]   Is there a DME ordered? No   Prep survey completed? Yes          Baylee NGUYEN - Registered Nurse

## 2023-05-11 NOTE — PAYOR COMM NOTE
"[facility]# [id number]  Discharge Summary    Utilization Review  Phone [phone]  Fax [phone]    [facility]  [address] [place] [address]          Oliverio Nicolas (78 y.o. Female)     Date of Birth   1945    Social Security Number       Address   [address] [facility] [address] [facility] [address]    Home Phone   [phone]    MRN   [id number]       Shinto   Pentecostal    Marital Status                               Admission Date   5/7/23    Admission Type   Emergency    Admitting Provider   Yenni Reyes MD    Attending Provider   Yenni Reyes MD    Department, Room/Bed   [address], S587/1       Discharge Date       Discharge Disposition   Home or Self Care    Discharge Destination                               Attending Provider: Yenni Reyes MD    Allergies: Boniva [Ibandronic Acid]    Isolation: None   Infection: None   Code Status: No CPR    Ht: 160 cm (63\")   Wt: 59.5 kg (131 lb 3.2 oz)    Admission Cmt: None   Principal Problem: Bilateral pneumonia secondary to aspiration [J18.9]                 Active Insurance as of 5/7/2023     Primary Coverage     Payor Plan Insurance Group Employer/Plan Group    ANTHEM MEDICARE REPLACEMENT ANTHEM MEDICARE ADVANTAGE [id number]     Payor Plan Address Payor Plan Phone Number Payor Plan Fax Number Effective Dates    [address] [phone]  1/1/2019 - None Entered    [facility] [id number]       Subscriber Name Subscriber Birth Date Member ID       OLIVERIO NICOLAS 1945 [id number]           Secondary Coverage     Payor Plan Insurance Group Employer/Plan Group    [place]      Payor Plan Address Payor Plan Phone Number Payor Plan Fax Number Effective Dates    [address] [phone]  1/26/2021 - None Entered    [facility] [id number]       Subscriber Name Subscriber Birth Date Member ID       OLIVERIO NICOLAS 1945 [id number]                 Emergency Contacts     "  (Rel.) Home Phone Work Phone Mobile Phone    Adia Tatum (Daughter) [phone] -- [phone]               Discharge Summary      Yenni Reyes MD at 23 0824              [facility] Medicine Services  DISCHARGE SUMMARY    Patient Name: Brittny Nicolas  : 1945  MRN: [id number]    Date of Admission: 2023 12:25 PM  Date of Discharge:  23  Primary Care Physician: Dalila Liz MD    Consults     Date and Time Order Name Status Description    2023 12:34 AM Inpatient Gastroenterology Consult Completed           Hospital Course     Presenting Problem:   Acute respiratory failure with hypoxia [J96.01]    Active Hospital Problems    Diagnosis  POA   • Alzheimer's disease, unspecified [G30.9, F02.80]  Yes   • Fatigue [R53.83]  Unknown   • Hypothyroidism [E03.9]  Yes   • Benign essential hypertension [I10]  Yes      Resolved Hospital Problems    Diagnosis Date Resolved POA   • **Bilateral pneumonia secondary to aspiration [J18.9] 2023 Yes   • Abnormal CT of the chest [R93.89] 2023 Unknown   • Decreased oral intake [R63.8] 2023 Unknown   • Hypoxia [R09.02] 2023 Yes          Hospital Course:  Brittny Nicolas is a 78 y.o. female with hx of dementia, hypothyroidism, and HTN presents due to cough and lethargy. Pulse ox at home showed O2 sats in the 80's. CT chest showed extensive multifocal bilateral airspace disease consistent with B/L pneumonia, possibly due to aspiration; also showed marked distension of esophagus.      Bilateral pneumonia, suspect aspiration  Hypoxia  --O2 sats 88% on RA on arrival  --CT chest with bilateral pneumonia, suspect aspiration related  --s/p IV Zosyn--switched to Augmentin 5/10/23, will continue at discharge to complete total 7 days therapy  --Blood cultures negative  --Currently on 1-2 liters, last RA saturation 87%--will need home O2 arranged  --Continue IS     Esophageal  dysmotility  Dysphgia  GERD  --s/p EGD 5/8/23: showed dilated esophagus but wide open LES, does not appear to be achalasia; dilated to 20 mm  --Observe following esophageal dilation, seems to be doing well  --Continue PPI--daughter requested changing her Dexilant, will trial Protonix at discharge  --Patient/family to call [facility] GI office if she has recurrent symptoms as she may require repeat dilation in the future  --SLP following, continue mechanical ground, thin liquids; MBS ordered for this morning prior to discharge as well     Hypothyroidism  --Continue Synthroid     HTN  --Continue Norvasc     Dementia  --Continue Aricept, Namenda  --Patient more somnolent this morning, slept most of the day yesterday; discussed overall prognosis for dementia, she will have good days and bad days; I think getting her back home to familiar surroundings and routine will help tremendously, daughter reassured      Discharge Follow Up Recommendations for outpatient labs/diagnostics:  F/U with PCP in 1 week  F/U with [facility] as needed based on symptoms    Day of Discharge     HPI:   Seen this morning, daughter at bedside. Patient slept most of day yesterday. She is still very sleepy this morning. She has no specific complaints.    Review of Systems  Gen-no fevers, no chills  CV-no chest pain, no palpitations  Resp-no cough, no dyspnea  GI-no N/V/D, no abd pain      Vital Signs:   Temp:  [97.3 °F (36.3 °C)-98.6 °F (37 °C)] 98.1 °F (36.7 °C)  Heart Rate:  [52-76] 66  Resp:  [16-18] 18  BP: (138-161)/(71-83) 151/75  Flow (L/min):  [1.5-2] 1.5      Physical Exam:  Gen-no acute distress  HENT-NCAT, mucous membranes moist  CV-RRR, S1 S2 normal, no m/r/g  Resp-CTAB, no wheezes or rales, nonlabored  Abd-soft, NT, ND, +BS  Ext-no edema  Neuro-does fall asleep intermittently during our conversation but able to wake up and answer questions appropriately, no focal deficits  Skin-no rashes  Psych-appropriate mood      Pertinent  and/or Most  Recent Results     LAB RESULTS:      Lab 05/11/23  0433 05/10/23  0353 05/09/23  0519 05/08/23 0317 05/07/23  1255   WBC 12.62* 13.72* 12.11* 12.20* 12.75*   HEMOGLOBIN 11.4* 11.3* 10.7* 10.4* 13.0   HEMATOCRIT 37.5 36.7 34.1 34.1 41.9   PLATELETS 351 360 338 296 342   NEUTROS ABS  --   --   --  10.03* 10.96*   IMMATURE GRANS (ABS)  --   --   --  0.06* 0.06*   LYMPHS ABS  --   --   --  1.20 0.61*   MONOS ABS  --   --   --  0.90 1.09*   EOS ABS  --   --   --  0.00 0.01   MCV 89.9 87.4 85.5 87.2 87.5   PROCALCITONIN  --   --   --   --  1.45*   LACTATE  --   --   --   --  1.5         Lab 05/11/23 0433 05/10/23  0353 05/09/23 0519 05/08/23 0317 05/07/23  1255   SODIUM 135* 137 137 138 139   POTASSIUM 3.6 3.8 3.8 4.5 3.6   CHLORIDE 101 102 104 106 103   CO2 25.0 26.0 23.0 23.0 25.0   ANION GAP 9.0 9.0 10.0 9.0 11.0   BUN 10 9 14 13 14   CREATININE 0.62 0.69 0.54* 0.60 0.71   EGFR 90.2 87.9 93.2 90.9 86.6   GLUCOSE 80 65 81 95 108*   CALCIUM 9.0 8.7 9.2 8.7 9.8   TSH  --   --   --   --  1.210         Lab 05/07/23  1255   TOTAL PROTEIN 7.7   ALBUMIN 3.5   GLOBULIN 4.2   ALT (SGPT) 8   AST (SGOT) 13   BILIRUBIN 0.5   ALK PHOS 115         Lab 05/07/23  1255   PROBNP 222.9   HSTROP T 9                 Brief Urine Lab Results  (Last result in the past 365 days)      Color   Clarity   Blood   Leuk Est   Nitrite   Protein   CREAT   Urine HCG        05/07/23 1605 Yellow   Turbid   Negative   Negative   Negative   30 mg/dL (1+)               Microbiology Results (last 10 days)     Procedure Component Value - Date/Time    Blood Culture - Blood, Arm, Left [[id number]]  (Normal) Collected: 05/07/23 1313    Lab Status: Preliminary result Specimen: Blood from Arm, Left Updated: 05/10/23 1330     Blood Culture No growth at 3 days    Blood Culture - Blood, Arm, Right [[id number]]  (Normal) Collected: 05/07/23 1313    Lab Status: Preliminary result Specimen: Blood from Arm, Right Updated: 05/10/23 1330     Blood Culture No growth at 3  days    COVID PRE-OP / PRE-PROCEDURE SCREENING ORDER (NO ISOLATION) - Swab, Nasopharynx [[id number]]  (Normal) Collected: 05/07/23 1207    Lab Status: Final result Specimen: Swab from Nasopharynx Updated: 05/07/23 1308    Narrative:      The following orders were created for panel order COVID PRE-OP / PRE-PROCEDURE SCREENING ORDER (NO ISOLATION) - Swab, Nasopharynx.  Procedure                               Abnormality         Status                     ---------                               -----------         ------                     COVID-19 and FLU A/B PCR...[[id number]]  Normal              Final result                 Please view results for these tests on the individual orders.    COVID-19 and FLU A/B PCR - Swab, Nasopharynx [[id number]]  (Normal) Collected: 05/07/23 1207    Lab Status: Final result Specimen: Swab from Nasopharynx Updated: 05/07/23 1308     COVID19 Not Detected     Influenza A PCR Not Detected     Influenza B PCR Not Detected    Narrative:      Fact sheet for providers: https://www.fda.gov/media/562918/download    Fact sheet for patients: https://www.fda.gov/media/741175/download    Test performed by PCR.          CT Chest With Contrast Diagnostic    Result Date: 5/7/2023  CT CHEST W CONTRAST DIAGNOSTIC Date of Exam: 5/7/2023 1:52 PM EDT Indication: cough, fever, paratracheal air on cxr. Comparison: Concurrent chest radiograph Technique: Axial CT images were obtained of the chest after the uneventful intravenous administration of 75 in the Isovue-300.  Reconstructed coronal and sagittal images were also obtained. Automated exposure control and iterative construction methods were used. Findings: Air shadow noted in the mediastinum corresponds to the markedly dilated esophagus, which contains extensive air and food, becoming markedly narrowed at the GE junction. Maximal diameter of the proximal esophagus is approximately 5 cm. More distally, the esophagus measures up to 4 cm transverse luminal  diameter before becoming markedly narrowed at the GE junction. This pattern suggests achalasia. No inflammatory change or mass is identified. GE junction segment appears very tight/narrowed. Included portion of the proximal stomach appears normal below the level of the cardia. No inflammatory change mass or adenopathy is seen in this general region. Elsewhere, no evidence of pulmonary embolic disease is seen. Thoracic aorta shows no evidence of aneurysm or dissection. No pericardial or pleural effusion or mediastinal adenopathy is seen. There is extensive multifocal bilateral airspace disease, greater in the right lower lobe and medial left lower lobe than elsewhere, suspicious for pneumonia or, given patient's esophageal findings, aspiration. No pneumothorax or edema is seen. Included images of the upper abdomen show mild fatty liver change. Spleen does not appear to be enlarged. Incidental note is made of bilateral breast prostheses.     Impression: 1. Marked distention of the entire esophagus down to the level of the GE junction where it is very narrow. No visible inflammation or mass. Findings suggest achalasia. 2. Extensive multifocal pulmonary airspace disease, consistent with bilateral pneumonia, which may be due to aspiration. Electronically Signed: Thomas Rodriguez  5/7/2023 2:16 PM EDT  Workstation ID: [id number]    XR Chest 1 View    Result Date: 5/7/2023  XR CHEST 1 VW Date of Exam: 5/7/2023 12:26 PM EDT Indication: SOA triage protocol Comparison: 10/26/2022 Findings: There is a small to moderate left pleural effusion Small right pleural effusion. There is generalized coarsening of the interstitial pattern which may be due to edema or atypical infection pattern. Pulmonary vascularity appears unchanged. Heart size appears within normal limits. Air lucency at the right paratracheal region may be due to esophageal air. No pneumothorax is seen.     Impression: 1. Small right and small to moderate left pleural  effusions. 2. Diffuse interstitial prominence may be due to edema or atypical infection pattern or may be due to chronic fibrosis. Electronically Signed: Justino Love  5/7/2023 12:56 PM EDT  Workstation ID: [id number]      Pending Labs     Order Current Status    Blood Culture - Blood, Arm, Left Preliminary result    Blood Culture - Blood, Arm, Right Preliminary result        Discharge Details        Discharge Medications      New Medications      Instructions Start Date   amoxicillin-clavulanate 875-125 MG per tablet  Commonly known as: AUGMENTIN   1 tablet, Oral, Every 12 Hours Scheduled      pantoprazole 40 MG EC tablet  Commonly known as: PROTONIX   40 mg, Oral, Daily With Breakfast         Continue These Medications      Instructions Start Date   acetaminophen 325 MG tablet  Commonly known as: TYLENOL   650 mg, Oral, Every 4 Hours PRN      amLODIPine 5 MG tablet  Commonly known as: NORVASC   5 mg, Oral, Daily      atorvastatin 20 MG tablet  Commonly known as: LIPITOR   20 mg, Oral, Every Night at Bedtime      donepezil 10 MG tablet  Commonly known as: ARICEPT   10 mg, Oral, Nightly      levothyroxine 125 MCG tablet  Commonly known as: SYNTHROID, LEVOTHROID   TAKE 1 TABLET BY MOUTH EVERY DAY      memantine 10 MG tablet  Commonly known as: NAMENDA   10 mg, Oral, 2 Times Daily      polyethylene glycol 17 g packet  Commonly known as: MIRALAX   17 g, Oral, Daily      QUEtiapine 25 MG tablet  Commonly known as: SEROquel   50 mg, Oral, Nightly      Vitamin D 50 MCG (2000 UT) capsule   1 capsule, Oral, Daily         Stop These Medications    dexlansoprazole 60 MG capsule  Commonly known as: DEXILANT     nitrofurantoin (macrocrystal-monohydrate) 100 MG capsule  Commonly known as: MACROBID     predniSONE 10 MG tablet  Commonly known as: DELTASONE            Allergies   Allergen Reactions   • Boniva [Ibandronic Acid] GI Intolerance     Refractory gerd and abdominal pain          Discharge Disposition:  Home or Self  Care    Diet:  Hospital:  Diet Order   Procedures   • Diet: Regular/House Diet, Gastrointestinal Diets; Fiber-Restricted; Texture: Mechanical Ground (NDD 2); Fluid Consistency: Thin (IDDSI 0)       Activity:  Activity Instructions     Activity as Tolerated               CODE STATUS:    Code Status and Medical Interventions:   Ordered at: 05/07/23 1507     Medical Intervention Limits:    NO intubation (DNI)     Code Status (Patient has no pulse and is not breathing):    No CPR (Do Not Attempt to Resuscitate)     Medical Interventions (Patient has pulse or is breathing):    Limited Support       Future Appointments   Date Time Provider Department [place]   5/11/2023  4:00 PM [facility] 1 BH [facility] US SO [facility]   7/12/2023 10:00 AM Munira Bloom APRN [facility] N CN LX2 [facility]   8/31/2023  2:15 PM Dalila Liz MD [facility] END BM [facility]       Additional Instructions for the Follow-ups that You Need to Schedule     Discharge Follow-up with PCP   As directed       Currently Documented PCP:    Dalila Liz MD    PCP Phone Number:    [phone]     Follow Up Details: 1 week         Discharge Follow-up with Specified Provider: [facility] as needed, call if recurrent symptoms of difficulty swallowing so they can arrange for outpatiend endoscopy for repeat esophageal dilation   As directed      To: [facility] as needed, call if recurrent symptoms of difficulty swallowing so they can arrange for outpatiend endoscopy for repeat esophageal dilation                     Yenni Reyes MD  05/11/23      Time Spent on Discharge:  I spent  40 minutes on this discharge activity which included: face-to-face encounter with the patient, reviewing the data in the system, coordination of the care with the nursing staff as well as consultants, documentation, and entering orders.            Electronically signed by Yenni Reyes MD at 05/11/23 0940       Discharge Order (From admission, onward)     Start     Ordered     05/11/23 0822  Discharge patient  Once        Expected Discharge Date: 05/11/23    Discharge Disposition: Home or Self Care    Physician of Record for Attribution - Please select from Treatment Team: JENNIFER DE PAZ [[id number]]    Review needed by CMO to determine Physician of Record: No       Question Answer Comment   Physician of Record for Attribution - Please select from Treatment Team JENNIFER DE PAZ    Review needed by CMO to determine Physician of Record No        05/11/23 0824

## 2023-05-12 ENCOUNTER — TRANSITIONAL CARE MANAGEMENT TELEPHONE ENCOUNTER (OUTPATIENT)
Dept: CALL CENTER | Facility: HOSPITAL | Age: 78
End: 2023-05-12
Payer: MEDICARE

## 2023-05-12 ENCOUNTER — HOME CARE VISIT (OUTPATIENT)
Dept: HOME HEALTH SERVICES | Facility: HOME HEALTHCARE | Age: 78
End: 2023-05-12
Payer: COMMERCIAL

## 2023-05-12 LAB
BACTERIA SPEC AEROBE CULT: NORMAL
BACTERIA SPEC AEROBE CULT: NORMAL

## 2023-05-12 PROCEDURE — G0299 HHS/HOSPICE OF RN EA 15 MIN: HCPCS

## 2023-05-12 NOTE — OUTREACH NOTE
Call Center TCM Note    Flowsheet Row Responses   [facility] patient discharged from? [place]   Does the patient have one of the following disease processes/diagnoses(primary or secondary)? Pneumonia   TCM attempt successful? Yes   Call start time 1245   Call end time 1247   Discharge diagnosis Bilateral pneumonia secondary to aspiration, hypoxia, Alzheimer's disease   Is patient permission given to speak with other caregiver? Yes   List who call center can speak with daughter- Adai   Person spoke with today (if not patient) and relationship daughter   Meds reviewed with patient/caregiver? Yes   Is the patient having any side effects they believe may be caused by any medication additions or changes? No   Does the patient have all medications ordered at discharge? Yes   Is the patient taking all medications as directed (includes completed medication regime)? Yes   Comments appt with Dr. Liz on 5/23   Does the patient have an appointment with their PCP within 7 days of discharge? Yes   What is the Home health agency?  [facility]   Has home health visited the patient within 72 hours of discharge? Call prior to 72 hours   Home health comments Home health will be there at 2 pm today   Psychosocial issues? No   Did the patient receive a copy of their discharge instructions? Yes   Nursing interventions Reviewed instructions with patient   What is the patient's perception of their health status since discharge? Improving   Nursing Interventions Nurse provided patient education   Is the patient/caregiver able to teach back the hierarchy of who to call/visit for symptoms/problems? PCP, Specialist, Home health nurse, Urgent Care, ED, 911 Yes   Is the patient/caregiver able to teach back signs and symptoms of worsening condition: Fever/chills, Chest pain, Shortness of breath   Is the patient/caregiver able to teach back importance of completing antibiotic course of treatment? Yes   TCM call completed? Yes   Wrap up  additional comments Per daughter, patient is doing well, no questions, confirmed appt with PCP for 5/23.   Call end time 1247   Would this patient benefit from a Referral to [facility] Social Work? No   Is the patient interested in additional calls from an ambulatory ?  NOTE:  applies to high risk patients requiring additional follow-up. No          Carlee Otero RN    5/12/2023, 12:47 EDT

## 2023-05-12 NOTE — Clinical Note
SOC Note:    Home Health ordered for: disciplines SN, PT, OT  MULTIPLE DOGS AND  CAT, HAS AN AIDE THRU WAIVER 5 DAYS A WEEK [facility] HOUSE/AGING WITH BIJAN     Reason for Hosp/Primary Dx/Co-morbidities: ASPIRATION PNEUMONIA, UTI     Focus of Care: aspiration pneumonia, uti    Skilled Need: SN for assessment of  system and CP assessment and medication education     Current Functional status/mobility/DME: cane, walker     HB status/Living Arrangements: lives with daughter, has 24/7 supervision     Skin Integrity/wound status: intct, no skin breakdown     Code Status: full     Fall Risk: high risk     POC confirmed with pcp 5/14/23

## 2023-05-14 VITALS
TEMPERATURE: 98.6 F | HEART RATE: 80 BPM | DIASTOLIC BLOOD PRESSURE: 96 MMHG | WEIGHT: 135 LBS | RESPIRATION RATE: 20 BRPM | OXYGEN SATURATION: 92 % | HEIGHT: 62 IN | SYSTOLIC BLOOD PRESSURE: 167 MMHG | BODY MASS INDEX: 24.84 KG/M2

## 2023-05-14 NOTE — HOME HEALTH
SOC Note:    Home Health ordered for: disciplines SN, PT, OT  MULTIPLE DOGS AND  CAT, HAS AN AIDE THRU WAIVER 5 DAYS A WEEK [facility] HOUSE/AGING WITH BIJAN     Reason for Hosp/Primary Dx/Co-morbidities:  PNEUMONIA, UTI     Focus of Care: aspiration pneumonia, uti    Skilled Need: SN for assessment of  system and CP assessment and medication education     Current Functional status/mobility/DME: cane, walker     HB status/Living Arrangements: lives with daughter, has 24/7 supervision     Skin Integrity/wound status: intct, no skin breakdown     Code Status: full     Fall Risk: high risk     POC confirmed with pcp 5/14/23    Plan for next visit: sn for assessment and instruction on cp and gu systems

## 2023-05-16 ENCOUNTER — HOME CARE VISIT (OUTPATIENT)
Dept: HOME HEALTH SERVICES | Facility: HOME HEALTHCARE | Age: 78
End: 2023-05-16
Payer: COMMERCIAL

## 2023-05-16 VITALS — RESPIRATION RATE: 16 BRPM

## 2023-05-16 PROCEDURE — G0151 HHCP-SERV OF PT,EA 15 MIN: HCPCS

## 2023-05-17 NOTE — HOME HEALTH
PT Eval Note:   Home Health ordered for: disciplines SN, PT, OT   Reason for Hosp/Primary Dx/Co-morbidities: PNEUMONIA, UTI/dementia, hypothyroidism, and HTN   Focus of Care: HHPT 1wk4 for gait training, balance training, therapeutic exercise, pt education and HEP instruction related to PNA  Current Functional status/mobility/DME: pt has a cane and a walker but does not use an AD regularly  HB status/Living Arrangements: lives with daughter, has 24/7 supervision; waiver services 5 days a week  Skin Integrity/wound status: no deficits noted  Code Status: full   Fall Risk: high risk per Tinetti  POC confirmed with pcp 5/14/23   Plan for next visit: issue and review written HEP

## 2023-05-19 ENCOUNTER — HOME CARE VISIT (OUTPATIENT)
Dept: HOME HEALTH SERVICES | Facility: HOME HEALTHCARE | Age: 78
End: 2023-05-19
Payer: COMMERCIAL

## 2023-05-19 PROCEDURE — G0152 HHCP-SERV OF OT,EA 15 MIN: HCPCS

## 2023-05-19 PROCEDURE — G0495 RN CARE TRAIN/EDU IN HH: HCPCS

## 2023-05-20 VITALS
TEMPERATURE: 97.4 F | HEART RATE: 76 BPM | SYSTOLIC BLOOD PRESSURE: 120 MMHG | OXYGEN SATURATION: 97 % | DIASTOLIC BLOOD PRESSURE: 62 MMHG | RESPIRATION RATE: 16 BRPM

## 2023-05-20 NOTE — HOME HEALTH
Routine Visit Note:    Skill/education provided: Lungs clear ox sat in mid 90s.  Family states she has a rare cough on  occasion when eating/drinking.  Taking meds without difficulty  Educated on medicaitons  daughter asked if Protonix could be crushed as pt cant swallow. Directions on bottle say not to.  She is taking pt to PCP on tue and will ask about crushing vs a different med      Plan for next visit Resp assessment and plan to dc

## 2023-05-21 NOTE — HOME HEALTH
OT Eval Note:   Home Health ordered for: disciplines SN, PT, OT   Reason for Hosp/Primary Dx/Co-morbidities: PNEUMONIA, UTI/dementia, hypothyroidism, and HTN   Focus of Care: HHOT 1wk3 for family/cg'er education/training for cognitive retaining with daily routine, therapeutic exercise to increase strength, breath support, balance, establish HEP w/ cg'er, equipment needs for bathroom safety, fall prevention strategies.  Current Functional status/mobility/DME: pt has a cane and a walker- doesn't use d/t difficulty w/ new learning with dementia  HB status/Living Arrangements: lives with daughter, has 24/7 supervision; waiver services 5 days a week  Skin Integrity/wound status: no deficits noted  Code Status: full   Fall Risk: high risk, steadying assist needed walking around obstacles on this date. Recommendations provided to increase home safety.  Plan for Next Visit: Contact [facility] re: ME needs for donation, Booklet from [employer], initiate HEP for GM/FM, balance tasks.

## 2023-05-22 ENCOUNTER — HOME CARE VISIT (OUTPATIENT)
Dept: HOME HEALTH SERVICES | Facility: HOME HEALTHCARE | Age: 78
End: 2023-05-22
Payer: COMMERCIAL

## 2023-05-22 VITALS
RESPIRATION RATE: 14 BRPM | OXYGEN SATURATION: 93 % | DIASTOLIC BLOOD PRESSURE: 74 MMHG | SYSTOLIC BLOOD PRESSURE: 130 MMHG | HEART RATE: 72 BPM

## 2023-05-22 PROCEDURE — G0157 HHC PT ASSISTANT EA 15: HCPCS

## 2023-05-23 ENCOUNTER — OFFICE VISIT (OUTPATIENT)
Dept: ENDOCRINOLOGY | Facility: CLINIC | Age: 78
End: 2023-05-23
Payer: MEDICARE

## 2023-05-23 VITALS
SYSTOLIC BLOOD PRESSURE: 126 MMHG | DIASTOLIC BLOOD PRESSURE: 64 MMHG | OXYGEN SATURATION: 97 % | WEIGHT: 128.2 LBS | HEIGHT: 63 IN | HEART RATE: 83 BPM | BODY MASS INDEX: 22.71 KG/M2

## 2023-05-23 DIAGNOSIS — F02.80 ALZHEIMER'S DISEASE, UNSPECIFIED: Primary | ICD-10-CM

## 2023-05-23 DIAGNOSIS — G30.9 ALZHEIMER'S DISEASE, UNSPECIFIED: Primary | ICD-10-CM

## 2023-05-23 DIAGNOSIS — I10 BENIGN ESSENTIAL HYPERTENSION: ICD-10-CM

## 2023-05-23 DIAGNOSIS — T17.908A ASPIRATION INTO AIRWAY, INITIAL ENCOUNTER: ICD-10-CM

## 2023-05-23 DIAGNOSIS — E78.01 ESSENTIAL FAMILIAL HYPERCHOLESTEROLEMIA: ICD-10-CM

## 2023-05-23 DIAGNOSIS — K22.0 ACHALASIA: ICD-10-CM

## 2023-05-23 NOTE — PROGRESS NOTES
Transitional Care Follow Up Visit  Subjective     Brittny Nicolas is a 78 y.o. female who presents for a transitional care management visit.    Within 48 business hours after discharge our office contacted her via telephone to coordinate her care and needs.      I reviewed and discussed the details of that call along with the discharge summary, hospital problems, inpatient lab results, inpatient diagnostic studies, and consultation reports with Brittny.     Current outpatient and discharge medications have been reconciled for the patient.  Reviewed by: Casandra Sommer MA          5/11/2023     5:09 PM   Date of TCM Phone Call   [facility]   Date of Admission 5/7/2023   Date of Discharge 5/11/2023   [place] [facility]     Risk for Readmission (LACE) Score: 13 (5/11/2023  6:00 AM)      History of Present Illness   Course During Hospital Stay:  Hospitalized for choking and pneumonia- found to have esophageal stricture and had dilation  Treated as aspiration   Had MBS - see speech note   Gave family no restrictions other than soft foods       The following portions of the patient's history were reviewed and updated as appropriate: allergies, current medications, past family history, past medical history, past social history, past surgical history and problem list.    Review of Systems    Objective   Physical Exam  Constitutional:       Appearance: Normal appearance.   Eyes:      Extraocular Movements: Extraocular movements intact.      Pupils: Pupils are equal, round, and reactive to light.   Cardiovascular:      Rate and Rhythm: Normal rate.   Pulmonary:      Effort: Pulmonary effort is normal.      Breath sounds: Normal breath sounds.   Musculoskeletal:         General: Normal range of motion.   Skin:     General: Skin is warm and dry.   Neurological:      General: No focal deficit present.      Mental Status: She is alert and oriented to person, place, and time.   Psychiatric:          Mood and Affect: Mood normal.         Behavior: Behavior normal.         Assessment & Plan   Problems Addressed this Visit        Other    Achalasia     S/p recent dilation via GI   Tolerating oral intake well now          Alzheimer's disease, unspecified - Primary     Progressive symptoms - incontinence and choking          Aspiration into airway     Resolved now          Benign essential hypertension     bp is good overall  Continue monitoring and medications          Essential familial hypercholesterolemia     Is taking lipitor 20 mg daily   Consider stopping this medication         Diagnoses       Codes Comments    Alzheimer's disease, unspecified    -  Primary ICD-10-CM: G30.9, F02.80  ICD-9-CM: 331.0     Benign essential hypertension     ICD-10-CM: I10  ICD-9-CM: 401.1     Essential familial hypercholesterolemia     ICD-10-CM: E78.01  ICD-9-CM: 272.0     Achalasia     ICD-10-CM: K22.0  ICD-9-CM: 530.0     Aspiration into airway, initial encounter     ICD-10-CM: T17.908A  ICD-9-CM: 934.9       3/23 lab work normal tsh, normal ldl   Discussed supplements   Needs medication for GERD that can be crushed    Return in about 6 months (around 11/23/2023) for Recheck.  Dalila Liz MD

## 2023-05-24 ENCOUNTER — READMISSION MANAGEMENT (OUTPATIENT)
Dept: CALL CENTER | Facility: HOSPITAL | Age: 78
End: 2023-05-24
Payer: MEDICARE

## 2023-05-24 NOTE — HOME HEALTH
PT Routine visit note    Skill/education provided: gait and transfer training, HEP instruction w/ pt. and caregivers    Pt. response: pt. is agreeable to PT today; caregiver and pt. education re: HEP, safety awareness able to v/u and return demo today    Plan for next visit: review HEP w/ pt. and caregivers

## 2023-05-24 NOTE — OUTREACH NOTE
COPD/PN Week 2 Survey    Flowsheet Row Responses   [facility] [place], operated by [facility] patient discharged from? [facility]   Does the patient have one of the following disease processes/diagnoses(primary or secondary)? Pneumonia   Week 2 attempt successful? Yes   Call start time 1210   Call end time 1212   Discharge diagnosis Bilateral pneumonia secondary to aspiration, hypoxia, Alzheimer's disease   Is patient permission given to speak with other caregiver? Yes   List who call center can speak with Adia Tatum   Person spoke with today (if not patient) and relationship Adia Tatum- daughter   Meds reviewed with patient/caregiver? Yes   Is the patient having any side effects they believe may be caused by any medication additions or changes? No   Does the patient have all medications ordered at discharge? Yes   Is the patient taking all medications as directed (includes completed medication regime)? Yes   Does the patient have a primary care provider?  Yes   Comments regarding PCP Patient has seen Dr. Liz   Has the patient kept scheduled appointments due by today? Yes   What is the Home health agency?  [facility]   Has home health visited the patient within 72 hours of discharge? Yes   Psychosocial issues? No   Did the patient receive a copy of their discharge instructions? Yes   Nursing interventions Reviewed instructions with patient   What is the patient's perception of their health status since discharge? Improving   Nursing Interventions Nurse provided patient education   If the patient is a current smoker, are they able to teach back resources for cessation? Not a smoker   Is the patient/caregiver able to teach back the hierarchy of who to call/visit for symptoms/problems? PCP, Specialist, Home health nurse, Urgent Care, ED, 911 Yes   Is the patient/caregiver able to teach back signs and symptoms of worsening condition: Fever/chills, Shortness of breath, Chest pain   Is the patient/caregiver able to teach back importance of  completing antibiotic course of treatment? Yes   Week 2 call completed? Yes          Deonna NGUYEN - Licensed Nurse

## 2023-05-25 ENCOUNTER — HOME CARE VISIT (OUTPATIENT)
Dept: HOME HEALTH SERVICES | Facility: HOME HEALTHCARE | Age: 78
End: 2023-05-25
Payer: COMMERCIAL

## 2023-05-25 VITALS
RESPIRATION RATE: 16 BRPM | SYSTOLIC BLOOD PRESSURE: 120 MMHG | HEART RATE: 78 BPM | TEMPERATURE: 97 F | DIASTOLIC BLOOD PRESSURE: 66 MMHG | OXYGEN SATURATION: 97 %

## 2023-05-25 PROCEDURE — G0495 RN CARE TRAIN/EDU IN HH: HCPCS

## 2023-05-26 ENCOUNTER — HOME CARE VISIT (OUTPATIENT)
Dept: HOME HEALTH SERVICES | Facility: HOME HEALTHCARE | Age: 78
End: 2023-05-26
Payer: COMMERCIAL

## 2023-05-26 PROCEDURE — G0152 HHCP-SERV OF OT,EA 15 MIN: HCPCS

## 2023-05-29 VITALS — DIASTOLIC BLOOD PRESSURE: 78 MMHG | SYSTOLIC BLOOD PRESSURE: 124 MMHG | RESPIRATION RATE: 16 BRPM

## 2023-05-31 ENCOUNTER — HOME CARE VISIT (OUTPATIENT)
Dept: HOME HEALTH SERVICES | Facility: HOME HEALTHCARE | Age: 78
End: 2023-05-31
Payer: COMMERCIAL

## 2023-05-31 VITALS — SYSTOLIC BLOOD PRESSURE: 117 MMHG | DIASTOLIC BLOOD PRESSURE: 77 MMHG | HEART RATE: 71 BPM | OXYGEN SATURATION: 94 %

## 2023-05-31 PROCEDURE — G0152 HHCP-SERV OF OT,EA 15 MIN: HCPCS

## 2023-05-31 NOTE — HOME HEALTH
OT session focused on therapeutic activity to increase endurance for activity participation as well as dynamic standing balance, functional transfers, tub bench transfers, therapeutic exercises, and IADL activity of simple housekeeping.  Pt required Supervision for transfers this date with Bina verbal cueing for safety.  Pt ambulated housed distance X3 with CGA and verbal cues for obstacle avoidance.  Pt particiaption in UE therapeutic exercises including shoulder flexion, horizontal shoulder abduction, scapular elevation, and circular scapular elevation X 10 reps from standing.  Pt with rated 5/10 exertion with activity, however O2 saturation above 95% with activity.  Pt participated in dynamic standing balance activity of balloon toss X15 reps and balloon tap for ~4 minutes to all directions with safety steping and positioning of body in space to safely achieve midline.  Pt then participated in housekeeping task of cleaning the dining room table with min verbal cueing and setup for task completion as well as close supervision for balance.  Pt then went to sink and completed washing of cloth use for task.  Pt being discharged with all goals met.  Pt to discharge home with family assist.

## 2023-05-31 NOTE — HOME HEALTH
"Routine Visit Note:    Skill/education provided: Pt/family ed/training of DME donated by [employer] for bathroom safety. Dtr installed commode frame, OT s/u TTB w/ pt/dtr/hired cg'er training for bathroom transfers. Education for daily routine and inserting there ex and activities during the day. Pt prefers there ex over some activities presented (coloring pages, seek and find, sorting cards and arranging in order, reminisce old pictures, fidget box ext).Booklet for Caring for your Alzheimer's Family Member provided.    Patient/caregiver response: pt tolerated session well. Follows 1 step commands, expressed items of interest and tasks that were \"OK\"    Plan for next visit: adapt HEP to include standing balance exercises and reinforce TTB transfers    Other pertinent info: Pt may benefit from 1/2 bed rail"

## 2023-06-02 ENCOUNTER — HOME CARE VISIT (OUTPATIENT)
Dept: HOME HEALTH SERVICES | Facility: HOME HEALTHCARE | Age: 78
End: 2023-06-02
Payer: COMMERCIAL

## 2023-06-02 VITALS
HEART RATE: 70 BPM | RESPIRATION RATE: 13 BRPM | OXYGEN SATURATION: 95 % | SYSTOLIC BLOOD PRESSURE: 130 MMHG | DIASTOLIC BLOOD PRESSURE: 78 MMHG

## 2023-06-02 PROCEDURE — G0157 HHC PT ASSISTANT EA 15: HCPCS

## 2023-06-05 ENCOUNTER — LAB (OUTPATIENT)
Dept: ENDOCRINOLOGY | Facility: CLINIC | Age: 78
End: 2023-06-05
Payer: MEDICARE

## 2023-06-05 ENCOUNTER — TELEPHONE (OUTPATIENT)
Dept: ENDOCRINOLOGY | Facility: CLINIC | Age: 78
End: 2023-06-05
Payer: MEDICARE

## 2023-06-05 DIAGNOSIS — N30.00 ACUTE CYSTITIS WITHOUT HEMATURIA: Primary | ICD-10-CM

## 2023-06-05 LAB
BILIRUB UR QL STRIP: NEGATIVE
CLARITY UR: ABNORMAL
COLOR UR: YELLOW
GLUCOSE UR STRIP-MCNC: NEGATIVE MG/DL
HGB UR QL STRIP.AUTO: NEGATIVE
KETONES UR QL STRIP: ABNORMAL
LEUKOCYTE ESTERASE UR QL STRIP.AUTO: ABNORMAL
NITRITE UR QL STRIP: POSITIVE
PH UR STRIP.AUTO: 6.5 [PH] (ref 5–8)
PROT UR QL STRIP: ABNORMAL
SP GR UR STRIP: 1.02 (ref 1–1.03)
UROBILINOGEN UR QL STRIP: ABNORMAL

## 2023-06-05 PROCEDURE — 87086 URINE CULTURE/COLONY COUNT: CPT | Performed by: INTERNAL MEDICINE

## 2023-06-05 PROCEDURE — 81001 URINALYSIS AUTO W/SCOPE: CPT | Performed by: INTERNAL MEDICINE

## 2023-06-05 NOTE — TELEPHONE ENCOUNTER
PATIENT'S DAUGHTER WILL BE DROPPING OFF A URINE SPECIMEN TODAY. DAUGHTER STATES THAT THIS WAS DISCUSSED DURING LAST APPT WITH DR KIM. DAUGHTER STATES THAT PATIENT HAS BEEN RUNNING A BIT OF A FEVER AND HAS NOT BEEN ACTING LIKE SHE FEELS WELL AGAIN AND IS CONCERNED THAT SHE MIGHT HAVE A UTI. DAUGHTER STATES THAT SPECIMEN WILL BE DROPPED OFF IN CLEAN URINE SPECIMEN CONTAINER.    THERE IS NOT A CURRENT UA LAB ORDER IN CHART AT TIME OF CALL.       Order created - per [facility]

## 2023-06-05 NOTE — HOME HEALTH
PT Routine visit note    Skill/education provided: standing balance exercises, HEP review w/ dtr    Pt./caregiver response: pt. is cooperative and agreeable to PT today; she is able to return demo standing HEP w/ daughter's assistance utilizing handouts as needed.  Pt. tolerates performing more difficult level dynamic balance tasks during tx w/ therapist w/o lob.    Plan for next visit: Discharge as appropriate by PT

## 2023-06-06 ENCOUNTER — TELEPHONE (OUTPATIENT)
Dept: ENDOCRINOLOGY | Facility: CLINIC | Age: 78
End: 2023-06-06
Payer: MEDICARE

## 2023-06-06 ENCOUNTER — HOME CARE VISIT (OUTPATIENT)
Dept: HOME HEALTH SERVICES | Facility: HOME HEALTHCARE | Age: 78
End: 2023-06-06
Payer: COMMERCIAL

## 2023-06-06 VITALS
DIASTOLIC BLOOD PRESSURE: 80 MMHG | RESPIRATION RATE: 16 BRPM | OXYGEN SATURATION: 95 % | HEART RATE: 68 BPM | TEMPERATURE: 97.2 F | SYSTOLIC BLOOD PRESSURE: 116 MMHG

## 2023-06-06 LAB
BACTERIA UR QL AUTO: ABNORMAL /HPF
COD CRY URNS QL: ABNORMAL /HPF
HYALINE CASTS UR QL AUTO: ABNORMAL /LPF
RBC # UR STRIP: ABNORMAL /HPF
REF LAB TEST METHOD: ABNORMAL
SQUAMOUS #/AREA URNS HPF: ABNORMAL /HPF
WBC # UR STRIP: ABNORMAL /HPF

## 2023-06-06 PROCEDURE — G0151 HHCP-SERV OF PT,EA 15 MIN: HCPCS

## 2023-06-06 RX ORDER — NITROFURANTOIN 25; 75 MG/1; MG/1
100 CAPSULE ORAL 2 TIMES DAILY
Qty: 14 CAPSULE | Refills: 0 | Status: SHIPPED | OUTPATIENT
Start: 2023-06-06

## 2023-06-06 NOTE — TELEPHONE ENCOUNTER
Patient has UTI- antibiotic sent to pharmacy [facility]  Thanks, [facility]

## 2023-06-07 LAB — BACTERIA SPEC AEROBE CULT: NORMAL

## 2023-06-09 RX ORDER — PANTOPRAZOLE SODIUM 40 MG/1
40 TABLET, DELAYED RELEASE ORAL
Qty: 30 TABLET | Refills: 0 | Status: SHIPPED | OUTPATIENT
Start: 2023-06-09

## 2023-06-09 NOTE — TELEPHONE ENCOUNTER
Patients daughter called, stated patient is needing a refill on her pantoprazole. Stated that they have tried to get ahold of Dr. Lawson office, but cannot get through. Stated that patient is completely out of medication and wanted to know if Dr. Liz could refill. I told her I would send a message. They would like this sent to [facility] in [place]. Thank you!

## 2023-06-14 RX ORDER — AMLODIPINE BESYLATE 5 MG/1
TABLET ORAL
Qty: 90 TABLET | Refills: 1 | Status: SHIPPED | OUTPATIENT
Start: 2023-06-14

## 2023-07-12 PROBLEM — E07.9 THYROID MASS: Status: ACTIVE | Noted: 2023-07-12

## 2023-07-12 PROBLEM — F32.1 MODERATE MAJOR DEPRESSION: Status: ACTIVE | Noted: 2023-07-12

## 2023-07-24 RX ORDER — ATORVASTATIN CALCIUM 20 MG/1
TABLET, FILM COATED ORAL
Qty: 90 TABLET | Refills: 1 | Status: SHIPPED | OUTPATIENT
Start: 2023-07-24

## 2023-07-24 NOTE — TELEPHONE ENCOUNTER
Rx Refill Note    Requested Prescriptions     Pending Prescriptions Disp Refills    atorvastatin (LIPITOR) 20 MG tablet [Pharmacy Med Name: ATORVASTATIN 20 MG TABLET] 90 tablet 1     Sig: TAKE 1 TABLET BY MOUTH EVERYDAY AT BEDTIME        Last office visit with prescribing clinician: 5/23/2023        Next office visit with prescribing clinician: 8/31/2023   {

## 2023-08-15 ENCOUNTER — TELEPHONE (OUTPATIENT)
Dept: NEUROLOGY | Facility: CLINIC | Age: 78
End: 2023-08-15
Payer: MEDICARE

## 2023-08-15 NOTE — TELEPHONE ENCOUNTER
OK FOR HUB TO RELAY MESSAGE:  FAXED OFFICE NOTES, DEMOGRAPHICS THAT INCLUDED MEDICATION LIST ALONG WITH A FACE SHEET. IF ALL NOTES FROM PT NEEDED THEY WILL NEED TO CONTACT [facility] [phone] OUR RECORDS DEPARTMENT BECAUSE PT HAS BEEN SEEN SINCE 2020 AND TO MANY NOTES TO FAX.

## 2023-08-15 NOTE — TELEPHONE ENCOUNTER
Provider: DAVION AYALA  Caller: INDIO BARRETT  Relationship to Patient: DAUGHTER    Phone Number:[phone]    Reason for Call:  PATIENT'S DAUGHTER CALLED AND SAID THAT THE NURSING HOME, [facility], NEEDS INFO THAT IS REQUIRED FOR ADMITTANCE. THEY NEED H & P, DRJuwan NOTES, MEDICATION LIST, VISIT NOTES, AND A FACE SHEET.   THE FAX -[phone]    NEED ASAP.     PLEASE REVIEW  THANK YOU

## 2023-08-31 ENCOUNTER — OFFICE VISIT (OUTPATIENT)
Dept: ENDOCRINOLOGY | Facility: CLINIC | Age: 78
End: 2023-08-31
Payer: MEDICARE

## 2023-08-31 VITALS
BODY MASS INDEX: 22.06 KG/M2 | DIASTOLIC BLOOD PRESSURE: 70 MMHG | HEIGHT: 64 IN | WEIGHT: 129.2 LBS | HEART RATE: 71 BPM | OXYGEN SATURATION: 98 % | SYSTOLIC BLOOD PRESSURE: 132 MMHG

## 2023-08-31 DIAGNOSIS — F02.80 ALZHEIMER'S DISEASE, UNSPECIFIED: ICD-10-CM

## 2023-08-31 DIAGNOSIS — E03.9 ACQUIRED HYPOTHYROIDISM: ICD-10-CM

## 2023-08-31 DIAGNOSIS — I10 BENIGN ESSENTIAL HYPERTENSION: Primary | ICD-10-CM

## 2023-08-31 DIAGNOSIS — G30.9 ALZHEIMER'S DISEASE, UNSPECIFIED: ICD-10-CM

## 2023-08-31 NOTE — PROGRESS NOTES
Brittny Nicolas 78 y.o.  CC:Follow-up, Hypertension, Hypothyroidism, and Hyperlipidemia    [facility]: Follow-up, Hypertension, Hypothyroidism, and Hyperlipidemia    Bp is good taking norvasc 5 mg daily   Is following low fat diet, taking lipitor 20 mg daily     Allergies   Allergen Reactions    Boniva [Ibandronic Acid] GI Intolerance     Refractory gerd and abdominal pain        Current Outpatient Medications:     acetaminophen (TYLENOL) 325 MG tablet, Take 2 tablets by mouth Every 4 (Four) Hours As Needed for Mild Pain ., Disp: , Rfl:     amLODIPine (NORVASC) 5 MG tablet, TAKE 1 TABLET BY MOUTH EVERY DAY, Disp: 90 tablet, Rfl: 1    atorvastatin (LIPITOR) 20 MG tablet, TAKE 1 TABLET BY MOUTH EVERYDAY AT BEDTIME, Disp: 90 tablet, Rfl: 1    Cholecalciferol (VITAMIN D) 2000 UNITS capsule, Take 1 capsule by mouth Daily. Indications: Vitamin D Deficiency, Disp: , Rfl:     donepezil (ARICEPT) 10 MG tablet, Take 1 tablet by mouth Every Night. Indications: Alzheimer's Disease, Disp: 90 tablet, Rfl: 3    levothyroxine (SYNTHROID, LEVOTHROID) 125 MCG tablet, TAKE 1 TABLET BY MOUTH EVERY DAY, Disp: 90 tablet, Rfl: 1    memantine (NAMENDA) 10 MG tablet, Take 1 tablet by mouth 2 (Two) Times a Day. Indications: Alzheimer's Disease, Disp: 180 tablet, Rfl: 3    pantoprazole (PROTONIX) 40 MG EC tablet, TAKE 1 TABLET BY MOUTH DAILY WITH BREAKFAST. INDICATIONS: HEARTBURN, Disp: 90 tablet, Rfl: 1    polyethylene glycol (MIRALAX) 17 g packet, Take 17 g by mouth Daily., Disp: , Rfl:     QUEtiapine (SEROquel) 25 MG tablet, Take 1-2 tablets in AM and 2 tablets in PM  Indications: Behavioral Disorders associated with Dementia, Disp: 360 tablet, Rfl: 3  Patient Active Problem List    Diagnosis     Moderate major depression [F32.1]     Thyroid mass [E07.9]     Aspiration into airway [T17.908A]     Visual loss [H54.7]     Nocturnal enuresis [N39.44]     Alzheimer's disease, unspecified [G30.9, F02.80]     Influenza A with respiratory  manifestations [J10.1]     Anxiety and depression [F41.9, F32.A]     Anemia [D64.9]     Oropharyngeal dysphagia [R13.12]     Hyponatremia [E87.1]     Late onset Alzheimer's dementia with behavioral disturbance [G30.1, F02.818]     Rib pain on left side [R07.81]     Memory loss [R41.3]     Pharyngitis [J02.9]     Left lower quadrant pain [R10.32]     Acute pain of left shoulder [M25.512]     Erosive esophagitis [K22.10]     H/O colonoscopy [Z98.890]     Fatigue [R53.83]     Hypothyroidism [E03.9]     Achalasia [K22.0]     Acute upper respiratory infection [J06.9]     Atypical chest pain [R07.89]     Benign essential hypertension [I10]     Carotid artery stenosis [I65.29]     Cataract [H26.9]     Cough [R05.9]     Acute cystitis [N30.00]     Screening for malignant neoplasm [Z12.9]     Gastroesophageal reflux disease [K21.9]     Essential familial hypercholesterolemia [E78.01]     Gastrointestinal bleeding [K92.2]     Inguinal hernia [K40.90]     Insomnia [G47.00]     Leg cramps [R25.2]     Meniere's disease [H81.09]     Night sweats [R61]     Osteoporosis [M81.0]     Peripheral neuropathy [G62.9]     Retinal artery occlusion [H34.9]     Rheumatoid arthritis [M06.9]     Stroke syndrome [LYF9459]     Suprapubic pain [R10.2]     Transient ischemic attack [G45.9]     Positive reaction to tuberculin skin test [R76.11]     Vitamin D deficiency [E55.9]     Weight loss [R63.4]      Review of Systems   Constitutional:  Negative for activity change, appetite change and unexpected weight change.   HENT:  Negative for congestion and rhinorrhea.    Eyes:  Negative for visual disturbance.   Respiratory:  Negative for cough and shortness of breath.    Cardiovascular:  Negative for palpitations and leg swelling.   Gastrointestinal:  Negative for constipation, diarrhea and nausea.   Genitourinary:  Negative for hematuria.   Musculoskeletal:  Negative for arthralgias, back pain, gait problem, joint swelling and myalgias.   Skin:   "Negative for color change, rash and wound.   Allergic/Immunologic: Negative for environmental allergies, food allergies and immunocompromised state.   Neurological:  Negative for dizziness, weakness and light-headedness.   Psychiatric/Behavioral:  Negative for confusion, decreased concentration, dysphoric mood and sleep disturbance. The patient is not nervous/anxious.    Social History     Socioeconomic History    Marital status:    Tobacco Use    Smoking status: Former     Packs/day: 1.00     Years: 15.00     Pack years: 15.00     Types: Cigarettes    Smokeless tobacco: Never    Tobacco comments:     Do not know when she quit. Likely about 1998.   Vaping Use    Vaping Use: Never used   Substance and Sexual Activity    Alcohol use: Not Currently     Alcohol/week: 0.0 standard drinks     Comment: She might have some wine, but I try to not give her any    Drug use: No    Sexual activity: Not Currently     Birth control/protection: Post-menopausal     Family History   Problem Relation Age of Onset    Cystic fibrosis Other     Cervical cancer Mother     Cancer Paternal Grandmother     Dementia Paternal Grandmother     Colon cancer Neg Hx     Colon polyps Neg Hx      /70   Pulse 71   Ht 162.6 cm (64\")   Wt 58.6 kg (129 lb 3.2 oz)   SpO2 98%   BMI 22.18 kg/mý   Physical Exam  Vitals and nursing note reviewed.   Constitutional:       Appearance: Normal appearance. She is well-developed.   HENT:      Head: Normocephalic and atraumatic.   Eyes:      General: Lids are normal.      Extraocular Movements: Extraocular movements intact.      Conjunctiva/sclera: Conjunctivae normal.      Pupils: Pupils are equal, round, and reactive to light.   Neck:      Thyroid: No thyroid mass or thyromegaly.      Vascular: No carotid bruit.      Trachea: Trachea normal. No tracheal deviation.   Cardiovascular:      Rate and Rhythm: Normal rate and regular rhythm.      Pulses: Normal pulses.      Heart sounds: Normal heart " sounds. No murmur heard.    No friction rub. No gallop.   Pulmonary:      Effort: Pulmonary effort is normal. No respiratory distress.      Breath sounds: Normal breath sounds. No wheezing.   Musculoskeletal:         General: No deformity. Normal range of motion.      Cervical back: Normal range of motion and neck supple.   Lymphadenopathy:      Cervical: No cervical adenopathy.   Skin:     General: Skin is warm and dry.      Findings: No erythema or rash.      Nails: There is no clubbing.   Neurological:      General: No focal deficit present.      Mental Status: She is alert. She is disoriented.      Cranial Nerves: No cranial nerve deficit.      Deep Tendon Reflexes: Reflexes are normal and symmetric. Reflexes normal.   Psychiatric:         Speech: Speech normal.         Behavior: Behavior normal.         Thought Content: Thought content normal.         Judgment: Judgment normal.     Results for orders placed or performed in visit on 07/12/23   Urinalysis With Culture If Indicated - Urine, Clean Catch    Specimen: Urine, Clean Catch   Result Value Ref Range    Color, UA Yellow Yellow, Straw    Appearance, UA Clear Clear    pH, UA 6.5 5.0 - 8.0    Specific Gravity, UA 1.013 1.005 - 1.030    Glucose, UA Negative Negative    Ketones, UA Negative Negative    Bilirubin, UA Negative Negative    Blood, UA Negative Negative    Protein, UA Negative Negative    Leuk Esterase, UA Negative Negative    Nitrite, UA Negative Negative    Urobilinogen, UA 0.2 E.U./dL 0.2 - 1.0 E.U./dL     Diagnoses and all orders for this visit:    1. Benign essential hypertension (Primary)  Assessment & Plan:  BP is good   Continue norvasc      2. Alzheimer's disease, unspecified  Assessment & Plan:  On medication - having some diarrhea at night   Trial am fiber  Is using miralax         3. Acquired hypothyroidism  Assessment & Plan:  Continue synthroid 125 mcg daily   Recent normal tsh       Return in about 6 months (around 2/29/2024) for  Ayad.    Dalila Liz MD  Signed Dalila Liz MD

## 2023-09-22 ENCOUNTER — APPOINTMENT (OUTPATIENT)
Dept: GENERAL RADIOLOGY | Facility: HOSPITAL | Age: 78
End: 2023-09-22
Payer: MEDICARE

## 2023-09-22 ENCOUNTER — APPOINTMENT (OUTPATIENT)
Dept: CT IMAGING | Facility: HOSPITAL | Age: 78
End: 2023-09-22
Payer: MEDICARE

## 2023-09-22 ENCOUNTER — HOSPITAL ENCOUNTER (EMERGENCY)
Facility: HOSPITAL | Age: 78
Discharge: HOME | End: 2023-09-22
Attending: EMERGENCY MEDICINE
Payer: MEDICARE

## 2023-09-22 VITALS
RESPIRATION RATE: 18 BRPM | HEIGHT: 63 IN | DIASTOLIC BLOOD PRESSURE: 84 MMHG | WEIGHT: 129 LBS | OXYGEN SATURATION: 92 % | BODY MASS INDEX: 22.86 KG/M2 | TEMPERATURE: 98.3 F | HEART RATE: 69 BPM | SYSTOLIC BLOOD PRESSURE: 151 MMHG

## 2023-09-22 DIAGNOSIS — N39.0 ACUTE UTI: Primary | ICD-10-CM

## 2023-09-22 DIAGNOSIS — B34.8 RHINOVIRUS: ICD-10-CM

## 2023-09-22 LAB
ALBUMIN SERPL-MCNC: 3.8 G/DL (ref 3.5–5.2)
ALBUMIN/GLOB SERPL: 1.1 G/DL
ALP SERPL-CCNC: 100 U/L (ref 39–117)
ALT SERPL W P-5'-P-CCNC: 9 U/L (ref 1–33)
ANION GAP SERPL CALCULATED.3IONS-SCNC: 16 MMOL/L (ref 5–15)
AST SERPL-CCNC: 15 U/L (ref 1–32)
B PARAPERT DNA SPEC QL NAA+PROBE: NOT DETECTED
B PERT DNA SPEC QL NAA+PROBE: NOT DETECTED
BACTERIA UR QL AUTO: ABNORMAL /HPF
BASOPHILS # BLD AUTO: 0.02 10*3/MM3 (ref 0–0.2)
BASOPHILS NFR BLD AUTO: 0.2 % (ref 0–1.5)
BILIRUB SERPL-MCNC: 0.4 MG/DL (ref 0–1.2)
BILIRUB UR QL STRIP: NEGATIVE
BUN SERPL-MCNC: 15 MG/DL (ref 8–23)
BUN/CREAT SERPL: 20.5 (ref 7–25)
C PNEUM DNA NPH QL NAA+NON-PROBE: NOT DETECTED
CALCIUM SPEC-SCNC: 9.3 MG/DL (ref 8.6–10.5)
CHLORIDE SERPL-SCNC: 104 MMOL/L (ref 98–107)
CLARITY UR: ABNORMAL
CO2 SERPL-SCNC: 23 MMOL/L (ref 22–29)
COD CRY URNS QL: ABNORMAL /HPF
COLOR UR: YELLOW
CREAT SERPL-MCNC: 0.73 MG/DL (ref 0.57–1)
DEPRECATED RDW RBC AUTO: 46.2 FL (ref 37–54)
EGFRCR SERPLBLD CKD-EPI 2021: 83.3 ML/MIN/1.73
EOSINOPHIL # BLD AUTO: 0.14 10*3/MM3 (ref 0–0.4)
EOSINOPHIL NFR BLD AUTO: 1.3 % (ref 0.3–6.2)
ERYTHROCYTE [DISTWIDTH] IN BLOOD BY AUTOMATED COUNT: 13.7 % (ref 12.3–15.4)
FLUAV SUBTYP SPEC NAA+PROBE: NOT DETECTED
FLUBV RNA ISLT QL NAA+PROBE: NOT DETECTED
GLOBULIN UR ELPH-MCNC: 3.5 GM/DL
GLUCOSE SERPL-MCNC: 203 MG/DL (ref 65–99)
GLUCOSE UR STRIP-MCNC: NEGATIVE MG/DL
HADV DNA SPEC NAA+PROBE: NOT DETECTED
HCOV 229E RNA SPEC QL NAA+PROBE: NOT DETECTED
HCOV HKU1 RNA SPEC QL NAA+PROBE: NOT DETECTED
HCOV NL63 RNA SPEC QL NAA+PROBE: NOT DETECTED
HCOV OC43 RNA SPEC QL NAA+PROBE: NOT DETECTED
HCT VFR BLD AUTO: 42.6 % (ref 34–46.6)
HGB BLD-MCNC: 13 G/DL (ref 12–15.9)
HGB UR QL STRIP.AUTO: NEGATIVE
HMPV RNA NPH QL NAA+NON-PROBE: NOT DETECTED
HPIV1 RNA ISLT QL NAA+PROBE: NOT DETECTED
HPIV2 RNA SPEC QL NAA+PROBE: NOT DETECTED
HPIV3 RNA NPH QL NAA+PROBE: NOT DETECTED
HPIV4 P GENE NPH QL NAA+PROBE: NOT DETECTED
HYALINE CASTS UR QL AUTO: ABNORMAL /LPF
IMM GRANULOCYTES # BLD AUTO: 0.03 10*3/MM3 (ref 0–0.05)
IMM GRANULOCYTES NFR BLD AUTO: 0.3 % (ref 0–0.5)
KETONES UR QL STRIP: NEGATIVE
LEUKOCYTE ESTERASE UR QL STRIP.AUTO: ABNORMAL
LYMPHOCYTES # BLD AUTO: 1.51 10*3/MM3 (ref 0.7–3.1)
LYMPHOCYTES NFR BLD AUTO: 14 % (ref 19.6–45.3)
M PNEUMO IGG SER IA-ACNC: NOT DETECTED
MCH RBC QN AUTO: 27.8 PG (ref 26.6–33)
MCHC RBC AUTO-ENTMCNC: 30.5 G/DL (ref 31.5–35.7)
MCV RBC AUTO: 91.2 FL (ref 79–97)
MONOCYTES # BLD AUTO: 0.6 10*3/MM3 (ref 0.1–0.9)
MONOCYTES NFR BLD AUTO: 5.6 % (ref 5–12)
NEUTROPHILS NFR BLD AUTO: 78.6 % (ref 42.7–76)
NEUTROPHILS NFR BLD AUTO: 8.45 10*3/MM3 (ref 1.7–7)
NITRITE UR QL STRIP: NEGATIVE
NRBC BLD AUTO-RTO: 0 /100 WBC (ref 0–0.2)
NT-PROBNP SERPL-MCNC: 121.2 PG/ML (ref 0–1800)
PH UR STRIP.AUTO: 5.5 [PH] (ref 5–8)
PLATELET # BLD AUTO: 238 10*3/MM3 (ref 140–450)
PMV BLD AUTO: 9.6 FL (ref 6–12)
POTASSIUM SERPL-SCNC: 3.4 MMOL/L (ref 3.5–5.2)
PROT SERPL-MCNC: 7.3 G/DL (ref 6–8.5)
PROT UR QL STRIP: NEGATIVE
RBC # BLD AUTO: 4.67 10*6/MM3 (ref 3.77–5.28)
RBC # UR STRIP: ABNORMAL /HPF
REF LAB TEST METHOD: ABNORMAL
RHINOVIRUS RNA SPEC NAA+PROBE: DETECTED
RSV RNA NPH QL NAA+NON-PROBE: NOT DETECTED
SARS-COV-2 RNA NPH QL NAA+NON-PROBE: NOT DETECTED
SODIUM SERPL-SCNC: 143 MMOL/L (ref 136–145)
SP GR UR STRIP: 1.02 (ref 1–1.03)
SQUAMOUS #/AREA URNS HPF: ABNORMAL /HPF
TROPONIN T SERPL HS-MCNC: 11 NG/L
UROBILINOGEN UR QL STRIP: ABNORMAL
WBC # UR STRIP: ABNORMAL /HPF
WBC NRBC COR # BLD: 10.75 10*3/MM3 (ref 3.4–10.8)

## 2023-09-22 PROCEDURE — 80053 COMPREHEN METABOLIC PANEL: CPT | Performed by: NURSE PRACTITIONER

## 2023-09-22 PROCEDURE — 83880 ASSAY OF NATRIURETIC PEPTIDE: CPT | Performed by: NURSE PRACTITIONER

## 2023-09-22 PROCEDURE — 99284 EMERGENCY DEPT VISIT MOD MDM: CPT

## 2023-09-22 PROCEDURE — 87086 URINE CULTURE/COLONY COUNT: CPT | Performed by: NURSE PRACTITIONER

## 2023-09-22 PROCEDURE — 71250 CT THORAX DX C-: CPT

## 2023-09-22 PROCEDURE — 71045 X-RAY EXAM CHEST 1 VIEW: CPT

## 2023-09-22 PROCEDURE — 74176 CT ABD & PELVIS W/O CONTRAST: CPT

## 2023-09-22 PROCEDURE — 85025 COMPLETE CBC W/AUTO DIFF WBC: CPT | Performed by: NURSE PRACTITIONER

## 2023-09-22 PROCEDURE — 0202U NFCT DS 22 TRGT SARS-COV-2: CPT | Performed by: NURSE PRACTITIONER

## 2023-09-22 PROCEDURE — 93005 ELECTROCARDIOGRAM TRACING: CPT | Performed by: NURSE PRACTITIONER

## 2023-09-22 PROCEDURE — 81001 URINALYSIS AUTO W/SCOPE: CPT | Performed by: NURSE PRACTITIONER

## 2023-09-22 PROCEDURE — 70450 CT HEAD/BRAIN W/O DYE: CPT

## 2023-09-22 PROCEDURE — 84484 ASSAY OF TROPONIN QUANT: CPT | Performed by: NURSE PRACTITIONER

## 2023-09-22 RX ORDER — SODIUM CHLORIDE 0.9 % (FLUSH) 0.9 %
10 SYRINGE (ML) INJECTION AS NEEDED
Status: DISCONTINUED | OUTPATIENT
Start: 2023-09-22 | End: 2023-09-23 | Stop reason: HOSPADM

## 2023-09-22 RX ORDER — CEFDINIR 300 MG/1
300 CAPSULE ORAL 2 TIMES DAILY
Qty: 20 CAPSULE | Refills: 0 | Status: SHIPPED | OUTPATIENT
Start: 2023-09-22

## 2023-09-22 RX ORDER — CEFDINIR 300 MG/1
300 CAPSULE ORAL ONCE
Status: COMPLETED | OUTPATIENT
Start: 2023-09-22 | End: 2023-09-22

## 2023-09-22 RX ADMIN — SODIUM CHLORIDE 500 ML: 9 INJECTION, SOLUTION INTRAVENOUS at 18:45

## 2023-09-22 RX ADMIN — CEFDINIR 300 MG: 300 CAPSULE ORAL at 22:28

## 2023-09-22 NOTE — ED PROVIDER NOTES
Subjective   History of Present Illness  Pleasant patient presents to the ER for concern for pneumonia or urinary tract infection.  She is accompanied by her daughter who is also her caregiver.  Patient does have a history of dementia.  Alzheimer's.  She reports the patient has been more confused than usual.  She has had decreased p.o. intake and they had recently had a cough.  Negative home test for COVID.      Review of Systems    Past Medical History:   Diagnosis Date    Acid reflux disease     Alzheimer's dementia     Arthritis     rhuematoid    Carotid artery occlusion     Coronary artery disease     Dementia     Depression     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    Graves disease     Hyperlipidemia     Hypertension     Memory loss     Stomach problems     Stroke     Stroke syndrome     Thyroid disease     Transient ischemic attack     Vision loss        Allergies   Allergen Reactions    Boniva [Ibandronic Acid] GI Intolerance     Refractory gerd and abdominal pain        Past Surgical History:   Procedure Laterality Date    AUGMENTATION MAMMAPLASTY      1978    BREAST AUGMENTATION      CAROTID ARTERY ANGIOPLASTY      CAROTID ENDARTERECTOMY      CAROTID STENT      COLONOSCOPY      D & C CERVICAL BIOPSY      Dilation and Curettage of Cervical Stump    ENDOSCOPY N/A 5/8/2023    Procedure: ESOPHAGOGASTRODUODENOSCOPY;  Surgeon: Marito Solis MD;  Location: [facility] ENDOSCOPY;  Service: Gastroenterology;  Laterality: N/A;  esophageal dilation done with 18-20mm ballon     HERNIA REPAIR      HERNIA REPAIR      THROMBOENDARTERECTOMY      carotid    UPPER GASTROINTESTINAL ENDOSCOPY         Family History   Problem Relation Age of Onset    Cystic fibrosis Other     Cervical cancer Mother     Cancer Paternal Grandmother     Dementia Paternal Grandmother     Colon cancer Neg Hx     Colon polyps Neg Hx        Social History     Socioeconomic History    Marital status:    Tobacco Use    Smoking status: Former     Packs/day:  1.00     Years: 15.00     Pack years: 15.00     Types: Cigarettes    Smokeless tobacco: Never    Tobacco comments:     Do not know when she quit. Likely about 1998.   Vaping Use    Vaping Use: Never used   Substance and Sexual Activity    Alcohol use: Not Currently     Alcohol/week: 0.0 standard drinks     Comment: She might have some wine, but I try to not give her any    Drug use: No    Sexual activity: Not Currently     Birth control/protection: Post-menopausal           Objective   Physical Exam  Constitutional:       Appearance: She is well-developed.   HENT:      Head: Normocephalic and atraumatic.      Right Ear: External ear normal.      Left Ear: External ear normal.      Nose: Nose normal.   Eyes:      Conjunctiva/sclera: Conjunctivae normal.      Pupils: Pupils are equal, round, and reactive to light.   Cardiovascular:      Rate and Rhythm: Normal rate and regular rhythm.      Heart sounds: Normal heart sounds.   Pulmonary:      Effort: Pulmonary effort is normal.      Breath sounds: Normal breath sounds.   Abdominal:      General: Bowel sounds are normal.      Palpations: Abdomen is soft.   Musculoskeletal:         General: Normal range of motion.      Cervical back: Normal range of motion and neck supple.   Skin:     General: Skin is warm and dry.   Neurological:      Mental Status: She is alert. Mental status is at baseline.   Psychiatric:         Behavior: Behavior normal.         Thought Content: Thought content normal.         Judgment: Judgment normal.       Procedures           ED Course  ED Course as of 09/22/23 2212   Fri Sep 22, 2023   1828 Differential includes pneumonia and urinary tract infection. [JM]   2200 Patient resting comfortably for the duration of ER stay.  I did speak to daughter about the results.  The patient's daughter feels like she would do better at home given her Alzheimer's.  I do not see any clinical evidence of pneumonia.  We will treat her for a urinary tract infection  given her history.  She is positive for human rhinovirus as well.  That could be rated to the respiratory tract symptoms that she has had recently as well.  We will give her dose of antibiotics here and culture her urine.  They are all well aware the signs of worse condition. [JM]      ED Course User Index  [JM] Asher Rocha APRN           CT Head Without Contrast   Final Result   1.No acute intracranial hemorrhage. Calvarium is intact.   2.Chronic findings as detailed above.                  Electronically Signed: Jered No MD     9/22/2023 8:31 PM EDT     Workstation ID: [id number]      CT Abdomen Pelvis Without Contrast   Final Result   Impression:      1. No acute process seen within the abdomen or pelvis.   2. Dilated distal esophagus which may be related to achalasia. This is unchanged from prior exam.   3. Colonic diverticulosis but no evidence of diverticulitis.                  Electronically Signed: Robert Bejarano MD     9/22/2023 8:36 PM EDT     Workstation ID: [id number]      CT Chest Without Contrast Diagnostic   Final Result   Impression:         1. Minimal bibasilar airspace disease which may be secondary to atelectasis or less likely pneumonia. No other acute process seen within the chest. 2. Emphysema.   3. Marked dilation of the esophagus unchanged from prior study which may be due to achalasia.            Electronically Signed: Robert Bejarano MD     9/22/2023 8:32 PM EDT     Workstation ID: [id number]      XR Chest 1 View   Final Result   Impression:   No acute cardiopulmonary disease         Electronically Signed: Robert Bejarano MD     9/22/2023 6:48 PM EDT     Workstation ID: [id number]                                        Medical Decision Making  Problems Addressed:  Acute UTI: complicated acute illness or injury  Rhinovirus: complicated acute illness or injury    Amount and/or Complexity of Data Reviewed  External Data Reviewed: labs, radiology and ECG.  Labs: ordered.  Radiology:  ordered.  ECG/medicine tests: ordered.    Risk  Prescription drug management.        Final diagnoses:   Acute UTI   Rhinovirus       ED Disposition  ED Disposition       ED Disposition   Discharge    Condition   Stable    Comment   --               Omari Burgess, APRN  [address] [id number] [phone]    Schedule an appointment as soon as possible for a visit            Medication List        New Prescriptions      cefdinir 300 MG capsule  Commonly known as: OMNICEF  Take 1 capsule by mouth 2 (Two) Times a Day.               Where to Get Your Medications        These medications were sent to [facility]/pharmacy #[id number] - [place], [place] - [address] [place] [address] AT [facility] - [phone]  - [phone]   [address] [place] [address]      Phone: [phone]   cefdinir 300 MG capsule            Asher Rocha, APRNIMISHA  09/22/23 0022

## 2023-09-23 LAB
BACTERIA SPEC AEROBE CULT: NORMAL
QT INTERVAL: 378 MS
QTC INTERVAL: 449 MS

## 2023-10-14 ENCOUNTER — HOSPITAL ENCOUNTER (EMERGENCY)
Facility: HOSPITAL | Age: 78
Discharge: HOME | End: 2023-10-14
Attending: EMERGENCY MEDICINE
Payer: MEDICARE

## 2023-10-14 ENCOUNTER — APPOINTMENT (OUTPATIENT)
Dept: GENERAL RADIOLOGY | Facility: HOSPITAL | Age: 78
End: 2023-10-14
Payer: MEDICARE

## 2023-10-14 VITALS
OXYGEN SATURATION: 94 % | SYSTOLIC BLOOD PRESSURE: 131 MMHG | DIASTOLIC BLOOD PRESSURE: 75 MMHG | BODY MASS INDEX: 23.04 KG/M2 | HEART RATE: 68 BPM | TEMPERATURE: 98.6 F | HEIGHT: 63 IN | WEIGHT: 130 LBS | RESPIRATION RATE: 18 BRPM

## 2023-10-14 DIAGNOSIS — U07.1 COVID-19 VIRUS INFECTION: ICD-10-CM

## 2023-10-14 DIAGNOSIS — Z86.59 HISTORY OF DEMENTIA: ICD-10-CM

## 2023-10-14 DIAGNOSIS — Z86.79 HISTORY OF HYPERTENSION: ICD-10-CM

## 2023-10-14 DIAGNOSIS — Z86.39 HISTORY OF HYPOTHYROIDISM: ICD-10-CM

## 2023-10-14 DIAGNOSIS — Z86.59 HISTORY OF ANXIETY: ICD-10-CM

## 2023-10-14 DIAGNOSIS — B34.9 ACUTE VIRAL SYNDROME: Primary | ICD-10-CM

## 2023-10-14 DIAGNOSIS — B34.8 RHINOVIRUS INFECTION: ICD-10-CM

## 2023-10-14 DIAGNOSIS — Z86.59 HISTORY OF DEPRESSION: ICD-10-CM

## 2023-10-14 LAB
ALBUMIN SERPL-MCNC: 3.6 G/DL (ref 3.5–5.2)
ALBUMIN/GLOB SERPL: 1.1 G/DL
ALP SERPL-CCNC: 91 U/L (ref 39–117)
ALT SERPL W P-5'-P-CCNC: 10 U/L (ref 1–33)
ANION GAP SERPL CALCULATED.3IONS-SCNC: 10 MMOL/L (ref 5–15)
AST SERPL-CCNC: 18 U/L (ref 1–32)
B PARAPERT DNA SPEC QL NAA+PROBE: NOT DETECTED
B PERT DNA SPEC QL NAA+PROBE: NOT DETECTED
BASOPHILS # BLD AUTO: 0.02 10*3/MM3 (ref 0–0.2)
BASOPHILS NFR BLD AUTO: 0.3 % (ref 0–1.5)
BILIRUB SERPL-MCNC: 0.3 MG/DL (ref 0–1.2)
BILIRUB UR QL STRIP: NEGATIVE
BUN SERPL-MCNC: 10 MG/DL (ref 8–23)
BUN/CREAT SERPL: 13.7 (ref 7–25)
C PNEUM DNA NPH QL NAA+NON-PROBE: NOT DETECTED
CALCIUM SPEC-SCNC: 8.9 MG/DL (ref 8.6–10.5)
CHLORIDE SERPL-SCNC: 103 MMOL/L (ref 98–107)
CLARITY UR: CLEAR
CO2 SERPL-SCNC: 24 MMOL/L (ref 22–29)
COLOR UR: YELLOW
CREAT SERPL-MCNC: 0.73 MG/DL (ref 0.57–1)
D-LACTATE SERPL-SCNC: 0.9 MMOL/L (ref 0.5–2)
DEPRECATED RDW RBC AUTO: 46.7 FL (ref 37–54)
EGFRCR SERPLBLD CKD-EPI 2021: 83.3 ML/MIN/1.73
EOSINOPHIL # BLD AUTO: 0.09 10*3/MM3 (ref 0–0.4)
EOSINOPHIL NFR BLD AUTO: 1.5 % (ref 0.3–6.2)
ERYTHROCYTE [DISTWIDTH] IN BLOOD BY AUTOMATED COUNT: 14.3 % (ref 12.3–15.4)
FLUAV SUBTYP SPEC NAA+PROBE: NOT DETECTED
FLUBV RNA ISLT QL NAA+PROBE: NOT DETECTED
GLOBULIN UR ELPH-MCNC: 3.2 GM/DL
GLUCOSE SERPL-MCNC: 94 MG/DL (ref 65–99)
GLUCOSE UR STRIP-MCNC: NEGATIVE MG/DL
HADV DNA SPEC NAA+PROBE: NOT DETECTED
HCOV 229E RNA SPEC QL NAA+PROBE: NOT DETECTED
HCOV HKU1 RNA SPEC QL NAA+PROBE: NOT DETECTED
HCOV NL63 RNA SPEC QL NAA+PROBE: NOT DETECTED
HCOV OC43 RNA SPEC QL NAA+PROBE: NOT DETECTED
HCT VFR BLD AUTO: 39 % (ref 34–46.6)
HGB BLD-MCNC: 12.5 G/DL (ref 12–15.9)
HGB UR QL STRIP.AUTO: NEGATIVE
HMPV RNA NPH QL NAA+NON-PROBE: NOT DETECTED
HPIV1 RNA ISLT QL NAA+PROBE: NOT DETECTED
HPIV2 RNA SPEC QL NAA+PROBE: NOT DETECTED
HPIV3 RNA NPH QL NAA+PROBE: NOT DETECTED
HPIV4 P GENE NPH QL NAA+PROBE: NOT DETECTED
IMM GRANULOCYTES # BLD AUTO: 0.03 10*3/MM3 (ref 0–0.05)
IMM GRANULOCYTES NFR BLD AUTO: 0.5 % (ref 0–0.5)
KETONES UR QL STRIP: NEGATIVE
LEUKOCYTE ESTERASE UR QL STRIP.AUTO: NEGATIVE
LIPASE SERPL-CCNC: 50 U/L (ref 13–60)
LYMPHOCYTES # BLD AUTO: 1.45 10*3/MM3 (ref 0.7–3.1)
LYMPHOCYTES NFR BLD AUTO: 24.2 % (ref 19.6–45.3)
M PNEUMO IGG SER IA-ACNC: NOT DETECTED
MCH RBC QN AUTO: 29.1 PG (ref 26.6–33)
MCHC RBC AUTO-ENTMCNC: 32.1 G/DL (ref 31.5–35.7)
MCV RBC AUTO: 90.7 FL (ref 79–97)
MONOCYTES # BLD AUTO: 0.74 10*3/MM3 (ref 0.1–0.9)
MONOCYTES NFR BLD AUTO: 12.4 % (ref 5–12)
NEUTROPHILS NFR BLD AUTO: 3.66 10*3/MM3 (ref 1.7–7)
NEUTROPHILS NFR BLD AUTO: 61.1 % (ref 42.7–76)
NITRITE UR QL STRIP: NEGATIVE
NRBC BLD AUTO-RTO: 0 /100 WBC (ref 0–0.2)
PH UR STRIP.AUTO: 6.5 [PH] (ref 5–8)
PLATELET # BLD AUTO: 169 10*3/MM3 (ref 140–450)
PMV BLD AUTO: 10 FL (ref 6–12)
POTASSIUM SERPL-SCNC: 3.5 MMOL/L (ref 3.5–5.2)
PROT SERPL-MCNC: 6.8 G/DL (ref 6–8.5)
PROT UR QL STRIP: NEGATIVE
RBC # BLD AUTO: 4.3 10*6/MM3 (ref 3.77–5.28)
RHINOVIRUS RNA SPEC NAA+PROBE: DETECTED
RSV RNA NPH QL NAA+NON-PROBE: NOT DETECTED
SARS-COV-2 RNA NPH QL NAA+NON-PROBE: DETECTED
SODIUM SERPL-SCNC: 137 MMOL/L (ref 136–145)
SP GR UR STRIP: 1.01 (ref 1–1.03)
UROBILINOGEN UR QL STRIP: NORMAL
WBC NRBC COR # BLD: 5.99 10*3/MM3 (ref 3.4–10.8)

## 2023-10-14 PROCEDURE — 80053 COMPREHEN METABOLIC PANEL: CPT | Performed by: PHYSICIAN ASSISTANT

## 2023-10-14 PROCEDURE — 83690 ASSAY OF LIPASE: CPT | Performed by: PHYSICIAN ASSISTANT

## 2023-10-14 PROCEDURE — 71045 X-RAY EXAM CHEST 1 VIEW: CPT

## 2023-10-14 PROCEDURE — 85025 COMPLETE CBC W/AUTO DIFF WBC: CPT | Performed by: PHYSICIAN ASSISTANT

## 2023-10-14 PROCEDURE — P9612 CATHETERIZE FOR URINE SPEC: HCPCS

## 2023-10-14 PROCEDURE — 81003 URINALYSIS AUTO W/O SCOPE: CPT | Performed by: PHYSICIAN ASSISTANT

## 2023-10-14 PROCEDURE — 0202U NFCT DS 22 TRGT SARS-COV-2: CPT | Performed by: PHYSICIAN ASSISTANT

## 2023-10-14 PROCEDURE — 99283 EMERGENCY DEPT VISIT LOW MDM: CPT

## 2023-10-14 PROCEDURE — 83605 ASSAY OF LACTIC ACID: CPT | Performed by: PHYSICIAN ASSISTANT

## 2023-10-14 PROCEDURE — 36415 COLL VENOUS BLD VENIPUNCTURE: CPT

## 2023-10-14 NOTE — ED PROVIDER NOTES
EMERGENCY DEPARTMENT ENCOUNTER    Pt Name: Brittny Nicolas  MRN: [id number]  Pt :   1945  Room Number:  Room/bed info not found  Date of encounter:  10/14/2023  PCP: Omari Burgess APRN  ED Provider: TRACY Dela Cruz    Historian: Patient    HPI:  Chief Complaint: Fever, Weakness     Context: Brittny Nicolas is a 78 y.o. female who presents to the ED c/o fever and weakness.  Patient has history of dementia and is not a reliable historian.  Family who is present at bedside reports that they just returned from a trip to [place] on Wednesday.  Family reports that Thursday morning patient was not feeling well and had a temperature of 100.3 øF.  Additional symptoms including weakness and diarrhea.  Patient also has had a cough.  No reports of pain.  Denies nausea or vomiting.  No urinary complaints.  Patient has become increasingly more weak to the point where caregiver has had to actually pick her up and move her physically.  Patient was giving Tylenol prior to arrival which helped with fever.  No additional complaints on interview and exam.  HPI     REVIEW OF SYSTEMS  A chief complaint appropriate review of systems was completed and is negative except as noted in the HPI.     PAST MEDICAL HISTORY  Past Medical History:   Diagnosis Date    Acid reflux disease     Alzheimer's dementia     Arthritis     rhuematoid    Carotid artery occlusion     Coronary artery disease     Dementia     Depression     Facial palsy 2022    Graves disease     Hyperlipidemia     Hypertension     Memory loss     Stomach problems     Stroke     Stroke syndrome     Thyroid disease     Transient ischemic attack     Vision loss        PAST SURGICAL HISTORY  Past Surgical History:   Procedure Laterality Date    AUGMENTATION MAMMAPLASTY          BREAST AUGMENTATION      CAROTID ARTERY ANGIOPLASTY      CAROTID ENDARTERECTOMY      CAROTID STENT      COLONOSCOPY      D & C CERVICAL BIOPSY      Dilation and Curettage of  Cervical Stump    ENDOSCOPY N/A 5/8/2023    Procedure: ESOPHAGOGASTRODUODENOSCOPY;  Surgeon: Marito Solis MD;  Location: [facility] ENDOSCOPY;  Service: Gastroenterology;  Laterality: N/A;  esophageal dilation done with 18-20mm ballon     HERNIA REPAIR      HERNIA REPAIR      THROMBOENDARTERECTOMY      carotid    UPPER GASTROINTESTINAL ENDOSCOPY         FAMILY HISTORY  Family History   Problem Relation Age of Onset    Cystic fibrosis Other     Cervical cancer Mother     Cancer Paternal Grandmother     Dementia Paternal Grandmother     Colon cancer Neg Hx     Colon polyps Neg Hx        SOCIAL HISTORY  Social History     Socioeconomic History    Marital status:    Tobacco Use    Smoking status: Former     Packs/day: 1.00     Years: 15.00     Additional pack years: 0.00     Total pack years: 15.00     Types: Cigarettes    Smokeless tobacco: Never    Tobacco comments:     Do not know when she quit. Likely about 1998.   Vaping Use    Vaping Use: Never used   Substance and Sexual Activity    Alcohol use: Not Currently     Alcohol/week: 0.0 standard drinks of alcohol     Comment: She might have some wine, but I try to not give her any    Drug use: No    Sexual activity: Not Currently     Birth control/protection: Post-menopausal       ALLERGIES  Boniva [ibandronic acid]    PHYSICAL EXAM  Physical Exam  ***    LAB RESULTS  Results for orders placed or performed during the hospital encounter of 09/22/23   Respiratory Panel PCR w/COVID-19(SARS-CoV-2) JATINDER/ITALIA/TRISTA/PAD/COR/MAD/RONDA In-House, NP Swab in UTM/VTM, 3-4 HR TAT - Swab, Nasopharynx    Specimen: Nasopharynx; Swab   Result Value Ref Range    ADENOVIRUS, PCR Not Detected Not Detected    Coronavirus 229E Not Detected Not Detected    Coronavirus HKU1 Not Detected Not Detected    Coronavirus NL63 Not Detected Not Detected    Coronavirus OC43 Not Detected Not Detected    COVID19 Not Detected Not Detected - Ref. Range    Human Metapneumovirus Not Detected Not Detected     Human Rhinovirus/Enterovirus Detected (A) Not Detected    Influenza A PCR Not Detected Not Detected    Influenza B PCR Not Detected Not Detected    Parainfluenza Virus 1 Not Detected Not Detected    Parainfluenza Virus 2 Not Detected Not Detected    Parainfluenza Virus 3 Not Detected Not Detected    Parainfluenza Virus 4 Not Detected Not Detected    RSV, PCR Not Detected Not Detected    Bordetella pertussis pcr Not Detected Not Detected    Bordetella parapertussis PCR Not Detected Not Detected    Chlamydophila pneumoniae PCR Not Detected Not Detected    Mycoplasma pneumo by PCR Not Detected Not Detected   Urine Culture - Urine, Urine, Catheter    Specimen: Urine, Catheter   Result Value Ref Range    Urine Culture >100,000 CFU/mL Mixed Swathi Isolated    Comprehensive Metabolic Panel    Specimen: Blood   Result Value Ref Range    Glucose 203 (H) 65 - 99 mg/dL    BUN 15 8 - 23 mg/dL    Creatinine 0.73 0.57 - 1.00 mg/dL    Sodium 143 136 - 145 mmol/L    Potassium 3.4 (L) 3.5 - 5.2 mmol/L    Chloride 104 98 - 107 mmol/L    CO2 23.0 22.0 - 29.0 mmol/L    Calcium 9.3 8.6 - 10.5 mg/dL    Total Protein 7.3 6.0 - 8.5 g/dL    Albumin 3.8 3.5 - 5.2 g/dL    ALT (SGPT) 9 1 - 33 U/L    AST (SGOT) 15 1 - 32 U/L    Alkaline Phosphatase 100 39 - 117 U/L    Total Bilirubin 0.4 0.0 - 1.2 mg/dL    Globulin 3.5 gm/dL    A/G Ratio 1.1 g/dL    BUN/Creatinine Ratio 20.5 7.0 - 25.0    Anion Gap 16.0 (H) 5.0 - 15.0 mmol/L    eGFR 83.3 >60.0 mL/min/1.73   Urinalysis With Culture If Indicated - Urine, Catheter    Specimen: Urine, Catheter   Result Value Ref Range    Color, UA Yellow Yellow, Straw    Appearance, UA Turbid (A) Clear    pH, UA 5.5 5.0 - 8.0    Specific Gravity, UA 1.025 1.001 - 1.030    Glucose, UA Negative Negative    Ketones, UA Negative Negative    Bilirubin, UA Negative Negative    Blood, UA Negative Negative    Protein, UA Negative Negative    Leuk Esterase, UA Small (1+) (A) Negative    Nitrite, UA Negative Negative     Urobilinogen, UA 1.0 E.U./dL 0.2 - 1.0 E.U./dL   BNP    Specimen: Blood   Result Value Ref Range    proBNP 121.2 0.0 - 1,800.0 pg/mL   Single High Sensitivity Troponin T    Specimen: Blood   Result Value Ref Range    HS Troponin T 11 (H) <10 ng/L   CBC Auto Differential    Specimen: Blood   Result Value Ref Range    WBC 10.75 3.40 - 10.80 10*3/mm3    RBC 4.67 3.77 - 5.28 10*6/mm3    Hemoglobin 13.0 12.0 - 15.9 g/dL    Hematocrit 42.6 34.0 - 46.6 %    MCV 91.2 79.0 - 97.0 fL    MCH 27.8 26.6 - 33.0 pg    MCHC 30.5 (L) 31.5 - 35.7 g/dL    RDW 13.7 12.3 - 15.4 %    RDW-SD 46.2 37.0 - 54.0 fl    MPV 9.6 6.0 - 12.0 fL    Platelets 238 140 - 450 10*3/mm3    Neutrophil % 78.6 (H) 42.7 - 76.0 %    Lymphocyte % 14.0 (L) 19.6 - 45.3 %    Monocyte % 5.6 5.0 - 12.0 %    Eosinophil % 1.3 0.3 - 6.2 %    Basophil % 0.2 0.0 - 1.5 %    Immature Grans % 0.3 0.0 - 0.5 %    Neutrophils, Absolute 8.45 (H) 1.70 - 7.00 10*3/mm3    Lymphocytes, Absolute 1.51 0.70 - 3.10 10*3/mm3    Monocytes, Absolute 0.60 0.10 - 0.90 10*3/mm3    Eosinophils, Absolute 0.14 0.00 - 0.40 10*3/mm3    Basophils, Absolute 0.02 0.00 - 0.20 10*3/mm3    Immature Grans, Absolute 0.03 0.00 - 0.05 10*3/mm3    nRBC 0.0 0.0 - 0.2 /100 WBC   Urinalysis, Microscopic Only - Urine, Catheter    Specimen: Urine, Catheter   Result Value Ref Range    RBC, UA 3-6 (A) None Seen, 0-2 /HPF    WBC, UA 13-20 (A) None Seen, 0-2 /HPF    Bacteria, UA None Seen None Seen, Trace /HPF    Squamous Epithelial Cells, UA 21-30 (A) None Seen, 0-2 /HPF    Hyaline Casts, UA None Seen 0 - 6 /LPF    Calcium Oxalate Crystals, UA Moderate/2+ None Seen /HPF    Methodology Manual Light Microscopy    ECG 12 Lead Chest Pain   Result Value Ref Range    QT Interval 378 ms    QTC Interval 449 ms       If labs were ordered, I independently reviewed the results and considered them in treating the patient.    RADIOLOGY  XR Chest 1 View    (Results Pending)     [] Radiologist's Report Reviewed:  I ordered and  independently reviewed the above noted radiographic studies.  See radiologist's dictation for official interpretation.      PROCEDURES    Procedures    No orders to display       MEDICATIONS GIVEN IN ER    Medications - No data to display    MEDICAL DECISION MAKING, PROGRESS, and CONSULTS   Medical Decision Making  Amount and/or Complexity of Data Reviewed  Labs: ordered.  Radiology: ordered.        All labs have been independently reviewed by me.  All radiology studies have been reviewed by me and the radiologist dictating the report.  All EKG's have been independently viewed by me.    [] Discussed with radiology regarding test interpretation:    Discussion below represents my analysis of pertinent findings related to patient's condition, differential diagnosis, treatment plan and final disposition.    Differential diagnosis:  The differential diagnosis associated with the patient's presentation includes: ***    Additional sources  Discussed/ obtained information from independent historians:   [] Spouse  [] Parent  [] Family member  [] Friend  [] EMS   [] Other:  External (non-ED) record review:   [] Inpatient record:   [] Office record:   [] Outpatient record:   [] Prior Outpatient labs:   [] Prior Outpatient radiology:   [] Primary Care record:   [] Outside ED record:   [] Other:   Patient's care impacted by:   [] Diabetes  [] Hypertension  [] Hyperlipidemia  [] Hypothyroidism   [] Coronary Artery Disease   [] COPD   [] Cancer   [] Obesity  [] GERD   [] Tobacco Abuse   [] Substance Abuse    [] Anxiety   [] Depression   [] Other:   Care significantly affected by Social Determinants of Health (housing and economic circumstances, unemployment)    [] Yes     [] No   If yes, Patient's care significantly limited by  Social Determinants of Health including:   [] Inadequate housing   [] Low income   [] Alcoholism and drug addiction in family   [] Problems related to primary support group   [] Unemployment   [] Problems  related to employment   [] Other Social Determinants of Health:     Shared decision making:  ***    Orders placed during this visit:  Orders Placed This Encounter   Procedures    COVID PRE-OP / PRE-PROCEDURE SCREENING ORDER (NO ISOLATION) - Swab, Nasopharynx    Respiratory Panel PCR w/COVID-19(SARS-CoV-2) JATINDER/ITALIA/TRISTA/PAD/COR/MAD/RONDA In-House, NP Swab in UTM/VTM, 3-4 HR TAT - Swab, Nasopharynx    XR Chest 1 View    Comprehensive Metabolic Panel    Lipase    Urinalysis With Microscopic If Indicated (No Culture) - Urine, Clean Catch    Lactic Acid, Plasma    CBC Auto Differential    CBC & Differential       I considered prescription management  with:   [] Pain medication  [] Antiviral  [] Antibiotic   [] Other:   Rationale:  Additional orders considered but not ordered:  The following testing was considered but ultimately not selected after discussion with patient/family:***    ED Course:              DIAGNOSIS  Final diagnoses:   None       DISPOSITION    ED Disposition       None            Please note that portions of this document were completed with voice recognition software.      if symptoms persist and unable to seek follow-up in a timely fashion.  The patient/family expressed understanding and agreement with this plan.  The patient will follow-up with primary care for reevaluation.      Orders placed during this visit:  Orders Placed This Encounter   Procedures    COVID PRE-OP / PRE-PROCEDURE SCREENING ORDER (NO ISOLATION) - Swab, Nasopharynx    Respiratory Panel PCR w/COVID-19(SARS-CoV-2) JATINDER/ITALIA/TRISTA/PAD/COR/MAD/RONDA In-House, NP Swab in UTM/VTM, 3-4 HR TAT - Swab, Nasopharynx    XR Chest 1 View    Comprehensive Metabolic Panel    Lipase    Urinalysis With Microscopic If Indicated (No Culture) - Urine, Clean Catch    Lactic Acid, Plasma    CBC Auto Differential    CBC & Differential       ED Course:    ED Course as of 10/19/23 1037   Sat Oct 14, 2023   2150 COVID19(!!): Detected [JG]   2151 Human Rhinovirus/Enterovirus(!): Detected [JG]   2151 Personally reviewed findings of nasopharyngeal swab and ER work-up.  Patient positive for COVID-19 and rhinovirus. [JG]   2152 I reviewed ER work-up with patient and family at bedside demonstrating viral illness positive for COVID-19 and human rhinovirus.  Stressed the need for symptomatic care including anti-inflammatories/Tylenol as needed for generalized body aches, pain and fever.  Also recommended encouragement of fluids and rest.  Patient and family agreeable to plan of care. [JG]   2152 Patient presents with symptoms suspicious for likely viral upper respiratory infection. Differential includes bacterial pneumonia, sinusitis, allergic rhinitis, influenza and COVID-19. Do not suspect underlying cardiopulmonary process. I considered, but think unlikely, dangerous causes of this patient's symptoms to include ACS, CHF or COPD exacerbations, pneumonia, pneumothorax. Nasopharyngeal swab POSITIVE for COVID-19 and Human Rhinovirus. Labs/urinalysis obtained and reviewed demonstrate no acute or emergent abnormalities. Imaging of chest demonstrated no  acute cardiopulmonary process. Based on clinical presentation and workup in ED including labs and imaging, no clear indication for treatment beyond symptomatic management.  Patient is afebrile, vital signs stable, nontoxic appearing with oxygen saturation of 94% on room air. Patient not in need of emergent medical intervention. Will be discharged home with symptomatic care. Patient provided reassurance. Discharged with PCP follow up.   [JG]      ED Course User Index  [JG] Antoni Valle PA            DIAGNOSIS  Final diagnoses:   Acute viral syndrome   COVID-19 virus infection   Rhinovirus infection   History of hypertension   History of hypothyroidism   History of anxiety   History of depression   History of dementia       DISPOSITION    ED Disposition       ED Disposition   Discharge    Condition   Stable    Comment   --               Please note that portions of this document were completed with voice recognition software.        Antoni Valle PA  10/19/23 1037

## 2023-10-20 RX ORDER — LEVOTHYROXINE SODIUM 0.12 MG/1
TABLET ORAL
Qty: 90 TABLET | Refills: 1 | Status: SHIPPED | OUTPATIENT
Start: 2023-10-20

## 2023-10-20 NOTE — TELEPHONE ENCOUNTER
Rx Refill Note    Requested Prescriptions     Pending Prescriptions Disp Refills    levothyroxine (SYNTHROID, LEVOTHROID) 125 MCG tablet [Pharmacy Med Name: LEVOTHYROXINE 125 MCG TABLET] 90 tablet 1     Sig: TAKE 1 TABLET BY MOUTH EVERY DAY        Last office visit with prescribing clinician: 8/31/2023     Next office visit with prescribing clinician: 2/29/2024   {

## 2023-10-23 ENCOUNTER — TELEPHONE (OUTPATIENT)
Dept: NEUROLOGY | Facility: CLINIC | Age: 78
End: 2023-10-23

## 2023-10-23 NOTE — TELEPHONE ENCOUNTER
Bairon Gibbons,  I have reviewed notes from daughter. This is most likely progression of the Alzheimer's disease. We placed a referral last visit to nursing home but I am unsure if daughter was able to find placement. I also discussed with daughter on 7/12/2023 a palliative care or hospice referral if she continued to decline-would she be interested in a referral? They come in to the home with [facility] navigators and evaluate whether she qualifies for their services. We could also fit her in this Friday 10/27/2023 at 1130am in our [place] location if she still wanted a sooner visit, but please keep the 2/14/2024 visit as well. Let me know. Thanks, Munira

## 2023-10-23 NOTE — TELEPHONE ENCOUNTER
After relaying Munira's response and recommendations, daughter, Adia and I talked things out and she is interested in a possible palliative care/Hospice referral or eval to see if she qualifies however she is going to be taking her to see CARLOS Pham on Thursday this week and have her thyroid checked, make sure no infections etc could be contributing to her fatigue and appetite as well and then she is going to take Munira up on seeing her this Friday where they can then discuss options and possibly do a referral for palliative care and Hospice from there.

## 2023-10-23 NOTE — TELEPHONE ENCOUNTER
Provider: LUIS ALBERTO AYALA APRN    Caller: INDIO    Relationship to Patient: EC    Phone Number: [phone]    Reason for Call: CALLED FOR A SOONER APPT THAN 2-14-23.  STATED SHE THINKS PT IS SHUTTING DOWN.  STATED THAT SHE IS HAVING A HARD TIME KEEPING PT CONSCIOUS.  STATED SHE HAS NOT BEEN EATING OR DRINKING MUCH FOR 2 WEEKS NOW.  STATED PT DOES GET UP TO WALK TO THE COUCH OR .  STATED IN SEPTEMBER PT WAS DIAGNOSED WITH RHINOVIRUS.  PT WAS AT ER ON 10-14-23 FOR COVID AND RHINOVIRUS.  STATED PT IS MORE ALERT WHEN SITTING UP.   STATED WHEN SHE LAYS DOWN HER EYES CLOSE AND DRIFTS OFF TO SLEEP.   CALLED [facility]W [facility] AT [facility] TRANSFERRED.    When was the patient last seen: 7-12-23    When did it start: 2 WEEKS AGO    Where is it located:     Characteristics of symptom/severity:     Timing- Is it constant or intermittent:     What makes it worse:     What makes it better: SITTING UP KEEPS HER AWAKE    What therapies/medications have you tried:         PLEASE CALL & ADVISE

## 2023-10-23 NOTE — TELEPHONE ENCOUNTER
"S/w Daughter, Adia who states its almost like she's half asleep sometimes you can tell she is kind of looking through slits of her eyes or rapid eye movement but falls asleep at the drop of a hat. Has to make her get up to eat and help her eat otherwise she will not :    They Went and got food the other night and the only way she could get her to eat was to physically hold her up so she didn't slump back in the [place] and had to hold her up and feed her but as soon as she stopped holding her up she'd slump back and be back asleep. And she keeps having to be like \"Mom! you have to eat\"  Its been a rapid turn around from July to now it's like she's just shutting down sleeps the days away.    She is no longer sick no cough no fever but just lethargic and daughter really does not feel this is related to the common cold or virus she had weeks ago.    Very worried she is going to choke and also concerned that she has to be made to eat otherwise she's too out of it.     She took one sip of her coffee this morning then layed down on the couch and was back asleep.     This has been going on for a month now. Concerns she has lost weight as she is not eating or drinking enough.       "

## 2023-10-27 ENCOUNTER — OFFICE VISIT (OUTPATIENT)
Dept: NEUROLOGY | Facility: CLINIC | Age: 78
End: 2023-10-27
Payer: MEDICARE

## 2023-10-27 VITALS
DIASTOLIC BLOOD PRESSURE: 72 MMHG | HEIGHT: 63 IN | HEART RATE: 75 BPM | SYSTOLIC BLOOD PRESSURE: 128 MMHG | BODY MASS INDEX: 22.32 KG/M2 | WEIGHT: 126 LBS | OXYGEN SATURATION: 96 %

## 2023-10-27 DIAGNOSIS — F02.818 LATE ONSET ALZHEIMER'S DEMENTIA WITH BEHAVIORAL DISTURBANCE: Primary | ICD-10-CM

## 2023-10-27 DIAGNOSIS — R54 SENILE DEBILITY: ICD-10-CM

## 2023-10-27 DIAGNOSIS — R53.83 LETHARGY: ICD-10-CM

## 2023-10-27 DIAGNOSIS — G30.1 LATE ONSET ALZHEIMER'S DEMENTIA WITH BEHAVIORAL DISTURBANCE: Primary | ICD-10-CM

## 2023-10-27 PROCEDURE — 99214 OFFICE O/P EST MOD 30 MIN: CPT | Performed by: NURSE PRACTITIONER

## 2023-10-27 PROCEDURE — 3074F SYST BP LT 130 MM HG: CPT | Performed by: NURSE PRACTITIONER

## 2023-10-27 PROCEDURE — 1159F MED LIST DOCD IN RCRD: CPT | Performed by: NURSE PRACTITIONER

## 2023-10-27 PROCEDURE — 3078F DIAST BP <80 MM HG: CPT | Performed by: NURSE PRACTITIONER

## 2023-10-27 PROCEDURE — 1160F RVW MEDS BY RX/DR IN RCRD: CPT | Performed by: NURSE PRACTITIONER

## 2023-10-27 RX ORDER — MEMANTINE HYDROCHLORIDE 10 MG/1
10 TABLET ORAL 2 TIMES DAILY
Qty: 180 TABLET | Refills: 3 | Status: SHIPPED | OUTPATIENT
Start: 2023-10-27

## 2023-10-27 RX ORDER — QUETIAPINE FUMARATE 25 MG/1
50 TABLET, FILM COATED ORAL NIGHTLY
Qty: 180 TABLET | Refills: 3 | Status: SHIPPED | OUTPATIENT
Start: 2023-10-27

## 2023-10-27 RX ORDER — DONEPEZIL HYDROCHLORIDE 10 MG/1
10 TABLET, FILM COATED ORAL NIGHTLY
Qty: 90 TABLET | Refills: 3 | Status: SHIPPED | OUTPATIENT
Start: 2023-10-27 | End: 2024-10-26

## 2023-10-27 NOTE — PROGRESS NOTES
"Subjective:     Patient ID: Brittny Nicolas is a 78 y.o. female.    CC:   Chief Complaint   Patient presents with    Alzheimer's Disease       HPI:   History of Present Illness  Today 10/27/2023-    This is an 78-year-old female who I last saw on 07/12/2023 for Alzheimer's disease with symptoms present since 2019. She has had a significant decline in her health overall. Her daughter called us back requesting a sooner appointment. She has had a significant decline physically and cognitively, has basically been \"shutting down and very lethargic\". She has been evaluated by primary care as well. Family is looking into additional long term care options versus hospice or palliative care. We placed a referral to nursing home at last visit in clinic but family never heard from the facility.    Today, she is accompanied by her daughter who contributes to her history. She reports she is tired. Her daughter states she has not heard anything from the nursing home regarding the referral. Her daughter states she has not been eating or drinking much. Her weight has decreased by 3 pounds since she was last seen in the clinic. Her daughter states she was seen in the emergency room on 09/22/2023 and was treated for a UTI. On 10/14/2023, she had COVID-19 and rhinovirus. She did not have a cough. Her daughter states the fatigue has been \"hanging around\" since 09/2023.     She has been taking Seroquel 2 tablets at bedtime long term, which is not a change. Her daughter states PCP wanted to increase her Aricept to 23 mg, but she wanted to wait until she talked to me before she gets that filled. Her daughter states she has been leaning over when she falls asleep in a chair. Her daughter states she would even think with regular coffee, she would have a little more \"pep in her\", but she will fall asleep within 30 minutes of getting back on the couch. Her daughter states it is like a REM sleep because her eyes will rapidly move. Her daughter " states yesterday with PCP in the clinic, she had fallen asleep and was unaware of anything going on in the room until he touched her arm, which caused her to wake up.     She tried to back off the Seroquel, but the agitation became worse and she needed to put her back on the prescribed dose. Her daughter states she does not want to get another garment change, does not want to get dressed, and does not want anyone to mess with her when she becomes agitated and the seroquel allows caregivers and her to assist in Brittny's care. Her daughter states she has a caregiver who comes in to help with daily activities with her mother. Her daughter states she has tried different foods. Her daughter states that last night she had shredded chicken, but she would not eat it. Her daughter confirms that she is choking or having trouble with swallowing food. Her daughter states she wanted to give her yogurt, but she kept falling asleep while she was trying to eat it. Her daughter states they are giving her all her medications crushed up into applesauce; however, the pharmacist told her not to crush the Protonix because it is long acting, but she can still take that medication by swallowing it with a big spoonful of applesauce. Her daughter states she needs help with bathing, and she has incontinence. She is total care now. She does still ambulate. Her daughter states she is sleeping well at night as she goes to bed at 6:30 PM or 7:00 PM and then she will not get up until 7:00 AM. Her daughter states that occasionally she gets up at 6:00 AM, but most of the time she has an extra hour.     Her daughter confirms that she had blood work completed on 10/14/2023 that showed her liver enzymes were normal and CBC and CMP were normal. COVID-19 was positive. UA negative. Lactic acid normal. Lipase normal. 9/22/2023 CT head without contrast showing no acute intracranial abnormalities. Chronic changes.    Prior Neurological history & evaluation:    Alzheimer's disease with symptoms present since 2019. She continues to have hallucinations, delusions, trouble with short term memory, sundown syndrome, sleeping troubles, eating issues, and aggression.   Has included an unremarkable head CT and screening blood work, which were unremarkable. [facility]  11/24/2021 labs-ammonia low at 10, tsh 6.5-addressed with pcp, ft4 normal, b12 normal, cbc ok, cmp ok with sodium mildly low at 135.  Ex- passed away 4/30/2022-this was difficult and they were close.  Her family started noticing symptoms around early 2019 marked by forgetfulness, and this has gradually worsened over time. She has had impairments in language, and executive function. Her family does help with managing medications and finances. She is currently living with her daughter in [place].    Tried cognitive rehab with SLP in past and did not find this helpful.  Urinary and bowel incontinence. Worsening. Frequent UTIs.  On donepezil, memantine and seroquel.    Alzheimer's disease with symptoms present since 2019. She has been taking donepezil 10 mg daily, Seroquel 25 mg 2 pills nightly, memantine 10mg twice a day. Seroquel dosing cannot be lowered as she has increased agitation per daughter.     Her daughter reports she was hospitalized in 05/2023 for aspirating food. She was hospitalized for approximately 1 week and was treated for pneumonia. She had an esophageal dilation. Her daughter adds in addition to her aspirating and having pneumonia, she also had a severe UTI that took approximately 1 month to improve. Continues to have dysphagia. Cannot swallow pills. Continues to lose weight. She requires total care for all activities of daily living.    The following portions of the patient's history were reviewed and updated as appropriate: allergies, current medications, past family history, past medical history, past social history, past surgical history,  and problem list.    Past Medical History:   Diagnosis Date    Acid reflux disease     Alzheimer's dementia     Arthritis     rhuematoid    Carotid artery occlusion     Coronary artery disease     Dementia     Depression     Facial palsy 2022    Graves disease     Hyperlipidemia     Hypertension     Memory loss     Stomach problems     Stroke     Stroke syndrome     Thyroid disease     Transient ischemic attack     Vision loss        Past Surgical History:   Procedure Laterality Date    AUGMENTATION MAMMAPLASTY          BREAST AUGMENTATION      CAROTID ARTERY ANGIOPLASTY      CAROTID ENDARTERECTOMY      CAROTID STENT      COLONOSCOPY      D & C CERVICAL BIOPSY      Dilation and Curettage of Cervical Stump    ENDOSCOPY N/A 2023    Procedure: ESOPHAGOGASTRODUODENOSCOPY;  Surgeon: Marito Solis MD;  Location: [facility] ENDOSCOPY;  Service: Gastroenterology;  Laterality: N/A;  esophageal dilation done with 18-20mm ballon     HERNIA REPAIR      HERNIA REPAIR      THROMBOENDARTERECTOMY      carotid    UPPER GASTROINTESTINAL ENDOSCOPY         Social History     Socioeconomic History    Marital status:    Tobacco Use    Smoking status: Former     Packs/day: 1.00     Years: 15.00     Additional pack years: 0.00     Total pack years: 15.00     Types: Cigarettes     Quit date: 10/4/2000     Years since quittin.0     Passive exposure: Never    Smokeless tobacco: Never    Tobacco comments:     Do not know when she quit. Likely about .   Vaping Use    Vaping Use: Never used   Substance and Sexual Activity    Alcohol use: Not Currently     Comment: She might have some wine, but I try to not give her any    Drug use: No    Sexual activity: Not Currently     Birth control/protection: Post-menopausal       Family History   Problem Relation Age of Onset    Cystic fibrosis Other     Cervical cancer Mother     Cancer Paternal Grandmother     Dementia Paternal Grandmother     Colon cancer Neg Hx     Colon  "polyps Neg Hx           Current Outpatient Medications:     acetaminophen (TYLENOL) 325 MG tablet, Take 2 tablets by mouth Every 4 (Four) Hours As Needed for Mild Pain ., Disp: , Rfl:     amLODIPine (NORVASC) 5 MG tablet, TAKE 1 TABLET BY MOUTH EVERY DAY, Disp: 90 tablet, Rfl: 1    atorvastatin (LIPITOR) 20 MG tablet, TAKE 1 TABLET BY MOUTH EVERYDAY AT BEDTIME, Disp: 90 tablet, Rfl: 1    Cholecalciferol (VITAMIN D) 2000 UNITS capsule, Take 1 capsule by mouth Daily. Indications: Vitamin D Deficiency, Disp: , Rfl:     donepezil (ARICEPT) 10 MG tablet, Take 1 tablet by mouth Every Night. Indications: Alzheimer's Disease, Disp: 90 tablet, Rfl: 3    levothyroxine (SYNTHROID, LEVOTHROID) 125 MCG tablet, TAKE 1 TABLET BY MOUTH EVERY DAY, Disp: 90 tablet, Rfl: 1    memantine (NAMENDA) 10 MG tablet, Take 1 tablet by mouth 2 (Two) Times a Day. Indications: Alzheimer's Disease, Disp: 180 tablet, Rfl: 3    pantoprazole (PROTONIX) 40 MG EC tablet, TAKE 1 TABLET BY MOUTH DAILY WITH BREAKFAST. INDICATIONS: HEARTBURN, Disp: 90 tablet, Rfl: 1    polyethylene glycol (MIRALAX) 17 g packet, Take 17 g by mouth Daily. (Patient taking differently: Take 17 g by mouth As Needed (GI). Indications: Constipation), Disp: , Rfl:     QUEtiapine (SEROquel) 25 MG tablet, Take 2 tablets by mouth Every Night. Indications: Behavioral Disorders associated with Dementia, Disp: 180 tablet, Rfl: 3     Review of Systems   Constitutional:  Positive for activity change, appetite change and fatigue.   HENT:  Positive for trouble swallowing.    Neurological:  Positive for weakness.   Psychiatric/Behavioral:  Positive for agitation, behavioral problems, confusion and decreased concentration. The patient is nervous/anxious.    All other systems reviewed and are negative.       Objective:  /72   Pulse 75   Ht 160 cm (62.99\")   Wt 57.2 kg (126 lb)   SpO2 96%   BMI 22.33 kg/m²     Neurologic Exam     Mental Status   Oriented to person.   Disoriented to " place.   Disoriented to time.   Speech: speech is normal   Level of consciousness: alert (Initially sleeping and leaning to left in exam chair upon entering room, once I stated her name she woke up, was alert throughout entire visit with stable posture)  Knowledge: poor.   Abnormal comprehension.     Cranial Nerves   Cranial nerves II through XII intact.     Motor Exam   Muscle bulk: normal  Overall muscle tone: normal    Strength   Strength 5/5 throughout.     Gait, Coordination, and Reflexes     Gait  Gait: (slow, no assistive device)    Coordination   Finger to nose coordination: normal    Tremor   Resting tremor: absent  Intention tremor: absent  Action tremor: absent    Reflexes   Right : 2+  Left : 2+      Physical Exam  Constitutional:       Comments: Thin white female in no acute distress, generally debilitated and weaker than her visit in July 2023   Neurological:      Mental Status: She is alert.      Cranial Nerves: Cranial nerves 2-12 are intact.      Motor: Motor strength is normal.     Coordination: Finger-Nose-Finger Test normal.   Psychiatric:         Mood and Affect: Affect is blunt.         Speech: Speech normal.         Behavior: Behavior is slowed.         Thought Content: Thought content is delusional (at times per daughter, cooperative today).         Cognition and Memory: She exhibits impaired recent memory.         Judgment: Judgment is inappropriate.     MMSE has been a 12 to 13 out of 30.    Assessment/Plan:       Diagnoses and all orders for this visit:    1. Late onset Alzheimer's dementia with behavioral disturbance (Primary)  -     Ambulatory Referral to Palliative Care  -     QUEtiapine (SEROquel) 25 MG tablet; Take 2 tablets by mouth Every Night. Indications: Behavioral Disorders associated with Dementia  Dispense: 180 tablet; Refill: 3  -     donepezil (ARICEPT) 10 MG tablet; Take 1 tablet by mouth Every Night. Indications: Alzheimer's Disease  Dispense: 90 tablet; Refill:  3  -     memantine (NAMENDA) 10 MG tablet; Take 1 tablet by mouth 2 (Two) Times a Day. Indications: Alzheimer's Disease  Dispense: 180 tablet; Refill: 3    2. Senile debility  -     Ambulatory Referral to Palliative Care    3. Lethargy  -     Ammonia; Future    She is having lethargy. Her labs have been essentially normal. She is not eating. She has dysphagia. She is sleeping day and night. Once the Seroquel was cut back, she had agitation and did not comply with the caregivers for helping with bathing, so they did have to get this back to her. PCP recommended possibly increasing the donepezil, however, I do not feel that would be helpful with her quality of life and may even cause more weight loss, nausea or GI intolerance. We are placing referral to palliative care services versus hospice with [facility] navigators. Her daughter is agreeable with this referral. She requires 24/7 care and supervision now and assistance with all daily activities as well as feeding. They are going to follow up 02/14/2024 as scheduled. She is to contact us if she has not heard about this referral in the next week. We discussed her symptoms are likely the progression of her Alzheimer's disease which is a progressive neurodegenerative disorder that does worsen over time. She is currently in the late stages at this time. Will keep medications the same for now but if the dysphagia increases, could consider weaning off the donepezil and memantine to decrease pill burden. Would continue the seroquel for agitation as long as necessary.     Reviewed medications, potential side effects and signs and symptoms to report. Discussed risk versus benefits of treatment plan with patient and/or family-including medications, labs and radiology that may be ordered. Addressed questions and concerns during visit. Patient and/or family verbalized understanding and agree with plan.    During this visit the following were done:  Labs Reviewed [x]    Labs  Ordered [x]    Radiology Reports Reviewed []    Radiology Ordered []    PCP Records Reviewed []    Referring Provider Records Reviewed []    ER Records Reviewed []    Hospital Records Reviewed []    History Obtained From Family [x]  dtr  Radiology Images Reviewed []    Other Reviewed []    Records Requested []       Transcribed from ambient dictation for Munira CARLOS Valencia by Sil Barriga.  10/27/23   14:37 EDT    Patient or patient representative verbalized consent to the visit recording.  I have personally performed the services described in this document as transcribed by the above individual, and it is both accurate and complete.  CARLOS Negron  10/27/2023  17:02 EDT    Note to patient: The 21st Century Cures Act makes medical notes like these available to patients in the interest of transparency. However, be advised this is a medical document. It is intended as peer to peer communication. It is written in medical language and may contain abbreviations or verbiage that are unfamiliar. It may appear blunt or direct. Medical documents are intended to carry relevant information, facts as evident, and the clinical opinion of the provider.

## 2023-10-27 NOTE — LETTER
"October 27, 2023     CARLOS Porras  [address]  [facility] [address]    Patient: Brittny Nicolas   YOB: 1945   Date of Visit: 10/27/2023       Dear CARLOS Porras    Brittny Nicolas was in my office today. Below is a copy of my note.    If you have questions, please do not hesitate to call me. I look forward to following Brittny along with you.         Sincerely,        CARLOS Negron        CC: Dalila Liz MD    Subjective:     Patient ID: Brtitny Nicolas is a 78 y.o. female.    CC:   Chief Complaint   Patient presents with   • Alzheimer's Disease       HPI:   History of Present Illness  Today 10/27/2023-    This is an 78-year-old female who I last saw on 07/12/2023 for Alzheimer's disease with symptoms present since 2019. She has had a significant decline in her health overall. Her daughter called us back requesting a sooner appointment. She has had a significant decline physically and cognitively, has basically been \"shutting down and very lethargic\". She has been evaluated by primary care as well. Family is looking into additional long term care options versus hospice or palliative care. We placed a referral to nursing home at last visit in clinic but family never heard from the facility.    Today, she is accompanied by her daughter who contributes to her history. She reports she is tired. Her daughter states she has not heard anything from the nursing home regarding the referral. Her daughter states she has not been eating or drinking much. Her weight has decreased by 3 pounds since she was last seen in the clinic. Her daughter states she was seen in the emergency room on 09/22/2023 and was treated for a UTI. On 10/14/2023, she had COVID-19 and rhinovirus. She did not have a cough. Her daughter states the fatigue has been \"hanging around\" since 09/2023.     She has been taking Seroquel 2 tablets at bedtime long term, which is not a change. Her " "daughter states PCP wanted to increase her Aricept to 23 mg, but she wanted to wait until she talked to me before she gets that filled. Her daughter states she has been leaning over when she falls asleep in a chair. Her daughter states she would even think with regular coffee, she would have a little more \"pep in her\", but she will fall asleep within 30 minutes of getting back on the couch. Her daughter states it is like a REM sleep because her eyes will rapidly move. Her daughter states yesterday with PCP in the clinic, she had fallen asleep and was unaware of anything going on in the room until he touched her arm, which caused her to wake up.     She tried to back off the Seroquel, but the agitation became worse and she needed to put her back on the prescribed dose. Her daughter states she does not want to get another garment change, does not want to get dressed, and does not want anyone to mess with her when she becomes agitated and the seroquel allows caregivers and her to assist in Brittny's care. Her daughter states she has a caregiver who comes in to help with daily activities with her mother. Her daughter states she has tried different foods. Her daughter states that last night she had shredded chicken, but she would not eat it. Her daughter confirms that she is choking or having trouble with swallowing food. Her daughter states she wanted to give her yogurt, but she kept falling asleep while she was trying to eat it. Her daughter states they are giving her all her medications crushed up into applesauce; however, the pharmacist told her not to crush the Protonix because it is long acting, but she can still take that medication by swallowing it with a big spoonful of applesauce. Her daughter states she needs help with bathing, and she has incontinence. She is total care now. She does still ambulate. Her daughter states she is sleeping well at night as she goes to bed at 6:30 PM or 7:00 PM and then she will not " get up until 7:00 AM. Her daughter states that occasionally she gets up at 6:00 AM, but most of the time she has an extra hour.     Her daughter confirms that she had blood work completed on 10/14/2023 that showed her liver enzymes were normal and CBC and CMP were normal. COVID-19 was positive. UA negative. Lactic acid normal. Lipase normal. 9/22/2023 CT head without contrast showing no acute intracranial abnormalities. Chronic changes.    Prior Neurological history & evaluation:   Alzheimer's disease with symptoms present since 2019. She continues to have hallucinations, delusions, trouble with short term memory, sundown syndrome, sleeping troubles, eating issues, and aggression.   Has included an unremarkable head CT and screening blood work, which were unremarkable. Hospitalized in August 2020 for hyponatremia at [facility].  11/24/2021 labs-ammonia low at 10, tsh 6.5-addressed with pcp, ft4 normal, b12 normal, cbc ok, cmp ok with sodium mildly low at 135.  Ex- passed away 4/30/2022-this was difficult and they were close.  Her family started noticing symptoms around early 2019 marked by forgetfulness, and this has gradually worsened over time. She has had impairments in language, and executive function. Her family does help with managing medications and finances. She is currently living with her daughter in [place].    Tried cognitive rehab with SLP in past and did not find this helpful.  Urinary and bowel incontinence. Worsening. Frequent UTIs.  On donepezil, memantine and seroquel.    Alzheimer's disease with symptoms present since 2019. She has been taking donepezil 10 mg daily, Seroquel 25 mg 2 pills nightly, memantine 10mg twice a day. Seroquel dosing cannot be lowered as she has increased agitation per daughter.     Her daughter reports she was hospitalized in 05/2023 for aspirating food. She was hospitalized for approximately 1 week and was treated for pneumonia. She had an esophageal dilation. Her  daughter adds in addition to her aspirating and having pneumonia, she also had a severe UTI that took approximately 1 month to improve. Continues to have dysphagia. Cannot swallow pills. Continues to lose weight. She requires total care for all activities of daily living.    The following portions of the patient's history were reviewed and updated as appropriate: allergies, current medications, past family history, past medical history, past social history, past surgical history, and problem list.    Past Medical History:   Diagnosis Date   • Acid reflux disease    • Alzheimer's dementia    • Arthritis     rhuematoid   • Carotid artery occlusion    • Coronary artery disease    • Dementia    • Depression    • Facial palsy 2022   • Graves disease    • Hyperlipidemia    • Hypertension    • Memory loss    • Stomach problems    • Stroke    • Stroke syndrome    • Thyroid disease    • Transient ischemic attack    • Vision loss        Past Surgical History:   Procedure Laterality Date   • AUGMENTATION MAMMAPLASTY         • BREAST AUGMENTATION     • CAROTID ARTERY ANGIOPLASTY     • CAROTID ENDARTERECTOMY     • CAROTID STENT     • COLONOSCOPY     • D & C CERVICAL BIOPSY      Dilation and Curettage of Cervical Stump   • ENDOSCOPY N/A 2023    Procedure: ESOPHAGOGASTRODUODENOSCOPY;  Surgeon: Marito Solis MD;  Location: [facility];  Service: Gastroenterology;  Laterality: N/A;  esophageal dilation done with 18-20mm ballon    • HERNIA REPAIR     • HERNIA REPAIR     • THROMBOENDARTERECTOMY      carotid   • UPPER GASTROINTESTINAL ENDOSCOPY         Social History     Socioeconomic History   • Marital status:    Tobacco Use   • Smoking status: Former     Packs/day: 1.00     Years: 15.00     Additional pack years: 0.00     Total pack years: 15.00     Types: Cigarettes     Quit date: 10/4/2000     Years since quittin.0     Passive exposure: Never   • Smokeless tobacco: Never   • Tobacco comments:      Do not know when she quit. Likely about 1998.   Vaping Use   • Vaping Use: Never used   Substance and Sexual Activity   • Alcohol use: Not Currently     Comment: She might have some wine, but I try to not give her any   • Drug use: No   • Sexual activity: Not Currently     Birth control/protection: Post-menopausal       Family History   Problem Relation Age of Onset   • Cystic fibrosis Other    • Cervical cancer Mother    • Cancer Paternal Grandmother    • Dementia Paternal Grandmother    • Colon cancer Neg Hx    • Colon polyps Neg Hx           Current Outpatient Medications:   •  acetaminophen (TYLENOL) 325 MG tablet, Take 2 tablets by mouth Every 4 (Four) Hours As Needed for Mild Pain ., Disp: , Rfl:   •  amLODIPine (NORVASC) 5 MG tablet, TAKE 1 TABLET BY MOUTH EVERY DAY, Disp: 90 tablet, Rfl: 1  •  atorvastatin (LIPITOR) 20 MG tablet, TAKE 1 TABLET BY MOUTH EVERYDAY AT BEDTIME, Disp: 90 tablet, Rfl: 1  •  Cholecalciferol (VITAMIN D) 2000 UNITS capsule, Take 1 capsule by mouth Daily. Indications: Vitamin D Deficiency, Disp: , Rfl:   •  donepezil (ARICEPT) 10 MG tablet, Take 1 tablet by mouth Every Night. Indications: Alzheimer's Disease, Disp: 90 tablet, Rfl: 3  •  levothyroxine (SYNTHROID, LEVOTHROID) 125 MCG tablet, TAKE 1 TABLET BY MOUTH EVERY DAY, Disp: 90 tablet, Rfl: 1  •  memantine (NAMENDA) 10 MG tablet, Take 1 tablet by mouth 2 (Two) Times a Day. Indications: Alzheimer's Disease, Disp: 180 tablet, Rfl: 3  •  pantoprazole (PROTONIX) 40 MG EC tablet, TAKE 1 TABLET BY MOUTH DAILY WITH BREAKFAST. INDICATIONS: HEARTBURN, Disp: 90 tablet, Rfl: 1  •  polyethylene glycol (MIRALAX) 17 g packet, Take 17 g by mouth Daily. (Patient taking differently: Take 17 g by mouth As Needed (GI). Indications: Constipation), Disp: , Rfl:   •  QUEtiapine (SEROquel) 25 MG tablet, Take 2 tablets by mouth Every Night. Indications: Behavioral Disorders associated with Dementia, Disp: 180 tablet, Rfl: 3     Review of Systems  "  Constitutional:  Positive for activity change, appetite change and fatigue.   HENT:  Positive for trouble swallowing.    Neurological:  Positive for weakness.   Psychiatric/Behavioral:  Positive for agitation, behavioral problems, confusion and decreased concentration. The patient is nervous/anxious.    All other systems reviewed and are negative.       Objective:  /72   Pulse 75   Ht 160 cm (62.99\")   Wt 57.2 kg (126 lb)   SpO2 96%   BMI 22.33 kg/m²     Neurologic Exam     Mental Status   Oriented to person.   Disoriented to place.   Disoriented to time.   Speech: speech is normal   Level of consciousness: alert (Initially sleeping and leaning to left in exam chair upon entering room, once I stated her name she woke up, was alert throughout entire visit with stable posture)  Knowledge: poor.   Abnormal comprehension.     Cranial Nerves   Cranial nerves II through XII intact.     Motor Exam   Muscle bulk: normal  Overall muscle tone: normal    Strength   Strength 5/5 throughout.     Gait, Coordination, and Reflexes     Gait  Gait: (slow, no assistive device)    Coordination   Finger to nose coordination: normal    Tremor   Resting tremor: absent  Intention tremor: absent  Action tremor: absent    Reflexes   Right : 2+  Left : 2+      Physical Exam  Constitutional:       Comments: Thin white female in no acute distress, generally debilitated and weaker than her visit in July 2023   Neurological:      Mental Status: She is alert.      Cranial Nerves: Cranial nerves 2-12 are intact.      Motor: Motor strength is normal.     Coordination: Finger-Nose-Finger Test normal.   Psychiatric:         Mood and Affect: Affect is blunt.         Speech: Speech normal.         Behavior: Behavior is slowed.         Thought Content: Thought content is delusional (at times per daughter, cooperative today).         Cognition and Memory: She exhibits impaired recent memory.         Judgment: Judgment is " inappropriate.     MMSE has been a 12 to 13 out of 30.    Assessment/Plan:       Diagnoses and all orders for this visit:    1. Late onset Alzheimer's dementia with behavioral disturbance (Primary)  -     Ambulatory Referral to Palliative Care  -     QUEtiapine (SEROquel) 25 MG tablet; Take 2 tablets by mouth Every Night. Indications: Behavioral Disorders associated with Dementia  Dispense: 180 tablet; Refill: 3  -     donepezil (ARICEPT) 10 MG tablet; Take 1 tablet by mouth Every Night. Indications: Alzheimer's Disease  Dispense: 90 tablet; Refill: 3  -     memantine (NAMENDA) 10 MG tablet; Take 1 tablet by mouth 2 (Two) Times a Day. Indications: Alzheimer's Disease  Dispense: 180 tablet; Refill: 3    2. Senile debility  -     Ambulatory Referral to Palliative Care    3. Lethargy  -     Ammonia; Future    She is having lethargy. Her labs have been essentially normal. She is not eating. She has dysphagia. She is sleeping day and night. Once the Seroquel was cut back, she had agitation and did not comply with the caregivers for helping with bathing, so they did have to get this back to her. PCP recommended possibly increasing the donepezil, however, I do not feel that would be helpful with her quality of life and may even cause more weight loss, nausea or GI intolerance. We are placing referral to palliative care services versus hospice with [facility] navigators. Her daughter is agreeable with this referral. She requires 24/7 care and supervision now and assistance with all daily activities as well as feeding. They are going to follow up 02/14/2024 as scheduled. She is to contact us if she has not heard about this referral in the next week. We discussed her symptoms are likely the progression of her Alzheimer's disease which is a progressive neurodegenerative disorder that does worsen over time. She is currently in the late stages at this time. Will keep medications the same for now but if the dysphagia  increases, could consider weaning off the donepezil and memantine to decrease pill burden. Would continue the seroquel for agitation as long as necessary.     Reviewed medications, potential side effects and signs and symptoms to report. Discussed risk versus benefits of treatment plan with patient and/or family-including medications, labs and radiology that may be ordered. Addressed questions and concerns during visit. Patient and/or family verbalized understanding and agree with plan.    During this visit the following were done:  Labs Reviewed [x]    Labs Ordered [x]    Radiology Reports Reviewed []    Radiology Ordered []    PCP Records Reviewed []    Referring Provider Records Reviewed []    ER Records Reviewed []    Hospital Records Reviewed []    History Obtained From Family [x]  dtr  Radiology Images Reviewed []    Other Reviewed []    Records Requested []       Transcribed from ambient dictation for CARLOS Negron by Sil Barriga.  10/27/23   14:37 EDT    Patient or patient representative verbalized consent to the visit recording.  I have personally performed the services described in this document as transcribed by the above individual, and it is both accurate and complete.  CARLOS Negron  10/27/2023  17:02 EDT    Note to patient: The 21st Century Cures Act makes medical notes like these available to patients in the interest of transparency. However, be advised this is a medical document. It is intended as peer to peer communication. It is written in medical language and may contain abbreviations or verbiage that are unfamiliar. It may appear blunt or direct. Medical documents are intended to carry relevant information, facts as evident, and the clinical opinion of the provider.

## 2023-11-02 ENCOUNTER — TELEPHONE (OUTPATIENT)
Dept: NEUROLOGY | Facility: CLINIC | Age: 78
End: 2023-11-02
Payer: MEDICARE

## 2023-11-02 NOTE — TELEPHONE ENCOUNTER
Patsy called me back and said there is not any Pallitative care in [facility] and recommended I call either [facility] or [facility] and check with them.  She also gave me an e-mail for ella Escobar and said to contact her and ask if she knew of anyone.  I will contact her at [email or web address]

## 2023-11-02 NOTE — TELEPHONE ENCOUNTER
LUIS ALBERTO PLACED A REFERRAL TO PALLITATIVE CARE AND REQUESTED I SEND IT TO [facility] HOWEVER; [facility] CALLED AND SAID THEY CANNOT SERVICE THE PT FOR PALLITATIVE CARE THEY ONLY SERVICE [place] AND [place] AND SUGGESTED I CALL [facility] IN [place] AND ASK ABOUT PALLITATIVE CARE.  I WAS SENT TO  OF HUMBERTO GLOVER WHO IS A  AND LVM ASKING FOR A CALL BACK TO TRY TO FIND OUT WHERE I MAY FIND PALLITATIVE CARE IN [place].  WAITING ON  A RETURN CALL HOPEFULLY WITH A LOCATION OR DIRECTIONS

## 2023-11-21 RX ORDER — PANTOPRAZOLE SODIUM 40 MG/1
40 TABLET, DELAYED RELEASE ORAL
Qty: 90 TABLET | Refills: 1 | Status: SHIPPED | OUTPATIENT
Start: 2023-11-21

## 2023-11-21 RX ORDER — AMLODIPINE BESYLATE 5 MG/1
TABLET ORAL
Qty: 90 TABLET | Refills: 1 | Status: SHIPPED | OUTPATIENT
Start: 2023-11-21

## 2023-11-21 NOTE — TELEPHONE ENCOUNTER
Rx Refill Note    Requested Prescriptions     Pending Prescriptions Disp Refills    amLODIPine (NORVASC) 5 MG tablet [Pharmacy Med Name: AMLODIPINE BESYLATE 5 MG TAB] 90 tablet 1     Sig: TAKE 1 TABLET BY MOUTH EVERY DAY    pantoprazole (PROTONIX) 40 MG EC tablet [Pharmacy Med Name: PANTOPRAZOLE SOD DR 40 MG TAB] 90 tablet 1     Sig: TAKE 1 TABLET BY MOUTH DAILY WITH BREAKFAST. INDICATIONS: HEARTBURN        Last office visit with prescribing clinician: 8/31/2023     Next office visit with prescribing clinician: 2/29/2024   {  17:53 EST

## 2024-02-15 RX ORDER — ATORVASTATIN CALCIUM 20 MG/1
TABLET, FILM COATED ORAL
Qty: 90 TABLET | Refills: 1 | Status: SHIPPED | OUTPATIENT
Start: 2024-02-15

## 2024-02-29 ENCOUNTER — OFFICE VISIT (OUTPATIENT)
Dept: ENDOCRINOLOGY | Facility: CLINIC | Age: 79
End: 2024-02-29
Payer: MEDICARE

## 2024-02-29 VITALS
WEIGHT: 127 LBS | SYSTOLIC BLOOD PRESSURE: 142 MMHG | BODY MASS INDEX: 22.5 KG/M2 | OXYGEN SATURATION: 99 % | DIASTOLIC BLOOD PRESSURE: 80 MMHG | HEIGHT: 63 IN | HEART RATE: 64 BPM

## 2024-02-29 DIAGNOSIS — E03.9 ACQUIRED HYPOTHYROIDISM: ICD-10-CM

## 2024-02-29 DIAGNOSIS — I10 BENIGN ESSENTIAL HYPERTENSION: Primary | ICD-10-CM

## 2024-02-29 DIAGNOSIS — E55.9 VITAMIN D DEFICIENCY: ICD-10-CM

## 2024-02-29 DIAGNOSIS — E78.01 ESSENTIAL FAMILIAL HYPERCHOLESTEROLEMIA: ICD-10-CM

## 2024-02-29 PROBLEM — R53.83 MALAISE AND FATIGUE: Status: ACTIVE | Noted: 2024-02-29

## 2024-02-29 PROBLEM — R53.81 MALAISE AND FATIGUE: Status: ACTIVE | Noted: 2024-02-29

## 2024-02-29 PROBLEM — R40.4 ALTERED LEVEL OF CONSCIOUSNESS: Status: ACTIVE | Noted: 2024-02-29

## 2024-02-29 PROCEDURE — 3079F DIAST BP 80-89 MM HG: CPT | Performed by: INTERNAL MEDICINE

## 2024-02-29 PROCEDURE — 80061 LIPID PANEL: CPT | Performed by: INTERNAL MEDICINE

## 2024-02-29 PROCEDURE — 82306 VITAMIN D 25 HYDROXY: CPT | Performed by: INTERNAL MEDICINE

## 2024-02-29 PROCEDURE — 1159F MED LIST DOCD IN RCRD: CPT | Performed by: INTERNAL MEDICINE

## 2024-02-29 PROCEDURE — 80053 COMPREHEN METABOLIC PANEL: CPT | Performed by: INTERNAL MEDICINE

## 2024-02-29 PROCEDURE — 99214 OFFICE O/P EST MOD 30 MIN: CPT | Performed by: INTERNAL MEDICINE

## 2024-02-29 PROCEDURE — 84439 ASSAY OF FREE THYROXINE: CPT | Performed by: INTERNAL MEDICINE

## 2024-02-29 PROCEDURE — 3077F SYST BP >= 140 MM HG: CPT | Performed by: INTERNAL MEDICINE

## 2024-02-29 PROCEDURE — 1160F RVW MEDS BY RX/DR IN RCRD: CPT | Performed by: INTERNAL MEDICINE

## 2024-02-29 PROCEDURE — 84443 ASSAY THYROID STIM HORMONE: CPT | Performed by: INTERNAL MEDICINE

## 2024-02-29 NOTE — PROGRESS NOTES
Brittny Nicolas 78 y.o.  CC:Follow-up, Hypertension, Hypothyroidism, and Hyperlipidemia      [place]: Follow-up, Hypertension, Hypothyroidism, and Hyperlipidemia    Bp is good   No c/o   Energy is good overall   Taking synthroid 125 mcg daily    Allergies   Allergen Reactions    Boniva [Ibandronic Acid] GI Intolerance     Refractory gerd and abdominal pain        Current Outpatient Medications:     acetaminophen (TYLENOL) 325 MG tablet, Take 2 tablets by mouth Every 4 (Four) Hours As Needed for Mild Pain ., Disp: , Rfl:     amLODIPine (NORVASC) 5 MG tablet, TAKE 1 TABLET BY MOUTH EVERY DAY, Disp: 90 tablet, Rfl: 1    atorvastatin (LIPITOR) 20 MG tablet, TAKE 1 TABLET BY MOUTH EVERYDAY AT BEDTIME, Disp: 90 tablet, Rfl: 1    Cholecalciferol (VITAMIN D) 2000 UNITS capsule, Take 1 capsule by mouth Daily. Indications: Vitamin D Deficiency, Disp: , Rfl:     donepezil (ARICEPT) 10 MG tablet, Take 1 tablet by mouth Every Night. Indications: Alzheimer's Disease, Disp: 90 tablet, Rfl: 3    levothyroxine (SYNTHROID, LEVOTHROID) 125 MCG tablet, TAKE 1 TABLET BY MOUTH EVERY DAY, Disp: 90 tablet, Rfl: 1    memantine (NAMENDA) 10 MG tablet, Take 1 tablet by mouth 2 (Two) Times a Day. Indications: Alzheimer's Disease, Disp: 180 tablet, Rfl: 3    pantoprazole (PROTONIX) 40 MG EC tablet, TAKE 1 TABLET BY MOUTH DAILY WITH BREAKFAST. INDICATIONS: HEARTBURN, Disp: 90 tablet, Rfl: 1    polyethylene glycol (MIRALAX) 17 g packet, Take 17 g by mouth Daily. (Patient taking differently: Take 17 g by mouth As Needed (GI). Indications: Constipation), Disp: , Rfl:     QUEtiapine (SEROquel) 25 MG tablet, Take 2 tablets by mouth Every Night. Indications: Behavioral Disorders associated with Dementia, Disp: 180 tablet, Rfl: 3  Patient Active Problem List    Diagnosis     Altered level of consciousness [R40.4]     Malaise and fatigue [R53.81, R53.83]     Senile debility [R54]     Moderate major depression [F32.1]     Thyroid mass [E07.9]      Aspiration into airway [T17.908A]     Visual loss [H54.7]     Nocturnal enuresis [N39.44]     Alzheimer's disease, unspecified [G30.9, F02.80]     Influenza A with respiratory manifestations [J10.1]     Anxiety and depression [F41.9, F32.A]     Anemia [D64.9]     Oropharyngeal dysphagia [R13.12]     Hyponatremia [E87.1]     Late onset Alzheimer's dementia with behavioral disturbance [G30.1, F02.818]     Rib pain on left side [R07.81]     Memory loss [R41.3]     Pharyngitis [J02.9]     Left lower quadrant pain [R10.32]     Acute pain of left shoulder [M25.512]     Erosive esophagitis [K22.10]     H/O colonoscopy [Z98.890]     Fatigue [R53.83]     Hypothyroidism [E03.9]     Achalasia [K22.0]     Acute upper respiratory infection [J06.9]     Atypical chest pain [R07.89]     Benign essential hypertension [I10]     Carotid artery stenosis [I65.29]     Cataract [H26.9]     Cough [R05.9]     Acute cystitis [N30.00]     Screening for malignant neoplasm [Z12.9]     Gastroesophageal reflux disease [K21.9]     Essential familial hypercholesterolemia [E78.01]     Gastrointestinal bleeding [K92.2]     Inguinal hernia [K40.90]     Insomnia [G47.00]     Leg cramps [R25.2]     Meniere's disease [H81.09]     Night sweats [R61]     Osteoporosis [M81.0]     Peripheral neuropathy [G62.9]     Retinal artery occlusion [H34.9]     Rheumatoid arthritis [M06.9]     Stroke syndrome [QQG7707]     Suprapubic pain [R10.2]     Transient ischemic attack [G45.9]     Positive reaction to tuberculin skin test [R76.11]     Vitamin D deficiency [E55.9]     Weight loss [R63.4]      Review of Systems   Constitutional:  Negative for activity change, appetite change and unexpected weight change.   HENT:  Negative for congestion and rhinorrhea.    Eyes:  Negative for visual disturbance.   Respiratory:  Negative for cough and shortness of breath.    Cardiovascular:  Negative for palpitations and leg swelling.   Gastrointestinal:  Negative for constipation,  "diarrhea and nausea.   Genitourinary:  Negative for hematuria.   Musculoskeletal:  Negative for arthralgias, back pain, gait problem, joint swelling and myalgias.   Skin:  Negative for color change, rash and wound.   Allergic/Immunologic: Negative for environmental allergies, food allergies and immunocompromised state.   Neurological:  Negative for dizziness, weakness and light-headedness.   Psychiatric/Behavioral:  Negative for confusion, decreased concentration, dysphoric mood and sleep disturbance. The patient is not nervous/anxious.      Social History     Socioeconomic History    Marital status:    Tobacco Use    Smoking status: Former     Packs/day: 1.00     Years: 15.00     Additional pack years: 0.00     Total pack years: 15.00     Types: Cigarettes     Quit date: 10/4/2000     Years since quittin.4     Passive exposure: Never    Smokeless tobacco: Never    Tobacco comments:     Do not know when she quit. Likely about .   Vaping Use    Vaping Use: Never used   Substance and Sexual Activity    Alcohol use: Not Currently     Comment: She might have some wine, but I try to not give her any    Drug use: No    Sexual activity: Not Currently     Birth control/protection: Post-menopausal     Family History   Problem Relation Age of Onset    Cystic fibrosis Other     Cervical cancer Mother     Cancer Paternal Grandmother     Dementia Paternal Grandmother     Colon cancer Neg Hx     Colon polyps Neg Hx      /80   Pulse 64   Ht 160 cm (63\")   Wt 57.6 kg (127 lb)   SpO2 99%   BMI 22.50 kg/m²   Physical Exam  Vitals and nursing note reviewed.   Constitutional:       Appearance: Normal appearance. She is well-developed.   HENT:      Head: Normocephalic and atraumatic.   Eyes:      General: Lids are normal.      Extraocular Movements: Extraocular movements intact.      Conjunctiva/sclera: Conjunctivae normal.      Pupils: Pupils are equal, round, and reactive to light.   Neck:      Thyroid: No " thyroid mass or thyromegaly.      Vascular: No carotid bruit.      Trachea: Trachea normal. No tracheal deviation.   Cardiovascular:      Rate and Rhythm: Normal rate and regular rhythm.      Pulses: Normal pulses.      Heart sounds: Normal heart sounds. No murmur heard.     No friction rub. No gallop.   Pulmonary:      Effort: Pulmonary effort is normal. No respiratory distress.      Breath sounds: Normal breath sounds. No wheezing.   Musculoskeletal:         General: No deformity. Normal range of motion.      Cervical back: Normal range of motion and neck supple.   Lymphadenopathy:      Cervical: No cervical adenopathy.   Skin:     General: Skin is warm and dry.      Findings: No erythema or rash.      Nails: There is no clubbing.   Neurological:      General: No focal deficit present.      Mental Status: She is alert and oriented to person, place, and time.      Cranial Nerves: No cranial nerve deficit.      Deep Tendon Reflexes: Reflexes are normal and symmetric. Reflexes normal.   Psychiatric:         Mood and Affect: Mood normal.         Speech: Speech normal.         Behavior: Behavior normal.         Thought Content: Thought content normal.         Judgment: Judgment normal.       Results for orders placed or performed during the hospital encounter of 10/14/23   Respiratory Panel PCR w/COVID-19(SARS-CoV-2) JATINDER/ITALIA/TRISTA/PAD/COR/MAD/RONDA In-House, NP Swab in UTM/VTM, 3-4 HR TAT - Swab, Nasopharynx    Specimen: Nasopharynx; Swab   Result Value Ref Range    ADENOVIRUS, PCR Not Detected Not Detected    Coronavirus 229E Not Detected Not Detected    Coronavirus HKU1 Not Detected Not Detected    Coronavirus NL63 Not Detected Not Detected    Coronavirus OC43 Not Detected Not Detected    COVID19 Detected (C) Not Detected - Ref. Range    Human Metapneumovirus Not Detected Not Detected    Human Rhinovirus/Enterovirus Detected (A) Not Detected    Influenza A PCR Not Detected Not Detected    Influenza B PCR Not Detected Not  Detected    Parainfluenza Virus 1 Not Detected Not Detected    Parainfluenza Virus 2 Not Detected Not Detected    Parainfluenza Virus 3 Not Detected Not Detected    Parainfluenza Virus 4 Not Detected Not Detected    RSV, PCR Not Detected Not Detected    Bordetella pertussis pcr Not Detected Not Detected    Bordetella parapertussis PCR Not Detected Not Detected    Chlamydophila pneumoniae PCR Not Detected Not Detected    Mycoplasma pneumo by PCR Not Detected Not Detected   Comprehensive Metabolic Panel    Specimen: Blood   Result Value Ref Range    Glucose 94 65 - 99 mg/dL    BUN 10 8 - 23 mg/dL    Creatinine 0.73 0.57 - 1.00 mg/dL    Sodium 137 136 - 145 mmol/L    Potassium 3.5 3.5 - 5.2 mmol/L    Chloride 103 98 - 107 mmol/L    CO2 24.0 22.0 - 29.0 mmol/L    Calcium 8.9 8.6 - 10.5 mg/dL    Total Protein 6.8 6.0 - 8.5 g/dL    Albumin 3.6 3.5 - 5.2 g/dL    ALT (SGPT) 10 1 - 33 U/L    AST (SGOT) 18 1 - 32 U/L    Alkaline Phosphatase 91 39 - 117 U/L    Total Bilirubin 0.3 0.0 - 1.2 mg/dL    Globulin 3.2 gm/dL    A/G Ratio 1.1 g/dL    BUN/Creatinine Ratio 13.7 7.0 - 25.0    Anion Gap 10.0 5.0 - 15.0 mmol/L    eGFR 83.3 >60.0 mL/min/1.73   Lipase    Specimen: Blood   Result Value Ref Range    Lipase 50 13 - 60 U/L   Urinalysis With Microscopic If Indicated (No Culture) - Straight Cath    Specimen: Straight Cath; Urine   Result Value Ref Range    Color, UA Yellow Yellow, Straw    Appearance, UA Clear Clear    pH, UA 6.5 5.0 - 8.0    Specific Gravity, UA 1.014 1.001 - 1.030    Glucose, UA Negative Negative    Ketones, UA Negative Negative    Bilirubin, UA Negative Negative    Blood, UA Negative Negative    Protein, UA Negative Negative    Leuk Esterase, UA Negative Negative    Nitrite, UA Negative Negative    Urobilinogen, UA 0.2 E.U./dL 0.2 - 1.0 E.U./dL   Lactic Acid, Plasma    Specimen: Blood   Result Value Ref Range    Lactate 0.9 0.5 - 2.0 mmol/L   CBC Auto Differential    Specimen: Blood   Result Value Ref Range     WBC 5.99 3.40 - 10.80 10*3/mm3    RBC 4.30 3.77 - 5.28 10*6/mm3    Hemoglobin 12.5 12.0 - 15.9 g/dL    Hematocrit 39.0 34.0 - 46.6 %    MCV 90.7 79.0 - 97.0 fL    MCH 29.1 26.6 - 33.0 pg    MCHC 32.1 31.5 - 35.7 g/dL    RDW 14.3 12.3 - 15.4 %    RDW-SD 46.7 37.0 - 54.0 fl    MPV 10.0 6.0 - 12.0 fL    Platelets 169 140 - 450 10*3/mm3    Neutrophil % 61.1 42.7 - 76.0 %    Lymphocyte % 24.2 19.6 - 45.3 %    Monocyte % 12.4 (H) 5.0 - 12.0 %    Eosinophil % 1.5 0.3 - 6.2 %    Basophil % 0.3 0.0 - 1.5 %    Immature Grans % 0.5 0.0 - 0.5 %    Neutrophils, Absolute 3.66 1.70 - 7.00 10*3/mm3    Lymphocytes, Absolute 1.45 0.70 - 3.10 10*3/mm3    Monocytes, Absolute 0.74 0.10 - 0.90 10*3/mm3    Eosinophils, Absolute 0.09 0.00 - 0.40 10*3/mm3    Basophils, Absolute 0.02 0.00 - 0.20 10*3/mm3    Immature Grans, Absolute 0.03 0.00 - 0.05 10*3/mm3    nRBC 0.0 0.0 - 0.2 /100 WBC     Diagnoses and all orders for this visit:    1. Benign essential hypertension (Primary)  Assessment & Plan:  Is taking norvasc 5 mg daily   Check cmp     Orders:  -     Comprehensive Metabolic Panel; Future  -     Comprehensive Metabolic Panel    2. Acquired hypothyroidism  Assessment & Plan:  Taking synthroid 125 mcg daily   Check tfts     Orders:  -     TSH; Future  -     T4, Free; Future  -     TSH  -     T4, Free    3. Essential familial hypercholesterolemia  Assessment & Plan:  Is eating low fat diet, taking lipitor 20 mg daily   Weight is stable  Check flp     Orders:  -     Lipid Panel; Future  -     Lipid Panel    4. Vitamin D deficiency  Assessment & Plan:  Continue supplement   Update levels     Orders:  -     Vitamin D,25-Hydroxy; Future  -     Vitamin D,25-Hydroxy    Return in about 6 months (around 8/29/2024) for Recheck.    Dalila Liz MD  Signed Dalila Liz MD

## 2024-03-01 LAB
25(OH)D3 SERPL-MCNC: 61 NG/ML (ref 30–100)
ALBUMIN SERPL-MCNC: 4.2 G/DL (ref 3.5–5.2)
ALBUMIN/GLOB SERPL: 1.4 G/DL
ALP SERPL-CCNC: 97 U/L (ref 39–117)
ALT SERPL W P-5'-P-CCNC: 11 U/L (ref 1–33)
ANION GAP SERPL CALCULATED.3IONS-SCNC: 13.8 MMOL/L (ref 5–15)
AST SERPL-CCNC: 19 U/L (ref 1–32)
BILIRUB SERPL-MCNC: 0.6 MG/DL (ref 0–1.2)
BUN SERPL-MCNC: 15 MG/DL (ref 8–23)
BUN/CREAT SERPL: 15.5 (ref 7–25)
CALCIUM SPEC-SCNC: 9.5 MG/DL (ref 8.6–10.5)
CHLORIDE SERPL-SCNC: 104 MMOL/L (ref 98–107)
CHOLEST SERPL-MCNC: 122 MG/DL (ref 0–200)
CO2 SERPL-SCNC: 23.2 MMOL/L (ref 22–29)
CREAT SERPL-MCNC: 0.97 MG/DL (ref 0.57–1)
EGFRCR SERPLBLD CKD-EPI 2021: 59.2 ML/MIN/1.73
GLOBULIN UR ELPH-MCNC: 3.1 GM/DL
GLUCOSE SERPL-MCNC: 79 MG/DL (ref 65–99)
HDLC SERPL-MCNC: 60 MG/DL (ref 40–60)
LDLC SERPL CALC-MCNC: 48 MG/DL (ref 0–100)
LDLC/HDLC SERPL: 0.82 {RATIO}
POTASSIUM SERPL-SCNC: 3.6 MMOL/L (ref 3.5–5.2)
PROT SERPL-MCNC: 7.3 G/DL (ref 6–8.5)
SODIUM SERPL-SCNC: 141 MMOL/L (ref 136–145)
T4 FREE SERPL-MCNC: 1.8 NG/DL (ref 0.93–1.7)
TRIGL SERPL-MCNC: 64 MG/DL (ref 0–150)
TSH SERPL DL<=0.05 MIU/L-ACNC: 1.05 UIU/ML (ref 0.27–4.2)
VLDLC SERPL-MCNC: 14 MG/DL (ref 5–40)

## 2024-04-29 RX ORDER — LEVOTHYROXINE SODIUM 0.12 MG/1
TABLET ORAL
Qty: 90 TABLET | Refills: 1 | Status: SHIPPED | OUTPATIENT
Start: 2024-04-29

## 2024-04-29 NOTE — TELEPHONE ENCOUNTER
Rx Refill Note  Requested Prescriptions     Pending Prescriptions Disp Refills    levothyroxine (SYNTHROID, LEVOTHROID) 125 MCG tablet [Pharmacy Med Name: LEVOTHYROXINE 125 MCG TABLET] 90 tablet 1     Sig: TAKE 1 TABLET BY MOUTH EVERY DAY      Last office visit with prescribing clinician: 2/29/2024   Last telemedicine visit with prescribing clinician: Visit date not found   Next office visit with prescribing clinician: 8/26/2024                         Would you like a call back once the refill request has been completed: [] Yes [] No    If the office needs to give you a call back, can they leave a voicemail: [] Yes [] No    Fer Rivas MA  04/29/24, 14:25 EDT

## 2024-06-02 NOTE — DISCHARGE INSTRUCTIONS
If you tested POSITIVE for COVID-19 stay at home for 5 days and isolate from others in your home.     Wear a well-fitted mask if you must be around others in your home.    Do Not Travel    End isolation after 5 full days if you are fever-free for 24 hours (without the use of fever-reducing medication) and your symptoms are improving.    If you were severely ill with COVID-19 or are immunocompromised  You should isolate for at least 10 days. Consult your doctor before ending isolation.    Take precautions until day 10. Wear a well-fitted mask for 10 full days any time you are around others inside your home or in public. Do not go to places where you are unable to wear a mask.  Do not travel until a full 10 days after your symptoms started or the date your positive test was taken if you had no symptoms. Avoid being around people who are at high risk    Opt out

## 2024-06-19 ENCOUNTER — PATIENT ROUNDING (BHMG ONLY) (OUTPATIENT)
Dept: URGENT CARE | Facility: CLINIC | Age: 79
End: 2024-06-19
Payer: MEDICARE

## 2024-06-21 RX ORDER — AMLODIPINE BESYLATE 5 MG/1
TABLET ORAL
Qty: 90 TABLET | Refills: 0 | Status: SHIPPED | OUTPATIENT
Start: 2024-06-21

## 2024-06-21 NOTE — TELEPHONE ENCOUNTER
Rx Refill Note    Requested Prescriptions     Pending Prescriptions Disp Refills    amLODIPine (NORVASC) 5 MG tablet [Pharmacy Med Name: AMLODIPINE BESYLATE 5 MG TAB] 90 tablet 1     Sig: TAKE 1 TABLET BY MOUTH EVERY DAY        Last office visit with prescribing clinician: 2/29/2024     Next office visit with prescribing clinician: 8/26/2024   {

## 2024-07-09 ENCOUNTER — TELEPHONE (OUTPATIENT)
Dept: NEUROLOGY | Facility: CLINIC | Age: 79
End: 2024-07-09
Payer: MEDICARE

## 2024-07-09 DIAGNOSIS — G30.1 LATE ONSET ALZHEIMER'S DEMENTIA WITH BEHAVIORAL DISTURBANCE: Primary | ICD-10-CM

## 2024-07-09 DIAGNOSIS — F02.818 LATE ONSET ALZHEIMER'S DEMENTIA WITH BEHAVIORAL DISTURBANCE: Primary | ICD-10-CM

## 2024-07-09 DIAGNOSIS — R62.7 FAILURE TO THRIVE IN ADULT: ICD-10-CM

## 2024-07-09 NOTE — TELEPHONE ENCOUNTER
Provider: LUIS ALBERTO AYALA    Caller: INDIO BARRETT    Relationship to Patient: DAUGHTER    Phone Number: [phone]    Reason for Call: PT'S DAUGHTER IS CALLING AS THE PT IS NOT WANTING TO EAT OR DRINK.  THEY ARE ABLE TO GET FOOD IN HER, BUT SHE DOES NOT DO IT WILLINGLY.  PT DOES NOT SEEM TO BE SICK.  SHE JUST WANTS TO SLEEP ALL THE TIME.  SHE ALSO HAS WEAKNESS IN WALKING, SHE REQUIRES A LOT OF ASSISTANCE GETTING UP AND GETTING TO HER FEET AND SHE IS VERY UNSTEADY.  THIS HAS BEEN GOING ON FOR ABOUT TWO WEEKS.    PLEASE CALL DAUGHTER TO ADVISE     THANK YOU

## 2024-07-10 NOTE — TELEPHONE ENCOUNTER
At her last visit 10/27/2023, I placed a referral to Palliative care. I do not see where patient did indeed get evaluated by those services. I can see that [facility] navigators did not service that area for Palliative care.     Most likely her Alzheimer's disease is progressing even more. If daughter is ok with a Hospice referral, we can place this to [facility]. They do offer Hospice services which is end of life comfort measures to [facility]. They would come to the home and this is 100% covered by medicare. I would place that referral and their nurse would come evaluate to see if she is appropriate for their services. Let me know if daughter is agreeable to this referral.     Thanks, CARLOS Jon

## 2024-07-13 DIAGNOSIS — F02.818 LATE ONSET ALZHEIMER'S DEMENTIA WITH BEHAVIORAL DISTURBANCE: ICD-10-CM

## 2024-07-13 DIAGNOSIS — G30.1 LATE ONSET ALZHEIMER'S DEMENTIA WITH BEHAVIORAL DISTURBANCE: ICD-10-CM

## 2024-07-15 RX ORDER — QUETIAPINE FUMARATE 25 MG/1
50 TABLET, FILM COATED ORAL NIGHTLY
Qty: 180 TABLET | Refills: 3 | Status: SHIPPED | OUTPATIENT
Start: 2024-07-15

## 2024-07-15 RX ORDER — PANTOPRAZOLE SODIUM 40 MG/1
40 TABLET, DELAYED RELEASE ORAL
Qty: 90 TABLET | Refills: 1 | Status: SHIPPED | OUTPATIENT
Start: 2024-07-15

## 2024-07-15 RX ORDER — QUETIAPINE FUMARATE 25 MG/1
50 TABLET, FILM COATED ORAL NIGHTLY
Qty: 90 TABLET | Refills: 1 | OUTPATIENT
Start: 2024-07-15

## 2024-07-15 NOTE — TELEPHONE ENCOUNTER
Rx Refill Note    Requested Prescriptions     Pending Prescriptions Disp Refills    pantoprazole (PROTONIX) 40 MG EC tablet [Pharmacy Med Name: PANTOPRAZOLE SOD DR 40 MG TAB] 90 tablet 1     Sig: TAKE 1 TABLET BY MOUTH DAILY WITH BREAKFAST. INDICATIONS: HEARTBURN        Last office visit with prescribing clinician: 2/29/2024       Next office visit with prescribing clinician: 8/26/2024   {

## 2024-07-15 NOTE — TELEPHONE ENCOUNTER
Rx Refill Note  Requested Prescriptions     Pending Prescriptions Disp Refills    QUEtiapine (SEROquel) 25 MG tablet [Pharmacy Med Name: QUETIAPINE FUMARATE 25 MG TAB] 360 tablet 3     Sig: TAKE 1-2 TABLETS IN AM AND 2 TABLETS IN PM INDICATIONS: BEHAVIORAL DISORDERS ASSOCIATED WITH DEMENTIA      Last office visit with prescribing clinician: 10/27/2023   Last telemedicine visit with prescribing clinician: Visit date not found   Next office visit with prescribing clinician:  NO F/U SCHEDULED                        Would you like a call back once the refill request has been completed: [] Yes [] No    If the office needs to give you a call back, can they leave a voicemail: [] Yes [] No    Beth Adams CMA  07/15/24, 11:53 EDT

## 2024-08-26 ENCOUNTER — OFFICE VISIT (OUTPATIENT)
Dept: ENDOCRINOLOGY | Facility: CLINIC | Age: 79
End: 2024-08-26
Payer: MEDICARE

## 2024-08-26 VITALS
WEIGHT: 122.8 LBS | HEART RATE: 79 BPM | DIASTOLIC BLOOD PRESSURE: 72 MMHG | SYSTOLIC BLOOD PRESSURE: 124 MMHG | HEIGHT: 63 IN | OXYGEN SATURATION: 98 % | BODY MASS INDEX: 21.76 KG/M2

## 2024-08-26 DIAGNOSIS — I10 BENIGN ESSENTIAL HYPERTENSION: Primary | ICD-10-CM

## 2024-08-26 DIAGNOSIS — E78.00 PURE HYPERCHOLESTEROLEMIA: ICD-10-CM

## 2024-08-26 DIAGNOSIS — E03.9 ACQUIRED HYPOTHYROIDISM: ICD-10-CM

## 2024-08-26 PROBLEM — R39.9 SYMPTOMS OF URINARY TRACT INFECTION: Status: ACTIVE | Noted: 2024-08-26

## 2024-08-26 PROBLEM — R30.0 DIFFICULT OR PAINFUL URINATION: Status: ACTIVE | Noted: 2024-08-26

## 2024-08-26 PROBLEM — R29.898 RIGHT ARM WEAKNESS: Status: ACTIVE | Noted: 2024-08-26

## 2024-08-26 PROCEDURE — 1160F RVW MEDS BY RX/DR IN RCRD: CPT | Performed by: INTERNAL MEDICINE

## 2024-08-26 PROCEDURE — 3078F DIAST BP <80 MM HG: CPT | Performed by: INTERNAL MEDICINE

## 2024-08-26 PROCEDURE — 99214 OFFICE O/P EST MOD 30 MIN: CPT | Performed by: INTERNAL MEDICINE

## 2024-08-26 PROCEDURE — 1159F MED LIST DOCD IN RCRD: CPT | Performed by: INTERNAL MEDICINE

## 2024-08-26 PROCEDURE — 3074F SYST BP LT 130 MM HG: CPT | Performed by: INTERNAL MEDICINE

## 2024-08-26 NOTE — PROGRESS NOTES
Brittny Nicolas 79 y.o.  CC:follow up hypothyroid, hypertension, hyperlipidemia    [place]: follow up hypothyroid, hypertension, hyperlipidemia    Interim - less appetite, sleeping more   Has hospice care at home  No c/o discomfort or problems with sleep  Daughter is using milkshakes to supplement calories   Discontinued norvasc and lipitor     Allergies   Allergen Reactions    Boniva [Ibandronic Acid] GI Intolerance     Refractory gerd and abdominal pain        Current Outpatient Medications:     acetaminophen (TYLENOL) 325 MG tablet, Take 2 tablets by mouth Every 4 (Four) Hours As Needed for Mild Pain ., Disp: , Rfl:     amLODIPine (NORVASC) 5 MG tablet, TAKE 1 TABLET BY MOUTH EVERY DAY, Disp: 90 tablet, Rfl: 0    Cholecalciferol (VITAMIN D) 2000 UNITS capsule, Take 1 capsule by mouth Daily. Indications: Vitamin D Deficiency, Disp: , Rfl:     Cholecalciferol 25 MCG (1000 UT) capsule, Take  by mouth., Disp: , Rfl:     donepezil (ARICEPT) 10 MG tablet, Take 1 tablet by mouth Every Night. Indications: Alzheimer's Disease, Disp: 90 tablet, Rfl: 3    levothyroxine (SYNTHROID, LEVOTHROID) 125 MCG tablet, TAKE 1 TABLET BY MOUTH EVERY DAY, Disp: 90 tablet, Rfl: 1    memantine (NAMENDA) 10 MG tablet, Take 1 tablet by mouth 2 (Two) Times a Day. Indications: Alzheimer's Disease, Disp: 180 tablet, Rfl: 3    memantine (NAMENDA) 10 MG tablet, Take  by mouth Daily., Disp: , Rfl:     pantoprazole (PROTONIX) 40 MG EC tablet, TAKE 1 TABLET BY MOUTH DAILY WITH BREAKFAST. INDICATIONS: HEARTBURN, Disp: 90 tablet, Rfl: 1    polyethylene glycol (MIRALAX) 17 g packet, Take 17 g by mouth Daily. (Patient taking differently: Take 17 g by mouth As Needed (GI). Indications: Constipation), Disp: , Rfl:     QUEtiapine (SEROquel) 25 MG tablet, Take 2 tablets by mouth Every Night. Indications: Behavioral Disorders associated with Dementia, Disp: 180 tablet, Rfl: 3  Patient Active Problem List    Diagnosis     Difficult or painful urination [R30.0]      Right arm weakness [R29.898]     Symptoms of urinary tract infection [R39.9]     Hyperlipidemia [E78.5]     Altered level of consciousness [R40.4]     Malaise and fatigue [R53.81, R53.83]     Senile debility [R54]     Moderate major depression [F32.1]     Thyroid mass [E07.9]     Aspiration into airway [T17.908A]     Visual loss [H54.7]     Nocturnal enuresis [N39.44]     Alzheimer's disease, unspecified [G30.9, F02.80]     Influenza A with respiratory manifestations [J10.1]     Anxiety and depression [F41.9, F32.A]     Anemia [D64.9]     Oropharyngeal dysphagia [R13.12]     Hyponatremia [E87.1]     Late onset Alzheimer's dementia with behavioral disturbance [G30.1, F02.818]     Rib pain on left side [R07.81]     Memory loss [R41.3]     Pharyngitis [J02.9]     Left lower quadrant pain [R10.32]     Acute pain of left shoulder [M25.512]     Erosive esophagitis [K22.10]     H/O colonoscopy [Z98.890]     Fatigue [R53.83]     Hypothyroidism [E03.9]     Achalasia [K22.0]     Acute upper respiratory infection [J06.9]     Atypical chest pain [R07.89]     Benign essential hypertension [I10]     Carotid artery stenosis [I65.29]     Cataract [H26.9]     Cough [R05.9]     Acute cystitis [N30.00]     Screening for malignant neoplasm [Z12.9]     Gastroesophageal reflux disease [K21.9]     Gastrointestinal bleeding [K92.2]     Inguinal hernia [K40.90]     Insomnia [G47.00]     Leg cramps [R25.2]     Meniere's disease [H81.09]     Night sweats [R61]     Osteoporosis [M81.0]     Peripheral neuropathy [G62.9]     Retinal artery occlusion [H34.9]     Rheumatoid arthritis [M06.9]     Stroke syndrome [RQU0928]     Suprapubic pain [R10.2]     Transient ischemic attack [G45.9]     Positive reaction to tuberculin skin test [R76.11]     Vitamin D deficiency [E55.9]     Weight loss [R63.4]      Review of Systems   Constitutional:  Positive for activity change, appetite change and unexpected weight change.   HENT:  Negative for congestion  "and rhinorrhea.    Eyes:  Negative for discharge and visual disturbance.   Respiratory:  Negative for shortness of breath.    Cardiovascular:  Negative for chest pain.   Genitourinary:  Positive for dysuria (awaiting UA results).   Psychiatric/Behavioral:  Positive for confusion. Negative for agitation and behavioral problems.      Social History     Socioeconomic History    Marital status:    Tobacco Use    Smoking status: Former     Current packs/day: 0.00     Average packs/day: 1 pack/day for 15.0 years (15.0 ttl pk-yrs)     Types: Cigarettes     Start date: 10/4/1985     Quit date: 10/4/2000     Years since quittin.9     Passive exposure: Never    Smokeless tobacco: Never    Tobacco comments:     Do not know when she quit. Likely about .   Vaping Use    Vaping status: Never Used   Substance and Sexual Activity    Alcohol use: Not Currently     Comment: She might have some wine, but I try to not give her any    Drug use: No    Sexual activity: Not Currently     Birth control/protection: Post-menopausal     Family History   Problem Relation Age of Onset    Cystic fibrosis Other     Cervical cancer Mother     Cancer Paternal Grandmother     Dementia Paternal Grandmother     Colon cancer Neg Hx     Colon polyps Neg Hx      /72   Pulse 79   Ht 160 cm (62.99\")   Wt 55.7 kg (122 lb 12.8 oz)   SpO2 98%   BMI 21.76 kg/m²   Physical Exam  Vitals and nursing note reviewed.   Constitutional:       Appearance: She is well-developed.   HENT:      Head: Normocephalic and atraumatic.   Eyes:      General: Lids are normal.      Conjunctiva/sclera: Conjunctivae normal.      Pupils: Pupils are equal, round, and reactive to light.   Neck:      Thyroid: No thyroid mass or thyromegaly.      Vascular: No carotid bruit.      Trachea: Trachea normal. No tracheal deviation.   Cardiovascular:      Rate and Rhythm: Normal rate and regular rhythm.      Heart sounds: Normal heart sounds. No murmur heard.     No " friction rub. No gallop.   Pulmonary:      Effort: Pulmonary effort is normal. No respiratory distress.      Breath sounds: Normal breath sounds. No wheezing.   Musculoskeletal:         General: No deformity. Normal range of motion.      Cervical back: Normal range of motion and neck supple.   Lymphadenopathy:      Cervical: No cervical adenopathy.   Skin:     General: Skin is warm and dry.      Findings: No erythema or rash.      Nails: There is no clubbing.   Neurological:      General: No focal deficit present.      Mental Status: She is alert.      Cranial Nerves: No cranial nerve deficit.      Deep Tendon Reflexes: Reflexes are normal and symmetric. Reflexes normal.   Psychiatric:         Speech: Speech normal.         Behavior: Behavior normal.       Results for orders placed or performed in visit on 02/29/24   Comprehensive Metabolic Panel    Specimen: Blood   Result Value Ref Range    Glucose 79 65 - 99 mg/dL    BUN 15 8 - 23 mg/dL    Creatinine 0.97 0.57 - 1.00 mg/dL    Sodium 141 136 - 145 mmol/L    Potassium 3.6 3.5 - 5.2 mmol/L    Chloride 104 98 - 107 mmol/L    CO2 23.2 22.0 - 29.0 mmol/L    Calcium 9.5 8.6 - 10.5 mg/dL    Total Protein 7.3 6.0 - 8.5 g/dL    Albumin 4.2 3.5 - 5.2 g/dL    ALT (SGPT) 11 1 - 33 U/L    AST (SGOT) 19 1 - 32 U/L    Alkaline Phosphatase 97 39 - 117 U/L    Total Bilirubin 0.6 0.0 - 1.2 mg/dL    Globulin 3.1 gm/dL    A/G Ratio 1.4 g/dL    BUN/Creatinine Ratio 15.5 7.0 - 25.0    Anion Gap 13.8 5.0 - 15.0 mmol/L    eGFR 59.2 (L) >60.0 mL/min/1.73   Lipid Panel    Specimen: Blood   Result Value Ref Range    Total Cholesterol 122 0 - 200 mg/dL    Triglycerides 64 0 - 150 mg/dL    HDL Cholesterol 60 40 - 60 mg/dL    LDL Cholesterol  48 0 - 100 mg/dL    VLDL Cholesterol 14 5 - 40 mg/dL    LDL/HDL Ratio 0.82    TSH    Specimen: Blood   Result Value Ref Range    TSH 1.050 0.270 - 4.200 uIU/mL   T4, Free    Specimen: Blood   Result Value Ref Range    Free T4 1.80 (H) 0.93 - 1.70 ng/dL    Vitamin D,25-Hydroxy    Specimen: Arm, Left; Blood   Result Value Ref Range    25 Hydroxy, Vitamin D 61.0 30.0 - 100.0 ng/ml     Diagnoses and all orders for this visit:    1. Benign essential hypertension (Primary)  Assessment & Plan:  BP controlled off medication   Continue monitoring       2. Acquired hypothyroidism  Assessment & Plan:  Normal TSH 2/24  Continue current supplement   Update levels annually         3. Pure hypercholesterolemia  Assessment & Plan:  Ldl 48 last visit  Off statin now       Return in about 6 months (around 2/26/2025) for Recheck.    Electronically signed by: Dalila Liz MD

## 2024-10-23 RX ORDER — LEVOTHYROXINE SODIUM 125 UG/1
TABLET ORAL
Qty: 90 TABLET | Refills: 1 | Status: SHIPPED | OUTPATIENT
Start: 2024-10-23

## 2024-10-23 NOTE — TELEPHONE ENCOUNTER
Rx Refill Note  Requested Prescriptions     Pending Prescriptions Disp Refills    levothyroxine (SYNTHROID, LEVOTHROID) 125 MCG tablet [Pharmacy Med Name: LEVOTHYROXINE 125 MCG TABLET] 90 tablet 1     Sig: TAKE 1 TABLET BY MOUTH EVERY DAY      Last office visit with prescribing clinician: 8/26/2024   Last telemedicine visit with prescribing clinician: Visit date not found   Next office visit with prescribing clinician: 2/24/2025                         Would you like a call back once the refill request has been completed: [] Yes [] No    If the office needs to give you a call back, can they leave a voicemail: [] Yes [] No    Adalgisa Kaufman MA  10/23/24, 13:33 EDT

## 2025-02-17 ENCOUNTER — TELEPHONE (OUTPATIENT)
Dept: ENDOCRINOLOGY | Facility: CLINIC | Age: 80
End: 2025-02-17

## 2025-02-17 NOTE — TELEPHONE ENCOUNTER
“Please be informed that patient has passed. Patient has been marked  in the system. The date of death is: 24.    Caller: Adia Tatum    Relationship: Emergency Contact    Best call back number: [phone]    Did the patient have surgery within 30 days of their passing (Y/N):

## (undated) DEVICE — SPNG VERSALON 4X4 4PLY NONSTRL LF BG/200

## (undated) DEVICE — ADAPT CLN LUM OLYMP AIR/H20

## (undated) DEVICE — CONTN GRAD MEAS TRIANG 32OZ BLK

## (undated) DEVICE — KT ORCA ORCAPOD DISP STRL

## (undated) DEVICE — SOLIDIFIER LIQ PREMISORB 1500CC

## (undated) DEVICE — SOL IRR H2O BTL 1000ML STRL

## (undated) DEVICE — DEV INFL CRE STERIFLATE 60CC DISP

## (undated) DEVICE — LUBE GEL ENDOGLIDE 1.1OZ

## (undated) DEVICE — SYR LUERLOK 50ML

## (undated) DEVICE — ESOPHAGEAL WIREGUIDED BALLOON DILATATION CATHETER: Brand: CRE WIREGUIDED

## (undated) DEVICE — INTRO ACCSR BLNT TP

## (undated) DEVICE — FIRST STEP BEDSIDE ADD WATER KIT - RESEALABLE STAND-UP POUCH, ENDOSCOPIC CLEANING PAD - 1 POUCH: Brand: FIRST STEP BEDSIDE ADD WATER KIT - RESEALABLE STAND-UP POUCH, ENDOSCOPIC CLEANIN

## (undated) DEVICE — SAFELINER SUCTION CANISTER 1000CC: Brand: DEROYAL

## (undated) DEVICE — HYBRID CO2 TUBING/CAP SET FOR OLYMPUS® SCOPES & CO2 SOURCE: Brand: ERBE

## (undated) DEVICE — TUBING, SUCTION, 1/4" X 10', STRAIGHT: Brand: MEDLINE

## (undated) DEVICE — THE BITE BLOCK MAXI, LATEX FREE STRAP IS USED TO PROTECT THE ENDOSCOPE INSERTION TUBE FROM BEING BITTEN BY THE PATIENT.